# Patient Record
Sex: MALE | Race: WHITE | NOT HISPANIC OR LATINO | Employment: OTHER | URBAN - METROPOLITAN AREA
[De-identification: names, ages, dates, MRNs, and addresses within clinical notes are randomized per-mention and may not be internally consistent; named-entity substitution may affect disease eponyms.]

---

## 2017-05-19 ENCOUNTER — TRANSCRIBE ORDERS (OUTPATIENT)
Dept: ADMINISTRATIVE | Facility: HOSPITAL | Age: 72
End: 2017-05-19

## 2017-05-19 DIAGNOSIS — M19.012 PRIMARY OSTEOARTHRITIS OF LEFT SHOULDER: Primary | ICD-10-CM

## 2017-05-29 ENCOUNTER — HOSPITAL ENCOUNTER (OUTPATIENT)
Dept: CT IMAGING | Facility: HOSPITAL | Age: 72
Discharge: HOME | End: 2017-05-29
Attending: ORTHOPAEDIC SURGERY | Admitting: ORTHOPAEDIC SURGERY

## 2017-05-29 ENCOUNTER — HOSPITAL ENCOUNTER (OUTPATIENT)
Dept: GENERAL RADIOLOGY | Facility: HOSPITAL | Age: 72
Discharge: HOME | End: 2017-05-29
Admitting: ORTHOPAEDIC SURGERY

## 2017-05-29 ENCOUNTER — APPOINTMENT (OUTPATIENT)
Dept: PREADMISSION TESTING | Facility: HOSPITAL | Age: 72
End: 2017-05-29

## 2017-05-29 VITALS — BODY MASS INDEX: 44.44 KG/M2 | WEIGHT: 293.21 LBS | HEIGHT: 68 IN

## 2017-05-29 DIAGNOSIS — M19.012 PRIMARY OSTEOARTHRITIS OF LEFT SHOULDER: ICD-10-CM

## 2017-05-29 LAB
ANION GAP SERPL CALCULATED.3IONS-SCNC: 4 MMOL/L (ref 3–11)
BUN BLD-MCNC: 21 MG/DL (ref 9–23)
BUN/CREAT SERPL: 21 (ref 7–25)
CALCIUM SPEC-SCNC: 10.1 MG/DL (ref 8.7–10.4)
CHLORIDE SERPL-SCNC: 104 MMOL/L (ref 99–109)
CO2 SERPL-SCNC: 33 MMOL/L (ref 20–31)
CREAT BLD-MCNC: 1 MG/DL (ref 0.6–1.3)
DEPRECATED RDW RBC AUTO: 47.8 FL (ref 37–54)
ERYTHROCYTE [DISTWIDTH] IN BLOOD BY AUTOMATED COUNT: 13.8 % (ref 11.3–14.5)
GFR SERPL CREATININE-BSD FRML MDRD: 73 ML/MIN/1.73
GLUCOSE BLD-MCNC: 100 MG/DL (ref 70–100)
HBA1C MFR BLD: 5.6 % (ref 4.8–5.6)
HCT VFR BLD AUTO: 40.8 % (ref 38.9–50.9)
HGB BLD-MCNC: 12.5 G/DL (ref 13.1–17.5)
MCH RBC QN AUTO: 28.9 PG (ref 27–31)
MCHC RBC AUTO-ENTMCNC: 30.6 G/DL (ref 32–36)
MCV RBC AUTO: 94.4 FL (ref 80–99)
PLATELET # BLD AUTO: 258 10*3/MM3 (ref 150–450)
PMV BLD AUTO: 9.3 FL (ref 6–12)
POTASSIUM BLD-SCNC: 4.7 MMOL/L (ref 3.5–5.5)
RBC # BLD AUTO: 4.32 10*6/MM3 (ref 4.2–5.76)
SODIUM BLD-SCNC: 141 MMOL/L (ref 132–146)
WBC NRBC COR # BLD: 6.25 10*3/MM3 (ref 3.5–10.8)

## 2017-05-29 PROCEDURE — 80048 BASIC METABOLIC PNL TOTAL CA: CPT | Performed by: ORTHOPAEDIC SURGERY

## 2017-05-29 PROCEDURE — 36415 COLL VENOUS BLD VENIPUNCTURE: CPT

## 2017-05-29 PROCEDURE — 73200 CT UPPER EXTREMITY W/O DYE: CPT

## 2017-05-29 PROCEDURE — 85027 COMPLETE CBC AUTOMATED: CPT | Performed by: ORTHOPAEDIC SURGERY

## 2017-05-29 PROCEDURE — 71020 HC CHEST PA AND LATERAL: CPT

## 2017-05-29 PROCEDURE — 93010 ELECTROCARDIOGRAM REPORT: CPT | Performed by: INTERNAL MEDICINE

## 2017-05-29 PROCEDURE — 83036 HEMOGLOBIN GLYCOSYLATED A1C: CPT | Performed by: ORTHOPAEDIC SURGERY

## 2017-05-29 PROCEDURE — 93005 ELECTROCARDIOGRAM TRACING: CPT

## 2017-05-29 RX ORDER — ALLOPURINOL 100 MG/1
100 TABLET ORAL DAILY
COMMUNITY

## 2017-05-29 RX ORDER — TELMISARTAN 80 MG/1
80 TABLET ORAL DAILY
COMMUNITY

## 2017-05-29 RX ORDER — ASPIRIN 325 MG
325 TABLET ORAL DAILY
COMMUNITY

## 2017-05-29 RX ORDER — PRAVASTATIN SODIUM 80 MG/1
80 TABLET ORAL DAILY
COMMUNITY

## 2017-05-29 RX ORDER — POTASSIUM CHLORIDE 600 MG/1
8 TABLET, FILM COATED, EXTENDED RELEASE ORAL EVERY OTHER DAY
COMMUNITY

## 2017-05-29 RX ORDER — LORAZEPAM 0.5 MG/1
0.5 TABLET ORAL EVERY 8 HOURS PRN
COMMUNITY

## 2017-05-29 RX ORDER — THIAMINE MONONITRATE (VIT B1) 100 MG
100 TABLET ORAL DAILY
COMMUNITY

## 2017-05-29 RX ORDER — HYDROCODONE BITARTRATE AND ACETAMINOPHEN 10; 325 MG/1; MG/1
1 TABLET ORAL EVERY 6 HOURS PRN
COMMUNITY
End: 2017-06-13 | Stop reason: HOSPADM

## 2017-05-29 RX ORDER — FUROSEMIDE 40 MG/1
40 TABLET ORAL DAILY
COMMUNITY

## 2017-06-11 ENCOUNTER — ANESTHESIA EVENT (OUTPATIENT)
Dept: PERIOP | Facility: HOSPITAL | Age: 72
End: 2017-06-11

## 2017-06-11 RX ORDER — FAMOTIDINE 10 MG/ML
20 INJECTION, SOLUTION INTRAVENOUS ONCE
Status: CANCELLED | OUTPATIENT
Start: 2017-06-12 | End: 2017-06-12

## 2017-06-12 ENCOUNTER — APPOINTMENT (OUTPATIENT)
Dept: GENERAL RADIOLOGY | Facility: HOSPITAL | Age: 72
End: 2017-06-12

## 2017-06-12 ENCOUNTER — ANESTHESIA (OUTPATIENT)
Dept: PERIOP | Facility: HOSPITAL | Age: 72
End: 2017-06-12

## 2017-06-12 ENCOUNTER — HOSPITAL ENCOUNTER (INPATIENT)
Facility: HOSPITAL | Age: 72
LOS: 1 days | Discharge: HOME | End: 2017-06-13
Attending: ORTHOPAEDIC SURGERY | Admitting: ORTHOPAEDIC SURGERY

## 2017-06-12 DIAGNOSIS — Z78.9 IMPAIRED MOBILITY AND ADLS: Primary | ICD-10-CM

## 2017-06-12 DIAGNOSIS — Z74.09 IMPAIRED MOBILITY AND ADLS: Primary | ICD-10-CM

## 2017-06-12 PROBLEM — M19.019 PRIMARY LOCALIZED OSTEOARTHROSIS OF SHOULDER REGION: Status: ACTIVE | Noted: 2017-06-12

## 2017-06-12 PROBLEM — M25.512 ACUTE PAIN OF LEFT SHOULDER: Status: ACTIVE | Noted: 2017-06-12

## 2017-06-12 PROBLEM — I10 HYPERTENSION: Status: ACTIVE | Noted: 2017-06-12

## 2017-06-12 LAB
GLUCOSE BLDC GLUCOMTR-MCNC: 119 MG/DL (ref 70–130)
POTASSIUM BLDA-SCNC: 4.14 MMOL/L (ref 3.5–5.3)

## 2017-06-12 PROCEDURE — 25010000002 FENTANYL CITRATE (PF) 100 MCG/2ML SOLUTION: Performed by: ANESTHESIOLOGY

## 2017-06-12 PROCEDURE — 0LS40ZZ REPOSITION LEFT UPPER ARM TENDON, OPEN APPROACH: ICD-10-PCS | Performed by: ORTHOPAEDIC SURGERY

## 2017-06-12 PROCEDURE — 97166 OT EVAL MOD COMPLEX 45 MIN: CPT | Performed by: OCCUPATIONAL THERAPIST

## 2017-06-12 PROCEDURE — C1713 ANCHOR/SCREW BN/BN,TIS/BN: HCPCS | Performed by: ORTHOPAEDIC SURGERY

## 2017-06-12 PROCEDURE — 99222 1ST HOSP IP/OBS MODERATE 55: CPT | Performed by: NURSE PRACTITIONER

## 2017-06-12 PROCEDURE — C1776 JOINT DEVICE (IMPLANTABLE): HCPCS | Performed by: ORTHOPAEDIC SURGERY

## 2017-06-12 PROCEDURE — 25010000002 ROPIVACAINE PER 1 MG: Performed by: NURSE ANESTHETIST, CERTIFIED REGISTERED

## 2017-06-12 PROCEDURE — 94799 UNLISTED PULMONARY SVC/PX: CPT

## 2017-06-12 PROCEDURE — 25010000002 ONDANSETRON PER 1 MG: Performed by: NURSE ANESTHETIST, CERTIFIED REGISTERED

## 2017-06-12 PROCEDURE — 25010000002 FENTANYL CITRATE (PF) 100 MCG/2ML SOLUTION: Performed by: NURSE ANESTHETIST, CERTIFIED REGISTERED

## 2017-06-12 PROCEDURE — 25010000002 NEOSTIGMINE 10 MG/10ML SOLUTION: Performed by: NURSE ANESTHETIST, CERTIFIED REGISTERED

## 2017-06-12 PROCEDURE — 73030 X-RAY EXAM OF SHOULDER: CPT

## 2017-06-12 PROCEDURE — 25010000002 PROPOFOL 10 MG/ML EMULSION: Performed by: NURSE ANESTHETIST, CERTIFIED REGISTERED

## 2017-06-12 PROCEDURE — 82962 GLUCOSE BLOOD TEST: CPT

## 2017-06-12 PROCEDURE — 25010000002 MIDAZOLAM PER 1 MG: Performed by: NURSE ANESTHETIST, CERTIFIED REGISTERED

## 2017-06-12 PROCEDURE — 0RRK0JZ REPLACEMENT OF LEFT SHOULDER JOINT WITH SYNTHETIC SUBSTITUTE, OPEN APPROACH: ICD-10-PCS | Performed by: ORTHOPAEDIC SURGERY

## 2017-06-12 PROCEDURE — 25010000002 VANCOMYCIN HCL IN NACL 2-0.9 GM/500ML-% SOLUTION: Performed by: ORTHOPAEDIC SURGERY

## 2017-06-12 PROCEDURE — 25010000002 VANCOMYCIN: Performed by: ORTHOPAEDIC SURGERY

## 2017-06-12 PROCEDURE — 97530 THERAPEUTIC ACTIVITIES: CPT | Performed by: OCCUPATIONAL THERAPIST

## 2017-06-12 PROCEDURE — 84132 ASSAY OF SERUM POTASSIUM: CPT | Performed by: ANESTHESIOLOGY

## 2017-06-12 PROCEDURE — 25010000002 DEXAMETHASONE PER 1 MG: Performed by: NURSE ANESTHETIST, CERTIFIED REGISTERED

## 2017-06-12 DEVICE — STEM HUM PRI EQUINX PF 11MM: Type: IMPLANTABLE DEVICE | Site: HUMERUS | Status: FUNCTIONAL

## 2017-06-12 DEVICE — HD HUM EQUINOXE SHT 50MM: Type: IMPLANTABLE DEVICE | Status: FUNCTIONAL

## 2017-06-12 DEVICE — PLT HUM EQUINOXE REPLICAT OFFST 4.5: Type: IMPLANTABLE DEVICE | Site: SHOULDER | Status: FUNCTIONAL

## 2017-06-12 DEVICE — SMARTSET HIGH PERFORMANCE MV MEDIUM VISCOSITY BONE CEMENT 40G
Type: IMPLANTABLE DEVICE | Site: SHOULDER | Status: FUNCTIONAL
Brand: SMARTSET

## 2017-06-12 DEVICE — IMPLANTABLE DEVICE: Type: IMPLANTABLE DEVICE | Site: SHOULDER | Status: FUNCTIONAL

## 2017-06-12 DEVICE — SCRW EQUINOXE TORQ DEFINE KT SQ: Type: IMPLANTABLE DEVICE | Site: SHOULDER | Status: FUNCTIONAL

## 2017-06-12 DEVICE — TOTL SHLDR ARTHROPLASTY SYS: Type: IMPLANTABLE DEVICE | Status: FUNCTIONAL

## 2017-06-12 RX ORDER — SODIUM CHLORIDE 450 MG/100ML
50 INJECTION, SOLUTION INTRAVENOUS CONTINUOUS
Status: DISCONTINUED | OUTPATIENT
Start: 2017-06-12 | End: 2017-06-13

## 2017-06-12 RX ORDER — SENNA AND DOCUSATE SODIUM 50; 8.6 MG/1; MG/1
2 TABLET, FILM COATED ORAL 2 TIMES DAILY
Status: DISCONTINUED | OUTPATIENT
Start: 2017-06-12 | End: 2017-06-13 | Stop reason: HOSPADM

## 2017-06-12 RX ORDER — FENTANYL CITRATE 50 UG/ML
INJECTION, SOLUTION INTRAMUSCULAR; INTRAVENOUS AS NEEDED
Status: DISCONTINUED | OUTPATIENT
Start: 2017-06-12 | End: 2017-06-12 | Stop reason: SURG

## 2017-06-12 RX ORDER — DEXAMETHASONE SODIUM PHOSPHATE 10 MG/ML
INJECTION INTRAMUSCULAR; INTRAVENOUS AS NEEDED
Status: DISCONTINUED | OUTPATIENT
Start: 2017-06-12 | End: 2017-06-12 | Stop reason: SURG

## 2017-06-12 RX ORDER — OXYCODONE HYDROCHLORIDE AND ACETAMINOPHEN 5; 325 MG/1; MG/1
1 TABLET ORAL ONCE
Status: COMPLETED | OUTPATIENT
Start: 2017-06-12 | End: 2017-06-12

## 2017-06-12 RX ORDER — ATRACURIUM BESYLATE 10 MG/ML
INJECTION, SOLUTION INTRAVENOUS AS NEEDED
Status: DISCONTINUED | OUTPATIENT
Start: 2017-06-12 | End: 2017-06-12 | Stop reason: SURG

## 2017-06-12 RX ORDER — ROCURONIUM BROMIDE 10 MG/ML
INJECTION, SOLUTION INTRAVENOUS AS NEEDED
Status: DISCONTINUED | OUTPATIENT
Start: 2017-06-12 | End: 2017-06-12 | Stop reason: SURG

## 2017-06-12 RX ORDER — PROPOFOL 10 MG/ML
VIAL (ML) INTRAVENOUS AS NEEDED
Status: DISCONTINUED | OUTPATIENT
Start: 2017-06-12 | End: 2017-06-12 | Stop reason: SURG

## 2017-06-12 RX ORDER — SODIUM CHLORIDE 0.9 % (FLUSH) 0.9 %
1-10 SYRINGE (ML) INJECTION AS NEEDED
Status: DISCONTINUED | OUTPATIENT
Start: 2017-06-12 | End: 2017-06-12 | Stop reason: HOSPADM

## 2017-06-12 RX ORDER — FENTANYL CITRATE 50 UG/ML
50 INJECTION, SOLUTION INTRAMUSCULAR; INTRAVENOUS
Status: DISCONTINUED | OUTPATIENT
Start: 2017-06-12 | End: 2017-06-12

## 2017-06-12 RX ORDER — BISACODYL 5 MG/1
10 TABLET, DELAYED RELEASE ORAL DAILY PRN
Status: DISCONTINUED | OUTPATIENT
Start: 2017-06-12 | End: 2017-06-13 | Stop reason: HOSPADM

## 2017-06-12 RX ORDER — MIDAZOLAM HYDROCHLORIDE 1 MG/ML
INJECTION INTRAMUSCULAR; INTRAVENOUS AS NEEDED
Status: DISCONTINUED | OUTPATIENT
Start: 2017-06-12 | End: 2017-06-12 | Stop reason: SURG

## 2017-06-12 RX ORDER — NALOXONE HCL 0.4 MG/ML
0.1 VIAL (ML) INJECTION
Status: DISCONTINUED | OUTPATIENT
Start: 2017-06-12 | End: 2017-06-13 | Stop reason: HOSPADM

## 2017-06-12 RX ORDER — GLYCOPYRROLATE 0.2 MG/ML
INJECTION INTRAMUSCULAR; INTRAVENOUS AS NEEDED
Status: DISCONTINUED | OUTPATIENT
Start: 2017-06-12 | End: 2017-06-12 | Stop reason: SURG

## 2017-06-12 RX ORDER — DOCUSATE SODIUM 100 MG/1
100 CAPSULE, LIQUID FILLED ORAL 2 TIMES DAILY PRN
Status: DISCONTINUED | OUTPATIENT
Start: 2017-06-12 | End: 2017-06-13 | Stop reason: HOSPADM

## 2017-06-12 RX ORDER — HYDROMORPHONE HYDROCHLORIDE 1 MG/ML
0.5 INJECTION, SOLUTION INTRAMUSCULAR; INTRAVENOUS; SUBCUTANEOUS
Status: DISCONTINUED | OUTPATIENT
Start: 2017-06-12 | End: 2017-06-13 | Stop reason: HOSPADM

## 2017-06-12 RX ORDER — MELOXICAM 15 MG/1
15 TABLET ORAL DAILY
COMMUNITY
End: 2017-06-13 | Stop reason: HOSPADM

## 2017-06-12 RX ORDER — VANCOMYCIN 2 GRAM/500 ML IN 0.9 % SODIUM CHLORIDE INTRAVENOUS
15 ONCE
Status: COMPLETED | OUTPATIENT
Start: 2017-06-12 | End: 2017-06-12

## 2017-06-12 RX ORDER — ATORVASTATIN CALCIUM 20 MG/1
20 TABLET, FILM COATED ORAL NIGHTLY
Status: DISCONTINUED | OUTPATIENT
Start: 2017-06-12 | End: 2017-06-13 | Stop reason: HOSPADM

## 2017-06-12 RX ORDER — ROPIVACAINE HYDROCHLORIDE 2 MG/ML
INJECTION, SOLUTION EPIDURAL; INFILTRATION; PERINEURAL AS NEEDED
Status: DISCONTINUED | OUTPATIENT
Start: 2017-06-12 | End: 2017-06-12 | Stop reason: SURG

## 2017-06-12 RX ORDER — ROPIVACAINE HYDROCHLORIDE 2 MG/ML
6 INJECTION, SOLUTION EPIDURAL; INFILTRATION CONTINUOUS
Status: DISCONTINUED | OUTPATIENT
Start: 2017-06-12 | End: 2017-06-13 | Stop reason: HOSPADM

## 2017-06-12 RX ORDER — SODIUM CHLORIDE, SODIUM LACTATE, POTASSIUM CHLORIDE, CALCIUM CHLORIDE 600; 310; 30; 20 MG/100ML; MG/100ML; MG/100ML; MG/100ML
9 INJECTION, SOLUTION INTRAVENOUS CONTINUOUS
Status: DISCONTINUED | OUTPATIENT
Start: 2017-06-12 | End: 2017-06-13 | Stop reason: HOSPADM

## 2017-06-12 RX ORDER — ONDANSETRON 2 MG/ML
INJECTION INTRAMUSCULAR; INTRAVENOUS AS NEEDED
Status: DISCONTINUED | OUTPATIENT
Start: 2017-06-12 | End: 2017-06-12 | Stop reason: SURG

## 2017-06-12 RX ORDER — FAMOTIDINE 20 MG/1
20 TABLET, FILM COATED ORAL ONCE
Status: COMPLETED | OUTPATIENT
Start: 2017-06-12 | End: 2017-06-12

## 2017-06-12 RX ORDER — CEFAZOLIN SODIUM IN 0.9 % NACL 3 G/100 ML
3 INTRAVENOUS SOLUTION, PIGGYBACK (ML) INTRAVENOUS ONCE
Status: COMPLETED | OUTPATIENT
Start: 2017-06-12 | End: 2017-06-12

## 2017-06-12 RX ORDER — ALLOPURINOL 100 MG/1
100 TABLET ORAL DAILY
Status: DISCONTINUED | OUTPATIENT
Start: 2017-06-12 | End: 2017-06-13 | Stop reason: HOSPADM

## 2017-06-12 RX ORDER — NEOSTIGMINE METHYLSULFATE 1 MG/ML
INJECTION, SOLUTION INTRAVENOUS AS NEEDED
Status: DISCONTINUED | OUTPATIENT
Start: 2017-06-12 | End: 2017-06-12 | Stop reason: SURG

## 2017-06-12 RX ORDER — ACETAMINOPHEN 325 MG/1
650 TABLET ORAL ONCE
Status: COMPLETED | OUTPATIENT
Start: 2017-06-12 | End: 2017-06-12

## 2017-06-12 RX ORDER — LIDOCAINE HYDROCHLORIDE 10 MG/ML
0.5 INJECTION, SOLUTION EPIDURAL; INFILTRATION; INTRACAUDAL; PERINEURAL ONCE AS NEEDED
Status: COMPLETED | OUTPATIENT
Start: 2017-06-12 | End: 2017-06-12

## 2017-06-12 RX ORDER — MAGNESIUM HYDROXIDE 1200 MG/15ML
LIQUID ORAL AS NEEDED
Status: DISCONTINUED | OUTPATIENT
Start: 2017-06-12 | End: 2017-06-12 | Stop reason: HOSPADM

## 2017-06-12 RX ORDER — TELMISARTAN 40 MG/1
80 TABLET ORAL DAILY
Status: DISCONTINUED | OUTPATIENT
Start: 2017-06-12 | End: 2017-06-13 | Stop reason: HOSPADM

## 2017-06-12 RX ORDER — ESMOLOL HYDROCHLORIDE 10 MG/ML
INJECTION INTRAVENOUS AS NEEDED
Status: DISCONTINUED | OUTPATIENT
Start: 2017-06-12 | End: 2017-06-12 | Stop reason: SURG

## 2017-06-12 RX ORDER — ACETAMINOPHEN 650 MG
TABLET, EXTENDED RELEASE ORAL AS NEEDED
Status: DISCONTINUED | OUTPATIENT
Start: 2017-06-12 | End: 2017-06-12 | Stop reason: HOSPADM

## 2017-06-12 RX ORDER — THIAMINE MONONITRATE (VIT B1) 100 MG
100 TABLET ORAL DAILY
Status: DISCONTINUED | OUTPATIENT
Start: 2017-06-12 | End: 2017-06-13 | Stop reason: HOSPADM

## 2017-06-12 RX ORDER — OXYCODONE AND ACETAMINOPHEN 7.5; 325 MG/1; MG/1
1 TABLET ORAL EVERY 4 HOURS PRN
Status: DISCONTINUED | OUTPATIENT
Start: 2017-06-12 | End: 2017-06-13 | Stop reason: HOSPADM

## 2017-06-12 RX ORDER — LIDOCAINE HYDROCHLORIDE 20 MG/ML
INJECTION, SOLUTION INFILTRATION; PERINEURAL AS NEEDED
Status: DISCONTINUED | OUTPATIENT
Start: 2017-06-12 | End: 2017-06-12 | Stop reason: SURG

## 2017-06-12 RX ADMIN — Medication 3 G: at 08:57

## 2017-06-12 RX ADMIN — ACETAMINOPHEN 650 MG: 325 TABLET, FILM COATED ORAL at 12:51

## 2017-06-12 RX ADMIN — Medication 100 MG: at 17:13

## 2017-06-12 RX ADMIN — ROCURONIUM BROMIDE 50 MG: 10 INJECTION INTRAVENOUS at 09:16

## 2017-06-12 RX ADMIN — VANCOMYCIN HYDROCHLORIDE 2000 MG: 1 INJECTION, POWDER, LYOPHILIZED, FOR SOLUTION INTRAVENOUS at 20:24

## 2017-06-12 RX ADMIN — Medication 6 ML/HR: at 12:15

## 2017-06-12 RX ADMIN — NEOSTIGMINE METHYLSULFATE 2.5 MG: 1 INJECTION, SOLUTION INTRAVENOUS at 11:28

## 2017-06-12 RX ADMIN — FENTANYL CITRATE 100 MCG: 50 INJECTION, SOLUTION INTRAMUSCULAR; INTRAVENOUS at 08:25

## 2017-06-12 RX ADMIN — ATRACURIUM BESYLATE 10 MG: 10 INJECTION, SOLUTION INTRAVENOUS at 10:13

## 2017-06-12 RX ADMIN — FENTANYL CITRATE 50 MCG: 50 INJECTION, SOLUTION INTRAMUSCULAR; INTRAVENOUS at 12:28

## 2017-06-12 RX ADMIN — DEXAMETHASONE SODIUM PHOSPHATE 4 MG: 10 INJECTION INTRAMUSCULAR; INTRAVENOUS at 09:51

## 2017-06-12 RX ADMIN — SODIUM CHLORIDE 50 ML/HR: 4.5 INJECTION, SOLUTION INTRAVENOUS at 12:53

## 2017-06-12 RX ADMIN — Medication 2000 MG: at 09:11

## 2017-06-12 RX ADMIN — LIDOCAINE HYDROCHLORIDE 50 MG: 20 INJECTION, SOLUTION INFILTRATION; PERINEURAL at 09:15

## 2017-06-12 RX ADMIN — ROPIVACAINE HYDROCHLORIDE 15 ML: 2 INJECTION, SOLUTION EPIDURAL; INFILTRATION at 12:32

## 2017-06-12 RX ADMIN — LIDOCAINE HYDROCHLORIDE 0.5 ML: 10 INJECTION, SOLUTION EPIDURAL; INFILTRATION; INTRACAUDAL; PERINEURAL at 07:48

## 2017-06-12 RX ADMIN — ATRACURIUM BESYLATE 10 MG: 10 INJECTION, SOLUTION INTRAVENOUS at 09:49

## 2017-06-12 RX ADMIN — FAMOTIDINE 20 MG: 20 TABLET ORAL at 07:48

## 2017-06-12 RX ADMIN — SODIUM CHLORIDE, POTASSIUM CHLORIDE, SODIUM LACTATE AND CALCIUM CHLORIDE 9 ML/HR: 600; 310; 30; 20 INJECTION, SOLUTION INTRAVENOUS at 07:48

## 2017-06-12 RX ADMIN — ROBINUL 0.4 MG: 0.2 INJECTION INTRAMUSCULAR; INTRAVENOUS at 11:28

## 2017-06-12 RX ADMIN — ATORVASTATIN CALCIUM 20 MG: 20 TABLET, FILM COATED ORAL at 20:24

## 2017-06-12 RX ADMIN — MIDAZOLAM HYDROCHLORIDE 2 MG: 1 INJECTION, SOLUTION INTRAMUSCULAR; INTRAVENOUS at 08:25

## 2017-06-12 RX ADMIN — PROPOFOL 200 MG: 10 INJECTION, EMULSION INTRAVENOUS at 09:16

## 2017-06-12 RX ADMIN — ESMOLOL HYDROCHLORIDE 5 MG: 10 INJECTION, SOLUTION INTRAVENOUS at 10:22

## 2017-06-12 RX ADMIN — ATRACURIUM BESYLATE 5 MG: 10 INJECTION, SOLUTION INTRAVENOUS at 10:46

## 2017-06-12 RX ADMIN — FENTANYL CITRATE 50 MCG: 50 INJECTION, SOLUTION INTRAMUSCULAR; INTRAVENOUS at 12:15

## 2017-06-12 RX ADMIN — SERTRALINE 50 MG: 50 TABLET, FILM COATED ORAL at 17:13

## 2017-06-12 RX ADMIN — ESMOLOL HYDROCHLORIDE 10 MG: 10 INJECTION, SOLUTION INTRAVENOUS at 11:01

## 2017-06-12 RX ADMIN — ONDANSETRON 4 MG: 2 INJECTION INTRAMUSCULAR; INTRAVENOUS at 10:43

## 2017-06-12 RX ADMIN — OXYCODONE HYDROCHLORIDE AND ACETAMINOPHEN 1 TABLET: 7.5; 325 TABLET ORAL at 17:13

## 2017-06-12 RX ADMIN — TELMISARTAN 80 MG: 40 TABLET ORAL at 17:13

## 2017-06-12 RX ADMIN — OXYCODONE AND ACETAMINOPHEN 1 TABLET: 5; 325 TABLET ORAL at 12:50

## 2017-06-12 RX ADMIN — OXYCODONE HYDROCHLORIDE AND ACETAMINOPHEN 1 TABLET: 7.5; 325 TABLET ORAL at 21:40

## 2017-06-12 RX ADMIN — ALLOPURINOL 100 MG: 100 TABLET ORAL at 17:13

## 2017-06-12 RX ADMIN — Medication 2 TABLET: at 17:13

## 2017-06-12 NOTE — PLAN OF CARE
Problem: Inpatient Occupational Therapy  Goal: Range of Motion Goal LTG- OT  Outcome: Ongoing (interventions implemented as appropriate)    06/12/17 1501   Range of Motion OT LTG   Range of Motion Goal OT LTG, Date Established 06/12/17   Range of Motion Goal OT LTG, Time to Achieve 1 wk   Range of Motion Goal OT LTG, AROM Measure Pt and spouse will complete LUE HEP within physician parameters with min assist and min cues in preparation for safe DC home        Goal: Patient Education Goal LTG- OT  Outcome: Ongoing (interventions implemented as appropriate)    06/12/17 1501   Patient Education OT LTG   Patient Education OT LTG, Date Established 06/12/17   Patient Education OT LTG, Time to Achieve by discharge   Patient Education OT LTG, Education Type written program;HEP;precautions per surgeon;1 hand/mich technique;brace use/care;home safety;john/doff brace   Patient Education OT LTG, Education Understanding demonstrates adequately;verbalizes understanding       Goal: UB Dressing Goal LTG- OT  Outcome: Ongoing (interventions implemented as appropriate)    06/12/17 1501   UB Dressing OT LTG   UB Dressing Goal OT LTG, Date Established 06/12/17   UB Dressing Goal OT LTG, Time to Achieve 1 wk   UB Dressing Goal OT LTG, Activity Type Pt and spouse will don/doff LUE sling with min assist and min cues in preparation for safe DC home.

## 2017-06-12 NOTE — THERAPY EVALUATION
Acute Care - Occupational Therapy Initial Evaluation  [facility]     Patient Name: Ibrahima Nava  : 1945  MRN: [id number]  Today's Date: 2017  Onset of Illness/Injury or Date of Surgery Date: 17  Date of Referral to OT: 17  Referring Physician: Dr. Xavier    Admit Date: 2017       ICD-10-CM ICD-9-CM   1. Impaired mobility and ADLs Z74.09 799.89     Patient Active Problem List   Diagnosis   • Primary localized osteoarthrosis of shoulder region   • Hypertension   • Impaired physical mobility   • Acute pain of left shoulder     Past Medical History:   Diagnosis Date   • Anxiety    • Arthritis    • Hypertension     CONTROLLED WITH MEDS PER PT    • Shoulder pain, left    • Stroke     -- NO RESIDUAL PROBLEMS   • Tremor of right hand    • Wears dentures     UPPER AND LOWER PLATE      Past Surgical History:   Procedure Laterality Date   • CARDIAC CATHETERIZATION      NO CORONARY STENTS    • COLONOSCOPY     • TOTAL KNEE ARTHROPLASTY Right    • TOTAL SHOULDER REPLACEMENT Right           OT ASSESSMENT FLOWSHEET (last 72 hours)      OT Evaluation       17 1420 17 1413 17 1355 17 1321 17 1300    Rehab Evaluation    Document Type   evaluation;therapy note (daily note)  -AR      Subjective Information   no complaints;agree to therapy  -AR      Patient Effort, Rehab Treatment   excellent  -AR      Symptoms Noted During/After Treatment   none  -AR      General Information    Patient Profile Review   yes  -AR      Onset of Illness/Injury or Date of Surgery Date   17  -AR      Referring Physician   Dr. Xavier  -AR      General Observations   Pt supine, spouse at bedside  -AR      Pertinent History Of Current Problem   Pt is a 72 yom admitted for surgical management of progressive left shoulder pain and dysfunction that has failed to improve with conservative measures. Pt is POD#0 left TSA and OT orders indicate left shoudler PROM FE no limit, IR chest, ER  30; AROM elbow/wrist/hand and scapular retractions. Pt is R-hand dominant and underwent right TSA x 1 yr ago. PTA, pt reports difficulty sleeping d/t pain and has been sleeping in a recliner with minimal improvement. He was unable to loop a belt and had pain when securing safety belt in the car. 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   -AR      Precautions/Limitations   fall precautions;insensate limb;shoulder precautions;non-weight bearing status   NWB LUE, interscalene nerve catheter  -AR      Prior Level of Function   independent:;all household mobility;community mobility;gait;transfer;min assist:;ADL's;driving;work  -AR      Equipment Currently Used at Home none  -MH  shower chair  -AR  none  -NT    Plans/Goals Discussed With   patient and family;agreed upon  -AR      Risks Reviewed   patient and family:;LOB;nausea/vomiting;dizziness;increased discomfort;change in vital signs;increased drainage;lines disloged  -AR      Benefits Reviewed   patient and family:;improve function;increase independence;increase strength;increase balance;decrease pain;decrease risk of DVT;increase knowledge  -AR      Barriers to Rehab   none identified  -AR      Living Environment    Lives With spouse  -MH  spouse  -AR      Living Arrangements house  -MH  house  -AR      Home Accessibility no concerns  -MH  stairs to enter home;tub/shower is not walk in;bed and bath on same level  -AR      Number of Stairs to Enter Home   1  -AR      Stair Railings at Home   none  -AR      Transportation Available car;family or friend will provide  -  car;family or friend will provide  -AR      Living Environment Comment   Pt lives with his spouse who can assist him as needed  -AR      Clinical Impression    Date of Referral to OT   06/12/17  -AR      OT Diagnosis   decreased independence with ADLS  -AR      Patient/Family Goals Statement   use a chainsaw again  -AR      Criteria for Skilled Therapeutic  Interventions Met   yes;treatment indicated  -AR      Rehab Potential   good, to achieve stated therapy goals  -AR      Therapy Frequency   daily   per priority policy  -AR      Anticipated Discharge Disposition   home with assist  -AR      Functional Level Prior    Ambulation  0-->independent  -MH   0-->independent  -NT    Transferring  0-->independent  -MH   0-->independent  -NT    Toileting  0-->independent  -MH   0-->independent  -NT    Bathing  0-->independent  -MH   0-->independent  -NT    Dressing  0-->independent  -MH   0-->independent  -NT    Eating  0-->independent  -MH   0-->independent  -NT    Communication  0-->understands/communicates without difficulty  -MH   0-->understands/communicates without difficulty  -NT    Swallowing  0-->swallows foods/liquids without difficulty  -MH   0-->swallows foods/liquids without difficulty  -NT    Prior Functional Level Comment     n/a  -NT    Pain Assessment    Pain Assessment   0-10  -AR      Pain Score   6  -AR      Post Pain Score   6  -AR      Pain Type   Acute pain  -AR      Pain Location   Shoulder  -AR      Pain Orientation   Left   axilla region  -AR      Pain Intervention(s)   Medication (See MAR);Repositioned;Ambulation/increased activity  -AR      Response to Interventions   tolerated  -AR      Vision Assessment/Intervention    Visual Impairment   WNL  -AR      Cognitive Assessment/Intervention    Current Cognitive/Communication Assessment   functional  -AR      Orientation Status   oriented x 4  -AR      Follows Commands/Answers Questions   100% of the time;able to follow multi-step instructions  -AR      Personal Safety   WNL/WFL  -AR      Personal Safety Interventions   fall prevention program maintained  -AR      ROM (Range of Motion)    General ROM   --   RUE WNLMARIA DEL CARMEN impaired  -AR      MMT (Manual Muscle Testing)    General MMT Assessment   --   RUCRISTIN WFL, MARIA DEL CARMEN deferred  -AR      Muscle Tone Assessment    Muscle Tone Assessment    LUE  -NT     LUE  Muscle Tone Assessment    mildly decreased tone  -NT     Mobility Assessment/Training    Extremity Weight-Bearing Status   left upper extremity  -AR      Left Upper Extremity Weight-Bearing   non weight-bearing  -AR      Bed Mobility, Assessment/Treatment    Bed Mobility, Scoot/Bridge, Bow   supervision required  -AR      Bed Mobility, Comment   educated pt on maintaining NWB LUE with bed mobility  -AR      Transfer Assessment/Treatment    Transfer, Comment   pt declined c/o fatigue  -AR      Upper Body Bathing Assessment/Training    UB Bathing Assess/Train, Comment   educated pt on left shoulder precation and left axilla care to maintain  -AR      Upper Body Dressing Assessment/Training    UB Dressing Assess/Train, Clothing Type   doffing:;donning:   sling  -AR      UB Dressing Assess/Train, Assist Device   mich technique  -AR      UB Dressing Assess/Train, Position   supine  -AR      UB Dressing Assess/Train, Bow   maximum assist (25% patient effort);verbal cues required  -AR      UB Dressing Assess/Train, Impairments   ROM decreased;sensation decreased;strength decreased;pain;other (see comments)   shoulder precautions  -AR      UB Dressing Assess/Train, Comment   Reviewed left shoulder precautions and ADL retraining to maintain, care of ON-Q ball with ADLs, sling mangement including donning/doffing/application and management.   -AR      Therapy Exercises    Left Upper Extremity   AROM:;10 reps;supine;hand pumps;shoulder protraction/retraction;AAROM:;elbow flexion/extension;pronation/supination;PROM:;shoulder extension/flexion;shoulder ER/IR   tolerated , IR chest, ER 30  -AR      Exercise Protocols   --   issued/reviewed LUE HEP, spouse declined teachback  -AR      Sensory Assessment/Intervention    Light Touch   LUE  -AR      LUE Light Touch   moderate impairment  -AR      General Therapy Interventions    ADL Retraining   Educated pt/spouse on left shoudler precautions and ADL retraining  to maintain, care of On-Q ball with ADLS and sling management  -AR      Positioning and Restraints    Pre-Treatment Position   in bed  -AR      Post Treatment Position   bed  -AR      In Bed   supine;call light within reach;encouraged to call for assist;exit alarm on;with family/caregiver;with brace  -AR        06/12/17 4885                Living Environment    Lives With spouse  -MY        Living Arrangements house  -MY        Transportation Available car;family or friend will provide  -MY        Functional Level Prior    Ambulation 0-->independent  -MY        Transferring 0-->independent  -MY        Toileting 0-->independent  -MY        Bathing 0-->independent  -MY        Dressing 0-->independent  -MY        Eating 0-->independent  -MY        Communication 0-->understands/communicates without difficulty  -MY        Swallowing 0-->swallows foods/liquids without difficulty  -MY          User Key  (r) = Recorded By, (t) = Taken By, (c) = Cosigned By    Initials Name Effective Dates    AR Anita Hu OT 06/22/15 -     MY Ewa Oreilly RN 06/16/16 -     ASHLEY Evans RN 03/14/16 -     NT Gabriela Shell RN 03/31/17 -            Occupational Therapy Education     Title: PT OT SLP Therapies (Done)     Topic: Occupational Therapy (Done)     Point: ADL training (Done)    Description: Instruct learner(s) on proper safety adaptation and remediation techniques during self care or transfers.   Instruct in proper use of assistive devices.    Learning Progress Summary    Learner Readiness Method Response Comment Documented by Status   Patient SOFIYA Cook,AIDA,H RYLEE,NR Reviewed left shouler precautions, ADL retraining to maintain, care of On-Q ball with ADLS, NWB LUE, LUE HEP AR 06/12/17 1459 Done   Significant Other SOFIYA Cook D,H RYLEE,DIANE Reviewed left shouler precautions, ADL retraining to maintain, care of On-Q ball with ADLS, NWB LUE, LUE HEP AR 06/12/17 1459 Done               Point: Home exercise program  (Done)    Description: Instruct learner(s) on appropriate technique for monitoring, assisting and/or progressing therapeutic exercises/activities.    Learning Progress Summary    Learner Readiness Method Response Comment Documented by Status   Patient Eager SOFIYA AMARAL D,H VU,NR Reviewed left shouler precautions, ADL retraining to maintain, care of On-Q ball with ADLS, NWB LUE LUE HEP AR 06/12/17 1459 Done   Significant Other [place] SOFIYA AMARAL,AIDA,H VU,NR Reviewed left shouler precautions, ADL retraining to maintain, care of On-Q ball with ADLS, NWB LUE, LUE HEP AR 06/12/17 1459 Done               Point: Precautions (Done)    Description: Instruct learner(s) on prescribed precautions during self-care and functional transfers.    Learning Progress Summary    Learner Readiness Method Response Comment Documented by Status   Patient Eager ESOFIYA D,H VU,NR Reviewed left shouler precautions, ADL retraining to maintain, care of On-Q ball with ADLS, NWB LUE, LUE HEP AR 06/12/17 1459 Done   Significant Other [place] SOFIYA AMARAL D,H VU,NR Reviewed left shouler precautions, ADL retraining to maintain, care of On-Q ball with ADLS, NWB LUE, LUE HEP AR 06/12/17 1459 Done               Point: Body mechanics (Done)    Description: Instruct learner(s) on proper positioning and spine alignment during self-care, functional mobility activities and/or exercises.    Learning Progress Summary    Learner Readiness Method Response Comment Documented by Status   Patient Eager E,TB,AIDA,H VU,NR Reviewed left shouler precautions, ADL retraining to maintain, care of On-Q ball with ADLS, NWB LUE, LUE HEP AR 06/12/17 1459 Done   Significant Other [place] SOFIYA AMARAL D,H VU,NR Reviewed left shouler precautions, ADL retraining to maintain, care of On-Q ball with ADLS, NWB LUE, LUE HEP AR 06/12/17 1459 Done                      User Key     Initials Effective Dates Name Provider Type Discipline    AR 06/22/15 -  Anita Hu OT Occupational Therapist OT                  OT  Recommendation and Plan  Anticipated Discharge Disposition: home with assist  Therapy Frequency: daily (per priority policy)  Plan of Care Review  Plan Of Care Reviewed With: patient, spouse  Outcome Summary/Follow up Plan: Pt rated pain 6/10 at axilla. He tolerated PROM , IR chest and ER 30. Recommend DC home with assist from spouse.           OT Goals       06/12/17 1503 06/12/17 1501       Bed Mobility OT LTG    Bed Mobility OT LTG, Date Established 06/12/17  -AR      Bed Mobility OT LTG, Time to Achieve by discharge  -AR      Bed Mobility OT LTG, Activity Type supine to sit/sit to supine  -AR      Bed Mobility OT LTG, [place] Level verbal cues required;minimum assist (75% patient effort)   while maintaining NWB LUE  -AR      Transfer Training OT LTG    Transfer Training OT LTG, Date Established 06/12/17  -AR      Transfer Training OT LTG, Activity Type sit to stand/stand to sit  -AR      Transfer Training OT LTG, [place] Level verbal cues required;contact guard assist  -AR      Range of Motion OT LTG    Range of Motion Goal OT LTG, Date Established  06/12/17  -AR     Range of Motion Goal OT LTG, Time to Achieve  1 wk  -AR     Range of Motion Goal OT LTG, AROM Measure  Pt and spouse will complete LUE HEP within physician parameters with min assist and min cues in preparation for safe DC home    -AR     Patient Education OT LTG    Patient Education OT LTG, Date Established  06/12/17  -AR     Patient Education OT LTG, Time to Achieve  by discharge  -AR     Patient Education OT LTG, Education Type  written program;HEP;precautions per surgeon;1 hand/mich technique;brace use/care;home safety;john/doff brace  -AR     Patient Education OT LTG, Education Understanding  demonstrates adequately;verbalizes understanding  -AR     UB Dressing OT LTG    UB Dressing Goal OT LTG, Date Established  06/12/17  -AR     UB Dressing Goal OT LTG, Time to Achieve  1 wk  -AR     UB Dressing Goal OT LTG, Activity Type  Pt  and spouse will don/doff LUE sling with min assist and min cues in preparation for safe DC home.   -AR       User Key  (r) = Recorded By, (t) = Taken By, (c) = Cosigned By    Initials Name Provider Type    JASON Hu OT Occupational Therapist                Outcome Measures       06/12/17 1355          How much help from another is currently needed...    Putting on and taking off regular lower body clothing? 2  -AR      Bathing (including washing, rinsing, and drying) 2  -AR      Toileting (which includes using toilet bed pan or urinal) 2  -AR      Putting on and taking off regular upper body clothing 2  -AR      Taking care of personal grooming (such as brushing teeth) 3  -AR      Eating meals 3  -AR      Score 14  -AR      Functional Assessment    Outcome Measure Options AM-PAC 6 Clicks Daily Activity (OT)  -AR        User Key  (r) = Recorded By, (t) = Taken By, (c) = Cosigned By    Initials Name Provider Type    JASON Hu OT Occupational Therapist          Time Calculation:   OT Start Time: 1355    Therapy Charges for Today     Code Description Service Date Service Provider Modifiers Qty    [id number]  OT EVAL MOD COMPLEXITY 3 6/12/2017 Anita Hu OT GO 1    [id number]  OT THERAPEUTIC ACT EA 15 MIN 6/12/2017 Anita Hu OT GO 2               Anita Hu OT  6/12/2017

## 2017-06-12 NOTE — PLAN OF CARE
Problem: Pain, Acute (Adult)  Goal: Identify Related Risk Factors and Signs and Symptoms  Outcome: Ongoing (interventions implemented as appropriate)    06/12/17 1428   Pain, Acute   Related Risk Factors (Acute Pain) surgery   Signs and Symptoms (Acute Pain) alteration in muscle tone       Goal: Acceptable Pain Control/Comfort Level  Outcome: Ongoing (interventions implemented as appropriate)    06/12/17 1428   Pain, Acute (Adult)   Acceptable Pain Control/Comfort Level making progress toward outcome

## 2017-06-12 NOTE — ANESTHESIA PROCEDURE NOTES
Airway  Urgency: elective    Airway not difficult    General Information and Staff    Patient location during procedure: OR    Indications and Patient Condition  Indications for airway management: airway protection    Preoxygenated: yes  Mask difficulty assessment: 1 - vent by mask    Final Airway Details  Final airway type: endotracheal airway      Successful airway: ETT  Cuffed: yes   Successful intubation technique: direct laryngoscopy  Facilitating devices/methods: intubating stylet  Endotracheal tube insertion site: oral  Blade: [place]  Blade size: #2  ETT size: 7.5 mm  Cormack-Lehane Classification: grade I - full view of glottis  Placement verified by: chest auscultation and capnometry   Measured from: lips  ETT to lips (cm): 22  Number of attempts at approach: 1

## 2017-06-12 NOTE — PLAN OF CARE
Problem: Patient Care Overview (Adult)  Goal: Plan of Care Review  Outcome: Ongoing (interventions implemented as appropriate)    06/12/17 1503   Coping/Psychosocial Response Interventions   Plan Of Care Reviewed With patient;spouse   Outcome Evaluation   Outcome Summary/Follow up Plan Pt rated pain 6/10 at axilla. He tolerated PROM , IR chest and ER 30. Recommend DC home with assist from spouse.          Problem: Inpatient Occupational Therapy  Goal: Bed Mobility Goal LTG- OT  Outcome: Ongoing (interventions implemented as appropriate)    06/12/17 1503   Bed Mobility OT LTG   Bed Mobility OT LTG, Date Established 06/12/17   Bed Mobility OT LTG, Time to Achieve by discharge   Bed Mobility OT LTG, Activity Type supine to sit/sit to supine   Bed Mobility OT LTG, [place] Level verbal cues required;minimum assist (75% patient effort)  (while maintaining NWB LUE)       Goal: Transfer Training Goal 1 LTG- OT  Outcome: Ongoing (interventions implemented as appropriate)

## 2017-06-12 NOTE — BRIEF OP NOTE
TOTAL SHOULDER ARTHROPLASTY  Procedure Note    Ibrahima Nava  6/12/2017    Pre-op Diagnosis:   * No pre-op diagnosis entered *    Post-op Diagnosis:     Post-Op Diagnosis Codes:     * Primary localized osteoarthrosis of shoulder region, left [M19.012]     * Left bicipital tenosynovitis [M75.22]    Procedure/CPT® Codes:  DE RECONSTR TOTAL SHOULDER IMPLANT [75648]  DE REPAIR BICEPS LONG TENDON [76185]    Procedure(s):  LEFT TOTAL SHOULDER ARTHROPLASTY     Surgeon(s):  MD Pradeep Doe MD, PGY-6 Sports Fellow  Lauri Lizarraga MD, PGY-5    Anesthesia: General with Block    Staff:   Circulator: Davon Reynoso RN; Brii Watkins RN; Nafisa Gonzalez RN  Scrub Person: Bairon Sandoval; Chloe Uriostegui; Fern Hernandez  Vendor Representative: Sajan Peoples  Nursing Assistant: Ronnie Montes    Estimated Blood Loss: 250 mL  Urine Voided: * No values recorded between 6/12/2017  9:11 AM and 6/12/2017 11:51 AM *    Specimens:                * No specimens in log *      Drains:           Findings: per dictation    Complications: none      Michael Xavier MD     Date: 6/12/2017  Time: 12:37 PM

## 2017-06-12 NOTE — ANESTHESIA POSTPROCEDURE EVALUATION
Patient: Ibrahima Nava    Procedure Summary     Date Anesthesia Start Anesthesia Stop Room / Location    06/12/17 0911 1155 [facility] OR 13 / [facility] OR       Procedure Diagnosis Surgeon Provider    LEFT TOTAL SHOULDER ARTHROPLASTY  (Left Shoulder) No diagnosis on file. MD Steve Doe MD          Anesthesia Type: general  Last vitals  BP      Temp      Pulse     Resp      SpO2        Post Anesthesia Care and Evaluation    Patient location during evaluation: PACU  Patient participation: complete - patient participated  Level of consciousness: awake and alert  Pain score: 0  Pain management: adequate  Airway patency: patent  Anesthetic complications: No anesthetic complications  PONV Status: none  Cardiovascular status: hemodynamically stable and acceptable  Respiratory status: nonlabored ventilation, acceptable and nasal cannula  Hydration status: acceptable

## 2017-06-12 NOTE — ANESTHESIA PREPROCEDURE EVALUATION
Anesthesia Evaluation     Patient summary reviewed and Nursing notes reviewed   NPO Solid Status: > 8 hours  NPO Liquid Status: > 8 hours     Airway   Mallampati: II  TM distance: >3 FB  Neck ROM: full  no difficulty expected  Dental    (+) upper dentures and lower dentures    Pulmonary    (+) a smoker (20 years ago) Former,   Cardiovascular     ECG reviewed    (+) hypertension,       Neuro/Psych  (+) CVA (no resid), psychiatric history Anxiety,    GI/Hepatic/Renal/Endo      Musculoskeletal     Abdominal    Substance History      OB/GYN          Other                                        Anesthesia Plan    ASA 2     general   (Interscalene catheter)  intravenous induction   Anesthetic plan and risks discussed with patient.    Plan discussed with CRNA.

## 2017-06-12 NOTE — OP NOTE
DATE OF OPERATION: 06/12/17  PREOPERATIVE DIAGNOSIS: Left shoulder degenerative joint disease with pain and limitation of function and motion.    POSTOPERATIVE DIAGNOSES:  1. 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    2. Biceps tenosynovitis.    PROCEDURES PERFORMED:  1. Left total shoulder arthroplasty.    2. Left biceps tenodesis.    SURGEON: Michael Xavier MD  ASSISTANTS:  1. Pradeep Brown MD, PGY-6 Sports Fellow  2. Lauri Lizarraga MD, PGY-5    ANESTHESIA: General plus block.    ESTIMATED BLOOD LOSS:250mL.    COMPLICATIONS: None.    DISPOSITION: Recovery room in stable condition.    IMPLANTS: [employer] Equinoxe total shoulder system, 11 mm stem press-fit, 4.5 replicator plate, 50 short humeral head, and large beta cage glenoid peripherally cemented.    INDICATIONS: This is a 72-year-old male with left shoulder pain and limited function and motion secondary to DJD. They have failed conservative treatment and after a discussion of risks, benefits, and alternatives, wished to proceed with shoulder arthroplasty.  DESCRIPTION OF PROCEDURE: On the day of surgery, he identified the left shoulder as the correct operative extremity. This was initialed by the surgeon with the patients's acknowledgment. The patient underwent placement of an interscalene block and was taken to the operating room and placed in the supine position. Upon induction of adequate anesthesia, the patient was brought up to the beach chair position and the shoulder and upper extremity were prepped and draped in the usual sterile fashion. Timeout confirmed the correct patient and operative extremity as well as that antibiotics were on board. A standard deltopectoral approach to the shoulder was carried out. It was carried sharply through the skin and subcutaneous tissue. Medial and lateral flaps were developed over the deltopectoral fascia. The cephalic vein was identified and mobilized medially with the  deltoid. The subdeltoid and subpectoral spaces were mobilized and a blunt retractor was placed deep to this. The clavipectoral fascia was opened on the lateral edge of the conjoined tendon and the retractor was moved deep to this. The leading edge of the pectoralis was released exposing the long head of the biceps. This was tenosynovitic. It was tenodesed to the pectoralis and released proximal to this. The clavipectoral fascia was opened on the lateral edge of conjoined tendon and the retractor moved deep to this. The 3 sisters were identified and coagulated. A subscapularis tenotomy was performed 1 cm medial to the lesser tuberosity and rotator interval was released to the glenoid exposing the humeral head. The inferior capsule was released directly off the humerus to allow greater than 90° of external rotation. A large inferior osteophyte was present which was removed with rongeur. The anatomic neck was exposed and the humeral head osteotomy was performed in approximately 30° of retroversion. The remainder of the osteophytes were removed. The canal was then entered, reamed, and broached. The final stem impacted in in approximately 30° of retroversion. A head protector was placed. The humerus was subluxed posteriorly. The glenoid exposed. Circumferential labral excision and capsular release were performed. A 270° mobilization of the subscapularis was carried out as well.  A centering hole was drilled. The glenoid was gently reamed and peripheral holes were drilled and trialing was carried out. The appropriate size was chosen. Thrombin-soaked Gelfoam was inserted into the holes to obtain hemostasis. Once the cement was prepared, Gelfoam was removed. Cement was inserted into the 3 peripheral holes and pressurized twice prior to impaction of the caged glenoid. Excellent fit was achieved with no rocking or instability. No excess cement was identified. The humerus was carefully subluxed back anteriorly. A 4.5  replicator plate was chosen and trialing was carried out. The appropriate head size and position were chosen and allowed approximately 50% posterior subluxation with spontaneous reduction, 25% inferior subluxation with spontaneous reduction, and full passive range of motion. The joint was copiously irrigated with orthopedic irrigation mixed with Betadine after the final implants were assembled and locked into place. The shoulder was reduced. The subscapularis was meticulously repaired with #2 FiberWire as was the rotator interval. This was stable to approximately 60° of external rotation, but will be limited to 30° in the perioperative period. The deltopectoral interval was approximated with 0 Vicryl, the subcutaneous tissue with 2-0 Vicryl, and the skin with Monocryl and Dermabond. A sterile dressing was placed. Anesthesia was reversed and the patient was taken to the recovery room in stable condition. All instrument, needle, and sponge counts were correct.      Michael Xavier MD*

## 2017-06-12 NOTE — PLAN OF CARE
Problem: Patient Care Overview (Adult)  Goal: Plan of Care Review  Outcome: Ongoing (interventions implemented as appropriate)    06/12/17 1321   Coping/Psychosocial Response Interventions   Plan Of Care Reviewed With patient;spouse   Patient Care Overview   Progress improving

## 2017-06-12 NOTE — H&P
Pre-Op H&P    Chief complaint: Left shoulder pain    HPI:    Patient is a 72 y.o.male presents with shoulder osteoarthrosis and here today for left total shoulder arthroplasty, possible reverse     Review of Systems:  General ROS: negative for chills, fever or skin lesions;  No changes since last office visit  Cardiovascular ROS: no chest pain or dyspnea on exertion  Respiratory ROS: no cough, shortness of breath, or wheezing    Allergies: No Known Allergies    Home Meds    Prescriptions Prior to Admission   Medication Sig Dispense Refill Last Dose   • furosemide (LASIX) 40 MG tablet Take 40 mg by mouth Daily.   6/11/2017   • HYDROcodone-acetaminophen (NORCO)  MG per tablet Take 1 tablet by mouth Every 6 (Six) Hours As Needed for Moderate Pain (4-6).   6/11/2017   • LORazepam (ATIVAN) 0.5 MG tablet Take 0.5 mg by mouth Every 8 (Eight) Hours As Needed for Anxiety.   6/11/2017   • meloxicam (MOBIC) 15 MG tablet Take 15 mg by mouth Daily.   6/7/2017   • Multiple Vitamins-Minerals (CENTRUM SILVER PO) Take 1 tablet by mouth Daily.   6/11/2017   • potassium chloride (KLOR-CON) 8 MEQ CR tablet Take 8 mEq by mouth Every Other Day.   6/11/2017   • pravastatin (PRAVACHOL) 80 MG tablet Take 80 mg by mouth Daily.   6/11/2017   • sertraline (ZOLOFT) 50 MG tablet Take 50 mg by mouth Daily.   6/11/2017   • telmisartan (MICARDIS) 80 MG tablet Take 80 mg by mouth Daily.   6/11/2017   • thiamine (VITAMIN B-1) 100 MG tablet Take 100 mg by mouth Daily.   6/11/2017   • allopurinol (ZYLOPRIM) 100 MG tablet Take 100 mg by mouth Daily.   6/7/2017   • aspirin 325 MG tablet Take 325 mg by mouth Daily.   6/7/2017       PMH:   Past Medical History:   Diagnosis Date   • Anxiety    • Arthritis    • Hypertension     CONTROLLED WITH MEDS PER PT    • Shoulder pain, left    • Stroke     2012-- NO RESIDUAL PROBLEMS   • Tremor of right hand    • Wears dentures     UPPER AND LOWER PLATE      PSH:    Past Surgical History:   Procedure Laterality  "Date   • CARDIAC CATHETERIZATION  2016    NO CORONARY STENTS    • COLONOSCOPY  2014   • TOTAL KNEE ARTHROPLASTY Right    • TOTAL SHOULDER REPLACEMENT Right        Immunization History:  Influenza: yes 2016  Pneumococcal: yes < 5 years  Tetanus: unknown    Social History:   Tobacco:   History   Smoking Status   • Former Smoker   • Types: Cigarettes   Smokeless Tobacco   • Never Used     Comment: QUIT 20 YEARS AGO, SMOKED X 40 YEARS       Alcohol:   History   Alcohol Use No       Physical Exam:/73 (BP Location: Right arm, Patient Position: Lying)  Pulse 68  Temp 98.2 °F (36.8 °C) (Temporal Artery )   Resp 18  Ht 68\" (172.7 cm)  Wt 293 lb 3.4 oz (133 kg)  SpO2 96%  BMI 44.58 kg/m2    General Appearance:    Alert, cooperative, no distress, appears stated age   Head:    Normocephalic, without obvious abnormality, atraumatic   Lungs:     Clear to auscultation bilaterally, respirations unlabored    Heart:   Regular rate and rhythm, S1 and S2 normal, no murmur, rub    or gallop    Abdomen:    Soft, non-tender.  +bowel sounds   Breast Exam:    deferred   Genitalia:    deferred   Extremities:   Extremities normal, atraumatic, no cyanosis or edema   Skin:   Skin color, texture, turgor normal, no rashes or lesions   Neurologic:   Grossly intact   Results Review  I reviewed the patient's new clinical results.    Cancer Staging (if applicable)  Cancer Patient: __ yes _x_no __unknown; If yes, clinical stage T:__ N:__M:__, stage group or __N/A    Impression: Left shoulder osteoarthrosis    Plan: Left total shoulder arthroplasty, possible reverse    Ailyn Barahona, APRN 6/12/2017 8:03 AM  "

## 2017-06-12 NOTE — PROGRESS NOTES
Discharge Planning Assessment  [facility]     Patient Name: Ibrahima Nava  MRN: [id number]  Today's Date: 6/12/2017    Admit Date: 6/12/2017          Discharge Needs Assessment       06/12/17 1420    Living Environment    Lives With spouse    Living Arrangements house    Home Accessibility no concerns    Transportation Available car;family or friend will provide    Living Environment    Provides Primary Care For no one    Quality Of Family Relationships supportive    Able to Return to Prior Living Arrangements yes    Discharge Needs Assessment    Concerns To Be Addressed denies needs/concerns at this time    Equipment Currently Used at Home none    Equipment Needed After Discharge none            Discharge Plan       06/12/17 1421    Case Management/Social Work Plan    Plan Home    Patient/Family In Agreement With Plan yes    Additional Comments Spoke with patient and wife at bedside. They live in a one story house in [facility]. He is indpendent with ADL's and mobility. He has a sling for his right shoulder. Denies any needs at this time. Family will transport at [place]. Goal is to return home. CM will follow.         Discharge Placement     No information found                Demographic Summary       06/12/17 1411    Referral Information    Arrived From home or self-care    Reason For Consult discharge planning    Primary Care Physician Information    Name OHLLY Lowe            Functional Status       06/12/17 1413    Functional Status Prior    Ambulation 0-->independent    Transferring 0-->independent    Toileting 0-->independent    Bathing 0-->independent    Dressing 0-->independent    Eating 0-->independent    Communication 0-->understands/communicates without difficulty    Swallowing 0-->swallows foods/liquids without difficulty    Activity Tolerance    Usual Activity Tolerance good    Current Activity Tolerance moderate            Psychosocial     None            Abuse/Neglect     None            Legal     None             Substance Abuse     None            Patient Forms     None          Brii Evans RN

## 2017-06-12 NOTE — NURSING NOTE
Acute Pain Service:  On-Q teaching completed with patient's spouse, Oralia.  Video demonstration, handout and bracelet provided with [facility] on call central phone number.  Instructed to call with any questions or concerns.  Patient verbalized understanding.  Service will continue to follow until catheter DC'd.  Please contact patient at [phone] if needed.

## 2017-06-12 NOTE — CONSULTS
Hospital Medicine Consult    Date of Admission: 6/12/2017  Date of Consult: 06/12/17    Primary Care Physician: Sebastien Lowe MD  Referring Physician:     Chief complaint/Reason for consultation Medical management     Subjective     History of Present Illness This is a 72 year old male with chronic and intractable left shoulder pain. The patient is currently status post  left total shoulder arthroplasty on 6/12/17. We were asked to consult on patient for medical management. Patient history is positive for a stroke with no residual. Patient had a right total shoulder arthroplasty last year. He had some arrhythmias with surgery and was evaluated by Cardiology after and had a heart cath with no stents and mild diease.       Review of Systems   Constitutional: Negative for appetite change, chills and fever.   Respiratory: Negative for shortness of breath and wheezing.    Cardiovascular: Negative for chest pain and palpitations.   Gastrointestinal: Negative for abdominal pain, constipation, diarrhea, nausea and vomiting.   Genitourinary: Negative for dysuria.      Otherwise complete ROS is negative except as mentioned above.    Past Medical History:  has a past medical history of Anxiety; Arthritis; Hypertension; Shoulder pain, left; Stroke; Tremor of right hand; and Wears dentures.    Past Surgical History:  has a past surgical history that includes Total shoulder replacement (Right); Cardiac catheterization (2016); Total knee arthroplasty (Right); and Colonoscopy (2014).    Family History: family history is not on file.    Social History:  reports that he has quit smoking. His smoking use included Cigarettes. He has never used smokeless tobacco. He reports that he does not drink alcohol or use illicit drugs.    Medications: Scheduled Meds:    sennosides-docusate sodium 2 tablet Oral BID   vancomycin 15 mg/kg Intravenous Once     Continuous Infusions:    lactated ringers 9 mL/hr Last Rate: 9 mL/hr (06/12/17 0748)    ropivacaine 6 mL/hr Last Rate: 6 mL/hr (06/12/17 1215)   sodium chloride 50 mL/hr Last Rate: 50 mL/hr (06/12/17 1253)     PRN Meds:.•  bisacodyl  •  docusate sodium  •  HYDROmorphone **AND** naloxone  •  magnesium hydroxide  •  oxyCODONE-acetaminophen  No Known Allergies    Objective    Physical Exam:   Vital Signs have been reviewed  Physical Exam   Constitutional: He is oriented to person, place, and time. He appears well-developed and well-nourished. No distress.   HENT:   Head: Normocephalic.   Eyes: Pupils are equal, round, and reactive to light.   Neck: Normal range of motion.   Cardiovascular: Normal rate, regular rhythm, normal heart sounds and intact distal pulses.  Exam reveals no friction rub.    No murmur heard.  Pulmonary/Chest: Effort normal and breath sounds normal. No stridor. No respiratory distress. He has no wheezes. He has no rales.   Abdominal: Soft. Bowel sounds are normal. He exhibits no distension. There is no tenderness.   Musculoskeletal:   Pt has limited rom in left arm due to surgery and sling    Neurological: He is alert and oriented to person, place, and time.   Skin: Skin is warm and dry. He is not diaphoretic.   Psychiatric: He has a normal mood and affect. His behavior is normal. Judgment and thought content normal.   Vitals reviewed.        Results Reviewed:  I have personally reviewed current lab, radiology, and data and agree with results.                Active Problems:    Primary localized osteoarthrosis of shoulder region    Hypertension    Impaired physical mobility    Acute pain of left shoulder        Assessment/Plan   Assessment & Plan  Intractable shoulder pain  secondary to osteoarthritis and status post left should arthroplasty  -- PT to assess   -- Pain control per surgery   -- nerve block   -- hold Mobic     History of CVA  -- will hold ASA for now resume when okay with Orthopedics  -- continue statin     Hypertension   -- resume Micardis   -- hold lasix today due to  decreased oral intake. Per patient wife he has no hx of CHF is on it for BP control. Can resume at discharge     Anxiety  -- on ativan at home     Hypothyroidism  -- Patient states he used to be on medication but has not been on any for at least a year  --TSH 0.901    CAD  -- Patient had heart cath at [place]   Mild disease  and EF 55-60%       AIC 5.60      I discussed the patients findings and my recommendations with:patient and wife.     Jerica Lawrence, ALAN  06/12/17  2:52 PM    Consults

## 2017-06-13 VITALS
RESPIRATION RATE: 16 BRPM | BODY MASS INDEX: 44.44 KG/M2 | OXYGEN SATURATION: 96 % | HEIGHT: 68 IN | TEMPERATURE: 97.1 F | DIASTOLIC BLOOD PRESSURE: 58 MMHG | HEART RATE: 80 BPM | WEIGHT: 293.21 LBS | SYSTOLIC BLOOD PRESSURE: 113 MMHG

## 2017-06-13 LAB
ANION GAP SERPL CALCULATED.3IONS-SCNC: 5 MMOL/L (ref 3–11)
BASOPHILS # BLD AUTO: 0.03 10*3/MM3 (ref 0–0.2)
BASOPHILS NFR BLD AUTO: 0.4 % (ref 0–1)
BUN BLD-MCNC: 13 MG/DL (ref 9–23)
BUN/CREAT SERPL: 14.4 (ref 7–25)
CALCIUM SPEC-SCNC: 9.1 MG/DL (ref 8.7–10.4)
CHLORIDE SERPL-SCNC: 104 MMOL/L (ref 99–109)
CO2 SERPL-SCNC: 28 MMOL/L (ref 20–31)
CREAT BLD-MCNC: 0.9 MG/DL (ref 0.6–1.3)
DEPRECATED RDW RBC AUTO: 50.6 FL (ref 37–54)
EOSINOPHIL # BLD AUTO: 0.08 10*3/MM3 (ref 0.1–0.3)
EOSINOPHIL NFR BLD AUTO: 1 % (ref 0–3)
ERYTHROCYTE [DISTWIDTH] IN BLOOD BY AUTOMATED COUNT: 14.2 % (ref 11.3–14.5)
GFR SERPL CREATININE-BSD FRML MDRD: 83 ML/MIN/1.73
GLUCOSE BLD-MCNC: 125 MG/DL (ref 70–100)
HCT VFR BLD AUTO: 35 % (ref 38.9–50.9)
HGB BLD-MCNC: 10.7 G/DL (ref 13.1–17.5)
IMM GRANULOCYTES # BLD: 0.02 10*3/MM3 (ref 0–0.03)
IMM GRANULOCYTES NFR BLD: 0.3 % (ref 0–0.6)
LYMPHOCYTES # BLD AUTO: 2.65 10*3/MM3 (ref 0.6–4.8)
LYMPHOCYTES NFR BLD AUTO: 34.8 % (ref 24–44)
MCH RBC QN AUTO: 29.6 PG (ref 27–31)
MCHC RBC AUTO-ENTMCNC: 30.6 G/DL (ref 32–36)
MCV RBC AUTO: 96.7 FL (ref 80–99)
MONOCYTES # BLD AUTO: 0.83 10*3/MM3 (ref 0–1)
MONOCYTES NFR BLD AUTO: 10.9 % (ref 0–12)
NEUTROPHILS # BLD AUTO: 4.01 10*3/MM3 (ref 1.5–8.3)
NEUTROPHILS NFR BLD AUTO: 52.6 % (ref 41–71)
PLATELET # BLD AUTO: 217 10*3/MM3 (ref 150–450)
PMV BLD AUTO: 10.1 FL (ref 6–12)
POTASSIUM BLD-SCNC: 4 MMOL/L (ref 3.5–5.5)
RBC # BLD AUTO: 3.62 10*6/MM3 (ref 4.2–5.76)
SODIUM BLD-SCNC: 137 MMOL/L (ref 132–146)
WBC NRBC COR # BLD: 7.62 10*3/MM3 (ref 3.5–10.8)

## 2017-06-13 PROCEDURE — 80048 BASIC METABOLIC PNL TOTAL CA: CPT | Performed by: ORTHOPAEDIC SURGERY

## 2017-06-13 PROCEDURE — 99239 HOSP IP/OBS DSCHRG MGMT >30: CPT | Performed by: PHYSICIAN ASSISTANT

## 2017-06-13 PROCEDURE — 25010000002 HYDROMORPHONE PER 4 MG: Performed by: ORTHOPAEDIC SURGERY

## 2017-06-13 PROCEDURE — 97530 THERAPEUTIC ACTIVITIES: CPT | Performed by: OCCUPATIONAL THERAPIST

## 2017-06-13 PROCEDURE — 85025 COMPLETE CBC W/AUTO DIFF WBC: CPT | Performed by: ORTHOPAEDIC SURGERY

## 2017-06-13 RX ORDER — ROPIVACAINE HYDROCHLORIDE 2 MG/ML
6 INJECTION, SOLUTION EPIDURAL; INFILTRATION CONTINUOUS
Start: 2017-06-13

## 2017-06-13 RX ORDER — OXYCODONE AND ACETAMINOPHEN 7.5; 325 MG/1; MG/1
1 TABLET ORAL EVERY 4 HOURS PRN
Qty: 50 TABLET | Refills: 0 | Status: SHIPPED | OUTPATIENT
Start: 2017-06-13 | End: 2017-06-22

## 2017-06-13 RX ORDER — PSEUDOEPHEDRINE HCL 30 MG
100 TABLET ORAL 2 TIMES DAILY PRN
Qty: 14 CAPSULE | Refills: 0 | Status: SHIPPED | OUTPATIENT
Start: 2017-06-13

## 2017-06-13 RX ADMIN — Medication 100 MG: at 09:11

## 2017-06-13 RX ADMIN — OXYCODONE HYDROCHLORIDE AND ACETAMINOPHEN 1 TABLET: 7.5; 325 TABLET ORAL at 09:15

## 2017-06-13 RX ADMIN — HYDROMORPHONE HYDROCHLORIDE 0.5 MG: 1 INJECTION, SOLUTION INTRAMUSCULAR; INTRAVENOUS; SUBCUTANEOUS at 01:22

## 2017-06-13 RX ADMIN — HYDROMORPHONE HYDROCHLORIDE 0.5 MG: 1 INJECTION, SOLUTION INTRAMUSCULAR; INTRAVENOUS; SUBCUTANEOUS at 11:29

## 2017-06-13 RX ADMIN — ALLOPURINOL 100 MG: 100 TABLET ORAL at 09:11

## 2017-06-13 RX ADMIN — SERTRALINE 50 MG: 50 TABLET, FILM COATED ORAL at 09:10

## 2017-06-13 RX ADMIN — OXYCODONE HYDROCHLORIDE AND ACETAMINOPHEN 1 TABLET: 7.5; 325 TABLET ORAL at 04:07

## 2017-06-13 RX ADMIN — Medication 2 TABLET: at 09:10

## 2017-06-13 RX ADMIN — TELMISARTAN 80 MG: 40 TABLET ORAL at 11:29

## 2017-06-13 NOTE — THERAPY DISCHARGE NOTE
Acute Care - Occupational Therapy Treatment Note/Discharge  [facility]     Patient Name: Ibrahima Nava  : 1945  MRN: [id number]  Today's Date: 2017  Onset of Illness/Injury or Date of Surgery Date: 17  Date of Referral to OT: 17  Referring Physician: Dr. Xavier      Admit Date: 2017    Visit Dx:     ICD-10-CM ICD-9-CM   1. Impaired mobility and ADLs Z74.09 799.89     Patient Active Problem List   Diagnosis   • Primary localized osteoarthrosis of shoulder region   • Hypertension   • Impaired physical mobility   • Acute pain of left shoulder             Adult Rehabilitation Note       17 0945          Rehab Assessment/Intervention    Discipline occupational therapist  -AR      Document Type therapy note (daily note);discharge summary   POD#1 left TSA  -AR      Subjective Information agree to therapy;complains of;pain  -AR      Patient Effort, Rehab Treatment excellent  -AR      Symptoms Noted During/After Treatment increased pain  -AR      Symptoms Noted Comment nurse notified of increased pain at anterior surface of shoulder despite block being increased to 10 prior to OT arrival.   -AR      Precautions/Limitations fall precautions;shoulder precautions;non-weight bearing status;other (see comments)   interscalene nerve catheter  -AR      Recorded by [AR] Anita Hu OT      Pain Assessment    Pain Assessment 0-10  -AR      Pain Score 6  -AR      Post Pain Score 7  -AR      Pain Type Acute pain  -AR      Pain Location Shoulder  -AR      Pain Orientation Left;Anterior  -AR      Pain Intervention(s) Medication (See MAR);Repositioned;Ambulation/increased activity  -AR      Response to Interventions nurse notified  -AR      Recorded by [AR] Anita Hu OT      Cognitive Assessment/Intervention    Current Cognitive/Communication Assessment functional  -AR      Orientation Status oriented x 4  -AR      Follows Commands/Answers Questions 100% of the time;able to follow  multi-step instructions  -AR      Personal Safety WNL/WFL  -AR      Personal Safety Interventions gait belt;nonskid shoes/slippers when out of bed;supervised activity  -AR      Recorded by [AR] Anita Hu OT      Mobility Assessment/Training    Extremity Weight-Bearing Status left upper extremity  -AR      Left Upper Extremity Weight-Bearing non weight-bearing  -AR      Recorded by [AR] Anita Hu OT      Bed Mobility, Assessment/Treatment    Bed Mobility, Scoot/Bridge, Tucker supervision required  -AR      Bed Mob, Supine to Sit, [place] supervision required  -AR      Bed Mobility, Comment Good ability to maintain NWB LUE  -AR      Recorded by [AR] Anita Hu OT      Transfer Assessment/Treatment    Transfers, Sit-Stand [place] independent  -AR      Transfers, Stand-Sit [place] independent  -AR      Toilet Transfer, [place] independent  -AR      Toilet Transfer, Assistive Device elevated toilet seat  -AR      Transfer, Comment Pt passed mobility screen and PT notified. Pt in agreement with plans to DC PT order.   -AR      Recorded by [AR] Anita Hu OT      Functional Mobility    Functional Mobility- Ind. Level independent  -AR      Functional Mobility-Distance (Feet) 250  -AR      Recorded by [AR] Anita Hu OT      Upper Body Bathing Assessment/Training    UB Bathing Assess/Train, Comment Pt and spouse recalled left axill care with independence  -AR      Recorded by [AR] Anita Hu OT      Upper Body Dressing Assessment/Training    UB Dressing Assess/Train, Clothing Type doffing:;donning:;hospital gown;t-shirt   sling  -AR      UB Dressing Assess/Train, Assist Device mich technique  -AR      UB Dressing Assess/Train, Position edge of bed  -AR      UB Dressing Assess/Train, [place] maximum assist (25% patient effort);verbal cues required  -AR      UB Dressing Assess/Train, Impairments ROM decreased;strength decreased;pain;other  (see comments)   shoulder precautions  -AR      UB Dressing Assess/Train, Comment Reviewed left shoulder precautions and ADL retraining to maintain, sling mangement including wear schedule/application/proper fit and care. Issued handout with "[employer]e link on internet to access shoulder sling video if concrens arise. Spouse doffed/donned sling with supervision and min cues. Reviewed care of ON_Q ball with ADLs to avoid dislodgement and avoid getting it wet.   -AR      Recorded by [AR] Anita Hu OT      Lower Body Dressing Assessment/Training    LB Dressing Assess/Train, Clothing Type donning:;pants;shoes  -AR      LB Dressing Assess/Train, Position standing;sitting  -AR      LB Dressing Assess/Train, [place] moderate assist (50% patient effort)  -AR      Recorded by [AR] Anita Hu OT      Therapy Exercises    Left Upper Extremity AROM:;10 reps;supine;elbow flexion/extension;hand pumps;pronation/supination;shoulder protraction/retraction;PROM:;shoulder ER/IR;shoulder horizontal abd/add   Tolerated FE PROM 120, IR chest and ER 30.  -AR      Exercise Protocols --   pt/spouse completed LUE HEP w/sup & good teachback from wife  -AR      Recorded by [AR] Anita Hu OT      Sensory Assessment/Intervention    Light Touch LUE  -AR      LUE Light Touch mild impairment  -AR      Recorded by [AR] Anita Hu OT      Positioning and Restraints    Pre-Treatment Position in bed  -AR      Post Treatment Position bed  -AR      In Bed sitting EOB;call light within reach;encouraged to call for assist;with family/caregiver;with brace;notified nsg   nurse notified of pt/s c/o pain despite block adjustment  -AR      Recorded by [AR] Anita Hu OT        User Key  (r) = Recorded By, (t) = Taken By, (c) = Cosigned By    Initials Name Effective Dates    AR Anita uH OT 06/22/15 -                 OT Goals       06/13/17 1408 06/12/17 1503 06/12/17 1501    Bed Mobility OT LTG    Bed  Mobility OT LTG, Date Established  06/12/17  -AR     Bed Mobility OT LTG, Time to Achieve  by discharge  -AR     Bed Mobility OT LTG, Activity Type  supine to sit/sit to supine  -AR     Bed Mobility OT LTG, [place] Level  verbal cues required;minimum assist (75% patient effort)   while maintaining NWB LUE  -AR     Bed Mobility OT LTG, Outcome goal met  -AR      Transfer Training OT LTG    Transfer Training OT LTG, Date Established  06/12/17  -AR     Transfer Training OT LTG, Activity Type  sit to stand/stand to sit  -AR     Transfer Training OT LTG, [place] Level  verbal cues required;contact guard assist  -AR     Transfer Training OT LTG, Outcome goal met  -AR      Range of Motion OT LTG    Range of Motion Goal OT LTG, Date Established   06/12/17  -AR    Range of Motion Goal OT LTG, Time to Achieve   1 wk  -AR    Range of Motion Goal OT LTG, AROM Measure   Pt and spouse will complete LUE HEP within physician parameters with min assist and min cues in preparation for safe DC home    -AR    Range of Motion Goal OT LTG, Outcome goal met  -AR      Patient Education OT LTG    Patient Education OT LTG, Date Established   06/12/17  -AR    Patient Education OT LTG, Time to Achieve   by discharge  -AR    Patient Education OT LTG, Education Type   written program;HEP;precautions per surgeon;1 hand/mich technique;brace use/care;home safety;john/doff brace  -AR    Patient Education OT LTG, Education Understanding   demonstrates adequately;verbalizes understanding  -AR    Patient Education OT LTG Outcome goal met  -AR      UB Dressing OT LTG    UB Dressing Goal OT LTG, Date Established   06/12/17  -AR    UB Dressing Goal OT LTG, Time to Achieve   1 wk  -AR    UB Dressing Goal OT LTG, Activity Type   Pt and spouse will don/doff LUE sling with min assist and min cues in preparation for safe DC home.   -AR    UB Dressing Goal OT LTG, Outcome goal met  -AR        User Key  (r) = Recorded By, (t) = Taken By, (c) = Cosigned  By    Initials Name Provider Type    JASON Hu OT Occupational Therapist          Occupational Therapy Education     Title: PT OT SLP Therapies (Done)     Topic: Occupational Therapy (Done)     Point: ADL training (Done)    Description: Instruct learner(s) on proper safety adaptation and remediation techniques during self care or transfers.   Instruct in proper use of assistive devices.    Learning Progress Summary    Learner Readiness Method Response Comment Documented by Status   Patient SOFIYA Cook D,H VU,YAZAN Reviewed L shoudler precautions, ADL retraining to maintain, sling management, LUE HEP, NWB LUE, home safety, care of ON-Q ball with ADLS AR 06/13/17 1407 Done    SOFIYA Cook,AIDA,H VU,NR Reviewed left shouler precautions, ADL retraining to maintain, care of On-Q ball with ADLS, NWB LUE, LUE HEP AR 06/12/17 1459 Done   Significant Other SOFIYA Cook D,H VU,DU Reviewed L shoudler precautions, ADL retraining to maintain, sling management, LUE HEP, NWB LUE, home safety, care of ON-Q ball with ADLS AR 06/13/17 1407 Done    SOFIYA Cook,AIDA,H VU,NR Reviewed left shouler precautions, ADL retraining to maintain, care of On-Q ball with ADLS, NWB LUE, LUE HEP AR 06/12/17 1459 Done               Point: Home exercise program (Done)    Description: Instruct learner(s) on appropriate technique for monitoring, assisting and/or progressing therapeutic exercises/activities.    Learning Progress Summary    Learner Readiness Method Response Comment Documented by Status   Patient SOFIYA Cook D,H VU,YAZAN Reviewed L shoudler precautions, ADL retraining to maintain, sling management, LUE HEP, NWB LUE, home safety, care of ON-Q ball with ADLS AR 06/13/17 1407 Done    SOFIYA Cook,AIDA,H VU,NR Reviewed left shouler precautions, ADL retraining to maintain, care of On-Q ball with ADLS, NWB LUE, LUE HEP AR 06/12/17 1459 Done   Significant Other SOFIYA Cook,AIDA,H VU,DU Reviewed L shoudler precautions, ADL retraining to maintain, sling  management, LUE HEP, NWB LUE, home safety, care of ON-Q ball with ADLS AR 06/13/17 1407 Done    SOFIYA Cook D,H VU,NR Reviewed left shouler precautions, ADL retraining to maintain, care of On-Q ball with ADLS, NWB LUE, LUE HEP AR 06/12/17 1459 Done               Point: Precautions (Done)    Description: Instruct learner(s) on prescribed precautions during self-care and functional transfers.    Learning Progress Summary    Learner Readiness Method Response Comment Documented by Status   Patient SOFIYA Cook D,H VU,DU Reviewed L shoudler precautions, ADL retraining to maintain, sling management, LUE HEP, NWB LUE, home safety, care of ON-Q ball with ADLS AR 06/13/17 1407 Done    SOFIYA Cook D,H VU,NR Reviewed left shouler precautions, ADL retraining to maintain, care of On-Q ball with ADLS, NWB LUE, LUE HEP AR 06/12/17 1459 Done   Significant Other SOFIYA Cook D,H VU,DU Reviewed L shoudler precautions, ADL retraining to maintain, sling management, LUE HEP, NWB LUE, home safety, care of ON-Q ball with ADLS AR 06/13/17 1407 Done    SOFIYA Cook D,H VU,NR Reviewed left shouler precautions, ADL retraining to maintain, care of On-Q ball with ADLS, NWB LUE, LUE HEP AR 06/12/17 1459 Done               Point: Body mechanics (Done)    Description: Instruct learner(s) on proper positioning and spine alignment during self-care, functional mobility activities and/or exercises.    Learning Progress Summary    Learner Readiness Method Response Comment Documented by Status   Patient SOFIYA Cook D,H VU,DU Reviewed L shoudler precautions, ADL retraining to maintain, sling management, LUE HEP, NWB LUE, home safety, care of ON-Q ball with ADLS AR 06/13/17 1407 Done    SOFIYA Cook D,H VU,NR Reviewed left shouler precautions, ADL retraining to maintain, care of On-Q ball with ADLS, NWB LUE, LUE HEP AR 06/12/17 1459 Done   Significant Other Eager E,TB,D,H RYLEE,YAZAN Reviewed L shoudler precautions, ADL retraining to maintain, sling management, LUE HEP,  APRYL JOYCE, home safety, care of ON-Q ball with ADLS AR 06/13/17 1407 Done    Asad AMARAL,SOFIYA,D,H VU,NR Reviewed left shouler precautions, ADL retraining to maintain, care of On-Q ball with ADLS, NWB MARIA [place] LUE HEP AR 06/12/17 1459 Done                      User Key     Initials Effective Dates Name Provider Type Discipline    AR 06/22/15 -  Anita Hu OT Occupational Therapist OT                OT Recommendation and Plan  Anticipated Discharge Disposition: home with assist  Therapy Frequency: daily (per priority policy)  Plan of Care Review  Plan Of Care Reviewed With: patient, spouse  Progress: improving  Outcome Summary/Follow up Plan: Pt c/o pain along anterior surface of shoulder despite increase in block prior to OT arrival. Pt tolerated FE PROM 120, IR chest and ER w/good teachback of HEP from spouse. They voiced no concerns for DC home other than controlling pain prior to DC. Recommend DC home with assist from spouse.            Outcome Measures       06/13/17 0945 06/12/17 1355       How much help from another is currently needed...    Putting on and taking off regular lower body clothing? 2  -AR 2  -AR     Bathing (including washing, rinsing, and drying) 2  -AR 2  -AR     Toileting (which includes using toilet bed pan or urinal) 3  -AR 2  -AR     Putting on and taking off regular upper body clothing 2  -AR 2  -AR     Taking care of personal grooming (such as brushing teeth) 3  -AR 3  -AR     Eating meals 3  -AR 3  -AR     Score 15  -AR 14  -AR     Functional Assessment    Outcome Measure Options AM-PAC 6 Clicks Daily Activity (OT)  -AR AM-PAC 6 Clicks Daily Activity (OT)  -AR       User Key  (r) = Recorded By, (t) = Taken By, (c) = Cosigned By    Initials Name Provider Type    AR Anita Hu OT Occupational Therapist           Time Calculation:          Time Calculation- OT       06/13/17 1411          Time Calculation- OT    OT Start Time 0945  -AR      Total Timed Code Minutes- OT 54 minute(s)   -AR      OT Received On 06/13/17  -AR      OT Goal Re-Cert Due Date 06/22/17  -AR        User Key  (r) = Recorded By, (t) = Taken By, (c) = Cosigned By    Initials Name Provider Type    JASON Hu OT Occupational Therapist          Therapy Charges for Today     Code Description Service Date Service Provider Modifiers Qty    11932107993  OT EVAL MOD COMPLEXITY 3 6/12/2017 Anita Hu OT GO 1    86243477907 HC OT THERAPEUTIC ACT EA 15 MIN 6/12/2017 Anita Hu OT GO 2    78886579022 HC OT THERAPEUTIC ACT EA 15 MIN 6/13/2017 Anita Hu OT GO 4               OT Discharge Summary  Anticipated Discharge Disposition: home with assist  Reason for Discharge: Discharge from facility  Outcomes Achieved: Able to achieve all goals within established timeline  Discharge Destination: Home with assist    Anita Hu OT  6/13/2017

## 2017-06-13 NOTE — PROGRESS NOTES
Orthopedic Daily Progress Note      CC: no new complaints    Pain well controlled  General: no fevers, chills  Abdomen: denies nausea, vomiting, or diarrhea    No other complaints    Physical Exam:  I have reviewed the vital signs.  Temp:  [96.8 °F (36 °C)-98.2 °F (36.8 °C)] 97.9 °F (36.6 °C)  Heart Rate:  [63-90] 79  Resp:  [16-22] 18  BP: (116-187)/(62-93) 143/75    Objective  General Appearance:    Alert, cooperative, no distress  Extremities: No clubbing, cyanosis, or edema to lower extremities  Pulses:  2+ in distal surgical extremity  Skin: Dressing Clean/dry/intact      Results Review:    I have reviewed the labs, radiology results and diagnostic studies personally      Results from last 7 days  Lab Units 06/13/17  0530   WBC 10*3/mm3 7.62   HEMOGLOBIN g/dL 10.7*   PLATELETS 10*3/mm3 217       Results from last 7 days  Lab Units 06/13/17  0530   SODIUM mmol/L 137   POTASSIUM mmol/L 4.0   TOTAL CO2 mmol/L 28.0   CREATININE mg/dL 0.90   GLUCOSE mg/dL 125*       I have reviewed the medications.    Assessment/Problem List  POD# 1 S/p left total shoulder arthroplasty    Plan  PT/OT this am  Discharge home today      Discharge Planning: I expect patient to be discharged to home today.    Lauri Lizarraga MD  06/13/17  7:34 AM

## 2017-06-13 NOTE — PLAN OF CARE
Problem: Patient Care Overview (Adult)  Goal: Plan of Care Review  Outcome: Ongoing (interventions implemented as appropriate)    06/13/17 0409   Coping/Psychosocial Response Interventions   Plan Of Care Reviewed With patient   Patient Care Overview   Progress no change   Outcome Evaluation   Outcome Summary/Follow up Plan Pt ambulated with minimal assist x1 this AM. VSS. Pain well controlled with PO meds. Complains only of axilla pain. ON-Q at 6ml/hr.          Problem: Pain, Acute (Adult)  Goal: Identify Related Risk Factors and Signs and Symptoms  Outcome: Ongoing (interventions implemented as appropriate)  Goal: Acceptable Pain Control/Comfort Level  Outcome: Ongoing (interventions implemented as appropriate)

## 2017-06-13 NOTE — NURSING NOTE
Dressing loose and catheter site leaking.  Catheter at 7.5cm at skin.  Changed dressing sterile technique.  Applied skin affix, steristrips, CHG tegaderm.   Patient still has numbness in his hand. Pain well controlled.

## 2017-06-13 NOTE — DISCHARGE SUMMARY
[facility] Medicine Services  DISCHARGE SUMMARY       Date of Admission: 6/12/2017  Date of Discharge:  6/13/2017  Primary Care Physician: Sebastien Lowe MD  Consulting Physician(s)     Provider Relationship    Molly Maldonado II, DO Consulting Physician          Discharge Diagnoses:  Active Hospital Problems (** Indicates Principal Problem)    Diagnosis Date Noted   • Primary localized osteoarthrosis of shoulder region [M19.019] 06/12/2017   • Hypertension [I10] 06/12/2017   • Impaired physical mobility [Z74.09] 06/12/2017   • Acute pain of left shoulder [M25.512] 06/12/2017      Resolved Hospital Problems    Diagnosis Date Noted Date Resolved   No resolved problems to display.       Presenting Problem/History of Present Illness  Primary localized osteoarthrosis of shoulder region, left [M19.012]     Chief Complaint on Day of Discharge:     History of Present Illness on Day of Discharge:   Pt sitting up in bed, reports doing well, still having some left shoulder pain, but tolerable per pt. OnQ in place, pt reports wife will be assisting with removal of OnQ needs to be present for teaching. Pt is not a good historian. Advised against ASA and meloxicam use until follows up with Ortho, but should be cautions with chronic use of meloxicam given hx. Pt reports eating very well. No fever, chills, SOA, palpations, CP, abd, or difficulty urinating. Pt would like to go home today. Discussed followup with PCP/Ortho    Hospital Course  Patient is a 72 y.o. male with PMH of HTN, hx CVA with left shoulder OA admitted for left total shoulder arthroplasty and left biceps tendoesis by Dr Xavier 6/12/17 , On-Q by CRNA to dc within 72hr. Passive ROM shoulder, FE no limit, ER to 30, IR to chest, Active ROM elbow/wrist/hand Scapula retractions per Ortho.    PT/OT have evaluated and provided tx, rec home with assist.     Hx of CVA/CAD with EF 55-60%, ASA held until okay with Dr Xavier to resume, continue  statin.Stop taking meloxicam until followup with PCP. Given cardiac hx and prior cva, advised pt to use with caution in the future/discuss with Cardiologist/PCP. On Home medications for HTN, lasix held 2nd to decreased po intake, no known hx of CHF. Tolerating po well, mild LE edema chronic per pt, will resume lasix. Remains on home meds for anxiety. Reportedly no longer on oral replacement for hypothyroidism, TSH 0.9 to follow up with PCP with further monitoring and management.      Pt is afebrile, normotensive, satting 96% on RA, wbc normal, mild postop anemia, likely dilutional in part with IVF, Pt is now medically appropriate for dc home, followup with PCP within 1 week of dc. Pt to follow up with Dr Xavier in 1 week    Procedures Performed  Procedure(s):  LEFT TOTAL SHOULDER ARTHROPLASTY        Consults:   Consults     Date and Time Order Name Status Description    6/12/2017 1245 Inpatient Consult to Hospitalist            Pertinent Test Results:  Lab Results (most recent)     Procedure Component Value Units Date/Time    POC Glucose Fingerstick [[id number]]  (Normal) Collected:  06/12/17 0749    Specimen:  Blood Updated:  06/12/17 0753     Glucose 119 mg/dL     Narrative:       Meter: [id number] : [id number] Destiny Vallejo    OR Potassium [[id number]]  (Normal) Collected:  06/12/17 0747    Specimen:  Blood Updated:  06/12/17 0756     Potassium, OR 4.14 mmol/L     CBC Auto Differential [[id number]]  (Abnormal) Collected:  06/13/17 0530    Specimen:  Blood Updated:  06/13/17 0636     WBC 7.62 10*3/mm3      RBC 3.62 (L) 10*6/mm3      Hemoglobin 10.7 (L) g/dL      Hematocrit 35.0 (L) %      MCV 96.7 fL      MCH 29.6 pg      MCHC 30.6 (L) g/dL      RDW 14.2 %      RDW-SD 50.6 fl      MPV 10.1 fL      Platelets 217 10*3/mm3      Neutrophil % 52.6 %      Lymphocyte % 34.8 %      Monocyte % 10.9 %      Eosinophil % 1.0 %      Basophil % 0.4 %      Immature Grans % 0.3 %      Neutrophils, Absolute 4.01 10*3/mm3       "Lymphocytes, Absolute 2.65 10*3/mm3      Monocytes, Absolute 0.83 10*3/mm3      Eosinophils, Absolute 0.08 (L) 10*3/mm3      Basophils, Absolute 0.03 10*3/mm3      Immature Grans, Absolute 0.02 10*3/mm3     CBC & Differential [[id number]] Collected:  06/13/17 0530    Specimen:  Blood Updated:  06/13/17 0636    Narrative:       Basic Metabolic Panel [[id number]]  (Abnormal) Collected:  06/13/17 0530    Specimen:  Blood Updated:  06/13/17 0651     Glucose 125 (H) mg/dL      BUN 13 mg/dL      Creatinine 0.90 mg/dL      Sodium 137 mmol/L      Potassium 4.0 mmol/L      Chloride 104 mmol/L      CO2 28.0 mmol/L      Calcium 9.1 mg/dL      eGFR Non African Amer 83 mL/min/1.73      BUN/Creatinine Ratio 14.4     Anion Gap 5.0 mmol/L     Narrative:           Imaging Results (most recent)     Procedure Component Value Units Date/Time    XR Shoulder 2+ View Left [[id number]] Updated:  06/12/17 1307        Condition on Discharge: stable    Physical Exam on Discharge:/58 (BP Location: Right arm, Patient Position: Lying)  Pulse 80  Temp 97.1 °F (36.2 °C) (Tympanic)   Resp 16  Ht 68\" (172.7 cm)  Wt 293 lb 3.4 oz (133 kg)  SpO2 96%  BMI 44.58 kg/m2  Physical Exam  Temp:  [96.8 °F (36 °C)-98.1 °F (36.7 °C)] 97.1 °F (36.2 °C)  Heart Rate:  [63-90] 80  Resp:  [16-22] 16  BP: (113-187)/(58-93) 113/58  Constitutional:sitting up at edge of bed, appears in no acute distress, awake, alert, looks comfortable  Eyes: PERRLA, sclerae anicteric, no conjunctival injection  Neck: supple trachea midline  Respiratory: Clear to auscultation bilaterally, nonlabored respirations   Cardiovascular: RRR, no murmurs, rubs, or gallops, palpable pedal pulses bilaterally  Gastrointestinal: Positive bowel sounds, soft, nontender, nondistended  Musculoskeletal: left shoulder incision with clean bandage and arm in sling, OnQ pump in place. 1+ bilat ankle edema, no clubbing or cyanosis to bilateral lower extremities. Wearing TEDS  Psychiatric: oriented x " 3, appropriate affect, cooperative  Neurologic: LUE in sling, strong hand ; RUE/BLE with active ROM, speech is clear, follows commands    Discharge Disposition  Home or Self Care    Discharge Medications   Ibrahima Nava CRISTIN   Home Medication Instructions DEZ:[id number]    Printed on:06/13/17 4536   Medication Information                      allopurinol (ZYLOPRIM) 100 MG tablet  Take 100 mg by mouth Daily.             aspirin 325 MG tablet  Take 325 mg by mouth Daily.             docusate sodium 100 MG capsule  Take 100 mg by mouth 2 (Two) Times a Day As Needed for Constipation.             furosemide (LASIX) 40 MG tablet  Take 40 mg by mouth Daily.             LORazepam (ATIVAN) 0.5 MG tablet  Take 0.5 mg by mouth Every 8 (Eight) Hours As Needed for Anxiety.             Multiple Vitamins-Minerals (CENTRUM SILVER PO)  Take 1 tablet by mouth Daily.             oxyCODONE-acetaminophen (PERCOCET) 7.5-325 MG per tablet  Take 1 tablet by mouth Every 4 (Four) Hours As Needed for Severe Pain (7-10) for up to 9 days.             potassium chloride (KLOR-CON) 8 MEQ CR tablet  Take 8 mEq by mouth Every Other Day.             pravastatin (PRAVACHOL) 80 MG tablet  Take 80 mg by mouth Daily.             ropivacaine (NAROPIN) 0.2 %  12 mg/hr by Peripheral Nerve route Continuous.             sertraline (ZOLOFT) 50 MG tablet  Take 50 mg by mouth Daily.             telmisartan (MICARDIS) 80 MG tablet  Take 80 mg by mouth Daily.             thiamine (VITAMIN B-1) 100 MG tablet  Take 100 mg by mouth Daily.                 Discharge Diet:   Diet Instructions     Diet: Cardiac; Thin Liquids, No Restrictions       Discharge Diet:  Cardiac   Fluid Consistency:  Thin Liquids, No Restrictions                 Discharge Care Plan / Instructions:    Activity at Discharge:   Activity Instructions     Other Instructions (Specify)       Per Dr Xavier                 Follow-up Appointments  No future appointments.  Additional Instructions for  the Follow-ups that You Need to Schedule     Discharge Follow-up with PCP    As directed    Follow Up Details:  1 week, please schedule.       Discharge Follow-up with Specified Provider    As directed    Follow Up:  1 Week   Has the patient’s follow-up appointment been scheduled and documented in the discharge navigator?:  Yes, documented in the appointment section                 Test Results Pending at Discharge       Casie M Mayne, PA-C 06/13/17 10:27 AM    Time: Discharge 40 min    Please note that portions of this note may have been completed with a voice recognition program. Efforts were made to edit the dictations, but occasionally words are mistranscribed.

## 2017-06-13 NOTE — PLAN OF CARE
Problem: Patient Care Overview (Adult)  Goal: Plan of Care Review  Outcome: Outcome(s) achieved Date Met:  06/13/17 06/13/17 1408   Coping/Psychosocial Response Interventions   Plan Of Care Reviewed With patient;spouse   Patient Care Overview   Progress improving   Outcome Evaluation   Outcome Summary/Follow up Plan Pt c/o pain along anterior surface of shoulder despite increase in block prior to OT arrival. Pt tolerated FE PROM 120, IR chest and ER w/good teachback of HEP from spouse. They voiced no concerns for DC home other than controlling pain prior to DC. Recommend DC home with assist from spouse.          Problem: Inpatient Occupational Therapy  Goal: Bed Mobility Goal LTG- OT  Outcome: Outcome(s) achieved Date Met:  06/13/17 06/12/17 1503 06/13/17 1408   Bed Mobility OT LTG   Bed Mobility OT LTG, Date Established 06/12/17 --    Bed Mobility OT LTG, Time to Achieve by discharge --    Bed Mobility OT LTG, Activity Type supine to sit/sit to supine --    Bed Mobility OT LTG, [place] Level verbal cues required;minimum assist (75% patient effort)  (while maintaining NWB LUE) --    Bed Mobility OT LTG, Outcome --  goal met       Goal: Transfer Training Goal 1 LTG- OT  Outcome: Outcome(s) achieved Date Met:  06/13/17 06/12/17 1503 06/13/17 1408   Transfer Training OT LTG   Transfer Training OT LTG, Date Established 06/12/17 --    Transfer Training OT LTG, Activity Type sit to stand/stand to sit --    Transfer Training OT LTG, [place] Level verbal cues required;contact guard assist --    Transfer Training OT LTG, Outcome --  goal met       Goal: Range of Motion Goal LTG- OT  Outcome: Outcome(s) achieved Date Met:  06/13/17 06/12/17 1501 06/13/17 1408   Range of Motion OT LTG   Range of Motion Goal OT LTG, Date Established 06/12/17 --    Range of Motion Goal OT LTG, Time to Achieve 1 wk --    Range of Motion Goal OT LTG, AROM Measure Pt and spouse will complete LUE HEP within physician parameters  with min assist and min cues in preparation for safe DC home  --    Range of Motion Goal OT LTG, Outcome --  goal met       Goal: Patient Education Goal LTG- OT  Outcome: Outcome(s) achieved Date Met:  06/13/17 06/12/17 1501 06/13/17 1408   Patient Education OT LTG   Patient Education OT LTG, Date Established 06/12/17 --    Patient Education OT LTG, Time to Achieve by discharge --    Patient Education OT LTG, Education Type written program;HEP;precautions per surgeon;1 hand/mich technique;brace use/care;home safety;john/doff brace --    Patient Education OT LTG, Education Understanding demonstrates adequately;verbalizes understanding --    Patient Education OT LTG Outcome --  goal met       Goal: UB Dressing Goal LTG- OT  Outcome: Outcome(s) achieved Date Met:  06/13/17 06/12/17 1501 06/13/17 1408   UB Dressing OT LTG   UB Dressing Goal OT LTG, Date Established 06/12/17 --    UB Dressing Goal OT LTG, Time to Achieve 1 wk --    UB Dressing Goal OT LTG, Activity Type Pt and spouse will don/doff LUE sling with min assist and min cues in preparation for safe DC home.  --    UB Dressing Goal OT LTG, Outcome --  goal met

## 2017-06-14 NOTE — PROGRESS NOTES
RAMAN Mcnair    Nerve Cath Post Op Call    Patient Name: Ibrahima Nava  :  1945  MRN:  [id number]  Date of Discharge: 2017    Nerve Cath Post Op Call:    Analgesia:Excellent  Pain Score:0/10  Side Effects:None  Catheter Site:clean  Patient Controlled ON Q pump infusion rate: 10ml/hr  Catheter Plan:Will continue with plan at home without changes and The patient was instructed to call ON CALL Anesthesia provider for any questions or problems

## 2017-06-15 NOTE — PROGRESS NOTES
RAMAN Mcnair    Nerve Cath Post Op Call    Patient Name: Ibrahima Nava  :  1945  MRN:  [id number]  Date of Discharge: 2017    Nerve Cath Post Op Call:    Catheter Plan:Patient/Family member report nerve catheter previously discontinued, tip intact

## 2017-06-15 NOTE — PROGRESS NOTES
RAMAN Mcnair    Nerve Cath Post Op Call    Patient Name: Ibrahima Nava  :  1945  MRN:  [id number]  Date of Discharge: 2017    Nerve Cath Post Op Call:    Catheter Plan:Patient called/No answer/Message left to call [facility] pain service for any questions or complaints

## 2022-02-22 NOTE — ANESTHESIA PROCEDURE NOTES
[facility] Note            Patient: Sissy Martin Date of Service: 2022   : 1984 MRN: [id number]     SUBJECTIVE:     Patient ID: Sissy is a 37 year old female.    Chief Complaint   Patient presents with   • Nausea     x3 days, with fatigue, dizziness, hot flashes, nasal congestion         HPI:  Patient is here for evaluation of generalized fatigue, dizziness, hot flashes, and nasal congestion intermittent x 3 days.  The patient has multiple complaints and does have a history of anxiety.      Past Medical History:   Diagnosis Date   • Thyroid disease        MEDICATIONS:  Current Outpatient Medications   Medication Sig   • azithromycin (ZITHROMAX) 250 MG tablet    • mirabegron ER (Myrbetriq) 25 MG 24 hour tablet daily.   • norethindrone-ethinyl estradiol-FE (Blisovi FE ) 1-20 MG-MCG per tablet Blisovi Fe  (28) 1 mg-20 mcg (21)/75 mg (7) tablet   TAKE 1 TABLET BY MOUTH DAILY   • Cholecalciferol (vitamin D3) 25 mcg (1,000 units) capsule Take 1,000 Units by mouth daily.   • levothyroxine 125 MCG tablet Take 125 mcg by mouth daily. Doses vary per day of the week   • hydroCORTisone (Anusol-HC) 25 MG suppository Place 1 suppository rectally 2 times daily as needed for Hemorrhoids.   • sertraline (ZOLOFT) 25 MG tablet sertraline 25 mg tablet   TAKE 1 TABLET BY MOUTH EVERY DAY   • nitrofurantoin, macrocrystal-monohydrate, (Macrobid) 100 MG capsule Take 1 capsule by mouth 2 times daily.   • ibuprofen (MOTRIN) 600 MG tablet Take 1 tablet by mouth every 6 hours as needed for Pain (take with food).   • ondansetron (Zofran ODT) 4 MG disintegrating tablet Place 1 tablet onto the tongue every 8 hours as needed for Nausea.     No current facility-administered medications for this visit.       ALLERGIES:  ALLERGIES:  No Known Allergies    PAST SURGICAL HISTORY:  History reviewed. No pertinent surgical history.    FAMILY HISTORY:  History reviewed. No pertinent family history.    SOCIAL  Peripheral Block    Patient location during procedure: pre-op  Start time: 6/12/2017 8:26 AM  Stop time: 6/12/2017 8:40 AM  Reason for block: at surgeon's request and post-op pain management  Performed by  Anesthesiologist: GENI ZHAO  Assisted by: RAMESH WETZEL  Preanesthetic Checklist  Completed: patient identified, site marked, surgical consent, pre-op evaluation, timeout performed, IV checked, risks and benefits discussed and monitors and equipment checked  Peripheral Block Prep:  Sterile barriers:cap, gloves, mask and sterile barriers  Prep: ChloraPrep  Patient monitoring: blood pressure monitoring, continuous pulse oximetry and EKG  Peripheral Procedure  Sedation:yes  Guidance:ultrasound guided  Images:still images obtained    Laterality:left  Block Type:interscalene  Injection Technique:catheter  Needle Type:Tuohy  Needle Gauge:18 G  Catheter Size:20 G (20g)  Medications  Local Injected:bupivacaine 0.25% Local Amount Injected:15mL  Post Assessment  Injection Assessment: negative aspiration for heme, no paresthesia on injection and incremental injection  Patient Tolerance:comfortable throughout block  Complications:no  Additional Notes  Procedure:                Catheter at skin-9                                      Anesthesia was provide by IV Sedation( see meds)      The pt was placed in semifowlers position with a slight tilt of the thorax contralateral to the insertion site.  The Insertion Site was prepped and draped in sterile fashion.  The skin was anesthetized with Lidocaine 1% 1ml injection utilizing a 25g needle.  Utilizing ultrasound guidance, a BBraun 2 inch 18 g Contiplex echogenic touhy needle was advanced in-plane.  Hydro dissection of tissue was achieved with Normal saline. Major vessels(carotid and Internal Jugular) where visualized as the brachial plexus was approached at the approximate level of C-7/ T-1.  Cervical 5 and Branches of Cervical 6 nerve roots where visualized and the  HISTORY:  Social History     Tobacco Use   • Smoking status: Never Smoker   • Smokeless tobacco: Never Used   Substance Use Topics   • Alcohol use: Not on file   • Drug use: Not on file         Review of Systems:  Review of Systems   Constitutional: Positive for fatigue.   HENT: Positive for congestion. Negative for ear pain, postnasal drip, rhinorrhea, sinus pressure, sinus pain and sore throat.    Eyes: Negative.    Respiratory: Negative.    Cardiovascular: Positive for chest pain and palpitations.   Gastrointestinal: Positive for nausea.   Endocrine: Negative.    Genitourinary: Negative.    Skin: Negative.    Allergic/Immunologic: Negative.    Neurological: Positive for headaches. Negative for dizziness.   Hematological: Negative.    Psychiatric/Behavioral: Negative for behavioral problems, self-injury and suicidal ideas. The patient is not hyperactive.          OBJECTIVE:     Visit Vitals  /74 (BP Location: LUE - Left upper extremity, Patient Position: Sitting, Cuff Size: Regular)   Pulse (!) 106   Temp 97.3 °F (36.3 °C) (Temporal)   Resp 18   LMP 01/19/2022 (Approximate)   SpO2 100%       Physical Exam  Vitals and nursing note reviewed.   Constitutional:       Appearance: Normal appearance.   HENT:      Head: Normocephalic and atraumatic.      Right Ear: Ear canal and external ear normal.      Left Ear: Ear canal and external ear normal.      Ears:      Comments: Bilateral middle ear effusion     Nose: Nose normal.      Mouth/Throat:      Mouth: Mucous membranes are moist.      Pharynx: Oropharynx is clear.      Neck: Normal range of motion.   Eyes:      Conjunctiva/sclera: Conjunctivae normal.   Cardiovascular:      Rate and Rhythm: Normal rate and regular rhythm.      Pulses: Normal pulses.      Heart sounds: Normal heart sounds.   Pulmonary:      Effort: Pulmonary effort is normal.      Breath sounds: Normal breath sounds.   Musculoskeletal:         General: Normal range of motion.   Skin:     General:  needle tip was placed posterior at the level of C-6 roots.  LA spread was visualized and injection was made incrementally every 5 mls with aspiration. Injection pressure was normal or little, there was no intraneural injection, no vascular injection.      The BBraun 20 g wire stylet  catheter was then placed under US guidance on the posterior aspect of the Brachial Plexus.  Location of catheter was confirmed with NS injection visualized with US . The tuohy was then removed and the skin was sealed with Skin AFix at catheter insertion site.  Skin was prepped with mastisol and the labeled catheter  was secured with steristrips and a CHG tegaderm. Thank You.             Skin is warm and dry.      Capillary Refill: Capillary refill takes less than 2 seconds.   Neurological:      General: No focal deficit present.      Mental Status: She is alert and oriented to person, place, and time. Mental status is at baseline.   Psychiatric:         Mood and Affect: Mood normal.         Behavior: Behavior normal.         Thought Content: Thought content normal.         Judgment: Judgment normal.           DIAGNOSTIC STUDIES:     Lab results:  Results for orders placed or performed in visit on 02/22/22   POCT SARS-COV-2 ANTIGEN   Result Value    POCT SARS-COV-2 ANTIGEN Not Detected   POCT URINE PREGNANCY   Result Value    URINE PREGNANCY,QUAL Negative    Internal Procedural Controls Acceptable Yes   POCT URINE DIP AUTO   Result Value    POCT Color Yellow    POCT Appearance Clear    POCT Glucose Urine Negative    POCT Bilirubin Negative    POCT Ketones Negative    POCT Specific [place] 1.015    POCT Occult Blood Moderate (A)    POCT pH 8.5    POCT Protein Negative    POCT Urobilinogen 0.2    Urine Nitrite Negative    WBC (Leukocyte) Esterase POC Small (A)        Imaging  XR CHEST PA AND LATERAL 2 VIEWS  Narrative: EXAMINATION: XR CHEST PA AND LATERAL 2 VIEWS.    CLINICAL INDICATION: None..     COMPARISON: None.    PA lateral views of the chest were obtained.    Lungs are clear, without infiltrate, atelectasis or effusion. Heart and  mediastinum are unremarkable.  Impression: Lungs are clear.    Electronically Signed by: DEBRA ANGELA MD   Signed on: 2/22/2022 9:59 AM         Procedures   Encounter Date: 02/22/22   ELECTROCARDIOGRAM 12-LEAD   Result Value    Ventricular Rate EKG/Min (BPM) 92    NJ-Interval (MSEC) 142    QRS-Interval (MSEC) 85    QT-Interval (MSEC) 344    QTc 394    P Axis (Degrees) 48    R Axis (Degrees) 57    T Axis (Degrees) 57    REPORT TEXT SINUS RHYTHM  NORMAL ECG  UNCONFIRMED REPORT       ASSESSMENT AND PLAN:     Problem List Items Addressed This Visit     None      Visit  Diagnoses     Nausea    -  Primary    Relevant Orders    POCT SARS-COV-2 ANTIGEN (Completed)    POCT URINE PREGNANCY (Completed)    POCT URINE DIP AUTO (Completed)    ELECTROCARDIOGRAM 12-LEAD (Completed)    XR CHEST PA AND LATERAL 2 VIEWS (Completed)    URINE, BACTERIAL CULTURE          This is a 37 year old year-old female who presents with here for evaluation of generalized fatigue, dizziness, hot flashes, and nasal congestion intermittent x 3 days.  The patient has multiple complaints and does have a history of anxiety.  She admits to being under increased stressors lately with starting a new job, and will be moving soon.  She does have a counselor that she meets with once a week.  She did state that she first developed dental pain and was seen by the dentist and was prescribed azithromycin for a sinus infection after imaging was completed.  She just started this today.  She has been complaining of sinus like headache and pressure with nasal congestion x 1 week.  Denies any fever or chills.  Denies being exposed to any person with an illness.  The patient also expressed that she has been having difficulty sleeping at night and awoke with chest pain, heart felt like it was beating fast, and she was unable to fall back asleep.  She does have a history of thyroid disease and has an appointment with the endocrinologist tomorrow.  She states that they will do lab work at that time to monitor her levels.  She has a hx of chronic UTIs.  A urinalysis was obtained, please see results as above.  A urine cx is pending.  The patient denies any urinary complaints at this time.  She also has an appointment with urology this week.  Multiple diagnoses have been considered.  An EKG was obtained and showed SR rate 92 bpm.  A CXR was obtained and did not show any acute abnormalities.  It was discussed with the patient that her sinus congestion is related to a sinus headache and to continue with the azithromycin as prescribed.  It  was also discussed that her other symptoms were likely related to anxiety and to follow up with her pmd, and therapist as already scheduled.  Told is chest pain returns to go to the ER for further evaluation.  The patient verbalized knowledge of symptoms.  Home with instructions.        Instructions provided as documented in the AVS.    The patient indicated understanding of the diagnosis and agreed with the plan of care.    Follow-up Information     Follow up With Specialties Details Why Contact Info    Teja Cook, DO Family Practice In 3 days  [address] [facility] [id number]  [phone]              Justin Gonzalez NP  2/22/2022

## 2024-04-16 ENCOUNTER — TRANSCRIBE ORDERS (OUTPATIENT)
Dept: ADMINISTRATIVE | Facility: HOSPITAL | Age: 79
End: 2024-04-16
Payer: MEDICARE

## 2024-04-16 DIAGNOSIS — T84.038A MECHANICAL LOOSENING OF PROSTHETIC SHOULDER JOINT: Primary | ICD-10-CM

## 2024-04-16 DIAGNOSIS — Z96.619 MECHANICAL LOOSENING OF PROSTHETIC SHOULDER JOINT: Primary | ICD-10-CM

## 2024-04-22 ENCOUNTER — HOSPITAL ENCOUNTER (OUTPATIENT)
Dept: GENERAL RADIOLOGY | Facility: HOSPITAL | Age: 79
Discharge: HOME | End: 2024-04-22
Admitting: ORTHOPAEDIC SURGERY
Payer: MEDICARE

## 2024-04-22 DIAGNOSIS — T84.038A MECHANICAL LOOSENING OF PROSTHETIC SHOULDER JOINT: ICD-10-CM

## 2024-04-22 DIAGNOSIS — Z96.619 MECHANICAL LOOSENING OF PROSTHETIC SHOULDER JOINT: ICD-10-CM

## 2024-04-22 LAB
APPEARANCE FLD: ABNORMAL
COLOR FLD: ABNORMAL
CRYSTALS FLD MICRO: NORMAL
LYMPHOCYTES NFR FLD MANUAL: 57 %
MACROPHAGE FLUID: 14 %
MONOCYTES NFR FLD: 26 %
NEUTROPHILS NFR FLD MANUAL: 3 %
RBC # FLD AUTO: ABNORMAL /MM3
WBC # FLD AUTO: 3344 /MM3

## 2024-04-22 PROCEDURE — 87015 SPECIMEN INFECT AGNT CONCNTJ: CPT | Performed by: ORTHOPAEDIC SURGERY

## 2024-04-22 PROCEDURE — 87206 SMEAR FLUORESCENT/ACID STAI: CPT | Performed by: ORTHOPAEDIC SURGERY

## 2024-04-22 PROCEDURE — 87205 SMEAR GRAM STAIN: CPT | Performed by: ORTHOPAEDIC SURGERY

## 2024-04-22 PROCEDURE — 87075 CULTR BACTERIA EXCEPT BLOOD: CPT | Performed by: ORTHOPAEDIC SURGERY

## 2024-04-22 PROCEDURE — 25010000002 LIDOCAINE 1 % SOLUTION: Performed by: ORTHOPAEDIC SURGERY

## 2024-04-22 PROCEDURE — 87102 FUNGUS ISOLATION CULTURE: CPT | Performed by: ORTHOPAEDIC SURGERY

## 2024-04-22 PROCEDURE — 87116 MYCOBACTERIA CULTURE: CPT | Performed by: ORTHOPAEDIC SURGERY

## 2024-04-22 PROCEDURE — 77002 NEEDLE LOCALIZATION BY XRAY: CPT

## 2024-04-22 PROCEDURE — 89051 BODY FLUID CELL COUNT: CPT | Performed by: ORTHOPAEDIC SURGERY

## 2024-04-22 PROCEDURE — 89060 EXAM SYNOVIAL FLUID CRYSTALS: CPT | Performed by: ORTHOPAEDIC SURGERY

## 2024-04-22 PROCEDURE — 87070 CULTURE OTHR SPECIMN AEROBIC: CPT | Performed by: ORTHOPAEDIC SURGERY

## 2024-04-22 RX ORDER — LIDOCAINE HYDROCHLORIDE 10 MG/ML
5 INJECTION, SOLUTION INFILTRATION; PERINEURAL ONCE
Status: COMPLETED | OUTPATIENT
Start: 2024-04-22 | End: 2024-04-22

## 2024-04-22 RX ADMIN — LIDOCAINE HYDROCHLORIDE 5 ML: 10 INJECTION, SOLUTION INFILTRATION; PERINEURAL at 13:34

## 2024-04-27 LAB
BACTERIA FLD CULT: NORMAL
BACTERIA SPEC ANAEROBE CULT: NORMAL
GRAM STN SPEC: NORMAL

## 2024-05-02 LAB — BACTERIA SPEC ANAEROBE CULT: NORMAL

## 2024-05-07 ENCOUNTER — HOSPITAL ENCOUNTER (OUTPATIENT)
Dept: GENERAL RADIOLOGY | Facility: HOSPITAL | Age: 79
Discharge: HOME | End: 2024-05-07
Payer: MEDICARE

## 2024-05-07 ENCOUNTER — PRE-ADMISSION TESTING (OUTPATIENT)
Dept: PREADMISSION TESTING | Facility: HOSPITAL | Age: 79
End: 2024-05-07
Payer: MEDICARE

## 2024-05-07 VITALS — BODY MASS INDEX: 43.19 KG/M2 | HEIGHT: 68 IN | WEIGHT: 284.94 LBS

## 2024-05-07 LAB
ANION GAP SERPL CALCULATED.3IONS-SCNC: 8 MMOL/L (ref 5–15)
BUN SERPL-MCNC: 15 MG/DL (ref 8–23)
BUN/CREAT SERPL: 17 (ref 7–25)
CALCIUM SPEC-SCNC: 9.4 MG/DL (ref 8.6–10.5)
CHLORIDE SERPL-SCNC: 104 MMOL/L (ref 98–107)
CO2 SERPL-SCNC: 28 MMOL/L (ref 22–29)
CREAT SERPL-MCNC: 0.88 MG/DL (ref 0.76–1.27)
CRP SERPL-MCNC: <0.3 MG/DL (ref 0–0.5)
DEPRECATED RDW RBC AUTO: 47.1 FL (ref 37–54)
EGFRCR SERPLBLD CKD-EPI 2021: 87.5 ML/MIN/1.73
ERYTHROCYTE [DISTWIDTH] IN BLOOD BY AUTOMATED COUNT: 14.7 % (ref 12.3–15.4)
ERYTHROCYTE [SEDIMENTATION RATE] IN BLOOD: 38 MM/HR (ref 0–20)
GLUCOSE SERPL-MCNC: 120 MG/DL (ref 65–99)
HBA1C MFR BLD: 5.6 % (ref 4.8–5.6)
HCT VFR BLD AUTO: 40.2 % (ref 37.5–51)
HGB BLD-MCNC: 12.2 G/DL (ref 13–17.7)
MCH RBC QN AUTO: 26.6 PG (ref 26.6–33)
MCHC RBC AUTO-ENTMCNC: 30.3 G/DL (ref 31.5–35.7)
MCV RBC AUTO: 87.6 FL (ref 79–97)
PLATELET # BLD AUTO: 402 10*3/MM3 (ref 140–450)
PMV BLD AUTO: 9.5 FL (ref 6–12)
POTASSIUM SERPL-SCNC: 4.6 MMOL/L (ref 3.5–5.2)
RBC # BLD AUTO: 4.59 10*6/MM3 (ref 4.14–5.8)
SODIUM SERPL-SCNC: 140 MMOL/L (ref 136–145)
WBC NRBC COR # BLD AUTO: 6.91 10*3/MM3 (ref 3.4–10.8)

## 2024-05-07 PROCEDURE — 86140 C-REACTIVE PROTEIN: CPT

## 2024-05-07 PROCEDURE — 36415 COLL VENOUS BLD VENIPUNCTURE: CPT

## 2024-05-07 PROCEDURE — 80048 BASIC METABOLIC PNL TOTAL CA: CPT

## 2024-05-07 PROCEDURE — 71046 X-RAY EXAM CHEST 2 VIEWS: CPT

## 2024-05-07 PROCEDURE — 85027 COMPLETE CBC AUTOMATED: CPT

## 2024-05-07 PROCEDURE — 85652 RBC SED RATE AUTOMATED: CPT

## 2024-05-07 PROCEDURE — 93005 ELECTROCARDIOGRAM TRACING: CPT

## 2024-05-07 PROCEDURE — 83036 HEMOGLOBIN GLYCOSYLATED A1C: CPT

## 2024-05-07 RX ORDER — VALACYCLOVIR HYDROCHLORIDE 500 MG/1
500 TABLET, FILM COATED ORAL DAILY
COMMUNITY

## 2024-05-07 RX ORDER — LEVOTHYROXINE SODIUM 0.05 MG/1
50 TABLET ORAL DAILY
COMMUNITY

## 2024-05-07 RX ORDER — FERROUS SULFATE 324(65)MG
324 TABLET, DELAYED RELEASE (ENTERIC COATED) ORAL
COMMUNITY

## 2024-05-07 RX ORDER — PANTOPRAZOLE SODIUM 40 MG/1
40 TABLET, DELAYED RELEASE ORAL DAILY
COMMUNITY

## 2024-05-07 RX ORDER — TIZANIDINE 4 MG/1
4 TABLET ORAL NIGHTLY
COMMUNITY

## 2024-05-07 RX ORDER — PRIMIDONE 50 MG/1
100 TABLET ORAL 2 TIMES DAILY
COMMUNITY

## 2024-05-10 LAB
QT INTERVAL: 380 MS
QTC INTERVAL: 407 MS

## 2024-05-20 ENCOUNTER — APPOINTMENT (OUTPATIENT)
Dept: GENERAL RADIOLOGY | Facility: HOSPITAL | Age: 79
End: 2024-05-20
Payer: MEDICARE

## 2024-05-20 ENCOUNTER — ANESTHESIA EVENT (OUTPATIENT)
Dept: PERIOP | Facility: HOSPITAL | Age: 79
End: 2024-05-20
Payer: MEDICARE

## 2024-05-20 ENCOUNTER — HOSPITAL ENCOUNTER (INPATIENT)
Facility: HOSPITAL | Age: 79
LOS: 1 days | Discharge: HOME | End: 2024-05-21
Attending: ORTHOPAEDIC SURGERY | Admitting: ORTHOPAEDIC SURGERY
Payer: MEDICARE

## 2024-05-20 ENCOUNTER — ANESTHESIA EVENT CONVERTED (OUTPATIENT)
Dept: ANESTHESIOLOGY | Facility: HOSPITAL | Age: 79
End: 2024-05-20
Payer: MEDICARE

## 2024-05-20 ENCOUNTER — ANESTHESIA (OUTPATIENT)
Dept: PERIOP | Facility: HOSPITAL | Age: 79
End: 2024-05-20
Payer: MEDICARE

## 2024-05-20 DIAGNOSIS — Z96.612 HISTORY OF ARTHROPLASTY OF LEFT SHOULDER: ICD-10-CM

## 2024-05-20 LAB — POTASSIUM SERPL-SCNC: 4.4 MMOL/L (ref 3.5–5.2)

## 2024-05-20 PROCEDURE — 0RPK0JZ REMOVAL OF SYNTHETIC SUBSTITUTE FROM LEFT SHOULDER JOINT, OPEN APPROACH: ICD-10-PCS | Performed by: ORTHOPAEDIC SURGERY

## 2024-05-20 PROCEDURE — 87075 CULTR BACTERIA EXCEPT BLOOD: CPT | Performed by: ORTHOPAEDIC SURGERY

## 2024-05-20 PROCEDURE — 25810000003 LACTATED RINGERS PER 1000 ML: Performed by: ANESTHESIOLOGY

## 2024-05-20 PROCEDURE — 97110 THERAPEUTIC EXERCISES: CPT

## 2024-05-20 PROCEDURE — 84132 ASSAY OF SERUM POTASSIUM: CPT | Performed by: ANESTHESIOLOGY

## 2024-05-20 PROCEDURE — 25010000002 ONDANSETRON PER 1 MG: Performed by: ANESTHESIOLOGY

## 2024-05-20 PROCEDURE — C1713 ANCHOR/SCREW BN/BN,TIS/BN: HCPCS | Performed by: ORTHOPAEDIC SURGERY

## 2024-05-20 PROCEDURE — C1776 JOINT DEVICE (IMPLANTABLE): HCPCS | Performed by: ORTHOPAEDIC SURGERY

## 2024-05-20 PROCEDURE — 97166 OT EVAL MOD COMPLEX 45 MIN: CPT

## 2024-05-20 PROCEDURE — 0RRK00Z REPLACEMENT OF LEFT SHOULDER JOINT WITH REVERSE BALL AND SOCKET SYNTHETIC SUBSTITUTE, OPEN APPROACH: ICD-10-PCS | Performed by: ORTHOPAEDIC SURGERY

## 2024-05-20 PROCEDURE — 25010000002 FENTANYL CITRATE (PF) 50 MCG/ML SOLUTION: Performed by: NURSE ANESTHETIST, CERTIFIED REGISTERED

## 2024-05-20 PROCEDURE — 87116 MYCOBACTERIA CULTURE: CPT | Performed by: ORTHOPAEDIC SURGERY

## 2024-05-20 PROCEDURE — 25010000002 ROPIVACAINE HCL-NACL 0.2-0.9 % SOLUTION: Performed by: NURSE ANESTHETIST, CERTIFIED REGISTERED

## 2024-05-20 PROCEDURE — 87102 FUNGUS ISOLATION CULTURE: CPT | Performed by: ORTHOPAEDIC SURGERY

## 2024-05-20 PROCEDURE — 87015 SPECIMEN INFECT AGNT CONCNTJ: CPT | Performed by: ORTHOPAEDIC SURGERY

## 2024-05-20 PROCEDURE — 25010000002 BUPIVACAINE (PF) 0.25 % SOLUTION: Performed by: NURSE ANESTHETIST, CERTIFIED REGISTERED

## 2024-05-20 PROCEDURE — 25010000002 DEXAMETHASONE PER 1 MG: Performed by: NURSE ANESTHETIST, CERTIFIED REGISTERED

## 2024-05-20 PROCEDURE — L3670 SO ACRO/CLAV CAN WEB PRE OTS: HCPCS | Performed by: ORTHOPAEDIC SURGERY

## 2024-05-20 PROCEDURE — 87205 SMEAR GRAM STAIN: CPT | Performed by: ORTHOPAEDIC SURGERY

## 2024-05-20 PROCEDURE — 97535 SELF CARE MNGMENT TRAINING: CPT

## 2024-05-20 PROCEDURE — 87206 SMEAR FLUORESCENT/ACID STAI: CPT | Performed by: ORTHOPAEDIC SURGERY

## 2024-05-20 PROCEDURE — 25010000002 PHENYLEPHRINE 10 MG/ML SOLUTION: Performed by: ANESTHESIOLOGY

## 2024-05-20 PROCEDURE — C1889 IMPLANT/INSERT DEVICE, NOC: HCPCS | Performed by: ORTHOPAEDIC SURGERY

## 2024-05-20 PROCEDURE — 25010000002 VANCOMYCIN HCL IN NACL 2-0.9 GM/500ML-% SOLUTION: Performed by: ORTHOPAEDIC SURGERY

## 2024-05-20 PROCEDURE — 25010000002 DEXAMETHASONE SODIUM PHOSPHATE 10 MG/ML SOLUTION: Performed by: NURSE ANESTHETIST, CERTIFIED REGISTERED

## 2024-05-20 PROCEDURE — 25010000002 PROPOFOL 10 MG/ML EMULSION: Performed by: NURSE ANESTHETIST, CERTIFIED REGISTERED

## 2024-05-20 PROCEDURE — 97550 CAREGIVER TRAING 1ST 30 MIN: CPT

## 2024-05-20 PROCEDURE — 87176 TISSUE HOMOGENIZATION CULTR: CPT | Performed by: ORTHOPAEDIC SURGERY

## 2024-05-20 PROCEDURE — 73030 X-RAY EXAM OF SHOULDER: CPT

## 2024-05-20 PROCEDURE — 25010000002 SUGAMMADEX 200 MG/2ML SOLUTION: Performed by: ANESTHESIOLOGY

## 2024-05-20 PROCEDURE — 25010000002 CEFAZOLIN PER 500 MG: Performed by: ORTHOPAEDIC SURGERY

## 2024-05-20 PROCEDURE — 87070 CULTURE OTHR SPECIMN AEROBIC: CPT | Performed by: ORTHOPAEDIC SURGERY

## 2024-05-20 DEVICE — IMPLANTABLE DEVICE
Type: IMPLANTABLE DEVICE | Site: SHOULDER | Status: FUNCTIONAL
Brand: EQUINOXE

## 2024-05-20 DEVICE — SUT FW #2 W/TPR NDL 1/2 CIR 38IN 97CM 26.5MM BLU: Type: IMPLANTABLE DEVICE | Site: SHOULDER | Status: FUNCTIONAL

## 2024-05-20 DEVICE — IMPLANTABLE DEVICE: Type: IMPLANTABLE DEVICE | Site: SHOULDER | Status: FUNCTIONAL

## 2024-05-20 RX ORDER — DEXAMETHASONE SODIUM PHOSPHATE 10 MG/ML
INJECTION, SOLUTION INTRAMUSCULAR; INTRAVENOUS
Status: COMPLETED | OUTPATIENT
Start: 2024-05-20 | End: 2024-05-20

## 2024-05-20 RX ORDER — LOSARTAN POTASSIUM 50 MG/1
50 TABLET ORAL
Status: DISCONTINUED | OUTPATIENT
Start: 2024-05-21 | End: 2024-05-21 | Stop reason: HOSPADM

## 2024-05-20 RX ORDER — PROPOFOL 10 MG/ML
VIAL (ML) INTRAVENOUS CONTINUOUS PRN
Status: DISCONTINUED | OUTPATIENT
Start: 2024-05-20 | End: 2024-05-20 | Stop reason: SURG

## 2024-05-20 RX ORDER — SODIUM CHLORIDE, SODIUM LACTATE, POTASSIUM CHLORIDE, CALCIUM CHLORIDE 600; 310; 30; 20 MG/100ML; MG/100ML; MG/100ML; MG/100ML
9 INJECTION, SOLUTION INTRAVENOUS CONTINUOUS
Status: DISCONTINUED | OUTPATIENT
Start: 2024-05-20 | End: 2024-05-21 | Stop reason: HOSPADM

## 2024-05-20 RX ORDER — SODIUM CHLORIDE 0.9 % (FLUSH) 0.9 %
3 SYRINGE (ML) INJECTION EVERY 12 HOURS SCHEDULED
Status: DISCONTINUED | OUTPATIENT
Start: 2024-05-20 | End: 2024-05-20 | Stop reason: HOSPADM

## 2024-05-20 RX ORDER — PRIMIDONE 50 MG/1
100 TABLET ORAL 2 TIMES DAILY
Status: DISCONTINUED | OUTPATIENT
Start: 2024-05-20 | End: 2024-05-21 | Stop reason: HOSPADM

## 2024-05-20 RX ORDER — HYDRALAZINE HYDROCHLORIDE 20 MG/ML
5 INJECTION INTRAMUSCULAR; INTRAVENOUS
Status: DISCONTINUED | OUTPATIENT
Start: 2024-05-20 | End: 2024-05-20 | Stop reason: HOSPADM

## 2024-05-20 RX ORDER — BUPIVACAINE HYDROCHLORIDE 2.5 MG/ML
INJECTION, SOLUTION EPIDURAL; INFILTRATION; INTRACAUDAL
Status: COMPLETED | OUTPATIENT
Start: 2024-05-20 | End: 2024-05-20

## 2024-05-20 RX ORDER — PROMETHAZINE HYDROCHLORIDE 25 MG/1
25 SUPPOSITORY RECTAL ONCE AS NEEDED
Status: DISCONTINUED | OUTPATIENT
Start: 2024-05-20 | End: 2024-05-20 | Stop reason: HOSPADM

## 2024-05-20 RX ORDER — LABETALOL HYDROCHLORIDE 5 MG/ML
5 INJECTION, SOLUTION INTRAVENOUS
Status: DISCONTINUED | OUTPATIENT
Start: 2024-05-20 | End: 2024-05-20 | Stop reason: HOSPADM

## 2024-05-20 RX ORDER — ACETAMINOPHEN 325 MG/1
650 TABLET ORAL EVERY 4 HOURS PRN
Status: DISCONTINUED | OUTPATIENT
Start: 2024-05-20 | End: 2024-05-21 | Stop reason: HOSPADM

## 2024-05-20 RX ORDER — DEXAMETHASONE SODIUM PHOSPHATE 4 MG/ML
INJECTION, SOLUTION INTRA-ARTICULAR; INTRALESIONAL; INTRAMUSCULAR; INTRAVENOUS; SOFT TISSUE AS NEEDED
Status: DISCONTINUED | OUTPATIENT
Start: 2024-05-20 | End: 2024-05-20 | Stop reason: SURG

## 2024-05-20 RX ORDER — LIDOCAINE HYDROCHLORIDE 10 MG/ML
0.5 INJECTION, SOLUTION EPIDURAL; INFILTRATION; INTRACAUDAL; PERINEURAL ONCE AS NEEDED
Status: COMPLETED | OUTPATIENT
Start: 2024-05-20 | End: 2024-05-20

## 2024-05-20 RX ORDER — DOCUSATE SODIUM 100 MG/1
100 CAPSULE, LIQUID FILLED ORAL 2 TIMES DAILY PRN
Status: DISCONTINUED | OUTPATIENT
Start: 2024-05-20 | End: 2024-05-21 | Stop reason: HOSPADM

## 2024-05-20 RX ORDER — TRANEXAMIC ACID 10 MG/ML
1000 INJECTION, SOLUTION INTRAVENOUS ONCE
Status: COMPLETED | OUTPATIENT
Start: 2024-05-20 | End: 2024-05-20

## 2024-05-20 RX ORDER — VANCOMYCIN 2 GRAM/500 ML IN 0.9 % SODIUM CHLORIDE INTRAVENOUS
15 ONCE
Status: COMPLETED | OUTPATIENT
Start: 2024-05-20 | End: 2024-05-20

## 2024-05-20 RX ORDER — FENTANYL CITRATE 50 UG/ML
50 INJECTION, SOLUTION INTRAMUSCULAR; INTRAVENOUS
Status: DISCONTINUED | OUTPATIENT
Start: 2024-05-20 | End: 2024-05-20 | Stop reason: HOSPADM

## 2024-05-20 RX ORDER — DEXMEDETOMIDINE HYDROCHLORIDE 100 UG/ML
INJECTION, SOLUTION INTRAVENOUS AS NEEDED
Status: DISCONTINUED | OUTPATIENT
Start: 2024-05-20 | End: 2024-05-20 | Stop reason: SURG

## 2024-05-20 RX ORDER — PHENYLEPHRINE HYDROCHLORIDE 10 MG/ML
INJECTION INTRAVENOUS AS NEEDED
Status: DISCONTINUED | OUTPATIENT
Start: 2024-05-20 | End: 2024-05-20 | Stop reason: SURG

## 2024-05-20 RX ORDER — ASPIRIN 325 MG
325 TABLET ORAL DAILY
Status: DISCONTINUED | OUTPATIENT
Start: 2024-05-21 | End: 2024-05-21 | Stop reason: HOSPADM

## 2024-05-20 RX ORDER — SODIUM CHLORIDE 0.9 % (FLUSH) 0.9 %
3-10 SYRINGE (ML) INJECTION AS NEEDED
Status: DISCONTINUED | OUTPATIENT
Start: 2024-05-20 | End: 2024-05-20 | Stop reason: HOSPADM

## 2024-05-20 RX ORDER — HYDROMORPHONE HYDROCHLORIDE 1 MG/ML
0.5 INJECTION, SOLUTION INTRAMUSCULAR; INTRAVENOUS; SUBCUTANEOUS
Status: DISCONTINUED | OUTPATIENT
Start: 2024-05-20 | End: 2024-05-21 | Stop reason: HOSPADM

## 2024-05-20 RX ORDER — OXYCODONE HYDROCHLORIDE 5 MG/1
5 TABLET ORAL EVERY 4 HOURS PRN
Status: DISCONTINUED | OUTPATIENT
Start: 2024-05-20 | End: 2024-05-21 | Stop reason: HOSPADM

## 2024-05-20 RX ORDER — NALOXONE HCL 0.4 MG/ML
0.4 VIAL (ML) INJECTION AS NEEDED
Status: DISCONTINUED | OUTPATIENT
Start: 2024-05-20 | End: 2024-05-20 | Stop reason: HOSPADM

## 2024-05-20 RX ORDER — ACETAMINOPHEN 650 MG/1
650 SUPPOSITORY RECTAL EVERY 4 HOURS PRN
Status: DISCONTINUED | OUTPATIENT
Start: 2024-05-20 | End: 2024-05-21 | Stop reason: HOSPADM

## 2024-05-20 RX ORDER — FERROUS SULFATE 325(65) MG
325 TABLET ORAL
Status: DISCONTINUED | OUTPATIENT
Start: 2024-05-21 | End: 2024-05-21 | Stop reason: HOSPADM

## 2024-05-20 RX ORDER — SERTRALINE HYDROCHLORIDE 100 MG/1
100 TABLET, FILM COATED ORAL DAILY
Status: DISCONTINUED | OUTPATIENT
Start: 2024-05-21 | End: 2024-05-21 | Stop reason: HOSPADM

## 2024-05-20 RX ORDER — PRAVASTATIN SODIUM 40 MG
80 TABLET ORAL NIGHTLY
Status: DISCONTINUED | OUTPATIENT
Start: 2024-05-20 | End: 2024-05-21 | Stop reason: HOSPADM

## 2024-05-20 RX ORDER — LEVOTHYROXINE SODIUM 0.05 MG/1
50 TABLET ORAL
Status: DISCONTINUED | OUTPATIENT
Start: 2024-05-21 | End: 2024-05-21 | Stop reason: HOSPADM

## 2024-05-20 RX ORDER — ONDANSETRON 2 MG/ML
4 INJECTION INTRAMUSCULAR; INTRAVENOUS EVERY 6 HOURS PRN
Status: DISCONTINUED | OUTPATIENT
Start: 2024-05-20 | End: 2024-05-21 | Stop reason: HOSPADM

## 2024-05-20 RX ORDER — MIDAZOLAM HYDROCHLORIDE 1 MG/ML
0.5 INJECTION INTRAMUSCULAR; INTRAVENOUS
Status: DISCONTINUED | OUTPATIENT
Start: 2024-05-20 | End: 2024-05-20 | Stop reason: HOSPADM

## 2024-05-20 RX ORDER — ONDANSETRON 4 MG/1
4 TABLET, ORALLY DISINTEGRATING ORAL EVERY 6 HOURS PRN
Status: DISCONTINUED | OUTPATIENT
Start: 2024-05-20 | End: 2024-05-21 | Stop reason: HOSPADM

## 2024-05-20 RX ORDER — PROMETHAZINE HYDROCHLORIDE 25 MG/1
25 TABLET ORAL ONCE AS NEEDED
Status: DISCONTINUED | OUTPATIENT
Start: 2024-05-20 | End: 2024-05-20 | Stop reason: HOSPADM

## 2024-05-20 RX ORDER — LORAZEPAM 0.5 MG/1
0.5 TABLET ORAL 2 TIMES DAILY
Status: DISCONTINUED | OUTPATIENT
Start: 2024-05-20 | End: 2024-05-21 | Stop reason: HOSPADM

## 2024-05-20 RX ORDER — ACETAMINOPHEN 500 MG
1000 TABLET ORAL ONCE
Status: COMPLETED | OUTPATIENT
Start: 2024-05-20 | End: 2024-05-20

## 2024-05-20 RX ORDER — ONDANSETRON 2 MG/ML
INJECTION INTRAMUSCULAR; INTRAVENOUS AS NEEDED
Status: DISCONTINUED | OUTPATIENT
Start: 2024-05-20 | End: 2024-05-20 | Stop reason: SURG

## 2024-05-20 RX ORDER — ROCURONIUM BROMIDE 10 MG/ML
INJECTION, SOLUTION INTRAVENOUS AS NEEDED
Status: DISCONTINUED | OUTPATIENT
Start: 2024-05-20 | End: 2024-05-20 | Stop reason: SURG

## 2024-05-20 RX ORDER — IPRATROPIUM BROMIDE AND ALBUTEROL SULFATE 2.5; .5 MG/3ML; MG/3ML
3 SOLUTION RESPIRATORY (INHALATION) ONCE AS NEEDED
Status: DISCONTINUED | OUTPATIENT
Start: 2024-05-20 | End: 2024-05-20 | Stop reason: HOSPADM

## 2024-05-20 RX ORDER — PANTOPRAZOLE SODIUM 40 MG/1
40 TABLET, DELAYED RELEASE ORAL DAILY
Status: DISCONTINUED | OUTPATIENT
Start: 2024-05-21 | End: 2024-05-21 | Stop reason: HOSPADM

## 2024-05-20 RX ORDER — DROPERIDOL 2.5 MG/ML
0.62 INJECTION, SOLUTION INTRAMUSCULAR; INTRAVENOUS
Status: DISCONTINUED | OUTPATIENT
Start: 2024-05-20 | End: 2024-05-20 | Stop reason: HOSPADM

## 2024-05-20 RX ORDER — DROPERIDOL 2.5 MG/ML
0.62 INJECTION, SOLUTION INTRAMUSCULAR; INTRAVENOUS ONCE AS NEEDED
Status: DISCONTINUED | OUTPATIENT
Start: 2024-05-20 | End: 2024-05-20 | Stop reason: HOSPADM

## 2024-05-20 RX ORDER — HYDROCODONE BITARTRATE AND ACETAMINOPHEN 5; 325 MG/1; MG/1
1 TABLET ORAL ONCE AS NEEDED
Status: DISCONTINUED | OUTPATIENT
Start: 2024-05-20 | End: 2024-05-20 | Stop reason: HOSPADM

## 2024-05-20 RX ORDER — NALOXONE HCL 0.4 MG/ML
0.1 VIAL (ML) INJECTION
Status: DISCONTINUED | OUTPATIENT
Start: 2024-05-20 | End: 2024-05-21 | Stop reason: HOSPADM

## 2024-05-20 RX ORDER — SODIUM CHLORIDE 0.9 % (FLUSH) 0.9 %
10 SYRINGE (ML) INJECTION AS NEEDED
Status: DISCONTINUED | OUTPATIENT
Start: 2024-05-20 | End: 2024-05-20 | Stop reason: HOSPADM

## 2024-05-20 RX ORDER — SODIUM CHLORIDE 450 MG/100ML
50 INJECTION, SOLUTION INTRAVENOUS CONTINUOUS
Status: DISCONTINUED | OUTPATIENT
Start: 2024-05-20 | End: 2024-05-21 | Stop reason: HOSPADM

## 2024-05-20 RX ORDER — LIDOCAINE HYDROCHLORIDE 10 MG/ML
INJECTION, SOLUTION EPIDURAL; INFILTRATION; INTRACAUDAL; PERINEURAL AS NEEDED
Status: DISCONTINUED | OUTPATIENT
Start: 2024-05-20 | End: 2024-05-20 | Stop reason: SURG

## 2024-05-20 RX ORDER — MEPERIDINE HYDROCHLORIDE 25 MG/ML
12.5 INJECTION INTRAMUSCULAR; INTRAVENOUS; SUBCUTANEOUS
Status: DISCONTINUED | OUTPATIENT
Start: 2024-05-20 | End: 2024-05-20 | Stop reason: HOSPADM

## 2024-05-20 RX ORDER — FENTANYL CITRATE 50 UG/ML
INJECTION, SOLUTION INTRAMUSCULAR; INTRAVENOUS
Status: COMPLETED | OUTPATIENT
Start: 2024-05-20 | End: 2024-05-20

## 2024-05-20 RX ORDER — SODIUM CHLORIDE 9 MG/ML
40 INJECTION, SOLUTION INTRAVENOUS AS NEEDED
Status: DISCONTINUED | OUTPATIENT
Start: 2024-05-20 | End: 2024-05-20 | Stop reason: HOSPADM

## 2024-05-20 RX ORDER — FAMOTIDINE 10 MG/ML
20 INJECTION, SOLUTION INTRAVENOUS ONCE
Status: CANCELLED | OUTPATIENT
Start: 2024-05-20 | End: 2024-05-20

## 2024-05-20 RX ORDER — CEFAZOLIN SODIUM IN 0.9 % NACL 3 G/100 ML
3000 INTRAVENOUS SOLUTION, PIGGYBACK (ML) INTRAVENOUS ONCE
Status: COMPLETED | OUTPATIENT
Start: 2024-05-20 | End: 2024-05-20

## 2024-05-20 RX ORDER — SODIUM CHLORIDE 0.9 % (FLUSH) 0.9 %
10 SYRINGE (ML) INJECTION EVERY 12 HOURS SCHEDULED
Status: DISCONTINUED | OUTPATIENT
Start: 2024-05-20 | End: 2024-05-20 | Stop reason: HOSPADM

## 2024-05-20 RX ORDER — HYDROMORPHONE HYDROCHLORIDE 1 MG/ML
0.5 INJECTION, SOLUTION INTRAMUSCULAR; INTRAVENOUS; SUBCUTANEOUS
Status: DISCONTINUED | OUTPATIENT
Start: 2024-05-20 | End: 2024-05-20 | Stop reason: HOSPADM

## 2024-05-20 RX ORDER — TIZANIDINE 4 MG/1
4 TABLET ORAL NIGHTLY
Status: DISCONTINUED | OUTPATIENT
Start: 2024-05-20 | End: 2024-05-21 | Stop reason: HOSPADM

## 2024-05-20 RX ORDER — VALACYCLOVIR HYDROCHLORIDE 500 MG/1
500 TABLET, FILM COATED ORAL
Qty: 3 TABLET | Refills: 0 | Status: DISCONTINUED | OUTPATIENT
Start: 2024-05-21 | End: 2024-05-21 | Stop reason: HOSPADM

## 2024-05-20 RX ORDER — ROPIVACAINE HYDROCHLORIDE 2 MG/ML
INJECTION, SOLUTION EPIDURAL; INFILTRATION; PERINEURAL CONTINUOUS
Status: DISCONTINUED | OUTPATIENT
Start: 2024-05-20 | End: 2024-05-21 | Stop reason: HOSPADM

## 2024-05-20 RX ORDER — ALLOPURINOL 100 MG/1
100 TABLET ORAL DAILY
Status: DISCONTINUED | OUTPATIENT
Start: 2024-05-21 | End: 2024-05-21 | Stop reason: HOSPADM

## 2024-05-20 RX ORDER — VANCOMYCIN 2 GRAM/500 ML IN 0.9 % SODIUM CHLORIDE INTRAVENOUS
15 ONCE
Qty: 500 ML | Refills: 0 | Status: COMPLETED | OUTPATIENT
Start: 2024-05-21 | End: 2024-05-21

## 2024-05-20 RX ORDER — FAMOTIDINE 20 MG/1
20 TABLET, FILM COATED ORAL ONCE
Status: COMPLETED | OUTPATIENT
Start: 2024-05-20 | End: 2024-05-20

## 2024-05-20 RX ORDER — ONDANSETRON 2 MG/ML
4 INJECTION INTRAMUSCULAR; INTRAVENOUS ONCE AS NEEDED
Status: DISCONTINUED | OUTPATIENT
Start: 2024-05-20 | End: 2024-05-20 | Stop reason: HOSPADM

## 2024-05-20 RX ADMIN — DEXAMETHASONE SODIUM PHOSPHATE 8 MG: 4 INJECTION INTRA-ARTICULAR; INTRALESIONAL; INTRAMUSCULAR; INTRAVENOUS; SOFT TISSUE at 15:08

## 2024-05-20 RX ADMIN — LIDOCAINE HYDROCHLORIDE 0.2 ML: 10 INJECTION, SOLUTION EPIDURAL; INFILTRATION; INTRACAUDAL; PERINEURAL at 13:26

## 2024-05-20 RX ADMIN — PROPOFOL 250 MG: 10 INJECTION, EMULSION INTRAVENOUS at 15:02

## 2024-05-20 RX ADMIN — SODIUM CHLORIDE, POTASSIUM CHLORIDE, SODIUM LACTATE AND CALCIUM CHLORIDE 9 ML/HR: 600; 310; 30; 20 INJECTION, SOLUTION INTRAVENOUS at 13:27

## 2024-05-20 RX ADMIN — SODIUM CHLORIDE, POTASSIUM CHLORIDE, SODIUM LACTATE AND CALCIUM CHLORIDE: 600; 310; 30; 20 INJECTION, SOLUTION INTRAVENOUS at 14:57

## 2024-05-20 RX ADMIN — PROPOFOL 25 MCG/KG/MIN: 10 INJECTION, EMULSION INTRAVENOUS at 15:13

## 2024-05-20 RX ADMIN — FAMOTIDINE 20 MG: 20 TABLET, FILM COATED ORAL at 13:24

## 2024-05-20 RX ADMIN — LORAZEPAM 0.5 MG: 0.5 TABLET ORAL at 22:51

## 2024-05-20 RX ADMIN — FENTANYL CITRATE 100 MCG: 50 INJECTION, SOLUTION INTRAMUSCULAR; INTRAVENOUS at 13:44

## 2024-05-20 RX ADMIN — Medication 1000 MG: at 17:27

## 2024-05-20 RX ADMIN — DEXAMETHASONE SODIUM PHOSPHATE 2 MG: 10 INJECTION, SOLUTION INTRAMUSCULAR; INTRAVENOUS at 13:57

## 2024-05-20 RX ADMIN — ONDANSETRON 4 MG: 2 INJECTION INTRAMUSCULAR; INTRAVENOUS at 17:01

## 2024-05-20 RX ADMIN — BUPIVACAINE HYDROCHLORIDE 15 ML: 2.5 INJECTION, SOLUTION EPIDURAL; INFILTRATION; INTRACAUDAL; PERINEURAL at 13:52

## 2024-05-20 RX ADMIN — BUPIVACAINE HYDROCHLORIDE 30 ML: 2.5 INJECTION, SOLUTION EPIDURAL; INFILTRATION; INTRACAUDAL at 13:57

## 2024-05-20 RX ADMIN — TRANEXAMIC ACID 1000 MG: 10 INJECTION, SOLUTION INTRAVENOUS at 15:09

## 2024-05-20 RX ADMIN — PHENYLEPHRINE HYDROCHLORIDE 100 MCG: 10 INJECTION, SOLUTION INTRAVENOUS at 16:33

## 2024-05-20 RX ADMIN — Medication 2 G: at 15:07

## 2024-05-20 RX ADMIN — ROCURONIUM BROMIDE 70 MG: 10 INJECTION INTRAVENOUS at 15:02

## 2024-05-20 RX ADMIN — TRANEXAMIC ACID 1000 MG: 10 INJECTION, SOLUTION INTRAVENOUS at 17:03

## 2024-05-20 RX ADMIN — LIDOCAINE HYDROCHLORIDE 50 MG: 10 INJECTION, SOLUTION EPIDURAL; INFILTRATION; INTRACAUDAL; PERINEURAL at 15:02

## 2024-05-20 RX ADMIN — SODIUM CHLORIDE 50 ML/HR: 4.5 INJECTION, SOLUTION INTRAVENOUS at 20:15

## 2024-05-20 RX ADMIN — CEFAZOLIN 3000 MG: 10 INJECTION, POWDER, FOR SOLUTION INTRAVENOUS at 14:00

## 2024-05-20 RX ADMIN — SUGAMMADEX 200 MG: 100 INJECTION, SOLUTION INTRAVENOUS at 17:06

## 2024-05-20 RX ADMIN — ACETAMINOPHEN 1000 MG: 500 TABLET ORAL at 13:24

## 2024-05-20 RX ADMIN — DEXMEDETOMIDINE HYDROCHLORIDE 8 MCG: 100 INJECTION, SOLUTION INTRAVENOUS at 15:03

## 2024-05-20 NOTE — ANESTHESIA PROCEDURE NOTES
Peripheral Block      Patient reassessed immediately prior to procedure    Patient location during procedure: pre-op  Start time: 5/20/2024 1:44 PM  Stop time: 5/20/2024 1:52 PM  Reason for block: at surgeon's request and post-op pain management  Performed by  CRNA/CAA: Yusuf Dodson, CRNA  Assisted by: Era Humphrey RN  Preanesthetic Checklist  Completed: patient identified, IV checked, site marked, risks and benefits discussed, surgical consent, monitors and equipment checked, pre-op evaluation and timeout performed  Prep:  Pt Position: right lateral decubitus  Sterile barriers:cap, gloves, mask and washed/disinfected hands  Prep: ChloraPrep  Patient monitoring: blood pressure monitoring, continuous pulse oximetry and EKG  Procedure    Sedation: yes  Performed under: local infiltration  Guidance:ultrasound guided    ULTRASOUND INTERPRETATION.  Using ultrasound guidance a 20 G gauge needle was placed in close proximity to the nerve, at which point, under ultrasound guidance anesthetic was injected in the area of the nerve and spread of the anesthesia was seen on ultrasound in close proximity thereto.  There were no abnormalities seen on ultrasound; a digital image was taken; and the patient tolerated the procedure with no complications. Images:still images obtained, printed/placed on chart    Laterality:left  Block Type:interscalene  Injection Technique:catheter  Needle Type:Tuohy and echogenic  Needle Gauge:18 G  Resistance on Injection: none  Catheter Size:20 G (20g)  Cath Depth at skin: 9 cm    Medications Used: fentaNYL citrate (PF) (SUBLIMAZE) injection - Intravenous   100 mcg - 5/20/2024 1:44:00 PM  bupivacaine PF (MARCAINE) 0.25 % injection - Injection   15 mL - 5/20/2024 1:52:00 PM      Medications  Preservative Free Saline:5ml    Post Assessment  Injection Assessment: negative aspiration for heme, no paresthesia on injection and incremental injection  Patient Tolerance:comfortable throughout  "block  Complications:no  Additional Notes  CATHETER  A high-frequency linear transducer, with sterile cover, was placed in the supraclavicular fossa to identify the subclavian artery and trunks and divisions of the brachial plexus. The transducer was then moved in a cephalad orientation with a slight rotation to continue visualization of the brachial plexus from the trunks and divisions, on to the C5-C7 roots. The insertion site was prepped and draped in sterile fashion. Skin and cutaneous tissue was infiltrated with 2-5 ml of 1% Lidocaine. Using ultrasound-guidance, an 18-gauge Contiplex Ultra 360 Touhy needle was advanced in plane from lateral to medial. Preservative-free normal saline was utilized for hydro-dissection of tissue, advancement of Touhy, and to confirm final needle placement at the fascial plane between the middle scalene muscle and sheath of the brachial plexus (C5-C7). A 20-gauge Contiplex Echo catheter was placed through the needle and advance out the tip of the Touhy 3-5 cm with the \"Guillory Flip\". The Touhy needle was then removed, and final catheter position verified lateral to the brachial plexus with local anesthetic (LA) and ultrasound visualization. The catheter was secured in the usual fashion with skin glue, benzoin, steri-strips, CHG tegaderm and label noting \"Nerve Block Catheter\". Jerk tape applied at yellow connector and catheter connection. All LA was injected in increments of 3-5 ml after catheter secured. Aspiration every 5 ml to prevent intravascular injection. Injection was completed with negative aspiration of blood and negative intravascular injection. Injection pressures were normal with minimal resistance.             "

## 2024-05-20 NOTE — ANESTHESIA POSTPROCEDURE EVALUATION
Patient: Ibrahima Nava    Procedure Summary       Date: 05/20/24 Room / Location:  [facility]    Anesthesia Start: 1457 Anesthesia Stop: 1727    Procedure: REVISION TOTAL SHOULDER ARTHROPLASTY TO REVERSE TOTAL SHOULDER ATHROPLASTY - LEFT (Left: Shoulder) Diagnosis:       Loosening of total shoulder replacement      (Loosening of total shoulder replacement [[id number]])    Surgeons: Michael Xavier MD Provider: Bobo Mariano MD    Anesthesia Type: general with block ASA Status: 2            Anesthesia Type: general with block    Vitals  Vitals Value Taken Time   BP     Temp     Pulse 72 05/20/24 1727   Resp     SpO2 94 % 05/20/24 1727   Vitals shown include unfiled device data.  124/68  97.2  RR 16      Post Anesthesia Care and Evaluation    Patient location during evaluation: PACU  Patient participation: waiting for patient participation  Level of consciousness: responsive to light touch  Pain score: 0  Pain management: adequate    Airway patency: patent  Anesthetic complications: No anesthetic complications  PONV Status: none  Cardiovascular status: hemodynamically stable and acceptable  Respiratory status: nonlabored ventilation, acceptable and nasal cannula  Hydration status: acceptable

## 2024-05-20 NOTE — ANESTHESIA PROCEDURE NOTES
Airway  Urgency: elective    Date/Time: 5/20/2024 3:04 PM  Airway not difficult    General Information and Staff    Patient location during procedure: OR  CRNA/CAA: Mikhail Wood, CRNA    Indications and Patient Condition  Indications for airway management: airway protection    Preoxygenated: yes  MILS not maintained throughout  Mask difficulty assessment: 1 - vent by mask    Final Airway Details  Final airway type: endotracheal airway      Successful airway: ETT  Cuffed: yes   Successful intubation technique: video laryngoscopy  Facilitating devices/methods: intubating stylet  Endotracheal tube insertion site: oral  Blade: Smith  Blade size: 3  ETT size (mm): 7.5  Cormack-Lehane Classification: grade I - full view of glottis  Placement verified by: chest auscultation and capnometry   Cuff volume (mL): 8  Measured from: lips  ETT/EBT  to lips (cm): 20  Number of attempts at approach: 1  Assessment: lips, teeth, and gum same as pre-op and atraumatic intubation    Additional Comments  Negative epigastric sounds, Breath sound equal bilaterally with symmetric chest rise and fall

## 2024-05-20 NOTE — H&P
Patient Name: Ibrahima Nava  MRN: [id number]  : 1945  DOS: 2024    Attending: Michael Xavier MD    Primary Care Provider: Sebastien Lowe MD      Chief complaint: Left shoulder pain    Subjective   Patient is a 79 y.o.male presents for scheduled surgery by Dr. Xavier. He anticipates a REVISION TOTAL SHOULDER ARTHROPLASTY TO REVERSE TOTAL SHOULDER ATHROPLASTY, POSSIBLE HEMIARTHROPLASTY - LEFT  today. He has history of left shoulder replacement several years ago. He states about 6 months ago he started having increased pain and limited ROM. He denies numbness, tingling or difficulty with . He denies injury.   Patient seen in the preop area, anxious about his surgery, denies no fever or chills, no chest pain or shortness of breath, no nausea or vomiting       Allergies:  No Known Allergies    Meds:  Medications Prior to Admission   Medication Sig Dispense Refill Last Dose    levothyroxine (SYNTHROID, LEVOTHROID) 50 MCG tablet Take 1 tablet by mouth Daily.   2024 at 0900    LORazepam (ATIVAN) 0.5 MG tablet Take 1 tablet by mouth 2 (Two) Times a Day.   2024 at 0900    NON FORMULARY Bariatric Advantage vitamin   2024 at 0900    pantoprazole (PROTONIX) 40 MG EC tablet Take 1 tablet by mouth Daily.   2024 at 0900    pravastatin (PRAVACHOL) 80 MG tablet Take 1 tablet by mouth Daily.   2024 at 0900    primidone (MYSOLINE) 50 MG tablet Take 2 tablets by mouth 2 (Two) Times a Day.   2024 at 0900    sertraline (ZOLOFT) 100 MG tablet Take 1 tablet by mouth Daily.   2024 at 0900    telmisartan (MICARDIS) 80 MG tablet Take 1 tablet by mouth Daily.   2024 at 0900    valACYclovir (VALTREX) 500 MG tablet Take 1 tablet by mouth Daily.   2024 at 0900    allopurinol (ZYLOPRIM) 100 MG tablet Take 1 tablet by mouth Daily.   More than a month    aspirin 325 MG tablet Take 1 tablet by mouth Daily.   5/15/2024    docusate sodium 100 MG capsule Take 100 mg by mouth 2 (Two)  "Times a Day As Needed for Constipation. 14 capsule 0 More than a month    ferrous sulfate 324 (65 Fe) MG tablet delayed-release EC tablet Take 1 tablet by mouth Daily With Breakfast.   More than a month    tiZANidine (ZANAFLEX) 4 MG tablet Take 1 tablet by mouth Every Night.   More than a month         History:   Past Medical History:   Diagnosis Date    Anxiety     Arthritis     Hypertension     CONTROLLED WITH MEDS PER PT     Shoulder pain, left     Sleep apnea     did not tolerate CPAP    Stroke     2012-- NO RESIDUAL PROBLEMS    Tremor of right hand     Wears dentures     UPPER AND LOWER PLATE      Past Surgical History:   Procedure Laterality Date    CARDIAC CATHETERIZATION  2016    NO CORONARY STENTS     COLONOSCOPY  2014    RI ARTHROPLASTY GLENOHUMERAL JOINT TOTAL SHOULDER Left 06/12/2017    Procedure: LEFT TOTAL SHOULDER ARTHROPLASTY ;  Surgeon: Michael Xavier MD;  Location: [facility] OR;  Service: Orthopedics    TOTAL KNEE ARTHROPLASTY Bilateral     TOTAL SHOULDER REPLACEMENT Right      History reviewed. No pertinent family history.  Social History     Tobacco Use    Smoking status: Former     Types: Cigarettes    Smokeless tobacco: Never    Tobacco comments:     QUIT 20 YEARS AGO, SMOKED X 40 YEARS    Vaping Use    Vaping status: Never Used   Substance Use Topics    Alcohol use: No    Drug use: No       Review of Systems  Pertinent items are noted in HPI, all other systems reviewed and negative    Vital Signs  /64 (BP Location: Right arm, Patient Position: Lying)   Pulse 63   Temp 98.1 °F (36.7 °C) (Temporal)   Resp 18   Ht 172.7 cm (68\")   Wt 129 kg (284 lb 15.1 oz)   SpO2 99%   BMI 43.33 kg/m²     Physical Exam:    General Appearance:    Alert, cooperative, in no acute distress   Head:    Normocephalic, without obvious abnormality, atraumatic   Eyes:            Lids and lashes normal, conjunctivae and sclerae normal, no   icterus, no pallor, corneas clear,    Ears:    Ears appear intact " "with no abnormalities noted   Throat:   No oral lesions, no thrush, oral mucosa moist   Neck:   No adenopathy, supple, trachea midline, no thyromegaly         Lungs:     Clear to auscultation,respirations regular, even and                   unlabored    Heart:    Regular rhythm and normal rate, normal S1 and S2, no      murmur    Abdomen:     Normal bowel sounds, no masses, no organomegaly, soft        non-tender, non-distended, no guarding, no rebound                 tenderness   Genitalia:    Deferred   Extremities: Left UE in a sling, CDI  dressing shoulder. Interscalene nerve block cath present.  Distal pulses, cap refill, movements of fingers, wrist, intact.     Pulses:   Pulses palpable and equal bilaterally   Skin:   No bleeding, bruising or rash   Neurologic:   Cranial nerves 2 - 12 grossly intact      I reviewed the patient's new clinical results.             Invalid input(s): \"NEUTOPHILPCT\"  Results from last 7 days   Lab Units 05/20/24  1313   POTASSIUM mmol/L 4.4     Lab Results   Component Value Date    HGBA1C 5.60 05/07/2024           Assessment and Plan:       * No active hospital problems. *      Plan    1. PT/OT. NWB, left UE, ROM hand, wrist, elbow.  2. Pain control-prns, interscalene nerve block cath with ropivacaine infusion.   3. IS-encourage  4. DVT proph- Mech/ mobilize.  5. Bowel regimen  6. Resume home medications as appropriate  7. Monitor post-op labs  8. DC planning home tomorrow if he is cleared by OT    Hypertension  Continue Micardis  Monitor blood pressure closely  Hold Lasix today  Monitor fluid balance closely  May restart Lasix tomorrow    Anxiety and depression   continue Zoloft    Hypercholesterolemia  Pravachol daily    Hypothyroidism  Continue Synthroid    Anemia  Continue iron supplement  H&H seems to be stable    Dragon disclaimer:  Part of this encounter note is an electronic transcription/translation of spoken language to printed text. The electronic translation of spoken " language may permit erroneous, or at times, nonsensical words or phrases to be inadvertently transcribed; Although I have reviewed the note for such errors, some may still exist.    Jaspreet Manning MD  05/20/24  15:14 EDT

## 2024-05-20 NOTE — OP NOTE
DATE OF OPERATION: 05/20/24  PREOPERATIVE DIAGNOSIS: 1.  Left shoulder prosthetic loosening  2. left status post total shoulder arthroplasty  POSTOPERATIVE DIAGNOSES:  1.  Left shoulder prosthetic loosening  2. left status post total shoulder arthroplasty  PROCEDURES PERFORMED:  1.  Left revision total shoulder to reverse total shoulder arthroplasty.    SURGEON: Michael Xavier MD  ASSISTANTS:  1. Edna Rose MD, PGY 5  2. Daphne Haider MD, PGY 6 Sports Fellow    ANESTHESIA: General plus block.      SURGICAL APPROACH: Deltopectoral      SURGICAL TECHNIQUE: Peel off         ESTIMATED BLOOD LOSS:550mL.    COMPLICATIONS: None.    DISPOSITION: Recovery room in stable condition.    IMPLANTS: [employer] Equinoxe reverse total shoulder system, 13 mm standard stem press-fit, +5 metal liner tray, 46 x 2.5 polyethylene tray, extended cage standard baseplate with 4 screws with locking caps, and a 46+4 mm glenosphere.    INDICATIONS: This is a 79-year-old male with left shoulder pain and limited function and motion.  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  After a discussion of risks, benefits, and alternatives, wished to proceed with revision shoulder arthroplasty.  DESCRIPTION OF PROCEDURE: On the day of surgery, the patient identified the left shoulder as the correct operative extremity. This was initialed by the surgeon with the patients's acknowledgment. The patient underwent placement of an interscalene block and was taken to the operating room and placed in the supine position. Upon induction of adequate anesthesia, the patient was brought up to the beach chair position and the shoulder and upper extremity were prepped and draped in the usual sterile fashion. Timeout confirmed the correct patient and operative extremity as well as that antibiotics were on board.  The previous deltopectoral approach to the shoulder was utilized. It was carried sharply through the  skin and subcutaneous tissue. Medial and lateral flaps were developed over the deltopectoral fascia. The cephalic vein was not identified and the deltopectoral interval was developed and the subdeltoid and subpectoral spaces were mobilized and a blunt retractor was placed deep to this. The clavipectoral fascia was opened on the lateral edge of the conjoined tendon and the retractor was moved deep to this. The leading edge of the pectoralis was released exposing the long head of the biceps. This was tenosynovitic. It was tenodesed to the pectoralis and released proximal to this. The 3 sisters were identified and coagulated.  Extensive bursal scarring was present which mimicked the rotator cuff but did not move with the humeral head with internal and external rotation.  The rotator interval was opened by following the bicipital groove.  The inferior capsule was then released directly off the humerus to allow greater than 90° of external rotation.  The humeral head was identified and extensive metallosis was present in the soft tissues and synovium.  Multiple soft tissue samples were obtained for tissue cultures.  2 were obtained from the anterior synovium, 1 from the humeral neck, 1 from inside the humeral canal and one from the glenoid.  Extensive resorption of the medial calcar was present.  The soft tissue here was debrided.  The humeral head and replicator plate were attempted to be removed and came out en bloc with the stem.  The canal was then thoroughly debrided, reamed and broached and we were able to reamed and broached up to a size.  A trial was placed and the humerus was subluxed posteriorly.  Circumferential labral excision and capsular release were performed.  The glenoid component was identified and was grossly loose and the central cage had fractured.  The glenoid came out en bloc with all 3 peripheral pegs with the majority of the metallic pegs present on the implant.  The glenoid vault was thoroughly  debrided using curettes and rongeurs.  A large central defect was present with significant bone loss.  The central cage from the anatomic prosthesis was very well-fixed and given the absence of infection removal was felt to risk significant bone loss.  Therefore initially a hole was drilled anterior to this but this did not provide adequate depth for placement of a new cage.  Next just superior to the original cage was drilled using a guide and this had approximately 13 mm depth of native bone.  This was felt to be adequate for placement of the extended cage glenoid and the glenoid vault after thorough debridement was filled with 30 cc of cancellous bone croutons which were packed densely.  The baseplate was impacted with good purchase to press-fit.  Next, the inferior screw hole was drilled and a screw placed with excellent purchase.  Next, an may-superior screw was placed, then a postero-inferior screw, then a superior screw placed with very good purchase in all. The locking caps were placed.  Due to going a little higher on the glenoid lateralized glenoid component along with the largest size possible were chosen to minimize the risk of notching or impingement.  The glenosphere was then inserted and locked into place with a set screw.  The humerus was carefully subluxed back anteriorly. A liner tray and polyethylene were placed and trialing was carried out. The appropriate final sizes were chosen and locked into place.  The shoulder was then reduced.  This allowed very good passive range of motion with no instability. The joint was copiously irrigated with orthopedic irrigation mixed with Betadine after the final implants were assembled and locked into place.  Passive range range of motion will be full elevation but external rotation will be limited to 30° in the perioperative period. The deltopectoral interval was approximated with 0 Vicryl, the subcutaneous tissue with 2-0 Vicryl, and the skin with Monocryl  and Dermabond. A sterile dressing was placed. Anesthesia was reversed and the patient was taken to the recovery room in stable condition. All instrument, needle, and sponge counts were correct.

## 2024-05-20 NOTE — ANESTHESIA PREPROCEDURE EVALUATION
Anesthesia Evaluation     Patient summary reviewed and Nursing notes reviewed   no history of anesthetic complications:   NPO Solid Status: > 8 hours  NPO Liquid Status: > 8 hours           Airway   Mallampati: II  TM distance: >3 FB  Neck ROM: full  No difficulty expected  Dental      Pulmonary - normal exam   (+) ,sleep apnea  Cardiovascular - normal exam    (+) hypertension      Neuro/Psych  (+) CVA, tremors, psychiatric history  GI/Hepatic/Renal/Endo      Musculoskeletal     Abdominal    Substance History      OB/GYN          Other   arthritis,                 Anesthesia Plan    ASA 2     general with block     intravenous induction     Anesthetic plan, risks, benefits, and alternatives have been provided, discussed and informed consent has been obtained with: patient.    Plan discussed with CRNA.    CODE STATUS:

## 2024-05-20 NOTE — THERAPY EVALUATION
Patient Name: Ibrahima Nava  : 1945    MRN: [id number]                              Today's Date: 2024       Admit Date: 2024    Visit Dx:     ICD-10-CM ICD-9-CM   1. History of arthroplasty of left shoulder  Z96.612 V43.61     Patient Active Problem List   Diagnosis    Primary localized osteoarthrosis of shoulder region    Hypertension    Impaired physical mobility    Acute pain of left shoulder    Status post reverse arthroplasty of shoulder, left     Past Medical History:   Diagnosis Date    Anxiety     Arthritis     Hypertension     CONTROLLED WITH MEDS PER PT     Shoulder pain, left     Sleep apnea     did not tolerate CPAP    Stroke     -- NO RESIDUAL PROBLEMS    Tremor of right hand     Wears dentures     UPPER AND LOWER PLATE      Past Surgical History:   Procedure Laterality Date    CARDIAC CATHETERIZATION      NO CORONARY STENTS     COLONOSCOPY      VA ARTHROPLASTY GLENOHUMERAL JOINT TOTAL SHOULDER Left 2017    Procedure: LEFT TOTAL SHOULDER ARTHROPLASTY ;  Surgeon: Michael Xavier MD;  Location: [facility];  Service: Orthopedics    TOTAL KNEE ARTHROPLASTY Bilateral     TOTAL SHOULDER REPLACEMENT Right       General Information       Row Name 24 1828          OT Time and Intention    Document Type evaluation  -AN     Mode of Treatment occupational therapy  -AN       Row Name 24 1828          General Information    Patient Profile Reviewed yes  -AN     Prior Level of Function independent:;all household mobility;community mobility;gait;ADL's  -AN     Existing Precautions/Restrictions fall;left;shoulder;non-weight bearing;other (see comments)  [place] PINK in Don[employer] II with abduction pillow, interscalene nerve cath  -AN     Barriers to Rehab none identified  -AN       Row Name 24 1828          Occupational Profile    Environmental Supports and Barriers (Occupational Profile) shower chair  -AN       Row Name 24 1828          Living Environment     People in Home alone  -AN       Row Name 05/20/24 1828          Home Main Entrance    Number of Stairs, Main Entrance none  -AN       Row Name 05/20/24 1828          Stairs Within Home, Primary    Number of Stairs, Within Home, Primary none  -AN       Row Name 05/20/24 1828          Cognition    Orientation Status (Cognition) oriented x 3  -AN       Row Name 05/20/24 1828          Safety Issues, Functional Mobility    Safety Issues Affecting Function (Mobility) awareness of need for assistance;safety precaution awareness;safety precautions follow-through/compliance;insight into deficits/self-awareness;sequencing abilities;judgment  -AN     Impairments Affecting Function (Mobility) balance;endurance/activity tolerance;sensation/sensory awareness;strength  -AN               User Key  (r) = Recorded By, (t) = Taken By, (c) = Cosigned By      Initials Name Provider Type    Hetal Parks OT Occupational Therapist                     Mobility/ADL's       Row Name 05/20/24 1829          Bed Mobility    Bed Mobility supine-sit;sit-supine  -AN     Supine-Sit [place] (Bed Mobility) verbal cues;contact guard  -AN     Sit-Supine [place] (Bed Mobility) verbal cues;contact guard  -AN     Bed Mobility, Safety Issues decreased use of arms for pushing/pulling  -AN     Assistive Device (Bed Mobility) head of bed elevated  -AN     Comment, (Bed Mobility) Pt educated on safe supine sleeping position and awareness of nerve cath  -AN       Row Name 05/20/24 1829          Transfers    Transfers sit-stand transfer;stand-sit transfer  -AN       Row Name 05/20/24 1829          Sit-Stand Transfer    Sit-Stand [place] (Transfers) contact guard;verbal cues  -AN       Row Name 05/20/24 1829          Stand-Sit Transfer    Stand-Sit [place] (Transfers) verbal cues;contact guard  -AN       Row Name 05/20/24 1829          Functional Mobility    Functional Mobility- Ind. Level minimum assist (75% patient effort);1  person;verbal cues required  -AN     Functional Mobility- Device other (see comments)  UE support  -AN     Functional Mobility-Distance (Feet) --  <household distance  -AN     Functional Mobility- Safety Issues sequencing ability decreased;step length decreased  -AN     Functional Mobility- Comment Pt ambulated short distance to the bathroom for toileting tasks with Min HHA due impaired balance. Intermittent dizziness.  -AN     Patient was able to Ambulate yes  -AN       Row Name 05/20/24 1829          Activities of Daily Living    BADL Assessment/Intervention bathing;upper body dressing;grooming;toileting  -AN       Row Name 05/20/24 1829          Mobility    Extremity Weight-bearing Status left upper extremity   -AN     Left Upper Extremity (Weight-bearing Status) non weight-bearing (NWB)   -AN       Row Name 05/20/24 1829          Upper Body Dressing Assessment/Training    [place] Level (Upper Body Dressing) don;pajama/robe;dependent (less than 25% patient effort)  don/doff sling  -AN     Position (Upper Body Dressing) sitting up in bed  -AN     Comment, (Upper Body Dressing) Patient educated on shoulder precautions with ADL retraining and sling management. Patient educated on nerve catheter care to avoid dislodgement.  -AN       Row Name 05/20/24 1829          Bathing Assessment/Intervention    Comment, (Bathing) Patient educated on shoulder precautions with axilla care. Patient educated that nerve catheter cannot get wet.  -AN       Row Name 05/20/24 1829          Grooming Assessment/Training    [place] Level (Grooming) wash face, hands;set up  -AN       Row Name 05/20/24 1829          Toileting Assessment/Training    [place] Level (Toileting) adjust/manage clothing;contact guard assist  -AN     Assistive Devices (Toileting) commode  -AN     Position (Toileting) supported standing  -AN               User Key  (r) = Recorded By, (t) = Taken By, (c) = Cosigned By      Initials Name Provider Type     AN Hetal Araya OT Occupational Therapist                   Obj/Interventions       Row Name 05/20/24 1832          Sensory Assessment (Somatosensory)    Sensory Assessment (Somatosensory) left UE  -AN     Left UE Sensory Assessment general sensation  -AN     Sensory Subjective Reports numbness  -AN       Row Name 05/20/24 1832          Vision Assessment/Intervention    Visual Impairment/Limitations WFL  -AN       Row Name 05/20/24 1832          Shoulder (Therapeutic Exercise)    Shoulder (Therapeutic Exercise) AROM (active range of motion);PROM (passive range of motion)  -AN     Shoulder AROM (Therapeutic Exercise) bilateral;scapular retraction;supine;10 repetitions  -AN     Shoulder PROM (Therapeutic Exercise) left;flexion;extension;external rotation;internal rotation;other (see comments)  tolerated to 140*  -AN       Row Name 05/20/24 1832          Elbow/Forearm (Therapeutic Exercise)    Elbow/Forearm (Therapeutic Exercise) AAROM (active assistive range of motion)  -AN     Elbow/Forearm AAROM (Therapeutic Exercise) left;flexion;extension;supination;pronation;sitting;10 repetitions  -AN       Row Name 05/20/24 1832          Wrist (Therapeutic Exercise)    Wrist (Therapeutic Exercise) AROM (active range of motion)  -AN     Wrist AROM (Therapeutic Exercise) left;flexion;extension;10 repetitions  -AN       Row Name 05/20/24 1832          Hand (Therapeutic Exercise)    Hand (Therapeutic Exercise) AROM (active range of motion)  -AN     Hand AROM/AAROM (Therapeutic Exercise) left;AROM (active range of motion);finger flexion;finger extension;10 repetitions  -AN       Row Name 05/20/24 1832          Motor Skills    Motor Skills motor control/coordination interventions;coordination  -AN     Coordination fine motor deficit;gross motor deficit;left;upper extremity;moderate impairment  -AN     Motor Control/Coordination Interventions fine motor manipulation/dexterity activities;gross motor coordination  activities;occupation/activity based treatment  -AN     Therapeutic Exercise elbow/forearm;wrist;hand;shoulder  -AN       Row Name 05/20/24 1832          Balance    Balance Assessment sitting static balance;sit to stand dynamic balance;sitting dynamic balance;standing static balance;standing dynamic balance  -AN     Static Sitting Balance standby assist  -AN     Dynamic Sitting Balance standby assist  -AN     Position, Sitting Balance sitting edge of bed  -AN     Sit to Stand Dynamic Balance verbal cues;contact guard  -AN     Static Standing Balance contact guard  -AN     Dynamic Standing Balance minimal assist  -AN     Position/Device Used, Standing Balance supported  -AN     Balance Interventions standing;sit to stand;supported;static;dynamic;minimal challenge;occupation based/functional task  -AN               User Key  (r) = Recorded By, (t) = Taken By, (c) = Cosigned By      Initials Name Provider Type    AN Hetal Araya, LORIE Occupational Therapist                   Goals/Plan       Row Name 05/20/24 1841          Transfer Goal 1 (OT)    Activity/Assistive Device (Transfer Goal 1, OT) sit-to-stand/stand-to-sit;toilet  -AN     [place] Level/Cues Needed (Transfer Goal 1, OT) standby assist  -AN     Time Frame (Transfer Goal 1, OT) long term goal (LTG);10 days  -AN       Row Name 05/20/24 1841          Bathing Goal 1 (OT)    Activity/Device (Bathing Goal 1, OT) upper body bathing  -AN     Time Frame (Bathing Goal 1, OT) long term goal (LTG);10 days  -AN     Strategies/Barriers (Bathing Goal 1, OT) axilla care  -AN       Row Name 05/20/24 1841          Dressing Goal 1 (OT)    Activity/Device (Dressing Goal 1, OT) upper body dressing  -AN     Time Frame (Dressing Goal 1, OT) long term goal (LTG);10 days  -AN     Strategies/Barriers (Dressing Goal 1, OT) Pt and family will demo donning/doffing of sling with supervision.  -AN       Row Name 05/20/24 1841          Strength Goal 1 (OT)    Strength Goal 1 (OT)  Pt and family will demo LUE HEP per MD parameters with supervision.  -AN     Time Frame (Strength Goal 1, OT) long term goal (LTG);10 days  -AN       Row Name 05/20/24 1841          Therapy Assessment/Plan (OT)    Planned Therapy Interventions (OT) activity tolerance training;adaptive equipment training;BADL retraining;functional balance retraining;edema control/reduction;transfer/mobility retraining;strengthening exercise;occupation/activity based interventions;ROM/therapeutic exercise;patient/caregiver education/training  -AN               User Key  (r) = Recorded By, (t) = Taken By, (c) = Cosigned By      Initials Name Provider Type    AN Hetal Araya OT Occupational Therapist                   Clinical Impression       Row Name 05/20/24 1834          Pain Assessment    Pretreatment Pain Rating 2/10  -AN     Posttreatment Pain Rating 2/10  -AN     Pain Location - Side/Orientation Left  -AN     Pain Location upper  -AN     Pain Location - extremity  -AN     Pre/Posttreatment Pain Comment tolerated  -AN     Pain Intervention(s) Repositioned;Ambulation/increased activity;Cold applied  -AN       Row Name 05/20/24 1834          Plan of Care Review    Plan of Care Reviewed With patient  -AN     Progress no change  -AN     Outcome Evaluation Pt presents below his functional baseline with deficits including impaired balance, mobility, and ADLs warranting skilled OT services. Pt and spouse educated on L shoulder precautions, ADL retraining, sling management, and LUE HEP. Pt tolerated L PROM FE to 140*, IR to chest and ER to 30*. Pt ambulated ~50 ft with Min HHA limited by dizziness and lethargy. SpO2 maintained >90% on RA. Pt failed mobility screen and would benefit from PT evaluation. Rec home with 24/7 assist at [place].  -AN       Row Name 05/20/24 1834          Therapy Assessment/Plan (OT)    Patient/Family Therapy Goal Statement (OT) Return to PLOF  -AN     Rehab Potential (OT) good, to achieve stated therapy goals   -AN     Criteria for Skilled Therapeutic Interventions Met (OT) yes;skilled treatment is necessary  -AN     Therapy Frequency (OT) daily  -AN     Predicted Duration of Therapy Intervention (OT) Pt caregiver training provided face to face on strategies and techniques related to activities of daily living, transfers, mobility, home safety, durable medical equipment, and brace management for 18 mins without the patient present.  -AN       Row Name 05/20/24 1834          Therapy Plan Review/Discharge Plan (OT)    Anticipated Discharge Disposition (OT) home with 24/7 care  -AN       Row Name 05/20/24 1834          Vital Signs    Pre SpO2 (%) 96  -AN     O2 Delivery Pre Treatment nasal cannula  -AN     Intra SpO2 (%) 96  -AN     O2 Delivery Intra Treatment room air  -AN     Post SpO2 (%) 95  -AN     O2 Delivery Post Treatment room air  -AN     Pre Patient Position Supine  -AN     Intra Patient Position Standing  -AN     Post Patient Position Supine  -AN       Row Name 05/20/24 1834          Positioning and Restraints    Pre-Treatment Position in bed  -AN     Post Treatment Position bed  -AN     In Bed notified nsg;supine;call light within reach;encouraged to call for assist;with nsg;side rails up x2;SCD pump applied  -AN               User Key  (r) = Recorded By, (t) = Taken By, (c) = Cosigned By      Initials Name Provider Type    AN Hetal Araya, LORIE Occupational Therapist                   Outcome Measures       Row Name 05/20/24 1842          How much help from another is currently needed...    Putting on and taking off regular lower body clothing? 2  -AN     Bathing (including washing, rinsing, and drying) 2  -AN     Toileting (which includes using toilet bed pan or urinal) 3  -AN     Putting on and taking off regular upper body clothing 1  -AN     Taking care of personal grooming (such as brushing teeth) 3  -AN     Eating meals 3  -AN     AM-PAC 6 Clicks Score (OT) 14  -AN       Row Name 05/20/24 1842           Functional Assessment    Outcome Measure Options AM-PAC 6 Clicks Daily Activity (OT)  -AN               User Key  (r) = Recorded By, (t) = Taken By, (c) = Cosigned By      Initials Name Provider Type    Hetal Parks OT Occupational Therapist                    Occupational Therapy Education       Title: PT OT SLP Therapies (Done)       Topic: Occupational Therapy (Done)       Point: ADL training (Done)       Description:   Instruct learner(s) on proper safety adaptation and remediation techniques during self care or transfers.   Instruct in proper use of assistive devices.                  Learning Progress Summary             Patient Acceptance, E,TB,D,H, VU,DU,NR by AN at 5/20/2024 1842   Family Acceptance, E,TB,D,H, VU,DU,NR by AN at 5/20/2024 1842                         Point: Home exercise program (Done)       Description:   Instruct learner(s) on appropriate technique for monitoring, assisting and/or progressing therapeutic exercises/activities.                  Learning Progress Summary             Patient Acceptance, E,TB,D,H, VU,DU,NR by AN at 5/20/2024 1842   Family Acceptance, E,TB,D,H, VU,DU,NR by AN at 5/20/2024 1842                         Point: Precautions (Done)       Description:   Instruct learner(s) on prescribed precautions during self-care and functional transfers.                  Learning Progress Summary             Patient Acceptance, E,TB,D,H, VU,DU,NR by AN at 5/20/2024 1842   Family Acceptance, E,TB,D,H, VU,DU,NR by AN at 5/20/2024 1842                         Point: Body mechanics (Done)       Description:   Instruct learner(s) on proper positioning and spine alignment during self-care, functional mobility activities and/or exercises.                  Learning Progress Summary             Patient Acceptance, E,TB,D,H, VU,DU,NR by AN at 5/20/2024 1842   Family Acceptance, E,TB,D,H, VU,DU,NR by AN at 5/20/2024 1842                                         User Key       Initials  Effective Dates Name Provider Type Discipline    AN 09/21/21 -  Hetal Araya OT Occupational Therapist OT                  OT Recommendation and Plan  Planned Therapy Interventions (OT): activity tolerance training, adaptive equipment training, BADL retraining, functional balance retraining, edema control/reduction, transfer/mobility retraining, strengthening exercise, occupation/activity based interventions, ROM/therapeutic exercise, patient/caregiver education/training  Therapy Frequency (OT): daily  Plan of Care Review  Plan of Care Reviewed With: patient  Progress: no change  Outcome Evaluation: Pt presents below his functional baseline with deficits including impaired balance, mobility, and ADLs warranting skilled OT services. Pt and spouse educated on L shoulder precautions, ADL retraining, sling management, and LUE HEP. Pt tolerated L PROM FE to 140*, IR to chest and ER to 30*. Pt ambulated ~50 ft with Min HHA limited by dizziness and lethargy. SpO2 maintained >90% on RA. Pt failed mobility screen and would benefit from PT evaluation. Rec home with 24/7 assist at [place].     Time Calculation:   Evaluation Complexity (OT)  Review Occupational Profile/Medical/Therapy History Complexity: expanded/moderate complexity  Assessment, Occupational Performance/Identification of Deficit Complexity: 3-5 performance deficits  Clinical Decision Making Complexity (OT): detailed assessment/moderate complexity  Overall Complexity of Evaluation (OT): moderate complexity     Time Calculation- OT       Row Name 05/20/24 1843             Time Calculation- OT    OT Start Time 1755  -AN      OT Received On 05/20/24  -AN      OT Goal Re-Cert Due Date 05/30/24  -AN         Timed Charges    41720 - OT Therapeutic Exercise Minutes 15  -AN      43786 - OT Self Care/Mgmt Minutes 10  -AN         Untimed Charges    OT Eval/Re-eval Minutes 46  -AN         Total Minutes    Timed Charges Total Minutes 25  -AN      Untimed Charges Total  Minutes 46  -AN       Total Minutes 71  -AN                User Key  (r) = Recorded By, (t) = Taken By, (c) = Cosigned By      Initials Name Provider Type    Hetal Parks OT Occupational Therapist                  Therapy Charges for Today       Code Description Service Date Service Provider Modifiers Qty    05237387043  OT THER PROC EA 15 MIN 5/20/2024 Hetal Araya OT GO 1    [id number]  OT SELF CARE/MGMT/TRAIN EA 15 MIN 5/20/2024 Hetal Araya OT GO 1    [id number]  OT EVAL MOD COMPLEXITY 4 5/20/2024 Hetal Araya OT GO 1    [id number]  CAREGIVER TRAINING STRATEGIES & TQ 1ST 30 MINUTES 5/20/2024 Hetal Araya OT  1                 Hetal Araya OT  5/20/2024

## 2024-05-20 NOTE — ANESTHESIA PROCEDURE NOTES
Peripheral Block      Patient reassessed immediately prior to procedure    Patient location during procedure: OR  Start time: 5/20/2024 1:53 PM  Stop time: 5/20/2024 1:57 PM  Reason for block: at surgeon's request and post-op pain management  Performed by  CRNA/CAA: Yusuf Dodson, CRNA  Assisted by: Era Humphrey RN  Preanesthetic Checklist  Completed: patient identified, IV checked, site marked, risks and benefits discussed, surgical consent, monitors and equipment checked, pre-op evaluation and timeout performed  Prep:  Pt Position: right lateral decubitus  Sterile barriers:cap, gloves, mask and washed/disinfected hands  Prep: ChloraPrep  Patient monitoring: blood pressure monitoring, continuous pulse oximetry and EKG  Procedure  Performed under: general  Guidance:ultrasound guided and landmark technique  Images:still images obtained, printed/placed on chart    Laterality:left  Block Type:PECS I and PECS II  Injection Technique:single-shot  Needle Type:short-bevel  Needle Gauge:20 G  Resistance on Injection: none    Medications Used: dexamethasone sodium phosphate injection - Injection   2 mg - 5/20/2024 1:57:00 PM  bupivacaine PF (MARCAINE) 0.25 % injection - Injection   30 mL - 5/20/2024 1:57:00 PM      Medications  Preservative Free Saline:5ml    Post Assessment  Injection Assessment: negative aspiration for heme, incremental injection and no paresthesia on injection  Patient Tolerance:comfortable throughout block  Complications:no  Additional Notes  Interpectoral-Pectoserratus Plane   A high-frequency linear transducer, with sterile cover, was placed medial to the coracoid process in the paramedian sagittal plane. The transducer was moved caudally to the 4th rib and rotated slightly to allow an in-plane needle trajectory from medial to lateral. Pectoralis Major Muscle (PMM), Pectoralis Minor Muscle (PmM), Thoracoacromial Artery, Ribs, and Pleura were identified under ultrasound. The insertion site was  "prepped in sterile fashion and then localized with 2-5 ml of 1% Lidocaine. Using ultrasound-guidance, a 20-gauge B-Moyer 4\" Ultraplex 360 non-stimulating echogenic needle was advanced in plane until the tip of the needle was in the fascial plane between the PMM and PmM, lateral to the Thoracoacromial Artery. 1-3ml of preservative free normal saline was used to hydro-dissect the fascial planes. After the fascial plane was verified, 10ml local anesthetic (LA) was injected for Interpectoral fascial plane block. The needle was continued along the same path to the level of the 4th rib below PmM.  Initially preservative free normal saline was used to confirm needle position and then 20 ml of LA was injected for Pectoserratus fascial plane block. Aspiration every 5 ml to prevent intravascular injection. Injection was completed with negative aspiration of blood and negative intravascular injection. Injection pressures were normal with minimal resistance.               "

## 2024-05-20 NOTE — PLAN OF CARE
Goal Outcome Evaluation:  Plan of Care Reviewed With: patient        Progress: no change  Outcome Evaluation: Pt presents below his functional baseline with deficits including impaired balance, mobility, and ADLs warranting skilled OT services. Pt and spouse educated on L shoulder precautions, ADL retraining, sling management, and LUE HEP. Pt tolerated L PROM FE to 140*, IR to chest and ER to 30*. Pt ambulated ~50 ft with Min HHA limited by dizziness and lethargy. SpO2 maintained >90% on RA. Pt failed mobility screen and would benefit from PT evaluation. Rec home with 24/7 assist at [place].      Anticipated Discharge Disposition (OT): home with 24/7 care

## 2024-05-20 NOTE — H&P
Pre-Op H&P  Ibrahima Nava  [id number]  1945      Chief complaint: Left shoulder pain      Subjective:  Patient is a 79 y.o.male presents for scheduled surgery by Dr. Xavier. He anticipates a REVISION TOTAL SHOULDER ARTHROPLASTY TO REVERSE TOTAL SHOULDER ATHROPLASTY, POSSIBLE HEMIARTHROPLASTY - LEFT  today. He has history of left shoulder replacement several years ago. He states about 6 months ago he started having increased pain and limited ROM. He denies numbness, tingling or difficulty with . He denies injury.      Review of Systems:  Constitutional-- No fever, chills or sweats. No fatigue.  CV-- No chest pain, palpitation or syncope. +HTN, HLD  Resp-- No SOB, cough, hemoptysis  Skin--No rashes or lesions      Allergies: No Known Allergies      Home Meds:  Medications Prior to Admission   Medication Sig Dispense Refill Last Dose    allopurinol (ZYLOPRIM) 100 MG tablet Take 1 tablet by mouth Daily.       aspirin 325 MG tablet Take 1 tablet by mouth Daily.       docusate sodium 100 MG capsule Take 100 mg by mouth 2 (Two) Times a Day As Needed for Constipation. 14 capsule 0     ferrous sulfate 324 (65 Fe) MG tablet delayed-release EC tablet Take 1 tablet by mouth Daily With Breakfast.       furosemide (LASIX) 40 MG tablet Take 1 tablet by mouth Daily.       levothyroxine (SYNTHROID, LEVOTHROID) 50 MCG tablet Take 1 tablet by mouth Daily.       LORazepam (ATIVAN) 0.5 MG tablet Take 1 tablet by mouth 2 (Two) Times a Day.       Multiple Vitamins-Iron (MULTIVITAMIN/IRON PO) Take 1 tablet by mouth Daily.       Multiple Vitamins-Minerals (CENTRUM SILVER PO) Take 1 tablet by mouth Daily.       pantoprazole (PROTONIX) 40 MG EC tablet Take 1 tablet by mouth Daily.       potassium chloride (KLOR-CON) 8 MEQ CR tablet Take 1 tablet by mouth Every Other Day.       pravastatin (PRAVACHOL) 80 MG tablet Take 1 tablet by mouth Daily.       primidone (MYSOLINE) 50 MG tablet Take 2 tablets by mouth 2 (Two) Times a Day.        ropivacaine (NAROPIN) 0.2 % 12 mg/hr by Peripheral Nerve route Continuous.       sertraline (ZOLOFT) 100 MG tablet Take 1 tablet by mouth Daily.       telmisartan (MICARDIS) 80 MG tablet Take 1 tablet by mouth Daily.       thiamine (VITAMIN B-1) 100 MG tablet Take 1 tablet by mouth Daily.       tiZANidine (ZANAFLEX) 4 MG tablet Take 1 tablet by mouth Every Night.       valACYclovir (VALTREX) 500 MG tablet Take 1 tablet by mouth Daily.       vitamin D3 125 MCG (5000 UT) capsule capsule Take 1 capsule by mouth Daily.            PMH:   Past Medical History:   Diagnosis Date    Anxiety     Arthritis     Hypertension     CONTROLLED WITH MEDS PER PT     Shoulder pain, left     Sleep apnea     did not tolerate CPAP    Stroke     2012-- NO RESIDUAL PROBLEMS    Tremor of right hand     Wears dentures     UPPER AND LOWER PLATE      PSH:    Past Surgical History:   Procedure Laterality Date    CARDIAC CATHETERIZATION  2016    NO CORONARY STENTS     COLONOSCOPY  2014    ND ARTHROPLASTY GLENOHUMERAL JOINT TOTAL SHOULDER Left 06/12/2017    Procedure: LEFT TOTAL SHOULDER ARTHROPLASTY ;  Surgeon: Michael Xavier MD;  Location: [facility];  Service: Orthopedics    TOTAL KNEE ARTHROPLASTY Bilateral     TOTAL SHOULDER REPLACEMENT Right        Immunization History:  Influenza: UTD  Pneumococcal: UTD  Tetanus: UTD  Covid : x2    Social History:   Tobacco:   Social History     Tobacco Use   Smoking Status Former    Types: Cigarettes   Smokeless Tobacco Never   Tobacco Comments    QUIT 20 YEARS AGO, SMOKED X 40 YEARS       Alcohol:     Social History     Substance and Sexual Activity   Alcohol Use No         Physical Exam:VS: /64  HR 76  RR 16 T 97.8  Sat 94%RA      General Appearance:    Alert, cooperative, no distress, appears stated age   Head:    Normocephalic, without obvious abnormality, atraumatic   Lungs:     Clear to auscultation bilaterally, respirations unlabored    Heart:   Regular rate and rhythm, S1 and S2  normal    Abdomen:    Soft without tenderness   Extremities:   Extremities normal, atraumatic, no cyanosis or edema   Skin:   Skin color, texture, turgor normal, no rashes or lesions   Neurologic:   Grossly intact     Results Review:     LABS:  Lab Results   Component Value Date    WBC 6.91 05/07/2024    HGB 12.2 (L) 05/07/2024    HCT 40.2 05/07/2024    MCV 87.6 05/07/2024     05/07/2024    NEUTROABS 4.01 06/13/2017    GLUCOSE 120 (H) 05/07/2024    BUN 15 05/07/2024    CREATININE 0.88 05/07/2024    EGFRIFNONA 83 06/13/2017     05/07/2024    K 4.6 05/07/2024     05/07/2024    CO2 28.0 05/07/2024    MG 1.9 06/07/2016    CALCIUM 9.4 05/07/2024       RADIOLOGY:  Imaging Results (Last 72 Hours)       ** No results found for the last 72 hours. **            I reviewed the patient's new clinical results.    Cancer Staging (if applicable)  Cancer Patient: __ yes __no __unknown; If yes, clinical stage T:__ N:__M:__, stage group or __N/A      Impression: Left shoulder pain      Plan: REVISION TOTAL SHOULDER ARTHROPLASTY TO REVERSE TOTAL SHOULDER ATHROPLASTY, POSSIBLE HEMIARTHROPLASTY - LEFT       [place] Walsh, ALAN   5/20/2024   12:48 EDT

## 2024-05-21 VITALS
WEIGHT: 284.94 LBS | BODY MASS INDEX: 43.19 KG/M2 | HEART RATE: 68 BPM | TEMPERATURE: 97.9 F | SYSTOLIC BLOOD PRESSURE: 154 MMHG | RESPIRATION RATE: 18 BRPM | HEIGHT: 68 IN | DIASTOLIC BLOOD PRESSURE: 67 MMHG | OXYGEN SATURATION: 95 %

## 2024-05-21 LAB
ANION GAP SERPL CALCULATED.3IONS-SCNC: 10 MMOL/L (ref 5–15)
BASOPHILS # BLD AUTO: 0.06 10*3/MM3 (ref 0–0.2)
BASOPHILS NFR BLD AUTO: 0.5 % (ref 0–1.5)
BUN SERPL-MCNC: 12 MG/DL (ref 8–23)
BUN/CREAT SERPL: 16 (ref 7–25)
CALCIUM SPEC-SCNC: 8.6 MG/DL (ref 8.6–10.5)
CHLORIDE SERPL-SCNC: 104 MMOL/L (ref 98–107)
CO2 SERPL-SCNC: 26 MMOL/L (ref 22–29)
CREAT SERPL-MCNC: 0.75 MG/DL (ref 0.76–1.27)
DEPRECATED RDW RBC AUTO: 46.8 FL (ref 37–54)
EGFRCR SERPLBLD CKD-EPI 2021: 91.8 ML/MIN/1.73
EOSINOPHIL # BLD AUTO: 0.06 10*3/MM3 (ref 0–0.4)
EOSINOPHIL NFR BLD AUTO: 0.5 % (ref 0.3–6.2)
ERYTHROCYTE [DISTWIDTH] IN BLOOD BY AUTOMATED COUNT: 14.6 % (ref 12.3–15.4)
GLUCOSE SERPL-MCNC: 105 MG/DL (ref 65–99)
HCT VFR BLD AUTO: 34 % (ref 37.5–51)
HGB BLD-MCNC: 10.4 G/DL (ref 13–17.7)
IMM GRANULOCYTES # BLD AUTO: 0.03 10*3/MM3 (ref 0–0.05)
IMM GRANULOCYTES NFR BLD AUTO: 0.2 % (ref 0–0.5)
LYMPHOCYTES # BLD AUTO: 3.24 10*3/MM3 (ref 0.7–3.1)
LYMPHOCYTES NFR BLD AUTO: 25.8 % (ref 19.6–45.3)
MCH RBC QN AUTO: 26.7 PG (ref 26.6–33)
MCHC RBC AUTO-ENTMCNC: 30.6 G/DL (ref 31.5–35.7)
MCV RBC AUTO: 87.2 FL (ref 79–97)
MONOCYTES # BLD AUTO: 1.04 10*3/MM3 (ref 0.1–0.9)
MONOCYTES NFR BLD AUTO: 8.3 % (ref 5–12)
NEUTROPHILS NFR BLD AUTO: 64.7 % (ref 42.7–76)
NEUTROPHILS NFR BLD AUTO: 8.11 10*3/MM3 (ref 1.7–7)
NRBC BLD AUTO-RTO: 0 /100 WBC (ref 0–0.2)
PLATELET # BLD AUTO: 310 10*3/MM3 (ref 140–450)
PMV BLD AUTO: 9.6 FL (ref 6–12)
POTASSIUM SERPL-SCNC: 4.7 MMOL/L (ref 3.5–5.2)
RBC # BLD AUTO: 3.9 10*6/MM3 (ref 4.14–5.8)
SODIUM SERPL-SCNC: 140 MMOL/L (ref 136–145)
WBC NRBC COR # BLD AUTO: 12.54 10*3/MM3 (ref 3.4–10.8)

## 2024-05-21 PROCEDURE — 97110 THERAPEUTIC EXERCISES: CPT | Performed by: OCCUPATIONAL THERAPIST

## 2024-05-21 PROCEDURE — 80048 BASIC METABOLIC PNL TOTAL CA: CPT | Performed by: ORTHOPAEDIC SURGERY

## 2024-05-21 PROCEDURE — 85025 COMPLETE CBC W/AUTO DIFF WBC: CPT | Performed by: ORTHOPAEDIC SURGERY

## 2024-05-21 PROCEDURE — 97161 PT EVAL LOW COMPLEX 20 MIN: CPT

## 2024-05-21 PROCEDURE — 97535 SELF CARE MNGMENT TRAINING: CPT | Performed by: OCCUPATIONAL THERAPIST

## 2024-05-21 PROCEDURE — 97116 GAIT TRAINING THERAPY: CPT

## 2024-05-21 PROCEDURE — 25010000002 VANCOMYCIN HCL IN NACL 2-0.9 GM/500ML-% SOLUTION: Performed by: ORTHOPAEDIC SURGERY

## 2024-05-21 RX ORDER — DOCUSATE SODIUM 100 MG/1
100 CAPSULE, LIQUID FILLED ORAL 2 TIMES DAILY
Qty: 30 CAPSULE | Refills: 0 | Status: SHIPPED | OUTPATIENT
Start: 2024-05-21 | End: 2024-06-05

## 2024-05-21 RX ORDER — OXYCODONE HYDROCHLORIDE 5 MG/1
5 TABLET ORAL EVERY 4 HOURS PRN
Qty: 25 TABLET | Refills: 0 | Status: SHIPPED | OUTPATIENT
Start: 2024-05-21 | End: 2024-05-30

## 2024-05-21 RX ADMIN — PANTOPRAZOLE SODIUM 40 MG: 40 TABLET, DELAYED RELEASE ORAL at 08:56

## 2024-05-21 RX ADMIN — LEVOTHYROXINE SODIUM 50 MCG: 0.05 TABLET ORAL at 05:13

## 2024-05-21 RX ADMIN — LORAZEPAM 0.5 MG: 0.5 TABLET ORAL at 08:56

## 2024-05-21 RX ADMIN — VALACYCLOVIR HYDROCHLORIDE 500 MG: 500 TABLET, FILM COATED ORAL at 08:55

## 2024-05-21 RX ADMIN — LOSARTAN POTASSIUM 50 MG: 50 TABLET, FILM COATED ORAL at 08:56

## 2024-05-21 RX ADMIN — PRIMIDONE 100 MG: 50 TABLET ORAL at 08:55

## 2024-05-21 RX ADMIN — SERTRALINE 100 MG: 100 TABLET, FILM COATED ORAL at 08:56

## 2024-05-21 RX ADMIN — ASPIRIN 325 MG: 325 TABLET ORAL at 08:56

## 2024-05-21 RX ADMIN — ALLOPURINOL 100 MG: 100 TABLET ORAL at 08:56

## 2024-05-21 RX ADMIN — FERROUS SULFATE TAB 325 MG (65 MG ELEMENTAL FE) 325 MG: 325 (65 FE) TAB at 08:56

## 2024-05-21 RX ADMIN — Medication 2000 MG: at 05:15

## 2024-05-21 NOTE — PROGRESS NOTES
"Orthopedic Daily Progress Note      CC: POD#1 s/p revision L shoulder arthroplasty to rTSA    Pain is controlled  General: no fevers, chills  Abdomen: no nausea, vomiting, or diarrhea    No other complaints    Physical Exam:  I have reviewed the vital signs.  Temp:  [97 °F (36.1 °C)-98.1 °F (36.7 °C)] 97.9 °F (36.6 °C)  Heart Rate:  [61-80] 68  Resp:  [16-18] 18  BP: (113-156)/(56-86) 154/67    Objective:  Vital signs: (most recent): Blood pressure 154/67, pulse 68, temperature 97.9 °F (36.6 °C), temperature source Oral, resp. rate 18, height 172.7 cm (68\"), weight 129 kg (284 lb 15.1 oz), SpO2 95%.              General Appearance:    Alert, cooperative, no distress  Extremities: No clubbing, cyanosis, or edema to lower extremities  Pulses:  2+ in distal surgical extremity  Skin: Dressing Clean/dry/intact      Results Review:    I have reviewed the labs, radiology results and diagnostic studies:    Results from last 7 days   Lab Units 05/21/24  0623   WBC 10*3/mm3 12.54*   HEMOGLOBIN g/dL 10.4*   PLATELETS 10*3/mm3 310     Results from last 7 days   Lab Units 05/21/24  0623   SODIUM mmol/L 140   POTASSIUM mmol/L 4.7   CO2 mmol/L 26.0   CREATININE mg/dL 0.75*   GLUCOSE mg/dL 105*       I have reviewed the medications.    Assessment/Problem List  POD# 1 S/p evision L shoulder arthroplasty to rTSA    Plan  D/c home today, FU in clinic for wound check 1 week. APRYL JOYCE in    -keep Cx x10d for C. acnes    Discharge Planning: I expect patient to be discharged to home today    Edna Rose MD  05/21/24  07:46 EDT            "

## 2024-05-21 NOTE — THERAPY TREATMENT NOTE
Patient Name: Ibrahima Nava  : 1945    MRN: [id number]                              Today's Date: 2024       Admit Date: 2024    Visit Dx:     ICD-10-CM ICD-9-CM   1. History of arthroplasty of left shoulder  Z96.612 V43.61     Patient Active Problem List   Diagnosis    Primary localized osteoarthrosis of shoulder region    Hypertension    Impaired physical mobility    Acute pain of left shoulder    Status post reverse arthroplasty of shoulder, left     Past Medical History:   Diagnosis Date    Anxiety     Arthritis     Hypertension     CONTROLLED WITH MEDS PER PT     Shoulder pain, left     Sleep apnea     did not tolerate CPAP    Stroke     -- NO RESIDUAL PROBLEMS    Tremor of right hand     Wears dentures     UPPER AND LOWER PLATE      Past Surgical History:   Procedure Laterality Date    CARDIAC CATHETERIZATION      NO CORONARY STENTS     COLONOSCOPY      IL ARTHROPLASTY GLENOHUMERAL JOINT TOTAL SHOULDER Left 2017    Procedure: LEFT TOTAL SHOULDER ARTHROPLASTY ;  Surgeon: Michael Xavier MD;  Location:  [facility];  Service: Orthopedics    TOTAL KNEE ARTHROPLASTY Bilateral     TOTAL SHOULDER REPLACEMENT Right     TOTAL SHOULDER REVISION Left 2024    Procedure: REVISION TOTAL SHOULDER ARTHROPLASTY TO REVERSE TOTAL SHOULDER ATHROPLASTY - LEFT;  Surgeon: Michael Xavier MD;  Location:  [facility];  Service: Orthopedics;  Laterality: Left;      General Information       Row Name 24 1421          OT Time and Intention    Document Type therapy note (daily note)  -AR     Mode of Treatment individual therapy;occupational therapy  -AR       Row Name 24 1421          General Information    Existing Precautions/Restrictions fall;left;shoulder;non-weight bearing;other (see comments)  interscalene nerve catheter, Donjoy Ultra II sling with pillow  -AR     Barriers to Rehab none identified  -AR       Row Name 24 1421          Cognition    Orientation Status  (Cognition) oriented x 4  -AR       Row Name 05/21/24 1421          Safety Issues, Functional Mobility    Safety Issues Affecting Function (Mobility) safety precautions follow-through/compliance  -AR     Impairments Affecting Function (Mobility) sensation/sensory awareness;pain  -AR               User Key  (r) = Recorded By, (t) = Taken By, (c) = Cosigned By      Initials Name Provider Type    AR Anita Hu, OT Occupational Therapist                     Mobility/ADL's       Row Name 05/21/24 1429          Bed Mobility    Comment, (Bed Mobility) OT educated pt on importance of maintaining NWB and safe sleeping position.  -AR       Row Name 05/21/24 1429          Transfers    Transfers sit-stand transfer;stand-sit transfer;toilet transfer  -AR     Comment, (Transfers) Educated pt on importance of attending to interscalene nerve catheter to avoid dislodgement.  -AR       [facility] Name 05/21/24 1429          Sit-Stand Transfer    Sit-Stand [place] (Transfers) supervision  -AR       Row Name 05/21/24 1429          Stand-Sit Transfer    Stand-Sit [place] (Transfers) supervision  -AR       Row Name 05/21/24 1429          Toilet Transfer    Type (Toilet Transfer) sit-stand;stand-sit  -AR     [place] Level (Toilet Transfer) supervision  -AR       [facility] Name 05/21/24 1429          Functional Mobility    Functional Mobility- Ind. Level supervision required  -AR     Functional Mobility-Distance (Feet) 20  -[facility] Name 05/21/24 1429          Activities of Daily Living    BADL Assessment/Intervention upper body dressing;bathing;lower body dressing  -AR       Row Name 05/21/24 1429          Mobility    Extremity Weight-bearing Status left upper extremity  -AR     Left Upper Extremity (Weight-bearing Status) non weight-bearing (NWB)  -[facility] Name 05/21/24 1429          Upper Body Dressing Assessment/Training    [place] Level (Upper Body Dressing) doff;front opening garment;moderate assist (50%  patient effort);don  -AR     Position (Upper Body Dressing) unsupported sitting  -AR     Comment, (Upper Body Dressing) Educated pt on shoulder precautions, ADL retraining to maintain, sling management and care of interscalene nerve catheter during ADLs to avoid dislodgement. Post teaching, pt and spouse able to don/doff sling with supervision. OT adjusted sling for optimal fit.  -AR       Row Name 05/21/24 1429          Bathing Assessment/Intervention    Comment, (Bathing) Educated pt on shoulder precautions and axilla care to maintain, reviewed that nerve catheter cannot get wet. Pt declined need for LH sponge as they have one at home.  -AR       Row Name 05/21/24 1429          Lower Body Dressing Assessment/Training    Comment, (Lower Body Dressing) Pt declined reacher and shoe horn  -AR               User Key  (r) = Recorded By, (t) = Taken By, (c) = Cosigned By      Initials Name Provider Type    Anita Truong, OT Occupational Therapist                   Obj/Interventions       Row Name 05/21/24 1444          Sensory Assessment (Somatosensory)    Sensory Assessment (Somatosensory) left LE  -AR     Sensory Subjective Reports numbness  -AR       Row Name 05/21/24 1444          Shoulder (Therapeutic Exercise)    Shoulder AROM (Therapeutic Exercise) bilateral;scapular retraction;sitting;10 repetitions  -AR     Shoulder PROM (Therapeutic Exercise) left;flexion;extension;external rotation;internal rotation;sitting;10 repetitions  -AR       Row Name 05/21/24 1444          Elbow/Forearm (Therapeutic Exercise)    Elbow/Forearm (Therapeutic Exercise) AROM (active range of motion)  -AR     Elbow/Forearm AROM (Therapeutic Exercise) left;flexion;extension;supination;pronation;sitting;10 repetitions  -AR       Row Name 05/21/24 1444          Wrist (Therapeutic Exercise)    Wrist (Therapeutic Exercise) AROM (active range of motion)  -AR     Wrist AROM (Therapeutic Exercise) left;flexion;extension;10 repetitions  -AR        [facility] Name 05/21/24 1444          Hand (Therapeutic Exercise)    Hand AROM/AAROM (Therapeutic Exercise) left;AROM (active range of motion);finger extension;finger flexion  -AR       [facility] Name 05/21/24 1444          Motor Skills    Therapeutic Exercise shoulder;elbow/forearm;wrist;hand  Reviewed written HEP  -AR       [facility] Name 05/21/24 1444          Balance    Balance Assessment sitting static balance;sitting dynamic balance;standing static balance;standing dynamic balance  -AR     Static Sitting Balance independent  -AR     Dynamic Sitting Balance independent  -AR     Position, Sitting Balance unsupported  -AR     Static Standing Balance supervision  -AR     Dynamic Standing Balance supervision  -AR     Position/Device Used, Standing Balance unsupported  -AR               User Key  (r) = Recorded By, (t) = Taken By, (c) = Cosigned By      Initials Name Provider Type    Anita Truong OT Occupational Therapist                   Goals/Plan       Row Name 05/21/24 1507          Transfer Goal 1 (OT)    Time Frame (Transfer Goal 1, OT) by discharge  -AR       Row Name 05/21/24 1507          Dressing Goal 1 (OT)    Progress/Outcome (Dressing Goal 1, OT) goal met  -AR       Row Name 05/21/24 1507          Strength Goal 1 (OT)    Progress/Outcome (Strength Goal 1, OT) goal met  -AR               User Key  (r) = Recorded By, (t) = Taken By, (c) = Cosigned By      Initials Name Provider Type    Anita Truong, OT Occupational Therapist                   Clinical Impression       Row Name 05/21/24 1506          Pain Assessment    Pretreatment Pain Rating 3/10  -AR     Posttreatment Pain Rating 0/10 - no pain  -AR     Pain Location - Side/Orientation Left  -AR     Pain Location - --  axilla  -AR     Pain Intervention(s) Medication (See MAR);Cold applied;Repositioned;Ambulation/increased activity  -AR       Row Name 05/21/24 1504          Plan of Care Review    Plan of Care Reviewed With  patient;spouse;daughter  -AR     Progress improving  -AR     Outcome Evaluation OT educated pt and family on shoulder precautions, ADL retraining to maintain, sling management and HEP. Post teaching, pt and spouse able to manage sling and HEP with supervision. Pt tolerated PROM , IR chest/ER 30. Spouse limited with weight of his LUE, trialed pt assisting with RUE and spouse assisting with BUE and no issues. Recommend DC home with family assist.  -AR       Row Name 05/21/24 1504          Therapy Plan Review/Discharge Plan (OT)    Anticipated Discharge Disposition (OT) home with assist  -AR       Row Name 05/21/24 1504          Vital Signs    Pre Patient Position Sitting  -AR     Intra Patient Position Standing  -AR     Post Patient Position Sitting  -AR       Row Name 05/21/24 1504          Positioning and Restraints    Pre-Treatment Position sitting in chair/recliner  -AR     Post Treatment Position chair  -AR     In Chair notified nsg;sitting;call light within reach;encouraged to call for assist;with family/caregiver;with brace  cold pack applied over incision  -AR               User Key  (r) = Recorded By, (t) = Taken By, (c) = Cosigned By      Initials Name Provider Type    Anita Truong, OT Occupational Therapist                   Outcome Measures       Row Name 05/21/24 1507          How much help from another is currently needed...    Putting on and taking off regular lower body clothing? 2  -AR     Bathing (including washing, rinsing, and drying) 3  -AR     Toileting (which includes using toilet bed pan or urinal) 3  -AR     Putting on and taking off regular upper body clothing 2  -AR     Taking care of personal grooming (such as brushing teeth) 3  -AR     Eating meals 3  -AR     AM-PAC 6 Clicks Score (OT) 16  -AR       Row Name 05/21/24 0926 05/21/24 0826       How much help from another person do you currently need...    Turning from your back to your side while in flat bed without using  bedrails? 4  -LR 3  -MM    Moving from lying on back to sitting on the side of a flat bed without bedrails? 3  -LR 3  -MM    Moving to and from a bed to a chair (including a wheelchair)? 3  -LR 3  -MM    Standing up from a chair using your arms (e.g., wheelchair, bedside chair)? 3  -LR 3  -MM    Climbing 3-5 steps with a railing? 3  -LR 3  -MM    To walk in hospital room? 3  -LR 3  -MM    AM-PAC 6 Clicks Score (PT) 19  -LR 18  -MM    Highest Level of Mobility Goal 6 --> Walk 10 steps or more  -LR 6 --> Walk 10 steps or more  -MM      Row Name 05/21/24 1507 05/21/24 0926       Functional Assessment    Outcome Measure Options AM-PAC 6 Clicks Daily Activity (OT)  -AR AM-PAC 6 Clicks Basic Mobility (PT)  -LR              User Key  (r) = Recorded By, (t) = Taken By, (c) = Cosigned By      Initials Name Provider Type    Jyoti Carlson, PT Physical Therapist    Anita Truong, OT Occupational Therapist    Claudia Tan, RN Registered Nurse                    Occupational Therapy Education       Title: PT OT SLP Therapies (In Progress)       Topic: Occupational Therapy (Done)       Point: ADL training (Done)       Description:   Instruct learner(s) on proper safety adaptation and remediation techniques during self care or transfers.   Instruct in proper use of assistive devices.                  Learning Progress Summary             Patient Eager, E,TB,D,H, DU,VU by AR at 5/21/2024 1508    Acceptance, E, VU by MM at 5/21/2024 0957    Acceptance, E,TB,D,H, VU,DU,NR by AN at 5/20/2024 1842   Family Eager, E,TB,D,H, DU,VU by AR at 5/21/2024 1508    Acceptance, E, VU by MM at 5/21/2024 0957    Acceptance, E,TB,D,H, VU,DU,NR by AN at 5/20/2024 1842                         Point: Home exercise program (Done)       Description:   Instruct learner(s) on appropriate technique for monitoring, assisting and/or progressing therapeutic exercises/activities.                  Learning Progress Summary              Patient Eager, E,TB,D,H, DU,VU by AR at 5/21/2024 1508    Acceptance, E, VU by MM at 5/21/2024 0957    Acceptance, E,TB,D,H, VU,DU,NR by AN at 5/20/2024 1842   Family Eager, E,TB,D,H, DU,VU by AR at 5/21/2024 1508    Acceptance, E, VU by MM at 5/21/2024 0957    Acceptance, E,TB,D,H, VU,DU,NR by AN at 5/20/2024 1842                         Point: Precautions (Done)       Description:   Instruct learner(s) on prescribed precautions during self-care and functional transfers.                  Learning Progress Summary             Patient Eager, E,TB,D,H, DU,VU by AR at 5/21/2024 1508    Acceptance, E, VU by MM at 5/21/2024 0957    Acceptance, E,TB,D,H, VU,DU,NR by AN at 5/20/2024 1842   Family Eager, E,TB,D,H, DU,VU by AR at 5/21/2024 1508    Acceptance, E, VU by MM at 5/21/2024 0957    Acceptance, E,TB,D,H, VU,DU,NR by AN at 5/20/2024 1842                         Point: Body mechanics (Done)       Description:   Instruct learner(s) on proper positioning and spine alignment during self-care, functional mobility activities and/or exercises.                  Learning Progress Summary             Patient Eager, E,TB,D,H, DU,VU by AR at 5/21/2024 1508    Acceptance, E, VU by MM at 5/21/2024 0957    Acceptance, E,TB,D,H, VU,DU,NR by AN at 5/20/2024 1842   Family Eager, E,TB,D,H, DU,VU by AR at 5/21/2024 1508    Acceptance, E, VU by MM at 5/21/2024 0957    Acceptance, E,TB,D,H, VU,DU,NR by AN at 5/20/2024 1842                                         User Key       Initials Effective Dates Name Provider Type Discipline    AR 07/11/23 -  Anita Hu, OT Occupational Therapist OT    MM 04/19/24 -  Claudia Regalado RN Registered Nurse Nurse     09/21/21 -  Hetal Araya OT Occupational Therapist OT                  OT Recommendation and Plan     Plan of Care Review  Plan of Care Reviewed With: patient, spouse, daughter  Progress: improving  Outcome Evaluation: OT educated pt and family on shoulder precautions, ADL  retraining to maintain, sling management and HEP. Post teaching, pt and spouse able to manage sling and HEP with supervision. Pt tolerated PROM , IR chest/ER 30. Spouse limited with weight of his LUE, trialed pt assisting with RUE and spouse assisting with BUE and no issues. Recommend DC home with family assist.     Time Calculation:         Time Calculation- OT       Row Name 05/21/24 1508 05/21/24 0926          Time Calculation- OT    OT Start Time 1323  -AR --     OT Received On 05/21/24  -AR --     OT Goal Re-Cert Due Date 05/30/24  -AR --        Timed Charges    35155 - OT Therapeutic Exercise Minutes 29  -AR --     04905 - Gait Training Minutes  -- 10  -LR     00101 - OT Self Care/Mgmt Minutes 24  -AR --        Total Minutes    Timed Charges Total Minutes 53  -AR 10  -LR      Total Minutes 53  -AR 10  -LR               User Key  (r) = Recorded By, (t) = Taken By, (c) = Cosigned By      Initials Name Provider Type    LR Jyoti Billingsley, PT Physical Therapist    Anita Truong OT Occupational Therapist                  Therapy Charges for Today       Code Description Service Date Service Provider Modifiers Qty    09948123299 HC OT SELF CARE/MGMT/TRAIN EA 15 MIN 5/21/2024 Anita Hu OT GO 2    11326333221 HC OT THER PROC EA 15 MIN 5/21/2024 Anita Hu OT GO 2                 Anita Hu OT  5/21/2024

## 2024-05-21 NOTE — PLAN OF CARE
Goal Outcome Evaluation:  Plan of Care Reviewed With: patient        Progress: improving  Outcome Evaluation: Patient ambulated 300 feet with SBA, step through gait pattern, no LOB or unsteadiness, limited by fatigue. B LE strength 5/5. O2 sats remained >92% on RA throughout mobility. Patient currently below baseline functioning, demonstrating decreased functional mobility status, decreased endurance, and decreased L shoulder strength/ROM. Will address these deficits to promote return to PLOF. Recommend d/c home with assist.      Anticipated Discharge Disposition (PT): home with assist

## 2024-05-21 NOTE — PLAN OF CARE
Goal Outcome Evaluation:  Plan of Care Reviewed With: patient           Outcome Evaluation: works very well with therapy and pain is well controlled education reviewed on DME and pain block medication set up and return when completed .

## 2024-05-21 NOTE — CASE MANAGEMENT/SOCIAL WORK
Continued Stay Note  [facility]     Patient Name: Ibrahima Nava  MRN: [id number]  Today's Date: 5/21/2024    Admit Date: 5/20/2024    Plan: Home   Discharge Plan       Row Name 05/21/24 1120       Plan    Plan Home    Plan Comments I spoke with patient in regards to discharge planning. He lives with spouse in [facility]. He reports being independent with ADLs'. He has grab bars and shower chair. He has Medicare. No advanced directives.    Final Discharge Disposition Code 01 - home or self-care                   Discharge Codes    No documentation.                 Expected Discharge Date and Time       Expected Discharge Date Expected Discharge Time    May 21, 2024               Alma Miller RN

## 2024-05-21 NOTE — PROGRESS NOTES
[facility]    Acute pain service Inpatient Progress Note    Patient Name: Ibrahima Nava  :  1945  MRN:  [id number]        Acute Pain  Service Inpatient Progress Note:    Analgesia:Good  Pain Score:2/10  LOC: alert and awake  Side Effects:None  Catheter Site:clean, dry and dressing intact  Cath type: peripheral nerve cath(InfuSystem)  Infusion rate: Ext/Pop: Basal: 1ml/hr, PIB: 5ml q 2 h, PCA: 5 ml q 30 min  Catheter Plan:Catheter to remain Insitu and Continue catheter infusion rate unchanged

## 2024-05-21 NOTE — PLAN OF CARE
Goal Outcome Evaluation:  Plan of Care Reviewed With: patient, spouse, daughter        Progress: improving  Outcome Evaluation: OT educated pt and family on shoulder precautions, ADL retraining to maintain, sling management and HEP. Post teaching, pt and spouse able to manage sling and HEP with supervision. Pt tolerated PROM , IR chest/ER 30. Spouse limited with weight of his LUE, trialed pt assisting with RUE and spouse assisting with BUE and no issues. Recommend DC home with family assist.      Anticipated Discharge Disposition (OT): home with assist

## 2024-05-21 NOTE — DISCHARGE SUMMARY
Patient Name: Ibrahima Naav  MRN: [id number]  : 1945  DOS: 2024    Attending: Michael Xavier MD    Primary Care Provider: Sebastien Lowe MD    Date of Admission:.2024 11:08 AM    Date of Discharge:  2024    Discharge Diagnosis:   Status post reverse arthroplasty of shoulder, left      Hospital Course    At admit:  Patient is a 79 y.o.male presents for scheduled surgery by Dr. Xavier. He anticipates a REVISION TOTAL SHOULDER ARTHROPLASTY TO REVERSE TOTAL SHOULDER ATHROPLASTY, POSSIBLE HEMIARTHROPLASTY - LEFT  today. He has history of left shoulder replacement several years ago. He states about 6 months ago he started having increased pain and limited ROM. He denies numbness, tingling or difficulty with . He denies injury.   Patient seen in the preop area, anxious about his surgery, denies no fever or chills, no chest pain or shortness of breath, no nausea or vomiting       After admit:  Patient was provided pain medications as needed for pain control, along with interscalene nerve block infusion of Ropivacaine    Adjustments were made to pain medications to optimize postop pain management.   Risks and benefits of opiate medications discussed with patient. PATIENCE report on chart was reviewed.    The patient was seen by OT and was taught exercises for left arm.  The patient used an IS for atelectasis prophylaxis and mechanicals for DVT prophylaxis.    Home medications were resumed as appropriate, and labs were monitored and remained fairly stable.     With the progress she has made, she is ready for DC home today.      The patient will have an Infupump ( instructed on it during this admit)  Discussed with patient regarding plan and she shows understanding and agreement.        Procedures Performed  Procedure(s):  REVISION TOTAL SHOULDER ARTHROPLASTY TO REVERSE TOTAL SHOULDER ATHROPLASTY - LEFT       Pertinent Test Results:    I reviewed the patient's new clinical results.   Results  "from last 7 days   Lab Units 24  0623   WBC 10*3/mm3 12.54*   HEMOGLOBIN g/dL 10.4*   HEMATOCRIT % 34.0*   PLATELETS 10*3/mm3 310     Results from last 7 days   Lab Units 24  0623 24  1313   SODIUM mmol/L 140  --    POTASSIUM mmol/L 4.7 4.4   CHLORIDE mmol/L 104  --    CO2 mmol/L 26.0  --    BUN mg/dL 12  --    CREATININE mg/dL 0.75*  --    CALCIUM mg/dL 8.6  --    GLUCOSE mg/dL 105*  --      I reviewed the patient's new imaging including images and reports.      Occupational Therapy  Pt presents below his functional baseline with deficits including impaired balance, mobility, and ADLs warranting skilled OT services. Pt and spouse educated on L shoulder precautions, ADL retraining, sling management, and LUE HEP. Pt tolerated L PROM FE to 140*, IR to chest and ER to 30*. Pt ambulated ~50 ft with Min HHA limited by dizziness and lethargy. SpO2 maintained >90% on RA. Pt failed mobility screen and would benefit from PT evaluation. Rec home with 24/7 assist at MA.        Anticipated Discharge Disposition (OT): home with 24/7 care       Physical therapy  Patient ambulated 300 feet with SBA, step through gait pattern, no LOB or unsteadiness, limited by fatigue. B LE strength 5/5. O2 sats remained >92% on RA throughout mobility. Patient currently below baseline functioning, demonstrating decreased functional mobility status, decreased endurance, and decreased L shoulder strength/ROM. Will address these deficits to promote return to PLOF. Recommend d/c home with assist.        Anticipated Discharge Disposition (PT): home with assist       Discharge Assessment:       Visit Vitals  /67 (BP Location: Right arm, Patient Position: Lying)   Pulse 68   Temp 97.9 °F (36.6 °C) (Oral)   Resp 18   Ht 172.7 cm (68\")   Wt 129 kg (284 lb 15.1 oz)   SpO2 95%   BMI 43.33 kg/m²     Temp (24hrs), Av.7 °F (36.5 °C), Min:97 °F (36.1 °C), Max:98.1 °F (36.7 °C)      General Appearance:    Alert, cooperative, in no " acute distress   Lungs:     Clear to auscultation,respirations regular, even and   unlabored    Heart:    Regular rhythm and normal rate, normal S1 and S2    Abdomen:     Normal bowel sounds, no masses, no organomegaly, soft        non-tender, non-distended, no guarding, no rebound                 tenderness   Extremities: Left UE in a sling, CDI  dressing over left shoulder incision . Interscalene nerve block cath present.  Distal pulses, cap refill,  intact. Movement and sensation no focal deficit     Pulses:   Pulses palpable and equal bilaterally   Skin:   No bleeding, bruising or rash        Discharge Disposition: Home          Discharge Medications        New Medications        Instructions Start Date   oxyCODONE 5 MG immediate release tablet  Commonly known as: ROXICODONE   5 mg, Oral, Every 4 Hours PRN             Changes to Medications        Instructions Start Date   docusate sodium 100 MG capsule  What changed: Another medication with the same name was added. Make sure you understand how and when to take each.   100 mg, Oral, 2 Times Daily PRN      docusate sodium 100 MG capsule  Commonly known as: COLACE  What changed: You were already taking a medication with the same name, and this prescription was added. Make sure you understand how and when to take each.   100 mg, Oral, 2 Times Daily             Continue These Medications        Instructions Start Date   allopurinol 100 MG tablet  Commonly known as: ZYLOPRIM   100 mg, Oral, Daily      aspirin 325 MG tablet   325 mg, Oral, Daily      ferrous sulfate 324 (65 Fe) MG tablet delayed-release EC tablet   324 mg, Oral, Daily With Breakfast      levothyroxine 50 MCG tablet  Commonly known as: SYNTHROID, LEVOTHROID   50 mcg, Oral, Daily      LORazepam 0.5 MG tablet  Commonly known as: ATIVAN   0.5 mg, Oral, 2 Times Daily      NON FORMULARY   Bariatric Advantage vitamin      pantoprazole 40 MG EC tablet  Commonly known as: PROTONIX   40 mg, Oral, Daily       pravastatin 80 MG tablet  Commonly known as: PRAVACHOL   80 mg, Oral, Daily      primidone 50 MG tablet  Commonly known as: MYSOLINE   100 mg, Oral, 2 Times Daily      sertraline 100 MG tablet  Commonly known as: ZOLOFT   100 mg, Oral, Daily      telmisartan 80 MG tablet  Commonly known as: MICARDIS   80 mg, Oral, Daily      tiZANidine 4 MG tablet  Commonly known as: ZANAFLEX   4 mg, Oral, Nightly      valACYclovir 500 MG tablet  Commonly known as: VALTREX   500 mg, Oral, Daily                 Activity at Discharge:   NWB left shoulder, ROM elbow, wrist, and hand per  instructions.    Follow-up Appointments:  No future appointments.    Time spent 33 minutes     Jaspreet Manning MD  05/21/24  11:15 EDT

## 2024-05-25 LAB
BACTERIA SPEC AEROBE CULT: NORMAL
BACTERIA SPEC ANAEROBE CULT: NORMAL
GRAM STN SPEC: NORMAL

## 2024-05-30 LAB
BACTERIA SPEC ANAEROBE CULT: NORMAL

## 2024-06-03 LAB
FUNGUS WND CULT: NORMAL
MYCOBACTERIUM SPEC CULT: NORMAL
NIGHT BLUE STAIN TISS: NORMAL

## 2024-07-01 LAB
FUNGUS WND CULT: NORMAL
MYCOBACTERIUM SPEC CULT: NORMAL
NIGHT BLUE STAIN TISS: NORMAL

## 2025-07-15 ENCOUNTER — APPOINTMENT (OUTPATIENT)
Dept: GENERAL RADIOLOGY | Facility: HOSPITAL | Age: 80
DRG: 308 | End: 2025-07-15
Payer: MEDICARE

## 2025-07-15 ENCOUNTER — HOSPITAL ENCOUNTER (INPATIENT)
Facility: HOSPITAL | Age: 80
LOS: 14 days | Discharge: NURSING, REHAB OR LONG-TERM CARE | DRG: 308 | End: 2025-07-29
Attending: EMERGENCY MEDICINE | Admitting: STUDENT IN AN ORGANIZED HEALTH CARE EDUCATION/TRAINING PROGRAM
Payer: MEDICARE

## 2025-07-15 ENCOUNTER — APPOINTMENT (OUTPATIENT)
Dept: NEUROLOGY | Facility: HOSPITAL | Age: 80
DRG: 308 | End: 2025-07-15
Payer: MEDICARE

## 2025-07-15 ENCOUNTER — APPOINTMENT (OUTPATIENT)
Dept: CT IMAGING | Facility: HOSPITAL | Age: 80
DRG: 308 | End: 2025-07-15
Payer: MEDICARE

## 2025-07-15 DIAGNOSIS — G47.30 SLEEP APNEA, UNSPECIFIED TYPE: ICD-10-CM

## 2025-07-15 DIAGNOSIS — R41.841 COGNITIVE COMMUNICATION DEFICIT: Primary | ICD-10-CM

## 2025-07-15 DIAGNOSIS — K92.2 GI (GASTROINTESTINAL BLEED): ICD-10-CM

## 2025-07-15 DIAGNOSIS — R41.82 ALTERED MENTAL STATUS, UNSPECIFIED ALTERED MENTAL STATUS TYPE: ICD-10-CM

## 2025-07-15 DIAGNOSIS — R13.19 ESOPHAGEAL DYSPHAGIA: ICD-10-CM

## 2025-07-15 DIAGNOSIS — E87.6 HYPOKALEMIA: ICD-10-CM

## 2025-07-15 DIAGNOSIS — D50.0 IRON DEFICIENCY ANEMIA DUE TO CHRONIC BLOOD LOSS: ICD-10-CM

## 2025-07-15 DIAGNOSIS — E66.813 OBESITY, CLASS III, BMI 40-49.9 (MORBID OBESITY): ICD-10-CM

## 2025-07-15 DIAGNOSIS — R47.01 APHASIA: ICD-10-CM

## 2025-07-15 DIAGNOSIS — Z86.73 HISTORY OF STROKE: ICD-10-CM

## 2025-07-15 PROBLEM — I63.9 CVA (CEREBRAL VASCULAR ACCIDENT): Status: ACTIVE | Noted: 2025-07-15

## 2025-07-15 LAB
ALBUMIN SERPL-MCNC: 3.7 G/DL (ref 3.5–5.2)
ALBUMIN/GLOB SERPL: 1.9 G/DL
ALP SERPL-CCNC: 85 U/L (ref 39–117)
ALT SERPL W P-5'-P-CCNC: 58 U/L (ref 1–41)
AMMONIA BLD-SCNC: 53 UMOL/L (ref 16–60)
AMPHET+METHAMPHET UR QL: NEGATIVE
AMPHETAMINES UR QL: NEGATIVE
ANION GAP SERPL CALCULATED.3IONS-SCNC: 12.4 MMOL/L (ref 5–15)
ARTERIAL PATENCY WRIST A: ABNORMAL
AST SERPL-CCNC: 75 U/L (ref 1–40)
ATMOSPHERIC PRESS: ABNORMAL MM[HG]
BACTERIA UR QL AUTO: ABNORMAL /HPF
BARBITURATES UR QL SCN: POSITIVE
BASE EXCESS BLDA CALC-SCNC: 13.3 MMOL/L (ref 0–2)
BASOPHILS # BLD AUTO: 0.02 10*3/MM3 (ref 0–0.2)
BASOPHILS NFR BLD AUTO: 0.2 % (ref 0–1.5)
BDY SITE: ABNORMAL
BENZODIAZ UR QL SCN: POSITIVE
BILIRUB SERPL-MCNC: 0.4 MG/DL (ref 0–1.2)
BILIRUB UR QL STRIP: NEGATIVE
BODY TEMPERATURE: 37
BUN BLDA-MCNC: 23 MG/DL (ref 8–26)
BUN SERPL-MCNC: 22.2 MG/DL (ref 8–23)
BUN/CREAT SERPL: 24.1 (ref 7–25)
BUPRENORPHINE SERPL-MCNC: NEGATIVE NG/ML
CA-I BLDA-SCNC: 1.14 MMOL/L (ref 1.2–1.32)
CALCIUM SPEC-SCNC: 8.9 MG/DL (ref 8.6–10.5)
CANNABINOIDS SERPL QL: NEGATIVE
CHLORIDE BLDA-SCNC: 93 MMOL/L (ref 98–109)
CHLORIDE SERPL-SCNC: 97 MMOL/L (ref 98–107)
CLARITY UR: CLEAR
CO2 BLDA-SCNC: 33 MMOL/L (ref 24–29)
CO2 BLDA-SCNC: 39.7 MMOL/L (ref 22–33)
CO2 SERPL-SCNC: 32.6 MMOL/L (ref 22–29)
COCAINE UR QL: NEGATIVE
COHGB MFR BLD: 1.8 % (ref 0–2)
COLOR UR: YELLOW
CREAT BLDA-MCNC: 0.9 MG/DL (ref 0.6–1.3)
CREAT SERPL-MCNC: 0.92 MG/DL (ref 0.76–1.27)
DEPRECATED RDW RBC AUTO: 53.5 FL (ref 37–54)
EGFRCR SERPLBLD CKD-EPI 2021: 84.1 ML/MIN/1.73
EGFRCR SERPLBLD CKD-EPI 2021: 86.3 ML/MIN/1.73
EOSINOPHIL # BLD AUTO: 0 10*3/MM3 (ref 0–0.4)
EOSINOPHIL NFR BLD AUTO: 0 % (ref 0.3–6.2)
EPAP: 0
ERYTHROCYTE [DISTWIDTH] IN BLOOD BY AUTOMATED COUNT: 17.2 % (ref 12.3–15.4)
ETHANOL BLD-MCNC: <10 MG/DL (ref 0–10)
FENTANYL UR-MCNC: NEGATIVE NG/ML
GEN 5 1HR TROPONIN T REFLEX: 45 NG/L
GLOBULIN UR ELPH-MCNC: 1.9 GM/DL
GLUCOSE BLDC GLUCOMTR-MCNC: 197 MG/DL (ref 70–130)
GLUCOSE BLDC GLUCOMTR-MCNC: 255 MG/DL (ref 70–130)
GLUCOSE SERPL-MCNC: 252 MG/DL (ref 65–99)
GLUCOSE UR STRIP-MCNC: NEGATIVE MG/DL
HBA1C MFR BLD: 6.34 % (ref 4.8–5.6)
HCO3 BLDA-SCNC: 38.2 MMOL/L (ref 20–26)
HCT VFR BLD AUTO: 31.2 % (ref 37.5–51)
HCT VFR BLD CALC: 27 % (ref 38–51)
HCT VFR BLDA CALC: 30 % (ref 38–51)
HGB BLD-MCNC: 9 G/DL (ref 13–17.7)
HGB BLDA-MCNC: 10.2 G/DL (ref 12–17)
HGB BLDA-MCNC: 8.8 G/DL (ref 13.5–17.5)
HGB UR QL STRIP.AUTO: ABNORMAL
HOLD SPECIMEN: NORMAL
HYALINE CASTS UR QL AUTO: ABNORMAL /LPF
IMM GRANULOCYTES # BLD AUTO: 0.32 10*3/MM3 (ref 0–0.05)
IMM GRANULOCYTES NFR BLD AUTO: 2.9 % (ref 0–0.5)
INHALED O2 CONCENTRATION: 21 %
INR PPP: 1.09 (ref 0.89–1.12)
IPAP: 0
KETONES UR QL STRIP: ABNORMAL
LEUKOCYTE ESTERASE UR QL STRIP.AUTO: NEGATIVE
LYMPHOCYTES # BLD AUTO: 1.22 10*3/MM3 (ref 0.7–3.1)
LYMPHOCYTES NFR BLD AUTO: 11 % (ref 19.6–45.3)
MAGNESIUM SERPL-MCNC: 2.2 MG/DL (ref 1.6–2.4)
MCH RBC QN AUTO: 25.2 PG (ref 26.6–33)
MCHC RBC AUTO-ENTMCNC: 28.8 G/DL (ref 31.5–35.7)
MCV RBC AUTO: 87.4 FL (ref 79–97)
METHADONE UR QL SCN: NEGATIVE
METHGB BLD QL: 0.4 % (ref 0–1.5)
MODALITY: ABNORMAL
MONOCYTES # BLD AUTO: 0.5 10*3/MM3 (ref 0.1–0.9)
MONOCYTES NFR BLD AUTO: 4.5 % (ref 5–12)
NEUTROPHILS NFR BLD AUTO: 81.4 % (ref 42.7–76)
NEUTROPHILS NFR BLD AUTO: 9.07 10*3/MM3 (ref 1.7–7)
NITRITE UR QL STRIP: NEGATIVE
NRBC BLD AUTO-RTO: 1.8 /100 WBC (ref 0–0.2)
NT-PROBNP SERPL-MCNC: 3433 PG/ML (ref 0–1800)
OPIATES UR QL: NEGATIVE
OXYCODONE UR QL SCN: NEGATIVE
OXYHGB MFR BLDV: 86.8 % (ref 94–99)
PAW @ PEAK INSP FLOW SETTING VENT: 0 CMH2O
PCO2 BLDA: 50.2 MM HG (ref 35–45)
PCO2 TEMP ADJ BLD: 50.2 MM HG (ref 35–48)
PCP UR QL SCN: NEGATIVE
PH BLDA: 7.49 PH UNITS (ref 7.35–7.45)
PH UR STRIP.AUTO: 6.5 [PH] (ref 5–8)
PH, TEMP CORRECTED: 7.49 PH UNITS
PLATELET # BLD AUTO: 176 10*3/MM3 (ref 140–450)
PMV BLD AUTO: 10.3 FL (ref 6–12)
PO2 BLDA: 52.2 MM HG (ref 83–108)
PO2 TEMP ADJ BLD: 52.2 MM HG (ref 83–108)
POTASSIUM BLDA-SCNC: 2.4 MMOL/L (ref 3.5–4.9)
POTASSIUM SERPL-SCNC: 2.5 MMOL/L (ref 3.5–5.2)
PROT SERPL-MCNC: 5.6 G/DL (ref 6–8.5)
PROT UR QL STRIP: ABNORMAL
PROTHROMBIN TIME: 14.8 SECONDS (ref 12.2–15.3)
RBC # BLD AUTO: 3.57 10*6/MM3 (ref 4.14–5.8)
RBC # UR STRIP: ABNORMAL /HPF
REF LAB TEST METHOD: ABNORMAL
SODIUM BLD-SCNC: 140 MMOL/L (ref 138–146)
SODIUM SERPL-SCNC: 142 MMOL/L (ref 136–145)
SP GR UR STRIP: >1.03 (ref 1–1.03)
SQUAMOUS #/AREA URNS HPF: ABNORMAL /HPF
TOTAL RATE: 0 BREATHS/MINUTE
TRICYCLICS UR QL SCN: NEGATIVE
TROPONIN T % DELTA: -2
TROPONIN T NUMERIC DELTA: -1 NG/L
TROPONIN T SERPL HS-MCNC: 46 NG/L
TSH SERPL DL<=0.05 MIU/L-ACNC: 0.88 UIU/ML (ref 0.27–4.2)
UROBILINOGEN UR QL STRIP: ABNORMAL
WBC # UR STRIP: ABNORMAL /HPF
WBC NRBC COR # BLD AUTO: 11.13 10*3/MM3 (ref 3.4–10.8)
WHOLE BLOOD HOLD COAG: NORMAL
WHOLE BLOOD HOLD SPECIMEN: NORMAL

## 2025-07-15 PROCEDURE — 83735 ASSAY OF MAGNESIUM: CPT | Performed by: EMERGENCY MEDICINE

## 2025-07-15 PROCEDURE — 85025 COMPLETE CBC W/AUTO DIFF WBC: CPT | Performed by: EMERGENCY MEDICINE

## 2025-07-15 PROCEDURE — 95816 EEG AWAKE AND DROWSY: CPT | Performed by: PSYCHIATRY & NEUROLOGY

## 2025-07-15 PROCEDURE — 99285 EMERGENCY DEPT VISIT HI MDM: CPT

## 2025-07-15 PROCEDURE — 71045 X-RAY EXAM CHEST 1 VIEW: CPT

## 2025-07-15 PROCEDURE — 82948 REAGENT STRIP/BLOOD GLUCOSE: CPT

## 2025-07-15 PROCEDURE — 80053 COMPREHEN METABOLIC PANEL: CPT | Performed by: EMERGENCY MEDICINE

## 2025-07-15 PROCEDURE — 85014 HEMATOCRIT: CPT | Performed by: EMERGENCY MEDICINE

## 2025-07-15 PROCEDURE — 95816 EEG AWAKE AND DROWSY: CPT

## 2025-07-15 PROCEDURE — 93005 ELECTROCARDIOGRAM TRACING: CPT | Performed by: EMERGENCY MEDICINE

## 2025-07-15 PROCEDURE — 70496 CT ANGIOGRAPHY HEAD: CPT

## 2025-07-15 PROCEDURE — 99222 1ST HOSP IP/OBS MODERATE 55: CPT | Performed by: NURSE PRACTITIONER

## 2025-07-15 PROCEDURE — 83036 HEMOGLOBIN GLYCOSYLATED A1C: CPT | Performed by: EMERGENCY MEDICINE

## 2025-07-15 PROCEDURE — 82375 ASSAY CARBOXYHB QUANT: CPT

## 2025-07-15 PROCEDURE — 83880 ASSAY OF NATRIURETIC PEPTIDE: CPT | Performed by: EMERGENCY MEDICINE

## 2025-07-15 PROCEDURE — 84484 ASSAY OF TROPONIN QUANT: CPT | Performed by: EMERGENCY MEDICINE

## 2025-07-15 PROCEDURE — 82140 ASSAY OF AMMONIA: CPT | Performed by: EMERGENCY MEDICINE

## 2025-07-15 PROCEDURE — 82077 ASSAY SPEC XCP UR&BREATH IA: CPT | Performed by: EMERGENCY MEDICINE

## 2025-07-15 PROCEDURE — 25510000001 IOPAMIDOL PER 1 ML: Performed by: EMERGENCY MEDICINE

## 2025-07-15 PROCEDURE — 82805 BLOOD GASES W/O2 SATURATION: CPT

## 2025-07-15 PROCEDURE — 70450 CT HEAD/BRAIN W/O DYE: CPT

## 2025-07-15 PROCEDURE — 36415 COLL VENOUS BLD VENIPUNCTURE: CPT

## 2025-07-15 PROCEDURE — 25010000002 POTASSIUM CHLORIDE 10 MEQ/100ML SOLUTION: Performed by: EMERGENCY MEDICINE

## 2025-07-15 PROCEDURE — 74177 CT ABD & PELVIS W/CONTRAST: CPT

## 2025-07-15 PROCEDURE — 84443 ASSAY THYROID STIM HORMONE: CPT | Performed by: EMERGENCY MEDICINE

## 2025-07-15 PROCEDURE — 25010000002 LEVETRIRACETAM PER 10 MG: Performed by: NURSE PRACTITIONER

## 2025-07-15 PROCEDURE — 70498 CT ANGIOGRAPHY NECK: CPT

## 2025-07-15 PROCEDURE — 99223 1ST HOSP IP/OBS HIGH 75: CPT | Performed by: INTERNAL MEDICINE

## 2025-07-15 PROCEDURE — 85610 PROTHROMBIN TIME: CPT | Performed by: EMERGENCY MEDICINE

## 2025-07-15 PROCEDURE — 0042T HC CT CEREBRAL PERFUSION W/WO CONTRAST: CPT

## 2025-07-15 PROCEDURE — 83050 HGB METHEMOGLOBIN QUAN: CPT

## 2025-07-15 PROCEDURE — 81001 URINALYSIS AUTO W/SCOPE: CPT | Performed by: EMERGENCY MEDICINE

## 2025-07-15 PROCEDURE — 36600 WITHDRAWAL OF ARTERIAL BLOOD: CPT

## 2025-07-15 PROCEDURE — 80047 BASIC METABLC PNL IONIZED CA: CPT | Performed by: EMERGENCY MEDICINE

## 2025-07-15 PROCEDURE — 80307 DRUG TEST PRSMV CHEM ANLYZR: CPT | Performed by: EMERGENCY MEDICINE

## 2025-07-15 RX ORDER — POTASSIUM CHLORIDE 1.5 G/1.58G
40 POWDER, FOR SOLUTION ORAL
Status: DISPENSED | OUTPATIENT
Start: 2025-07-15 | End: 2025-07-15

## 2025-07-15 RX ORDER — SODIUM CHLORIDE 0.9 % (FLUSH) 0.9 %
10 SYRINGE (ML) INJECTION EVERY 12 HOURS SCHEDULED
Status: DISCONTINUED | OUTPATIENT
Start: 2025-07-15 | End: 2025-07-23

## 2025-07-15 RX ORDER — SODIUM CHLORIDE 9 MG/ML
40 INJECTION, SOLUTION INTRAVENOUS AS NEEDED
Status: DISCONTINUED | OUTPATIENT
Start: 2025-07-15 | End: 2025-07-23

## 2025-07-15 RX ORDER — CLOPIDOGREL BISULFATE 75 MG/1
75 TABLET ORAL DAILY
Status: DISCONTINUED | OUTPATIENT
Start: 2025-07-16 | End: 2025-07-16

## 2025-07-15 RX ORDER — CLOPIDOGREL BISULFATE 75 MG/1
300 TABLET ORAL ONCE
Status: COMPLETED | OUTPATIENT
Start: 2025-07-15 | End: 2025-07-15

## 2025-07-15 RX ORDER — SODIUM CHLORIDE 0.9 % (FLUSH) 0.9 %
10 SYRINGE (ML) INJECTION AS NEEDED
Status: DISCONTINUED | OUTPATIENT
Start: 2025-07-15 | End: 2025-07-23

## 2025-07-15 RX ORDER — IOPAMIDOL 755 MG/ML
100 INJECTION, SOLUTION INTRAVASCULAR
Status: COMPLETED | OUTPATIENT
Start: 2025-07-15 | End: 2025-07-15

## 2025-07-15 RX ORDER — NITROGLYCERIN 0.4 MG/1
0.4 TABLET SUBLINGUAL
Status: DISCONTINUED | OUTPATIENT
Start: 2025-07-15 | End: 2025-07-29 | Stop reason: HOSPADM

## 2025-07-15 RX ORDER — POLYETHYLENE GLYCOL 3350 17 G/17G
17 POWDER, FOR SOLUTION ORAL DAILY PRN
Status: DISCONTINUED | OUTPATIENT
Start: 2025-07-15 | End: 2025-07-29 | Stop reason: HOSPADM

## 2025-07-15 RX ORDER — LEVETIRACETAM 500 MG/5ML
1000 INJECTION, SOLUTION, CONCENTRATE INTRAVENOUS ONCE
Status: COMPLETED | OUTPATIENT
Start: 2025-07-15 | End: 2025-07-15

## 2025-07-15 RX ORDER — DOXAZOSIN 1 MG/1
1 TABLET ORAL NIGHTLY
COMMUNITY
End: 2025-07-29 | Stop reason: HOSPADM

## 2025-07-15 RX ORDER — POTASSIUM CHLORIDE 7.45 MG/ML
10 INJECTION INTRAVENOUS
Status: COMPLETED | OUTPATIENT
Start: 2025-07-15 | End: 2025-07-15

## 2025-07-15 RX ORDER — AMOXICILLIN 250 MG
2 CAPSULE ORAL 2 TIMES DAILY PRN
Status: DISCONTINUED | OUTPATIENT
Start: 2025-07-15 | End: 2025-07-29 | Stop reason: HOSPADM

## 2025-07-15 RX ORDER — LEVETIRACETAM 500 MG/5ML
500 INJECTION, SOLUTION, CONCENTRATE INTRAVENOUS EVERY 12 HOURS SCHEDULED
Status: DISCONTINUED | OUTPATIENT
Start: 2025-07-16 | End: 2025-07-17

## 2025-07-15 RX ORDER — ASPIRIN 81 MG/1
81 TABLET, CHEWABLE ORAL DAILY
Status: DISCONTINUED | OUTPATIENT
Start: 2025-07-15 | End: 2025-07-16

## 2025-07-15 RX ORDER — CELECOXIB 100 MG/1
100 CAPSULE ORAL DAILY
COMMUNITY
End: 2025-07-29 | Stop reason: HOSPADM

## 2025-07-15 RX ORDER — SODIUM CHLORIDE 0.9 % (FLUSH) 0.9 %
10 SYRINGE (ML) INJECTION EVERY 12 HOURS SCHEDULED
Status: DISCONTINUED | OUTPATIENT
Start: 2025-07-15 | End: 2025-07-29 | Stop reason: HOSPADM

## 2025-07-15 RX ORDER — ATORVASTATIN CALCIUM 40 MG/1
80 TABLET, FILM COATED ORAL NIGHTLY
Status: CANCELLED | OUTPATIENT
Start: 2025-07-15

## 2025-07-15 RX ORDER — BISACODYL 10 MG
10 SUPPOSITORY, RECTAL RECTAL DAILY PRN
Status: DISCONTINUED | OUTPATIENT
Start: 2025-07-15 | End: 2025-07-29 | Stop reason: HOSPADM

## 2025-07-15 RX ORDER — SODIUM CHLORIDE 9 MG/ML
40 INJECTION, SOLUTION INTRAVENOUS AS NEEDED
Status: DISCONTINUED | OUTPATIENT
Start: 2025-07-15 | End: 2025-07-29 | Stop reason: HOSPADM

## 2025-07-15 RX ORDER — SODIUM CHLORIDE 0.9 % (FLUSH) 0.9 %
10 SYRINGE (ML) INJECTION AS NEEDED
Status: DISCONTINUED | OUTPATIENT
Start: 2025-07-15 | End: 2025-07-29 | Stop reason: HOSPADM

## 2025-07-15 RX ORDER — BISACODYL 5 MG/1
5 TABLET, DELAYED RELEASE ORAL DAILY PRN
Status: DISCONTINUED | OUTPATIENT
Start: 2025-07-15 | End: 2025-07-29 | Stop reason: HOSPADM

## 2025-07-15 RX ORDER — ASPIRIN 300 MG/1
300 SUPPOSITORY RECTAL DAILY
Status: DISCONTINUED | OUTPATIENT
Start: 2025-07-15 | End: 2025-07-16

## 2025-07-15 RX ADMIN — Medication 10 ML: at 23:28

## 2025-07-15 RX ADMIN — ASPIRIN 81 MG: 81 TABLET, CHEWABLE ORAL at 14:13

## 2025-07-15 RX ADMIN — LEVETIRACETAM 1000 MG: 500 INJECTION, SOLUTION INTRAVENOUS at 17:06

## 2025-07-15 RX ADMIN — IOPAMIDOL 115 ML: 755 INJECTION, SOLUTION INTRAVENOUS at 12:41

## 2025-07-15 RX ADMIN — CLOPIDOGREL BISULFATE 300 MG: 75 TABLET, FILM COATED ORAL at 14:13

## 2025-07-15 RX ADMIN — POTASSIUM CHLORIDE 40 MEQ: 1.5 POWDER, FOR SOLUTION ORAL at 14:13

## 2025-07-15 RX ADMIN — POTASSIUM CHLORIDE 10 MEQ: 7.46 INJECTION, SOLUTION INTRAVENOUS at 15:14

## 2025-07-15 RX ADMIN — POTASSIUM CHLORIDE 10 MEQ: 7.46 INJECTION, SOLUTION INTRAVENOUS at 14:14

## 2025-07-15 NOTE — ED NOTES
Ibrahima Nava    Nursing Report ED to Floor:  Mental status: A&OX4, NIH 1  Ambulatory status: HAS NOT AMBULATED IN ED  Oxygen Therapy:  2LNC  Cardiac Rhythm: AFIB NEW  Admitted from: HOME  Safety Concerns:  HIGH FALL RISK   Precautions: NA  Social Issues: NA  ED Room #:  14    ED Nurse Phone Extension - [phone] or may call [phone].      HPI:   Chief Complaint   Patient presents with    Difficulty Swallowing    Speech Problem    Altered Mental Status       Past Medical History:  Past Medical History:   Diagnosis Date    Anxiety     Arthritis     Hypertension     CONTROLLED WITH MEDS PER PT     Shoulder pain, left     Sleep apnea     did not tolerate CPAP    Stroke     2012-- NO RESIDUAL PROBLEMS    Tremor of right hand     Wears dentures     UPPER AND LOWER PLATE         Past Surgical History:  Past Surgical History:   Procedure Laterality Date    CARDIAC CATHETERIZATION  2016    NO CORONARY STENTS     COLONOSCOPY  2014    MO ARTHROPLASTY GLENOHUMERAL JOINT TOTAL SHOULDER Left 06/12/2017    Procedure: LEFT TOTAL SHOULDER ARTHROPLASTY ;  Surgeon: Michael Xavier MD;  Location: [facility] OR;  Service: Orthopedics    TOTAL KNEE ARTHROPLASTY Bilateral     TOTAL SHOULDER REPLACEMENT Right     TOTAL SHOULDER REVISION Left 5/20/2024    Procedure: REVISION TOTAL SHOULDER ARTHROPLASTY TO REVERSE TOTAL SHOULDER ATHROPLASTY - LEFT;  Surgeon: Michael Xavier MD;  Location:  [facility] OR;  Service: Orthopedics;  Laterality: Left;        Admitting Doctor:   Ruth Michaud MD    Consulting Provider(s):  Consults       Date and Time Order Name Status Description    7/15/2025 12:29 PM Inpatient Neurology Consult Stroke               Admitting Diagnosis:   The primary encounter diagnosis was Aphasia. Diagnoses of Dysphagia, unspecified type, Altered mental status, unspecified altered mental status type, and Hypokalemia were also pertinent to this visit.    Most Recent Vitals:   Vitals:    07/15/25 1333 07/15/25 1335 07/15/25 1400  07/15/25 1430   BP:   (!) 182/95 (!) 166/105   BP Location:       Pulse: 87 90 91 89   Resp:       Temp:       TempSrc:       SpO2: 98% 97% 98% 98%   Weight:       Height:           Active LDAs/IV Access:   Lines, Drains & Airways       Active LDAs       Name Placement date Placement time Site Days    Peripheral IV 05/20/24 1307 Posterior;Right Hand 05/20/24  1307  Hand  421    Peripheral IV 07/15/25 1237 18 G Right Antecubital 07/15/25  1237  Antecubital  less than 1                    Labs (abnormal labs have a star):   Labs Reviewed   CBC WITH AUTO DIFFERENTIAL - Abnormal; Notable for the following components:       Result Value    WBC 11.13 (*)     RBC 3.57 (*)     Hemoglobin 9.0 (*)     Hematocrit 31.2 (*)     MCH 25.2 (*)     MCHC 28.8 (*)     RDW 17.2 (*)     Neutrophil % 81.4 (*)     Lymphocyte % 11.0 (*)     Monocyte % 4.5 (*)     Eosinophil % 0.0 (*)     Immature Grans % 2.9 (*)     Neutrophils, Absolute 9.07 (*)     Immature Grans, Absolute 0.32 (*)     nRBC 1.8 (*)     All other components within normal limits   COMPREHENSIVE METABOLIC PANEL - Abnormal; Notable for the following components:    Glucose 252 (*)     Potassium 2.5 (*)     Chloride 97 (*)     CO2 32.6 (*)     Total Protein 5.6 (*)     ALT (SGPT) 58 (*)     AST (SGOT) 75 (*)     All other components within normal limits    Narrative:     GFR Categories in Chronic Kidney Disease (CKD)              GFR Category          GFR (mL/min/1.73)    Interpretation  G1                    90 or greater        Normal or high (1)  G2                    60-89                Mild decrease (1)  G3a                   45-59                Mild to moderate decrease  G3b                   30-44                Moderate to severe decrease  G4                    15-29                Severe decrease  G5                    14 or less           Kidney failure    (1)In the absence of evidence of kidney disease, neither GFR category G1 or G2 fulfill the criteria for  CKD.    eGFR calculation 2021 CKD-EPI creatinine equation, which does not include race as a factor   BNP (IN-HOUSE) - Abnormal; Notable for the following components:    proBNP 3,433.0 (*)     All other components within normal limits    Narrative:     This assay is used as an aid in the diagnosis of individuals suspected of having heart failure. It can be used as an aid in the diagnosis of acute decompensated heart failure (ADHF) in patients presenting with signs and symptoms of ADHF to the emergency department (ED). In addition, NT-proBNP of <300 pg/mL indicates ADHF is not likely.    Age Range Result Interpretation  NT-proBNP Concentration (pg/mL:      <50             Positive            >450                   Gray                 300-450                    Negative             <300    50-75           Positive            >900                  Gray                300-900                  Negative            <300      >75             Positive            >1800                  Gray                300-1800                  Negative            <300   TROPONIN - Abnormal; Notable for the following components:    HS Troponin T 46 (*)     All other components within normal limits    Narrative:     High Sensitive Troponin T Reference Range:  <14.0 ng/L- Negative Female for AMI  <22.0 ng/L- Negative Male for AMI  >=14 - Abnormal Female indicating possible myocardial injury.  >=22 - Abnormal Male indicating possible myocardial injury.   Clinicians would have to utilize clinical acumen, EKG, Troponin, and serial changes to determine if it is an Acute Myocardial Infarction or myocardial injury due to an underlying chronic condition.        URINE DRUG SCREEN - Abnormal; Notable for the following components:    Benzodiazepine Screen, Urine Positive (*)     Barbiturates Screen, Urine Positive (*)     All other components within normal limits    Narrative:     Cutoff For Drugs Screened:    Amphetamines               500  ng/ml  Barbiturates               200 ng/ml  Benzodiazepines            150 ng/ml  Cocaine                    150 ng/ml  Methadone                  200 ng/ml  Opiates                    100 ng/ml  Phencyclidine               25 ng/ml  THC                         50 ng/ml  Methamphetamine            500 ng/ml  Tricyclic Antidepressants  300 ng/ml  Oxycodone                  100 ng/ml  Buprenorphine               10 ng/ml    The normal value for all drugs tested is negative. This report includes unconfirmed screening results, with the cutoff values listed, to be used for medical treatment purposes only.  Unconfirmed results must not be used for non-medical purposes such as employment or legal testing.  Clinical consideration should be applied to any drug of abuse test, particularly when unconfirmed results are used.     URINALYSIS W/ MICROSCOPIC IF INDICATED (NO CULTURE) - Abnormal; Notable for the following components:    Specific Gravity, UA >1.030 (*)     Ketones, UA Trace (*)     Blood, UA Moderate (2+) (*)     Protein, UA 30 mg/dL (1+) (*)     All other components within normal limits   BLOOD GAS, ARTERIAL W/CO-OXIMETRY - Abnormal; Notable for the following components:    pH, Arterial 7.489 (*)     pCO2, Arterial 50.2 (*)     pO2, Arterial 52.2 (*)     HCO3, Arterial 38.2 (*)     Base Excess, Arterial 13.3 (*)     Hemoglobin, Blood Gas 8.8 (*)     Hematocrit, Blood Gas 27.0 (*)     Oxyhemoglobin 86.8 (*)     CO2 Content 39.7 (*)     pCO2, Temperature Corrected 50.2 (*)     pO2, Temperature Corrected 52.2 (*)     All other components within normal limits   HEMOGLOBIN A1C - Abnormal; Notable for the following components:    Hemoglobin A1C 6.34 (*)     All other components within normal limits    Narrative:     Hemoglobin A1C Ranges:    Increased Risk for Diabetes  5.7% to 6.4%  Diabetes                     >= 6.5%  Diabetic Goal                < 7.0%   URINALYSIS, MICROSCOPIC ONLY - Abnormal; Notable for the  following components:    RBC, UA 11-20 (*)     All other components within normal limits   POCT CHEM 8 - Abnormal; Notable for the following components:    Glucose 255 (*)     POC Potassium 2.4 (*)     Chloride 93 (*)     Total CO2 33 (*)     Hemoglobin 10.2 (*)     Hematocrit 30 (*)     Ionized Calcium 1.14 (*)     All other components within normal limits   PROTIME-INR - Normal   TSH RFX ON ABNORMAL TO FREE T4 - Normal   MAGNESIUM - Normal   ETHANOL - Normal    Narrative:     Not for legal purposes.   FENTANYL, URINE - Normal    Narrative:     Negative Threshold:      Fentanyl 5 ng/mL     The normal value for the drug tested is negative. This report includes final unconfirmed screening results to be used for medical treatment purposes only. Unconfirmed results must not be used for non-medical purposes such as employment or legal testing. Clinical consideration should be applied to any drug of abuse test, particularly when unconfirmed results are used.          RAINBOW DRAW    Narrative:     The following orders were created for panel order [facility] Draw.  Procedure                               Abnormality         Status                     ---------                               -----------         ------                     Green Top (Gel)[[id number]]                                  Final result               Lavender Top[[id number]]                                     Final result               Gold Top - SST[[id number]]                                   Final result               Uriarte Top[[id number]]                                         Final result               Light Blue Top[[id number]]                                   Final result                 Please view results for these tests on the individual orders.   BLOOD GAS, ARTERIAL   AMMONIA   HIGH SENSITIVITIY TROPONIN T 1HR   CBC AND DIFFERENTIAL    Narrative:     The following orders were created for panel order CBC & Differential.  Procedure                                Abnormality         Status                     ---------                               -----------         ------                     CBC Auto Differential[[id number]]        Abnormal            Final result                 Please view results for these tests on the individual orders.   GREEN TOP   LAVENDER TOP   GOLD TOP - SST   GRAY TOP   LIGHT BLUE TOP       Meds Given in ED:   Medications   sodium chloride 0.9 % flush 10 mL (has no administration in time range)   aspirin chewable tablet 81 mg (81 mg Oral Given 7/15/25 1413)     Or   aspirin suppository 300 mg ( Rectal Not Given:  See Alt 7/15/25 1413)   clopidogrel (PLAVIX) tablet 300 mg (300 mg Oral Given 7/15/25 1413)     And   clopidogrel (PLAVIX) tablet 75 mg (has no administration in time range)   Potassium Replacement - Follow Nurse / BPA Driven Protocol (has no administration in time range)   Magnesium Cardiology Dose Replacement - Follow Nurse / BPA Driven Protocol (has no administration in time range)   potassium chloride (KLOR-CON) packet 40 mEq (40 mEq Oral Given 7/15/25 1413)   potassium chloride 10 mEq in 100 mL IVPB (10 mEq Intravenous New Bag 7/15/25 1414)   iopamidol (ISOVUE-370) 76 % injection 100 mL (115 mL Intravenous Given 7/15/25 1241)           Last NIH score:  Interval: baseline  1a. Level of Consciousness: 0-->Alert, keenly responsive  1b. LOC Questions: 0-->Answers both questions correctly  1c. LOC Commands: 0-->Performs both tasks correctly  2. Best Gaze: 0-->Normal  3. Visual: 0-->No visual loss  4. Facial Palsy: 0-->Normal symmetrical movements  5a. Motor Arm, Left: 0-->No drift, limb holds 90 (or 45) degrees for full 10 secs  5b. Motor Arm, Right: 0-->No drift, limb holds 90 (or 45) degrees for full 10 secs  6a. Motor Leg, Left: 0-->No drift, leg holds 30 degree position for full 5 secs  6b. Motor Leg, Right: 0-->No drift, leg holds 30 degree position for full 5 secs  7. Limb Ataxia: 0-->Absent  8. Sensory: 0-->Normal,  no sensory loss  9. Best Language: 1-->Mild-to-moderate aphasia, some obvious loss of fluency or facility of comprehension, without significant limitation on ideas expressed or form of expression. Reduction of speech and/or comprehension, however, makes conversation. . . (see row details)  10. Dysarthria: 0-->Normal  11. Extinction and Inattention (formerly Neglect): 0-->No abnormality    Total (NIH Stroke Scale): 1     Dysphagia screening results:  Patient Factors Component (Dysphagia:Stroke or Rule-out)  Best Eye Response: 4-->(E4) spontaneous (07/15/25 1428)  Best Motor Response: 6-->(M6) obeys commands (07/15/25 1428)  Best Verbal Response: 5-->(V5) oriented (07/15/25 1428)  [place] Coma Scale Score: 15 (07/15/25 1428)  Is there Facial Asymmetry/Weakness?: No (07/15/25 1427)  Is there Tongue Asymmetry/Weakness?: No (07/15/25 1427)  Is there Palatal Asymmetry/Weakness?: No (07/15/25 1427)  Patient Assessment Result: Pass - Proceed to Water Test (07/15/25 1427)     [place] Coma Scale:  No data recorded     CIWA:        Restraint Type:            Isolation Status:  No active isolations

## 2025-07-15 NOTE — Clinical Note
Level of Care: Telemetry [5]   Diagnosis: CVA (cerebral vascular accident) [969563]   Admitting Physician: JADIEL MCKAY [[id number]]   Attending Physician: JADIEL MCKAY [id number]]   Certification: I Certify That Inpatient Hospital Services Are Medically Necessary For Greater Than 2 Midnights

## 2025-07-15 NOTE — ED PROVIDER NOTES
[place]    EMERGENCY DEPARTMENT ENCOUNTER      Pt Name: Ibrahima Nava  MRN: [id number]  YOB: 1945  Date of evaluation: 7/15/2025  Provider: Juan Manuel Sandoval MD    CHIEF COMPLAINT       Chief Complaint   Patient presents with    Difficulty Swallowing    Speech Problem    Altered Mental Status         HISTORY OF PRESENT ILLNESS   Ibrahima Nava is a 80 y.o. male who presents to the emergency department with reported alteration in mental status, difficulty with swallowing, altered speech with an unknown time of onset.  Patient family member went to check on him yesterday and noted the above symptoms, these have persisted into today prompting emergency department visit.  Patient has prior history of CVA.  Patient himself has no specific complaints at this time.      Nursing notes were reviewed.    REVIEW OF SYSTEMS     ROS:  A chief complaint appropriate review of systems was completed and is negative except as noted in the HPI.      PAST MEDICAL HISTORY     Past Medical History:   Diagnosis Date    Anxiety     Arthritis     Hypertension     CONTROLLED WITH MEDS PER PT     Shoulder pain, left     Sleep apnea     did not tolerate CPAP    Stroke     2012-- NO RESIDUAL PROBLEMS    Tremor of right hand     Wears dentures     UPPER AND LOWER PLATE          SURGICAL HISTORY       Past Surgical History:   Procedure Laterality Date    CARDIAC CATHETERIZATION  2016    NO CORONARY STENTS     COLONOSCOPY  2014    WA ARTHROPLASTY GLENOHUMERAL JOINT TOTAL SHOULDER Left 06/12/2017    Procedure: LEFT TOTAL SHOULDER ARTHROPLASTY ;  Surgeon: Michael Xavier MD;  Location: [facility] OR;  Service: Orthopedics    TOTAL KNEE ARTHROPLASTY Bilateral     TOTAL SHOULDER REPLACEMENT Right     TOTAL SHOULDER REVISION Left 5/20/2024    Procedure: REVISION TOTAL SHOULDER ARTHROPLASTY TO REVERSE TOTAL SHOULDER ATHROPLASTY - LEFT;  Surgeon: Michael Xavier MD;  Location: [facility] OR;  Service: Orthopedics;  Laterality: Left;          CURRENT MEDICATIONS       Current Facility-Administered Medications:     apixaban (ELIQUIS) tablet 5 mg, 5 mg, Oral, Q12H, Igor Nath MD, 5 mg at 07/16/25 2056    atorvastatin (LIPITOR) tablet 40 mg, 40 mg, Oral, Nightly, Carlos Mendez MD, 40 mg at 07/16/25 2056    sennosides-docusate (PERICOLACE) 8.6-50 MG per tablet 2 tablet, 2 tablet, Oral, BID PRN **AND** polyethylene glycol (MIRALAX) packet 17 g, 17 g, Oral, Daily PRN **AND** bisacodyl (DULCOLAX) EC tablet 5 mg, 5 mg, Oral, Daily PRN **AND** bisacodyl (DULCOLAX) suppository 10 mg, 10 mg, Rectal, Daily PRN, Ruth Michaud MD    cloNIDine (CATAPRES) tablet 0.1 mg, 0.1 mg, Oral, Q1H PRN, Carlos Mendez MD    dextrose (D50W) (25 g/50 mL) IV injection 25 g, 25 g, Intravenous, Q15 Min PRN, Peggy Walsh DO    dextrose (GLUTOSE) oral gel 15 g, 15 g, Oral, Q15 Min PRN, Peggy Walsh DO    empagliflozin (JARDIANCE) tablet 10 mg, 10 mg, Oral, Daily, Carlos Mendez MD, 10 mg at 07/16/25 1521    glucagon (GLUCAGEN) injection 1 mg, 1 mg, Intramuscular, Q15 Min PRN, Peggy Walsh DO    hydrALAZINE (APRESOLINE) injection 10 mg, 10 mg, Intravenous, Q6H PRN, Peggy Walsh DO, 10 mg at 07/16/25 1803    Insulin Lispro (humaLOG) injection 2-9 Units, 2-9 Units, Subcutaneous, 4x Daily AC & at Bedtime, Peggy Walsh DO, 8 Units at 07/16/25 2152    levETIRAcetam (KEPPRA) injection 500 mg, 500 mg, Intravenous, Q12H, Lyric Dan APRN, 500 mg at 07/16/25 2056    levothyroxine (SYNTHROID, LEVOTHROID) tablet 50 mcg, 50 mcg, Oral, Q AM, Peggy Walsh, DO    LORazepam (ATIVAN) tablet 0.5 mg, 0.5 mg, Oral, Daily PRN, Peggy Walsh, DO    losartan (COZAAR) tablet 100 mg, 100 mg, Oral, Q24H, Carlos Mendez MD, 100 mg at 07/16/25 4159    Magnesium Cardiology Dose Replacement - Follow Nurse / BPA Driven Protocol, , Not Applicable, PRN, Juan Manuel Sandoval MD    metoprolol tartrate (LOPRESSOR)  injection 5 mg, 5 mg, Intravenous, Q6H PRN, Peggy Walsh DO    metoprolol tartrate (LOPRESSOR) tablet 12.5 mg, 12.5 mg, Oral, Q12H, Carlos Mendez MD, 12.5 mg at 07/16/25 2109    nitroglycerin (NITROSTAT) SL tablet 0.4 mg, 0.4 mg, Sublingual, Q5 Min PRN, Ruth Michaud MD    pantoprazole (PROTONIX) EC tablet 40 mg, 40 mg, Oral, Q AM, Peggy Walsh DO    potassium chloride (KLOR-CON M20) CR tablet 40 mEq, 40 mEq, Oral, Q4H, Peggy Walsh, , 40 mEq at 07/16/25 2304    Potassium Replacement - Follow Nurse / BPA Driven Protocol, , Not Applicable, PRJaime HILLIARD Nicholas D, MD    primidone (MYSOLINE) tablet 100 mg, 100 mg, Oral, Daily, Peggy Walsh, , 100 mg at 07/16/25 1521    sertraline (ZOLOFT) tablet 200 mg, 200 mg, Oral, Daily, Peggy Walsh DO, 200 mg at 07/16/25 1521    sodium chloride 0.9 % flush 10 mL, 10 mL, Intravenous, PRNJaime Nicholas D, MD    sodium chloride 0.9 % flush 10 mL, 10 mL, Intravenous, Q12H, Lyric Dan APRN, 10 mL at 07/16/25 2109    sodium chloride 0.9 % flush 10 mL, 10 mL, Intravenous, PRNSy Alison E, APRN    sodium chloride 0.9 % flush 10 mL, 10 mL, Intravenous, Q12H, Ruth Michaud MD, 10 mL at 07/16/25 2056    sodium chloride 0.9 % flush 10 mL, 10 mL, Intravenous, PRNKeily Jocelyn, MD    sodium chloride 0.9 % infusion 40 mL, 40 mL, Intravenous, PRNSy Alison E, APRN    sodium chloride 0.9 % infusion 40 mL, 40 mL, Intravenous, PRN, Ruth Michaud MD    spironolactone (ALDACTONE) tablet 25 mg, 25 mg, Oral, Daily, Carlos Mendez MD, 25 mg at 07/16/25 1521    terazosin (HYTRIN) capsule 1 mg, 1 mg, Oral, Nightly, Peggy Walsh DO, 1 mg at 07/16/25 2056    tiZANidine (ZANAFLEX) tablet 4 mg, 4 mg, Oral, Nightly, Peggy Walsh DO, 4 mg at 07/16/25 2056    valACYclovir (VALTREX) tablet 500 mg, 500 mg, Oral, Daily, Peggy aWlsh DO, 500 mg at 07/16/25 1521    ALLERGIES     Patient has no known allergies.    FAMILY  HISTORY     History reviewed. No pertinent family history.       SOCIAL HISTORY       Social History     Socioeconomic History    Marital status:    Tobacco Use    Smoking status: Former     Types: Cigarettes    Smokeless tobacco: Never    Tobacco comments:     QUIT 20 YEARS AGO, SMOKED X 40 YEARS    Vaping Use    Vaping status: Never Used   Substance and Sexual Activity    Alcohol use: No    Drug use: No    Sexual activity: Defer         PHYSICAL EXAM    (up to 7 for level 4, 8 or more for level 5)     Vitals:    07/16/25 1525 07/16/25 1758 07/16/25 2155 07/17/25 0029   BP: (!) 214/109 (!) 175/116 152/89 117/83   BP Location: Right arm Right arm  Right arm   Patient Position: Lying Lying  Lying   Pulse: 94 89 89 85   Resp: 14 18  18   Temp: 97.5 °F (36.4 °C) 98.3 °F (36.8 °C)  97.4 °F (36.3 °C)   TempSrc: Oral Oral  Axillary   SpO2: 96%  97% 94%   Weight:       Height:           General: Awake, alert, no acute distress.  HEENT: Conjunctivae normal.  Neck: Trachea midline.  Cardiac: Tachycardic, regular rhythm, no murmurs, rubs, or gallops  Lungs: Lungs are clear to auscultation, there is no wheezing, rhonchi, or rales. There is no use of accessory muscles.  Abdomen: Abdomen is soft, nontender, nondistended. There are no firm or pulsatile masses, no rebound rigidity or guarding.   Musculoskeletal: No deformity.  Pitting edema to bilateral lower extremities.  Neuro: Alert and oriented x 4.  Seems to be slightly aphasic.  He has 5 out of 5 strength in all 4 extremities.  Dermatology: Skin is warm and dry  Psych: Mentation is grossly normal, cognition is grossly normal. Affect is appropriate.        DIAGNOSTIC RESULTS     EKG: All EKGs are interpreted by the Emergency Department Physician who either signs or Co-signs this chart in the absence of a cardiologist.    ECG 12 Lead Chest Pain   Preliminary Result   Test Reason : Chest Pain   Blood Pressure :   */*   mmHG   Vent. Rate :  98 BPM     Atrial Rate : 468  BPM      P-R Int :   * ms          QRS Dur :  88 ms       QT Int : 352 ms       P-R-T Axes :   *  27 184 degrees     QTcB Int : 449 ms      Atrial fibrillation   Anterior infarct (cited on or before 15-Jul-2025)   Abnormal ECG   When compared with ECG of 15-Jul-2025 14:18, (Unconfirmed)   No significant change was found      Referred By:            Confirmed By:       ECG 12 Lead ED Triage Standing Order; Acute Stroke (Onset <24 hrs)   Preliminary Result   Test Reason : ED Triage Standing Order~   Blood Pressure :   */*   mmHG   Vent. Rate :  88 BPM     Atrial Rate :  81 BPM      P-R Int :   * ms          QRS Dur :  92 ms       QT Int : 388 ms       P-R-T Axes :   *  31 173 degrees     QTcB Int : 469 ms      Atrial fibrillation   Possible Anterior infarct , age undetermined   Abnormal ECG   When compared with ECG of 07-May-2024 13:43,   Significant changes have occurred      Referred By: ED MD           Confirmed By:       Telemetry Scan   Final Result            RADIOLOGY:   [x] Radiologist's Report Reviewed:  FL Limited Ugi For Mbs Reflux Single-Contrast   Final Result   Impression:      1. Fluoroscopy provided for modified barium swallow.      2. Limited upper GI series shows incomplete clearance with residual thin barium in the upper two thirds of the esophagus. No evidence of obstruction and only mild intraesophageal reflux.         Electronically Signed: Derek Garcia MD     7/16/2025 12:10 PM EDT     Workstation ID: [id number]      FL Video Swallow With Speech Single Contrast   Final Result   Impression:      1. Fluoroscopy provided for modified barium swallow.      2. Limited upper GI series shows incomplete clearance with residual thin barium in the upper two thirds of the esophagus. No evidence of obstruction and only mild intraesophageal reflux.         Electronically Signed: Derek Garcia MD     7/16/2025 12:10 PM EDT     Workstation ID: [id number]      MRI Brain Without Contrast   Final Result   Impression:    Moderate typical chronic and senescent findings are present as above, including a chronic appearing infarct of the left pre and postcentral gyri. There is no evidence of recent infarct, hemorrhage, mass or mass effect.            Electronically Signed: Chase Birmingham MD     7/16/2025 8:46 AM EDT     Workstation ID: [id number]      XR Chest 1 View   Final Result   Impression:   Cardiomegaly with interval development of pulmonary edema.            Electronically Signed: Jani Jung MD     7/15/2025 2:44 PM EDT     Workstation ID: [id number]      CT Angiogram Head w AI Analysis of LVO   Final Result   Impression:   1.Mild cervical and intracranial atherosclerotic changes are present as above, without evidence of flow-limiting stenosis, large vessel occlusion or aneurysm.   2.No acute findings in the abdomen and pelvis.   3.Incidentally noted 22 x 21 mm cystic finding within the pancreatic head. Correlation with any available prior imaging could be of use to document expected stability of this likely benign finding, otherwise as indicated, MRI could be useful to further    evaluate.   4.Incompletely characterized 3.8 cm nodular mass within the upper mediastinum, possibly an enlarged lymph node. Consider dedicated CT chest.            Electronically Signed: Chase Birmingham MD     7/15/2025 1:27 PM EDT     Workstation ID: [id number]      CT Angiogram Neck   Final Result   Impression:   1.Mild cervical and intracranial atherosclerotic changes are present as above, without evidence of flow-limiting stenosis, large vessel occlusion or aneurysm.   2.No acute findings in the abdomen and pelvis.   3.Incidentally noted 22 x 21 mm cystic finding within the pancreatic head. Correlation with any available prior imaging could be of use to document expected stability of this likely benign finding, otherwise as indicated, MRI could be useful to further    evaluate.   4.Incompletely characterized 3.8 cm nodular mass within the upper  mediastinum, possibly an enlarged lymph node. Consider dedicated CT chest.            Electronically Signed: Chase Birmingham MD     7/15/2025 1:27 PM EDT     Workstation ID: [id number]      CT CEREBRAL PERFUSION WITH & WITHOUT CONTRAST   Final Result   Impression:   Examination appears within normal limits.            Electronically Signed: Ibrahima Ribeiro MD     7/15/2025 1:15 PM EDT     Workstation ID: [id number]      CT Abdomen Pelvis With Contrast   Final Result   Impression:   1.Mild cervical and intracranial atherosclerotic changes are present as above, without evidence of flow-limiting stenosis, large vessel occlusion or aneurysm.   2.No acute findings in the abdomen and pelvis.   3.Incidentally noted 22 x 21 mm cystic finding within the pancreatic head. Correlation with any available prior imaging could be of use to document expected stability of this likely benign finding, otherwise as indicated, MRI could be useful to further    evaluate.   4.Incompletely characterized 3.8 cm nodular mass within the upper mediastinum, possibly an enlarged lymph node. Consider dedicated CT chest.            Electronically Signed: Chase Birmingham MD     7/15/2025 1:27 PM EDT     Workstation ID: [id number]      CT Head Without Contrast Stroke Protocol   Final Result   Impression:         1. Old, less likely late subacute infarct of the left parietal lobe.      2. Small chronic ischemic focus of the area of the left external capsule.      3. 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      4. No clearly acute intracranial abnormality is identified.            Electronically Signed: Derek Garcia MD     7/15/2025 12:55 PM EDT     Workstation ID: [id number]          I ordered and independently reviewed the above noted radiographic studies.        LABS:    I have reviewed and interpreted all of the currently available lab results from this visit (if applicable):  Results for orders placed or performed during the hospital encounter  of 07/15/25   Protime-INR    Collection Time: 07/15/25 12:39 PM    Specimen: Blood   Result Value Ref Range    Protime 14.8 12.2 - 15.3 Seconds    INR 1.09 0.89 - 1.12   CBC Auto Differential    Collection Time: 07/15/25 12:39 PM    Specimen: Blood   Result Value Ref Range    WBC 11.13 (H) 3.40 - 10.80 10*3/mm3    RBC 3.57 (L) 4.14 - 5.80 10*6/mm3    Hemoglobin 9.0 (L) 13.0 - 17.7 g/dL    Hematocrit 31.2 (L) 37.5 - 51.0 %    MCV 87.4 79.0 - 97.0 fL    MCH 25.2 (L) 26.6 - 33.0 pg    MCHC 28.8 (L) 31.5 - 35.7 g/dL    RDW 17.2 (H) 12.3 - 15.4 %    RDW-SD 53.5 37.0 - 54.0 fl    MPV 10.3 6.0 - 12.0 fL    Platelets 176 140 - 450 10*3/mm3    Neutrophil % 81.4 (H) 42.7 - 76.0 %    Lymphocyte % 11.0 (L) 19.6 - 45.3 %    Monocyte % 4.5 (L) 5.0 - 12.0 %    Eosinophil % 0.0 (L) 0.3 - 6.2 %    Basophil % 0.2 0.0 - 1.5 %    Immature Grans % 2.9 (H) 0.0 - 0.5 %    Neutrophils, Absolute 9.07 (H) 1.70 - 7.00 10*3/mm3    Lymphocytes, Absolute 1.22 0.70 - 3.10 10*3/mm3    Monocytes, Absolute 0.50 0.10 - 0.90 10*3/mm3    Eosinophils, Absolute 0.00 0.00 - 0.40 10*3/mm3    Basophils, Absolute 0.02 0.00 - 0.20 10*3/mm3    Immature Grans, Absolute 0.32 (H) 0.00 - 0.05 10*3/mm3    nRBC 1.8 (H) 0.0 - 0.2 /100 WBC   Comprehensive Metabolic Panel    Collection Time: 07/15/25 12:39 PM    Specimen: Blood   Result Value Ref Range    Glucose 252 (H) 65 - 99 mg/dL    BUN 22.2 8.0 - 23.0 mg/dL    Creatinine 0.92 0.76 - 1.27 mg/dL    Sodium 142 136 - 145 mmol/L    Potassium 2.5 (C) 3.5 - 5.2 mmol/L    Chloride 97 (L) 98 - 107 mmol/L    CO2 32.6 (H) 22.0 - 29.0 mmol/L    Calcium 8.9 8.6 - 10.5 mg/dL    Total Protein 5.6 (L) 6.0 - 8.5 g/dL    Albumin 3.7 3.5 - 5.2 g/dL    ALT (SGPT) 58 (H) 1 - 41 U/L    AST (SGOT) 75 (H) 1 - 40 U/L    Alkaline Phosphatase 85 39 - 117 U/L    Total Bilirubin 0.4 0.0 - 1.2 mg/dL    Globulin 1.9 gm/dL    A/G Ratio 1.9 g/dL    BUN/Creatinine Ratio 24.1 7.0 - 25.0    Anion Gap 12.4 5.0 - 15.0 mmol/L    eGFR 84.1 >60.0  mL/min/1.73   BNP    Collection Time: 07/15/25 12:39 PM    Specimen: Blood   Result Value Ref Range    proBNP 3,433.0 (H) 0.0 - 1,800.0 pg/mL   High Sensitivity Troponin T    Collection Time: 07/15/25 12:39 PM    Specimen: Blood   Result Value Ref Range    HS Troponin T 46 (H) <22 ng/L   TSH Rfx On Abnormal To Free T4    Collection Time: 07/15/25 12:39 PM    Specimen: Blood   Result Value Ref Range    TSH 0.876 0.270 - 4.200 uIU/mL   Magnesium    Collection Time: 07/15/25 12:39 PM    Specimen: Blood   Result Value Ref Range    Magnesium 2.2 1.6 - 2.4 mg/dL   Ethanol    Collection Time: 07/15/25 12:39 PM    Specimen: Blood   Result Value Ref Range    Ethanol <10 0 - 10 mg/dL   Hemoglobin A1c    Collection Time: 07/15/25 12:39 PM    Specimen: Blood   Result Value Ref Range    Hemoglobin A1C 6.34 (H) 4.80 - 5.60 %   Green Top (Gel)    Collection Time: 07/15/25 12:39 PM   Result Value Ref Range    Extra Tube Hold for add-ons.    Lavender Top    Collection Time: 07/15/25 12:39 PM   Result Value Ref Range    Extra Tube hold for add-on    Gold Top - SST    Collection Time: 07/15/25 12:39 PM   Result Value Ref Range    Extra Tube Hold for add-ons.    Gray Top    Collection Time: 07/15/25 12:39 PM   Result Value Ref Range    Extra Tube Hold for add-ons.    Light Blue Top    Collection Time: 07/15/25 12:39 PM   Result Value Ref Range    Extra Tube Hold for add-ons.    POC CHEM 8    Collection Time: 07/15/25 12:43 PM    Specimen: Blood   Result Value Ref Range    Glucose 255 (H) 70 - 130 mg/dL    BUN 23 8 - 26 mg/dL    Creatinine 0.90 0.60 - 1.30 mg/dL    Sodium 140 138 - 146 mmol/L    POC Potassium 2.4 (L) 3.5 - 4.9 mmol/L    Chloride 93 (L) 98 - 109 mmol/L    Total CO2 33 (H) 24 - 29 mmol/L    Hemoglobin 10.2 (L) 12.0 - 17.0 g/dL    Hematocrit 30 (L) 38 - 51 %    Ionized Calcium 1.14 (L) 1.20 - 1.32 mmol/L    eGFR 86.3 >60.0 mL/min/1.73   Blood Gas, Arterial With Co-Ox    Collection Time: 07/15/25  1:16 PM    Specimen:  Arterial Blood   Result Value Ref Range    Site Right Radial     Hesham's Test N/A     pH, Arterial 7.489 (H) 7.350 - 7.450 pH units    pCO2, Arterial 50.2 (H) 35.0 - 45.0 mm Hg    pO2, Arterial 52.2 (L) 83.0 - 108.0 mm Hg    HCO3, Arterial 38.2 (H) 20.0 - 26.0 mmol/L    Base Excess, Arterial 13.3 (H) 0.0 - 2.0 mmol/L    Hemoglobin, Blood Gas 8.8 (L) 13.5 - 17.5 g/dL    Hematocrit, Blood Gas 27.0 (L) 38.0 - 51.0 %    Oxyhemoglobin 86.8 (L) 94 - 99 %    Methemoglobin 0.40 0.00 - 1.50 %    Carboxyhemoglobin 1.8 0 - 2 %    CO2 Content 39.7 (H) 22 - 33 mmol/L    Temperature 37.0     Barometric Pressure for Blood Gas      Modality Room Air     FIO2 21 %    Rate 0 Breaths/minute    PIP 0 cmH2O    IPAP 0     EPAP 0     pH, Temp Corrected 7.489 pH Units    pCO2, Temperature Corrected 50.2 (H) 35 - 48 mm Hg    pO2, Temperature Corrected 52.2 (L) 83 - 108 mm Hg   Urine Drug Screen - Urine, Clean Catch    Collection Time: 07/15/25  1:19 PM    Specimen: Urine, Clean Catch   Result Value Ref Range    THC, Screen, Urine Negative Negative    Phencyclidine (PCP), Urine Negative Negative    Cocaine Screen, Urine Negative Negative    Methamphetamine, Ur Negative Negative    Opiate Screen Negative Negative    Amphetamine Screen, Urine Negative Negative    Benzodiazepine Screen, Urine Positive (A) Negative    Tricyclic Antidepressants Screen Negative Negative    Methadone Screen, Urine Negative Negative    Barbiturates Screen, Urine Positive (A) Negative    Oxycodone Screen, Urine Negative Negative    Buprenorphine, Screen, Urine Negative Negative   Urinalysis With Microscopic If Indicated (No Culture) - Urine, Clean Catch    Collection Time: 07/15/25  1:19 PM    Specimen: Urine, Clean Catch   Result Value Ref Range    Color, UA Yellow Yellow, Straw    Appearance, UA Clear Clear    pH, UA 6.5 5.0 - 8.0    Specific Gravity, UA >1.030 (H) 1.005 - 1.030    Glucose, UA Negative Negative    Ketones, UA Trace (A) Negative    Bilirubin, UA  Negative Negative    Blood, UA Moderate (2+) (A) Negative    Protein, UA 30 mg/dL (1+) (A) Negative    Leuk Esterase, UA Negative Negative    Nitrite, UA Negative Negative    Urobilinogen, UA 1.0 E.U./dL 0.2 - 1.0 E.U./dL   Fentanyl, Urine - Urine, Clean Catch    Collection Time: 07/15/25  1:19 PM    Specimen: Urine, Clean Catch   Result Value Ref Range    Fentanyl, Urine Negative Negative   Urinalysis, Microscopic Only - Urine, Clean Catch    Collection Time: 07/15/25  1:19 PM    Specimen: Urine, Clean Catch   Result Value Ref Range    RBC, UA 11-20 (A) None Seen, 0-2 /HPF    WBC, UA 0-2 None Seen, 0-2 /HPF    Bacteria, UA None Seen None Seen /HPF    Squamous Epithelial Cells, UA 0-2 None Seen, 0-2 /HPF    Hyaline Casts, UA None Seen None Seen /LPF    Methodology Automated Microscopy    ECG 12 Lead ED Triage Standing Order; Acute Stroke (Onset <24 hrs)    Collection Time: 07/15/25  2:18 PM   Result Value Ref Range    QT Interval 388 ms    QTC Interval 469 ms   High Sensitivity Troponin T 1Hr    Collection Time: 07/15/25  2:25 PM    Specimen: Blood   Result Value Ref Range    HS Troponin T 45 (H) <22 ng/L    Troponin T Numeric Delta -1 ng/L    Troponin T % Delta -2 Abnormal if >/= 20%   POC Glucose Once    Collection Time: 07/15/25  6:10 PM    Specimen: Blood   Result Value Ref Range    Glucose 197 (H) 70 - 130 mg/dL   Ammonia    Collection Time: 07/15/25  7:49 PM    Specimen: Blood   Result Value Ref Range    Ammonia 53 16 - 60 umol/L   POC Glucose Once    Collection Time: 07/16/25 12:00 AM    Specimen: Blood   Result Value Ref Range    Glucose 182 (H) 70 - 130 mg/dL   POC Glucose Once    Collection Time: 07/16/25  6:16 AM    Specimen: Blood   Result Value Ref Range    Glucose 180 (H) 70 - 130 mg/dL   POC Glucose Once    Collection Time: 07/16/25 11:45 AM    Specimen: Blood   Result Value Ref Range    Glucose 251 (H) 70 - 130 mg/dL   Lipid Panel    Collection Time: 07/16/25  3:07 PM    Specimen: Blood   Result  Value Ref Range    Total Cholesterol 157 0 - 200 mg/dL    Triglycerides 240 (H) 0 - 150 mg/dL    HDL Cholesterol 40 40 - 60 mg/dL    LDL Cholesterol  77 0 - 100 mg/dL    VLDL Cholesterol 40 5 - 40 mg/dL    LDL/HDL Ratio 1.73    Basic Metabolic Panel    Collection Time: 07/16/25  3:07 PM    Specimen: Blood   Result Value Ref Range    Glucose 227 (H) 65 - 99 mg/dL    BUN 20.9 8.0 - 23.0 mg/dL    Creatinine 0.84 0.76 - 1.27 mg/dL    Sodium 142 136 - 145 mmol/L    Potassium 2.7 (L) 3.5 - 5.2 mmol/L    Chloride 98 98 - 107 mmol/L    CO2 33.0 (H) 22.0 - 29.0 mmol/L    Calcium 8.7 8.6 - 10.5 mg/dL    BUN/Creatinine Ratio 24.9 7.0 - 25.0    Anion Gap 11.0 5.0 - 15.0 mmol/L    eGFR 88.2 >60.0 mL/min/1.73   CBC (No Diff)    Collection Time: 07/16/25  3:17 PM    Specimen: Blood   Result Value Ref Range    WBC 9.61 3.40 - 10.80 10*3/mm3    RBC 3.29 (L) 4.14 - 5.80 10*6/mm3    Hemoglobin 8.3 (L) 13.0 - 17.7 g/dL    Hematocrit 28.6 (L) 37.5 - 51.0 %    MCV 86.9 79.0 - 97.0 fL    MCH 25.2 (L) 26.6 - 33.0 pg    MCHC 29.0 (L) 31.5 - 35.7 g/dL    RDW 17.4 (H) 12.3 - 15.4 %    RDW-SD 53.6 37.0 - 54.0 fl    MPV 11.1 6.0 - 12.0 fL    Platelets 150 140 - 450 10*3/mm3   Iron Profile w/o Ferritin    Collection Time: 07/16/25  4:05 PM    Specimen: Blood   Result Value Ref Range    Iron 52 (L) 59 - 158 mcg/dL    Iron Saturation (TSAT) 14 (L) 20 - 50 %    Transferrin 257 200 - 360 mg/dL    TIBC 383 298 - 536 mcg/dL   ECG 12 Lead Chest Pain    Collection Time: 07/16/25  5:02 PM   Result Value Ref Range    QT Interval 352 ms    QTC Interval 449 ms   POC Glucose Once    Collection Time: 07/16/25  5:21 PM    Specimen: Blood   Result Value Ref Range    Glucose 189 (H) 70 - 130 mg/dL   POC Glucose Once    Collection Time: 07/16/25  9:40 PM    Specimen: Blood   Result Value Ref Range    Glucose 371 (H) 70 - 130 mg/dL        If labs were ordered, I independently reviewed the results and considered them in treating the patient.      EMERGENCY  DEPARTMENT COURSE and DIFFERENTIAL DIAGNOSIS/MDM:   Vitals:  AS OF 02:21 EDT    BP - 117/83  HR - 85  TEMP - 97.4 °F (36.3 °C) (Axillary)  O2 SATS - 94%        Discussion below represents my analysis of pertinent findings related to patient's condition, differential diagnosis, treatment plan and final disposition.      Differential diagnosis:  The differential diagnosis associated with the patient's presentation includes: CVA, intracranial hemorrhage, intracranial mass, migraine, seizure, pneumonia, urinary tract infection, electrolyte derangement, substance use      Independent interpretations (ECG/rhythm strip/X-ray/US/CT scan): I independently interpreted the patient's head CT and chest x-ray.  There is no evidence of [facility], no focal infiltrate on x-ray.      Additional sources:  Discussed/obtained information from independent historians:   [] Spouse:   [] Parent:   [] Friend:   [] EMS:   [x] Other: History obtained from patient family member who accompanies him at bedside and is as noted in HPI  External (non-ED) record review:   [] Inpatient record:   [] Office record:   [] Outpatient record:   [] Prior Outpatient labs:   [] Prior Outpatient radiology:   [] Primary Care record:   [] Outside ED record:   [] Other:       Patient's care impacted by:   [] Diabetes   [x] Hypertension   [] Coronary Artery Disease   [] Cancer   [] Other:     Care significantly affected by Social Determinants of Health (housing and economic circumstances, unemployment)    [] Yes     [x] No   If yes, Patient's care significantly limited by  Social Determinants of Health including:    [] Inadequate housing    [] Low income    [] Alcoholism and drug addiction in family    [] Problems related to primary support group    [] Unemployment    [] Problems related to employment    [] Other Social Determinants of Health:       ED Course:    ED Course as of 07/17/25 0221   Tue Jul 15, 2025   3804 Patient presents with unknown duration of dysphagia,  aphasia, confusion. He is the father of one of our nurses in the emergency department, she went to visit him yesterday and noted the above symptoms that did not improve. He is oriented x 4, does have some mild aphasia, possible subacute infarct noted on initial CT imaging. Stroke team is following, they ordered an EEG which has been completed. He is also significantly hypokalemic with a potassium of 2.5, replacement protocols have been ordered. [NS]   1414 I discussed case with hospitalist Dr. Monteiro.  Discussed history, presentation, workup.  Accepts patient for admission. [NS]      ED Course User Index  [NS] Juan Manuel Sandoval MD         CRITICAL CARE TIME    Approximately 35 minutes of discontinuous critical care time was provided to this patient by myself absent of any time spent performing procedures.  Patient presents critically ill with severe hypokalemia placing the cardiovascular system at risk requiring the following interventions: IV potassium replacement, interpretation of lab/ECG/imaging, frequent reassessment, coordination of admission.  Patient at high risk of deterioration and possibly death without these interventions.      FINAL IMPRESSION      1. Aphasia    2. Esophageal dysphagia    3. Altered mental status, unspecified altered mental status type    4. Hypokalemia    5. History of stroke          DISPOSITION/PLAN     ED Disposition       ED Disposition   Decision to Admit    Condition   --    Comment   Level of Care: Telemetry [5]   Diagnosis: CVA (cerebral vascular accident) [[id number]]   Admitting Physician: JADIEL MCKAY [[id number]]   Attending Physician: JADIEL MCKAY [[id number]]                   Comment: Please note this report has been produced using speech recognition software.      Juan Manuel Sandoval MD  Attending Emergency Physician             Juan Manuel Sandoval MD  07/17/25 0221

## 2025-07-15 NOTE — H&P
[facility] Medicine Services  HISTORY AND PHYSICAL    Patient Name: Ibrahima Nava  : 1945  MRN: [id number]  Primary Care Physician: Sebastien Lowe MD  Date of admission: 7/15/2025      Subjective   Subjective     Chief Complaint:  speech changes, reported dysarthria and dysphagia     HPI:  Ibrahima Nava is a 80 y.o. male w history of HTN, anxiety on chronic benzos, CVA in  (no residual deficit), essential tremor.   His daughter, an RN, saw him yesterday after 2-3 days away and noted slurred speech, trouble swallowing liquid, and apparent confusion/slow mentation.  Pt also had had urinary incontinence which is very unusual for him.   No focal numbnesss/weakness.  Patient himself thinks he is having trouble making words come out, also notes some trouble walking but cannot explain it well.  Veers into tangential conversation about a gastric sleeve and his diastasis recti.        Personal History     Past Medical History:   Diagnosis Date    Anxiety     Arthritis     Hypertension     CONTROLLED WITH MEDS PER PT     Shoulder pain, left     Sleep apnea     did not tolerate CPAP    Stroke     -- NO RESIDUAL PROBLEMS    Tremor of right hand     Wears dentures     UPPER AND LOWER PLATE            Past Surgical History:   Procedure Laterality Date    CARDIAC CATHETERIZATION      NO CORONARY STENTS     COLONOSCOPY      DE ARTHROPLASTY GLENOHUMERAL JOINT TOTAL SHOULDER Left 2017    Procedure: LEFT TOTAL SHOULDER ARTHROPLASTY ;  Surgeon: Michael Xavier MD;  Location: [facility];  Service: Orthopedics    TOTAL KNEE ARTHROPLASTY Bilateral     TOTAL SHOULDER REPLACEMENT Right     TOTAL SHOULDER REVISION Left 2024    Procedure: REVISION TOTAL SHOULDER ARTHROPLASTY TO REVERSE TOTAL SHOULDER ATHROPLASTY - LEFT;  Surgeon: Michael Xavier MD;  Location: [facility];  Service: Orthopedics;  Laterality: Left;       Family History: family history is not on file.      Social History:  reports that he has quit smoking. His smoking use included cigarettes. He has never used smokeless tobacco. He reports that he does not drink alcohol and does not use drugs.  Social History     Social History Narrative    Not on file       Medications:  Available home medication information reviewed.  LORazepam, NON FORMULARY, allopurinol, aspirin, docusate sodium, ferrous sulfate, levothyroxine, pantoprazole, pravastatin, primidone, sertraline, telmisartan, tiZANidine, and valACYclovir    No Known Allergies    Objective   Objective     Vital Signs:   Temp:  [98 °F (36.7 °C)] 98 °F (36.7 °C)  Heart Rate:  [] 89  Resp:  [18] 18  BP: (143-187)/() 166/105  Flow (L/min) (Oxygen Therapy):  [2] 2  Total (NIH Stroke Scale): 1    Physical Exam   Gen:  WD/WN man in bed, awake and conversant, speech slow and somewhat halting, seems to have trouble expressing his ideas.    Neuro: alert and oriented, clear speech but slow/halting, follows commands, grossly nonfocal, tremor   HEENT:  NC/AT   Neck:  Supple, no LAD  Heart RRR   Lungs CTA   Abd:  Soft, nontender  Extrem:  No c/c/e      Result Review:  I have personally reviewed the results from the time of this admission to 7/15/2025 15:02 EDT and agree with these findings:  [x]  Laboratory list / accordion  []  Microbiology  [x]  Radiology  [x]  EKG/Telemetry   []  Cardiology/Vascular   []  Pathology  []  Old records  []  Other:  Most notable findings include: head CT, CTAs, noted.  MRI pending.        LAB RESULTS:      Lab 07/15/25  1243 07/15/25  1239   WBC  --  11.13*   HEMOGLOBIN  --  9.0*   HEMOGLOBIN, POC 10.2*  --    HEMATOCRIT  --  31.2*   HEMATOCRIT POC 30*  --    PLATELETS  --  176   NEUTROS ABS  --  9.07*   IMMATURE GRANS (ABS)  --  0.32*   LYMPHS ABS  --  1.22   MONOS ABS  --  0.50   EOS ABS  --  0.00   MCV  --  87.4   PROTIME  --  14.8   INR  --  1.09         Lab 07/15/25  1243 07/15/25  1239   SODIUM  --  142   POTASSIUM  --  2.5*    CHLORIDE  --  97*   CO2  --  32.6*   ANION GAP  --  12.4   BUN  --  22.2   CREATININE 0.90 0.92   EGFR 86.3 84.1   GLUCOSE  --  252*   CALCIUM  --  8.9   MAGNESIUM  --  2.2   HEMOGLOBIN A1C  --  6.34*   TSH  --  0.876         Lab 07/15/25  1239   TOTAL PROTEIN 5.6*   ALBUMIN 3.7   GLOBULIN 1.9   ALT (SGPT) 58*   AST (SGOT) 75*   BILIRUBIN 0.4   ALK PHOS 85         Lab 07/15/25  1239   PROBNP 3,433.0*   HSTROP T 46*                 Lab 07/15/25  1316   PH, ARTERIAL 7.489*   PCO2, ARTERIAL 50.2*   PO2 ART 52.2*   FIO2 21   HCO3 ART 38.2*   BASE EXCESS ART 13.3*   CARBOXYHEMOGLOBIN 1.8     UA          7/15/2025    13:19   Urinalysis   Squamous Epithelial Cells, UA 0-2    Specific Gravity, UA >1.030    Ketones, UA Trace    Blood, UA Moderate (2+)    Leukocytes, UA Negative    Nitrite, UA Negative    RBC, UA 11-20    WBC, UA 0-2    Bacteria, UA None Seen        Microbiology Results (last 10 days)       ** No results found for the last 240 hours. **            XR Chest 1 View  Result Date: 7/15/2025  XR CHEST 1 VW Date of Exam: 7/15/2025 2:23 PM EDT Indication: Acute Stroke Protocol (onset < 12 hrs) Comparison: 5/7/2024 Findings: Cardiac size is enlarged. There is an increase in the pulmonary vascular markings with interval development of diffuse interstitial edema. No focal consolidation is seen. There is no obvious pleural fluid.     Impression: Impression: Cardiomegaly with interval development of pulmonary edema. Electronically Signed: Jani Jung MD  7/15/2025 2:44 PM EDT  Workstation ID: [id number]    CT Angiogram Head w AI Analysis of LVO  Result Date: 7/15/2025  CT ANGIOGRAM HEAD W AI ANALYSIS OF LVO, CT ANGIOGRAM NECK, CT ABDOMEN PELVIS W CONTRAST Date of Exam: 7/15/2025 12:38 PM EDT Indication: Neuro Deficit, acute, Stroke suspected Neuro deficit, acute stroke suspected. Comparison: None available. Technique: CTA of the head and neck was performed after the uneventful intravenous administration of 90 mL  Isovue-370. Axial IV contrast-enhanced CT of the abdomen and pelvis was performed. Reconstructed coronal and sagittal images were also obtained. In  addition, a 3-D volume rendered image was created for interpretation. Automated exposure control and iterative reconstruction methods were used. Findings: CTA head and neck: The lung apices demonstrate a partially imaged 3.8 cm nodular soft tissue finding within the upper mediastinum on the left, either an enlarged lymph node or mass. Evaluation of the neck soft tissues demonstrates no evidence of aerodigestive tract mass or pathologic cervical lymphadenopathy. Multilevel cervical spondylosis is present, without evidence of acute fracture or aggressive osseous lesion. Patent aortic arch with three-vessel branching. The subclavian arteries are patent bilaterally. On the right, there is calcific atherosclerotic change present at the ICA origin with mild, less than 50% narrowing by NASCET criteria. Similar mild narrowing is present on the left. The vertebral arteries are normal in course and caliber bilaterally. Intracranially, the carotid siphons demonstrate calcific atherosclerotic changes with some mild narrowing of both ophthalmic segments. The anterior cerebral arteries are normal in course and caliber bilaterally. The right middle cerebral artery is normal. The left middle cerebral artery demonstrates no evidence of high-grade stenosis, occlusion or aneurysm. The vertebrobasilar system is patent. The posterior cerebral arteries are normal in course and caliber bilaterally. CT abdomen pelvis: The lung bases are clear. The body wall soft tissues demonstrate no acute or suspicious findings, with 2 adjacent presumed sebaceous cyst seen in the right gluteal region. The liver demonstrates low-attenuation findings most consistent  with cysts. The spleen, pancreas and bilateral adrenal glands demonstrate no acute findings. A homogeneous cystic appearing finding within the  pancreatic head measures 22 x 21 mm. Normal gallbladder. Small and large bowel loops are nondilated. Colonic diverticular changes are present, without definite acute inflammatory change to suggest diverticulitis. The appendix is normal. There is no free fluid or pneumoperitoneum. Atherosclerotic, nonaneurysmal abdominal aorta. The pelvic viscera demonstrate no acute findings.     Impression: Impression: 1.Mild cervical and intracranial atherosclerotic changes are present as above, without evidence of flow-limiting stenosis, large vessel occlusion or aneurysm. 2.No acute findings in the abdomen and pelvis. 3.Incidentally noted 22 x 21 mm cystic finding within the pancreatic head. Correlation with any available prior imaging could be of use to document expected stability of this likely benign finding, otherwise as indicated, MRI could be useful to further evaluate. 4.Incompletely characterized 3.8 cm nodular mass within the upper mediastinum, possibly an enlarged lymph node. Consider dedicated CT chest. Electronically Signed: Chase Birmingham MD  7/15/2025 1:27 PM EDT  Workstation ID: [id number]    CT Angiogram Neck  Result Date: 7/15/2025  CT ANGIOGRAM HEAD W AI ANALYSIS OF LVO, CT ANGIOGRAM NECK, CT ABDOMEN PELVIS W CONTRAST Date of Exam: 7/15/2025 12:38 PM EDT Indication: Neuro Deficit, acute, Stroke suspected Neuro deficit, acute stroke suspected. Comparison: None available. Technique: CTA of the head and neck was performed after the uneventful intravenous administration of 90 mL Isovue-370. Axial IV contrast-enhanced CT of the abdomen and pelvis was performed. Reconstructed coronal and sagittal images were also obtained. In  addition, a 3-D volume rendered image was created for interpretation. Automated exposure control and iterative reconstruction methods were used. Findings: CTA head and neck: The lung apices demonstrate a partially imaged 3.8 cm nodular soft tissue finding within the upper mediastinum on the left,  either an enlarged lymph node or mass. Evaluation of the neck soft tissues demonstrates no evidence of aerodigestive tract mass or pathologic cervical lymphadenopathy. Multilevel cervical spondylosis is present, without evidence of acute fracture or aggressive osseous lesion. Patent aortic arch with three-vessel branching. The subclavian arteries are patent bilaterally. On the right, there is calcific atherosclerotic change present at the ICA origin with mild, less than 50% narrowing by NASCET criteria. Similar mild narrowing is present on the left. The vertebral arteries are normal in course and caliber bilaterally. Intracranially, the carotid siphons demonstrate calcific atherosclerotic changes with some mild narrowing of both ophthalmic segments. The anterior cerebral arteries are normal in course and caliber bilaterally. The right middle cerebral artery is normal. The left middle cerebral artery demonstrates no evidence of high-grade stenosis, occlusion or aneurysm. The vertebrobasilar system is patent. The posterior cerebral arteries are normal in course and caliber bilaterally. CT abdomen pelvis: The lung bases are clear. The body wall soft tissues demonstrate no acute or suspicious findings, with 2 adjacent presumed sebaceous cyst seen in the right gluteal region. The liver demonstrates low-attenuation findings most consistent  with cysts. The spleen, pancreas and bilateral adrenal glands demonstrate no acute findings. A homogeneous cystic appearing finding within the pancreatic head measures 22 x 21 mm. Normal gallbladder. Small and large bowel loops are nondilated. Colonic diverticular changes are present, without definite acute inflammatory change to suggest diverticulitis. The appendix is normal. There is no free fluid or pneumoperitoneum. Atherosclerotic, nonaneurysmal abdominal aorta. The pelvic viscera demonstrate no acute findings.     Impression: Impression: 1.Mild cervical and intracranial  atherosclerotic changes are present as above, without evidence of flow-limiting stenosis, large vessel occlusion or aneurysm. 2.No acute findings in the abdomen and pelvis. 3.Incidentally noted 22 x 21 mm cystic finding within the pancreatic head. Correlation with any available prior imaging could be of use to document expected stability of this likely benign finding, otherwise as indicated, MRI could be useful to further evaluate. 4.Incompletely characterized 3.8 cm nodular mass within the upper mediastinum, possibly an enlarged lymph node. Consider dedicated CT chest. Electronically Signed: Chase Birmingham MD  7/15/2025 1:27 PM EDT  Workstation ID: [id number]    CT Abdomen Pelvis With Contrast  Result Date: 7/15/2025  CT ANGIOGRAM HEAD W AI ANALYSIS OF LVO, CT ANGIOGRAM NECK, CT ABDOMEN PELVIS W CONTRAST Date of Exam: 7/15/2025 12:38 PM EDT Indication: Neuro Deficit, acute, Stroke suspected Neuro deficit, acute stroke suspected. Comparison: None available. Technique: CTA of the head and neck was performed after the uneventful intravenous administration of 90 mL Isovue-370. Axial IV contrast-enhanced CT of the abdomen and pelvis was performed. Reconstructed coronal and sagittal images were also obtained. In  addition, a 3-D volume rendered image was created for interpretation. Automated exposure control and iterative reconstruction methods were used. Findings: CTA head and neck: The lung apices demonstrate a partially imaged 3.8 cm nodular soft tissue finding within the upper mediastinum on the left, either an enlarged lymph node or mass. Evaluation of the neck soft tissues demonstrates no evidence of aerodigestive tract mass or pathologic cervical lymphadenopathy. Multilevel cervical spondylosis is present, without evidence of acute fracture or aggressive osseous lesion. Patent aortic arch with three-vessel branching. The subclavian arteries are patent bilaterally. On the right, there is calcific atherosclerotic  change present at the ICA origin with mild, less than 50% narrowing by NASCET criteria. Similar mild narrowing is present on the left. The vertebral arteries are normal in course and caliber bilaterally. Intracranially, the carotid siphons demonstrate calcific atherosclerotic changes with some mild narrowing of both ophthalmic segments. The anterior cerebral arteries are normal in course and caliber bilaterally. The right middle cerebral artery is normal. The left middle cerebral artery demonstrates no evidence of high-grade stenosis, occlusion or aneurysm. The vertebrobasilar system is patent. The posterior cerebral arteries are normal in course and caliber bilaterally. CT abdomen pelvis: The lung bases are clear. The body wall soft tissues demonstrate no acute or suspicious findings, with 2 adjacent presumed sebaceous cyst seen in the right gluteal region. The liver demonstrates low-attenuation findings most consistent  with cysts. The spleen, pancreas and bilateral adrenal glands demonstrate no acute findings. A homogeneous cystic appearing finding within the pancreatic head measures 22 x 21 mm. Normal gallbladder. Small and large bowel loops are nondilated. Colonic diverticular changes are present, without definite acute inflammatory change to suggest diverticulitis. The appendix is normal. There is no free fluid or pneumoperitoneum. Atherosclerotic, nonaneurysmal abdominal aorta. The pelvic viscera demonstrate no acute findings.     Impression: Impression: 1.Mild cervical and intracranial atherosclerotic changes are present as above, without evidence of flow-limiting stenosis, large vessel occlusion or aneurysm. 2.No acute findings in the abdomen and pelvis. 3.Incidentally noted 22 x 21 mm cystic finding within the pancreatic head. Correlation with any available prior imaging could be of use to document expected stability of this likely benign finding, otherwise as indicated, MRI could be useful to further  evaluate. 4.Incompletely characterized 3.8 cm nodular mass within the upper mediastinum, possibly an enlarged lymph node. Consider dedicated CT chest. Electronically Signed: Chase Birmingham MD  7/15/2025 1:27 PM EDT  Workstation ID: [id number]    CT CEREBRAL PERFUSION WITH & WITHOUT CONTRAST  Result Date: 7/15/2025  CT CEREBRAL PERFUSION W WO CONTRAST Date of Exam: 7/15/2025 12:38 PM EDT Indication: Neuro Deficit, acute, Stroke suspected Neuro deficit, acute stroke suspected.  Comparison: CT head from earlier today Technique: Axial CT images of the brain were obtained prior to and after the administration of 50 mL Isovue-370. Core blood volume, core blood flow, mean transit time, and Tmax images were obtained utilizing the Rapid software protocol. A limited CT angiogram of the head was also performed to measure the blood vessel density. The radiation dose reduction device was turned on for each scan per the ALARA (As Low as Reasonably Achievable) protocol. Findings: The intracranial cerebral perfusion appears normal.     Impression: Impression: Examination appears within normal limits. Electronically Signed: Ibrahima Ribeiro MD  7/15/2025 1:15 PM EDT  Workstation ID: [id number]    CT Head Without Contrast Stroke Protocol  Result Date: 7/15/2025  CT HEAD WO CONTRAST STROKE PROTOCOL Date of Exam: 7/15/2025 12:36 PM EDT Indication: Neuro deficit, acute, stroke suspected Neuro Deficit, acute, Stroke suspected. Comparison: None available Technique: Axial CT images were obtained of the head without contrast administration.  Reconstructed coronal images were also obtained. Automated exposure control and iterative construction methods were used. Scan Time: 1234 Results discussed with ALAN Myers at 1240, 7/15/2025. Findings: The calvarium appears intact. 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 Left maxillary sinus mucous cyst is noted. Paranasal sinuses and mastoids otherwise appear clear.  Orbits appear unremarkable except for ocular lens replacements. There is moderately advanced generalized cerebral atrophy. Focal left parietal encephalomalacia presumably represents old infarct. Intermediate density of the medial portion of the area of encephalomalacia could at least potentially represent late subacute infarct, although this is not favored. Small focal low-attenuation area is seen in the left external capsule, nonspecific. There is an old lacunar-type infarct of the head of the caudate nucleus. No clearly acute infarct/edema is identified. No hemorrhage, hydrocephalus, mass or mass effect, or abnormal extra-axial collection is seen.     Impression: Impression: 1. Old, less likely late subacute infarct of the left parietal lobe. 2. Small chronic ischemic focus of the area of the left external capsule. 3. 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 4. No clearly acute intracranial abnormality is identified. Electronically Signed: Derek Garcia MD  7/15/2025 12:55 PM EDT  Workstation ID: [id number]          Assessment & Plan   Assessment & Plan       CVA (cerebral vascular accident)    80 yr old man w hisotry of CVA 2021, HTN, anxiety, found at home with confusion, aphasia, dysarthria.        Suspected CVA   Vs encephalopathy or seizure disorder   - change in speech, mentation noted by dtr without focal deficit   - stroke team consulted  - head CT shows old CVAs, possible late subacute CVA in left parietal   - ASA, Plavix, statin.  Await MRI.  Checking EEG.  Loaded w Keppra     Chronic benzo use  - ativan 1mg BID prescribed longterm     Pancreatic head cystic mass  - seen on abd CT.  2x2cm.  Consider dedicated imaging at some point .  Need to ask pt/family if a recent CT abd has been done for comparison     Upper mediastinal nodule/mass vs LAD  - seen on abd CT       VTE Prophylaxis:  Mechanical VTE prophylaxis orders are signed & held.            CODE STATUS:    There are no questions and answers  to display.       Expected Discharge   Expected discharge date/ time has not been documented.     Ruth Michaud MD  07/15/25

## 2025-07-15 NOTE — CONSULTS
Stroke Consult Note    Patient Name: Ibrahima Nava   MRN: [id number]  Age: 80 y.o.  Sex: male  : 1945    Primary Care Physician: Sebastien Lowe MD  Referring Physician:  Dr. Sandoval    TIME STROKE TEAM CALLED: 1235 EST       TIME PATIENT SEEN: 1240 EST    Handedness:Right  Race:      Chief Complaint/Reason for Consultation: AMS, dysarthria, dysphagia    HPI: Ibrahima Nava is an 80-year-old,  male with known diagnosis of prior CVA (> 10 years ago, [facility] no residual deficits), HTN, DARCY unable to tolerate CPAP who presents with altered mental status, dysarthria, weakness, and dysphagia.  Patient's wife says that he has not been quite himself for about a month.  His daughter, who is an ED nurse says that he has seemed more confused over the past few days and significantly weaker from his baseline, has confused slurred speech.  Family reports several instances of decreased alertness not responding when they are speaking to him; he has had at least 1 episode of urinary incontinence of which he was unaware.  They deny any obvious seizure-like activity.  The patient is a contractor and normally very physically active.  He also reports trouble swallowing, dizziness and generalized weakness.  His speech is slurred, he does have some obvious loss of fluency but no focal weakness or sensory loss.    Last Known Normal Date/Time:  > 24 hours    Review of Systems   Constitutional:  Positive for activity change and fatigue. Negative for chills and fever.   HENT:  Positive for trouble swallowing.    Eyes:  Negative for visual disturbance.   Respiratory:  Negative for cough and shortness of breath.    Cardiovascular:  Negative for chest pain and palpitations.   Gastrointestinal:  Positive for nausea. Negative for vomiting.   Musculoskeletal:  Positive for gait problem.   Neurological:  Positive for dizziness, speech difficulty and weakness. Negative for facial asymmetry, numbness and headaches.    Psychiatric/Behavioral: Negative.        Past Medical History:   Diagnosis Date    Anxiety     Arthritis     Hypertension     CONTROLLED WITH MEDS PER PT     Shoulder pain, left     Sleep apnea     did not tolerate CPAP    Stroke     2012-- NO RESIDUAL PROBLEMS    Tremor of right hand     Wears dentures     UPPER AND LOWER PLATE      Past Surgical History:   Procedure Laterality Date    CARDIAC CATHETERIZATION  2016    NO CORONARY STENTS     COLONOSCOPY  2014    SC ARTHROPLASTY GLENOHUMERAL JOINT TOTAL SHOULDER Left 06/12/2017    Procedure: LEFT TOTAL SHOULDER ARTHROPLASTY ;  Surgeon: Michael Xavier MD;  Location:  [facility];  Service: Orthopedics    TOTAL KNEE ARTHROPLASTY Bilateral     TOTAL SHOULDER REPLACEMENT Right     TOTAL SHOULDER REVISION Left 5/20/2024    Procedure: REVISION TOTAL SHOULDER ARTHROPLASTY TO REVERSE TOTAL SHOULDER ATHROPLASTY - LEFT;  Surgeon: Michael Xavier MD;  Location:  [facility];  Service: Orthopedics;  Laterality: Left;     No family history on file.  Social History     Socioeconomic History    Marital status:    Tobacco Use    Smoking status: Former     Types: Cigarettes    Smokeless tobacco: Never    Tobacco comments:     QUIT 20 YEARS AGO, SMOKED X 40 YEARS    Vaping Use    Vaping status: Never Used   Substance and Sexual Activity    Alcohol use: No    Drug use: No    Sexual activity: Defer     No Known Allergies  Prior to Admission medications    Medication Sig Start Date End Date Taking? Authorizing Provider   allopurinol (ZYLOPRIM) 100 MG tablet Take 1 tablet by mouth Daily.    ProviderVonda MD   aspirin 325 MG tablet Take 1 tablet by mouth Daily.    ProviderVonda MD   docusate sodium 100 MG capsule Take 100 mg by mouth 2 (Two) Times a Day As Needed for Constipation. 6/13/17   Mayne, Casie M, PA-C   ferrous sulfate 324 (65 Fe) MG tablet delayed-release EC tablet Take 1 tablet by mouth Daily With Breakfast.    ProviderVonda MD    levothyroxine (SYNTHROID, LEVOTHROID) 50 MCG tablet Take 1 tablet by mouth Daily.    Vonda Rosario MD   LORazepam (ATIVAN) 0.5 MG tablet Take 1 tablet by mouth 2 (Two) Times a Day.    Vonda Rosario MD   NON FORMULARY Bariatric Advantage vitamin    Vonda Rosario MD   pantoprazole (PROTONIX) 40 MG EC tablet Take 1 tablet by mouth Daily.    Vonda Rosario MD   pravastatin (PRAVACHOL) 80 MG tablet Take 1 tablet by mouth Daily.    Vonda Rosario MD   primidone (MYSOLINE) 50 MG tablet Take 2 tablets by mouth 2 (Two) Times a Day.    Vonda Rosario MD   sertraline (ZOLOFT) 100 MG tablet Take 1 tablet by mouth Daily.    Vonda Rosario MD   telmisartan (MICARDIS) 80 MG tablet Take 1 tablet by mouth Daily.    Vonda Rosario MD   tiZANidine (ZANAFLEX) 4 MG tablet Take 1 tablet by mouth Every Night.    Vonda Rosario MD   valACYclovir (VALTREX) 500 MG tablet Take 1 tablet by mouth Daily.    Vonda Rosario MD         Temp:  [98 °F (36.7 °C)] 98 °F (36.7 °C)  Heart Rate:  [101] 101  Resp:  [18] 18  BP: (143-187)/() 187/103  Neurological Exam  Mental Status  Awake and alert. Oriented only to person and time. Believes he is at [place]. Mild dysarthria present. Expressive aphasia present.    Cranial Nerves  CN II: Visual fields full to confrontation.  CN III, IV, VI: Extraocular movements intact bilaterally. Normal lids and orbits bilaterally. Pupils equal round and reactive to light bilaterally.  CN V: Facial sensation is normal.  CN VII: Full and symmetric facial movement.  CN XII: Tongue midline without atrophy or fasciculations.    Motor  Normal muscle bulk throughout. No fasciculations present. Normal muscle tone. No abnormal involuntary movements. Strength is 5/5 in all four extremities except as noted.  BLE 4/5.    Sensory  Light touch is normal in upper and lower extremities.     Coordination  Right: Finger-to-nose normal.Left: Finger-to-nose  normal.    Gait    Not tested.      Physical Exam  Vitals reviewed.   Constitutional:       General: He is awake.      Appearance: Normal appearance.   HENT:      Head: Normocephalic and atraumatic.   Eyes:      General: Lids are normal.      Extraocular Movements: Extraocular movements intact.      Pupils: Pupils are equal, round, and reactive to light.   Cardiovascular:      Rate and Rhythm: Normal rate.   Pulmonary:      Effort: Pulmonary effort is normal. No respiratory distress.   Musculoskeletal:      Cervical back: Normal range of motion.      Right lower leg: Edema present.      Left lower leg: Edema present.   Skin:     General: Skin is warm and dry.   Neurological:      General: No focal deficit present.      Mental Status: He is alert. He is disoriented.      Cranial Nerves: Dysarthria present. No cranial nerve deficit.      Sensory: No sensory deficit.      Motor: Weakness present.      Coordination: Coordination normal.   Psychiatric:         Mood and Affect: Mood normal.         Behavior: Behavior normal.         Acute Stroke Data    Thrombolytic Inclusion / Exclusion Criteria    Time: 13:20 EDT  Person Administering Scale: ALAN Veronica      YES NO INCLUSION CRITERIA CLASS I   [x] []   Suspected diagnosis of acute ischemic stroke with measureable neurological deficit.  Low NIHSS with disabling stroke symptoms.   [] [x]   Onset of stroke symptoms < 3 hours before beginning treatment >/ 18 years old  Stroke symptom onset = time patient was last seen well or without symptoms (LKW)   [] [x]   Onset of symptoms between 3-4.5 hours: >/= 80 years old (safe Class IIa) with history of   both diabetes and prior CVA  (reasonable Class IIb) AND NIHSS </= 25  *If not eligible for IV Thrombolytic consider neuro intervention for LKW within 24 hours     YES NO EXCLUSION CRITERIA (CONTRAINDICATIONS) CLASS III EVIDENCE HARM   [] []   Blood pressure >185/110 medically refractory to IV medications   [] []    Active bleeding at a non-compressible site   [] []   Active intracranial hemorrhage (ICH)   [] []   Symptoms suggestive of subarachnoid hemorrhage (SAH)   [] []   GI bleed within 21 days   [] []   Ischemic stroke within 3 months   [] []   Severe head trauma within 3 months    [] []   Intracranial or intraspinal surgery within 3 months   [] []   Current GI malignancy   [] []   Intracranial neoplasm   [] []   Infective endocarditis   [] []   Aortic arch dissection   [] []   Active coagulopathy with  INR >1.7, platelets <100,000, PTT > 40 sec, PT > 15 sec   *For warfarin, administration can begin before blood tests resulted. Discontinue for above values.    [] []   Treatment dose* of LMWH (Lovenox) in last 24 hours  *prophylactic dosages are not a contraindication   [] []   Concurrent use of antiplatelet agents' glycoprotein inhibitors IIb/IIIa (Integrilin, etc.)   [] []   Thrombin or factor Xa inhibitors (Eliquis, Xarelto, Arixtra) taken in last 48 hours     YES NO CLASS II: AIS WITH THE FOLLOWING CONDITIONS -   TREATMENT RISKS SHOULD BE WEIGHED AGAINST POSSIBLE BENEFITS.    [] []   Major trauma in last 14 days, recent major surgery in last 14 days, intracranial arterial dissection, giant unruptured and unsecured intracranial aneurysm, pericarditis     [] []   The risks and benefits have been discussed with the patient or family related to the administration of IV thrombolytic therapy for stroke symptoms.   [] []   I have discussed and reviewed the patient's case and imaging with the attending prior to IV thrombolytic therapy.   TIME  Time IV thrombolytic administered       Hospital Meds:  Scheduled- aspirin, 81 mg, Oral, Daily   Or  aspirin, 300 mg, Rectal, Daily  clopidogrel, 300 mg, Oral, Once   And  [START ON 7/16/2025] clopidogrel, 75 mg, Oral, Daily      Infusions-     PRNs-   sodium chloride    Functional Status Prior to Current Stroke/Melodie Score:     MODIFIED MELODIE SCALE (to be assessed for each patient  having history of stroke) []Stroke history but not assessed  [x]0: No symptoms at all  []1: No significant disability despite symptoms  []2: Slight disability  []3: Moderate disability  []4: Moderately severe disability  []5: Severe disability  []6: Death         NIH Stroke Scale  Time: 12:45 EDT  Person Administering Scale: ALAN Veronica    1a  Level of consciousness: 0=alert; keenly responsive   1b. LOC questions:  0=Answers both questions correctly   1c. LOC commands: 0=Performs both tasks correctly   2.  Best Gaze: 0=normal   3.  Visual: 0=No visual loss   4. Facial Palsy: 0=Normal symmetric movement   5a.  Motor left arm: 0=No drift, limb holds 90 (or 45) degrees for full 10 seconds   5b.  Motor right arm: 0=No drift, limb holds 90 (or 45) degrees for full 10 seconds   6a. motor left le=No drift, limb holds 90 (or 45) degrees for full 10 seconds   6b  Motor right le=No drift, limb holds 90 (or 45) degrees for full 10 seconds   7. Limb Ataxia: 0=Absent   8.  Sensory: 0=Normal; no sensory loss   9. Best Language:  1=Mild to moderate aphasia; some obvious loss of fluency or facility of comprehension without significant limitation on ideas expressed or form of expression.   10. Dysarthria: 1=Mild to moderate, patient slurs at least some words and at worst, can be understood with some difficulty   11. Extinction and Inattention: 0=No abnormality    Total:   2       Results Reviewed:  I have personally reviewed current lab, radiology, and data and agree with results.  WBC   Date Value Ref Range Status   07/15/2025 11.13 (H) 3.40 - 10.80 10*3/mm3 Final     RBC   Date Value Ref Range Status   07/15/2025 3.57 (L) 4.14 - 5.80 10*6/mm3 Final     Hemoglobin   Date Value Ref Range Status   07/15/2025 10.2 (L) 12.0 - 17.0 g/dL Final     Comment:     Serial Number: [id number]:  [id number]   07/15/2025 9.0 (L) 13.0 - 17.7 g/dL Final     Hematocrit   Date Value Ref Range Status   07/15/2025 30 (L) 38 - 51 %  Final   07/15/2025 31.2 (L) 37.5 - 51.0 % Final     MCV   Date Value Ref Range Status   07/15/2025 87.4 79.0 - 97.0 fL Final     MCH   Date Value Ref Range Status   07/15/2025 25.2 (L) 26.6 - 33.0 pg Final     MCHC   Date Value Ref Range Status   07/15/2025 28.8 (L) 31.5 - 35.7 g/dL Final     RDW   Date Value Ref Range Status   07/15/2025 17.2 (H) 12.3 - 15.4 % Final     RDW-SD   Date Value Ref Range Status   07/15/2025 53.5 37.0 - 54.0 fl Final     MPV   Date Value Ref Range Status   07/15/2025 10.3 6.0 - 12.0 fL Final     Platelets   Date Value Ref Range Status   07/15/2025 176 140 - 450 10*3/mm3 Final     Neutrophil %   Date Value Ref Range Status   07/15/2025 81.4 (H) 42.7 - 76.0 % Final     Lymphocyte %   Date Value Ref Range Status   07/15/2025 11.0 (L) 19.6 - 45.3 % Final     Monocyte %   Date Value Ref Range Status   07/15/2025 4.5 (L) 5.0 - 12.0 % Final     Eosinophil %   Date Value Ref Range Status   07/15/2025 0.0 (L) 0.3 - 6.2 % Final     Basophil %   Date Value Ref Range Status   07/15/2025 0.2 0.0 - 1.5 % Final     Immature Grans %   Date Value Ref Range Status   07/15/2025 2.9 (H) 0.0 - 0.5 % Final     Neutrophils, Absolute   Date Value Ref Range Status   07/15/2025 9.07 (H) 1.70 - 7.00 10*3/mm3 Final     Lymphocytes, Absolute   Date Value Ref Range Status   07/15/2025 1.22 0.70 - 3.10 10*3/mm3 Final     Monocytes, Absolute   Date Value Ref Range Status   07/15/2025 0.50 0.10 - 0.90 10*3/mm3 Final     Eosinophils, Absolute   Date Value Ref Range Status   07/15/2025 0.00 0.00 - 0.40 10*3/mm3 Final     Basophils, Absolute   Date Value Ref Range Status   07/15/2025 0.02 0.00 - 0.20 10*3/mm3 Final     Immature Grans, Absolute   Date Value Ref Range Status   07/15/2025 0.32 (H) 0.00 - 0.05 10*3/mm3 Final     nRBC   Date Value Ref Range Status   07/15/2025 1.8 (H) 0.0 - 0.2 /100 WBC Final     Lab Results   Component Value Date    GLUCOSE 252 (H) 07/15/2025    BUN 22.2 07/15/2025    CREATININE 0.90  07/15/2025     07/15/2025    K 2.5 (C) 07/15/2025    CL 97 (L) 07/15/2025    CALCIUM 8.9 07/15/2025    PROTEINTOT 5.6 (L) 07/15/2025    ALBUMIN 3.7 07/15/2025    ALT 58 (H) 07/15/2025    AST 75 (H) 07/15/2025    ALKPHOS 85 07/15/2025    BILITOT 0.4 07/15/2025    GLOB 1.9 07/15/2025    AGRATIO 1.9 07/15/2025    BCR 24.1 07/15/2025    ANIONGAP 12.4 07/15/2025    EGFR 86.3 07/15/2025     XR Chest 1 View  Result Date: 7/15/2025  Impression: Cardiomegaly with interval development of pulmonary edema. Electronically Signed: Jani Jung MD  7/15/2025 2:44 PM EDT  Workstation ID: [id number]    CT Angiogram Head w AI Analysis of LVO  Result Date: 7/15/2025  Impression: 1.Mild cervical and intracranial atherosclerotic changes are present as above, without evidence of flow-limiting stenosis, large vessel occlusion or aneurysm. 2.No acute findings in the abdomen and pelvis. 3.Incidentally noted 22 x 21 mm cystic finding within the pancreatic head. Correlation with any available prior imaging could be of use to document expected stability of this likely benign finding, otherwise as indicated, MRI could be useful to further evaluate. 4.Incompletely characterized 3.8 cm nodular mass within the upper mediastinum, possibly an enlarged lymph node. Consider dedicated CT chest. Electronically Signed: Chase Birmingham MD  7/15/2025 1:27 PM EDT  Workstation ID: [id number]    CT Angiogram Neck  Result Date: 7/15/2025  Impression: 1.Mild cervical and intracranial atherosclerotic changes are present as above, without evidence of flow-limiting stenosis, large vessel occlusion or aneurysm. 2.No acute findings in the abdomen and pelvis. 3.Incidentally noted 22 x 21 mm cystic finding within the pancreatic head. Correlation with any available prior imaging could be of use to document expected stability of this likely benign finding, otherwise as indicated, MRI could be useful to further evaluate. 4.Incompletely characterized 3.8 cm nodular mass  within the upper mediastinum, possibly an enlarged lymph node. Consider dedicated CT chest. Electronically Signed: Chase Birmingham MD  7/15/2025 1:27 PM EDT  Workstation ID: [id number]    CT Abdomen Pelvis With Contrast  Result Date: 7/15/2025  Impression: 1.Mild cervical and intracranial atherosclerotic changes are present as above, without evidence of flow-limiting stenosis, large vessel occlusion or aneurysm. 2.No acute findings in the abdomen and pelvis. 3.Incidentally noted 22 x 21 mm cystic finding within the pancreatic head. Correlation with any available prior imaging could be of use to document expected stability of this likely benign finding, otherwise as indicated, MRI could be useful to further evaluate. 4.Incompletely characterized 3.8 cm nodular mass within the upper mediastinum, possibly an enlarged lymph node. Consider dedicated CT chest. Electronically Signed: Chase Birmingham MD  7/15/2025 1:27 PM EDT  Workstation ID: [id number]    CT CEREBRAL PERFUSION WITH & WITHOUT CONTRAST  Result Date: 7/15/2025  Impression: Examination appears within normal limits. Electronically Signed: Ibrahima Ribeiro MD  7/15/2025 1:15 PM EDT  Workstation ID: [id number]    CT Head Without Contrast Stroke Protocol  Result Date: 7/15/2025  Impression: 1. Old, less likely late subacute infarct of the left parietal lobe. 2. Small chronic ischemic focus of the area of the left external capsule. 3. 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 4. No clearly acute intracranial abnormality is identified. Electronically Signed: Derek Garcia MD  7/15/2025 12:55 PM EDT  Workstation ID: [id number]          Assessment/Plan:  80-year-old,  male with known diagnosis of prior CVA (> 10 years ago, [facility] no residual deficits), HTN, and DARCY unable to tolerate CPAP who presents with altered mental status, dysarthria, weakness, and dysphagia.  Per his wife he has not been at his baseline for about a month.  He is become very  weak with slurred fragmented speech.  He has periods of decreased alertness and has had at least 1 episode of urinary incontinence of which he was unaware.  No obvious seizure-like activity.  NIHSS is a 2.  CT head shows a subacute to chronic left parietal infarct, a small chronic lacunar infarct in the left external capsule and the left caudate nucleus.  There is no acute abnormality.  CT perfusion is negative.  CTA head and neck shows mild bilateral arthrosclerosis in the ICAs, but no significant flow-limiting stenosis no LVO or aneurysm.  There is an incidental cystic finding within the pancreatic head measuring 22 x 21 mm and a 3.8 cm nodule mass in the upper mediastinum that is possibly an enlarged lymph node.  Patient is not a candidate for IV thrombolytics secondary to time.  Not a candidate for endovascular therapy as no vessel amenable      Antiplatelet PTA: Aspirin 325  Anticoagulant PTA: None         Dysarthria        Dysphagia        AMS  - Subacute left MCA infarct on CT head which is likely the source of patient's symptoms  -TIA/CVA order set without thrombolytic therapy has been initiated  -NPO until bedside nursing dysphagia screen completed  -MRI brain without  -TTE   -EEG pending  -A1c and lipid panel in AM  -Meds: Aspirin 81 mg and plavix 75 mg (loaded with 300 mg in the ED), load with keppra 1g followed by 500 mg BID for suspected seizures  -Activity as tolerated, fall risk precautions  -PT/OT/SLP evaluation    2.  Essential hypertension,   -SBP < 180 for now  -Nicardipine as needed for SBP >180    3.  Hyperlipidemia  -Lipid panel in AM  -Hold statin due to elevated LFTs        Plan of care was discussed with patient, family and Dr. Sandoval.  Stroke neurology will continue to follow.  Please call with any questions or concerns.  Thank you for this consult.             Lyric Dan, ALAN  July 15, 2025  13:20 EDT

## 2025-07-16 ENCOUNTER — APPOINTMENT (OUTPATIENT)
Dept: MRI IMAGING | Facility: HOSPITAL | Age: 80
DRG: 308 | End: 2025-07-16
Payer: MEDICARE

## 2025-07-16 ENCOUNTER — APPOINTMENT (OUTPATIENT)
Dept: GENERAL RADIOLOGY | Facility: HOSPITAL | Age: 80
DRG: 308 | End: 2025-07-16
Payer: MEDICARE

## 2025-07-16 ENCOUNTER — APPOINTMENT (OUTPATIENT)
Dept: CARDIOLOGY | Facility: HOSPITAL | Age: 80
DRG: 308 | End: 2025-07-16
Payer: MEDICARE

## 2025-07-16 LAB
ANION GAP SERPL CALCULATED.3IONS-SCNC: 11 MMOL/L (ref 5–15)
BUN SERPL-MCNC: 20.9 MG/DL (ref 8–23)
BUN/CREAT SERPL: 24.9 (ref 7–25)
CALCIUM SPEC-SCNC: 8.7 MG/DL (ref 8.6–10.5)
CHLORIDE SERPL-SCNC: 98 MMOL/L (ref 98–107)
CHOLEST SERPL-MCNC: 157 MG/DL (ref 0–200)
CO2 SERPL-SCNC: 33 MMOL/L (ref 22–29)
CREAT SERPL-MCNC: 0.84 MG/DL (ref 0.76–1.27)
DEPRECATED RDW RBC AUTO: 53.6 FL (ref 37–54)
EGFRCR SERPLBLD CKD-EPI 2021: 88.2 ML/MIN/1.73
ERYTHROCYTE [DISTWIDTH] IN BLOOD BY AUTOMATED COUNT: 17.4 % (ref 12.3–15.4)
GLUCOSE BLDC GLUCOMTR-MCNC: 180 MG/DL (ref 70–130)
GLUCOSE BLDC GLUCOMTR-MCNC: 182 MG/DL (ref 70–130)
GLUCOSE BLDC GLUCOMTR-MCNC: 189 MG/DL (ref 70–130)
GLUCOSE BLDC GLUCOMTR-MCNC: 251 MG/DL (ref 70–130)
GLUCOSE BLDC GLUCOMTR-MCNC: 371 MG/DL (ref 70–130)
GLUCOSE SERPL-MCNC: 227 MG/DL (ref 65–99)
HCT VFR BLD AUTO: 28.6 % (ref 37.5–51)
HDLC SERPL-MCNC: 40 MG/DL (ref 40–60)
HGB BLD-MCNC: 8.3 G/DL (ref 13–17.7)
IRON 24H UR-MRATE: 52 MCG/DL (ref 59–158)
IRON SATN MFR SERPL: 14 % (ref 20–50)
LDLC SERPL CALC-MCNC: 77 MG/DL (ref 0–100)
LDLC/HDLC SERPL: 1.73 {RATIO}
MCH RBC QN AUTO: 25.2 PG (ref 26.6–33)
MCHC RBC AUTO-ENTMCNC: 29 G/DL (ref 31.5–35.7)
MCV RBC AUTO: 86.9 FL (ref 79–97)
PLATELET # BLD AUTO: 150 10*3/MM3 (ref 140–450)
PMV BLD AUTO: 11.1 FL (ref 6–12)
POTASSIUM SERPL-SCNC: 2.7 MMOL/L (ref 3.5–5.2)
RBC # BLD AUTO: 3.29 10*6/MM3 (ref 4.14–5.8)
SODIUM SERPL-SCNC: 142 MMOL/L (ref 136–145)
TIBC SERPL-MCNC: 383 MCG/DL (ref 298–536)
TRANSFERRIN SERPL-MCNC: 257 MG/DL (ref 200–360)
TRIGL SERPL-MCNC: 240 MG/DL (ref 0–150)
VLDLC SERPL-MCNC: 40 MG/DL (ref 5–40)
WBC NRBC COR # BLD AUTO: 9.61 10*3/MM3 (ref 3.4–10.8)

## 2025-07-16 PROCEDURE — 80061 LIPID PANEL: CPT | Performed by: NURSE PRACTITIONER

## 2025-07-16 PROCEDURE — 93005 ELECTROCARDIOGRAM TRACING: CPT | Performed by: FAMILY MEDICINE

## 2025-07-16 PROCEDURE — 85027 COMPLETE CBC AUTOMATED: CPT | Performed by: INTERNAL MEDICINE

## 2025-07-16 PROCEDURE — 97161 PT EVAL LOW COMPLEX 20 MIN: CPT

## 2025-07-16 PROCEDURE — 74230 X-RAY XM SWLNG FUNCJ C+: CPT

## 2025-07-16 PROCEDURE — 82948 REAGENT STRIP/BLOOD GLUCOSE: CPT

## 2025-07-16 PROCEDURE — 99222 1ST HOSP IP/OBS MODERATE 55: CPT | Performed by: INTERNAL MEDICINE

## 2025-07-16 PROCEDURE — 80048 BASIC METABOLIC PNL TOTAL CA: CPT | Performed by: INTERNAL MEDICINE

## 2025-07-16 PROCEDURE — 93010 ELECTROCARDIOGRAM REPORT: CPT | Performed by: INTERNAL MEDICINE

## 2025-07-16 PROCEDURE — 25010000002 HYDRALAZINE PER 20 MG: Performed by: FAMILY MEDICINE

## 2025-07-16 PROCEDURE — 92523 SPEECH SOUND LANG COMPREHEN: CPT

## 2025-07-16 PROCEDURE — 97165 OT EVAL LOW COMPLEX 30 MIN: CPT

## 2025-07-16 PROCEDURE — 25010000002 LEVETRIRACETAM PER 10 MG: Performed by: NURSE PRACTITIONER

## 2025-07-16 PROCEDURE — 99233 SBSQ HOSP IP/OBS HIGH 50: CPT | Performed by: STUDENT IN AN ORGANIZED HEALTH CARE EDUCATION/TRAINING PROGRAM

## 2025-07-16 PROCEDURE — 63710000001 INSULIN LISPRO (HUMAN) PER 5 UNITS: Performed by: FAMILY MEDICINE

## 2025-07-16 PROCEDURE — 70551 MRI BRAIN STEM W/O DYE: CPT

## 2025-07-16 PROCEDURE — 92610 EVALUATE SWALLOWING FUNCTION: CPT

## 2025-07-16 PROCEDURE — 74240 X-RAY XM UPR GI TRC 1CNTRST: CPT

## 2025-07-16 PROCEDURE — 25010000002 METOPROLOL TARTRATE 5 MG/5ML SOLUTION: Performed by: FAMILY MEDICINE

## 2025-07-16 PROCEDURE — 84466 ASSAY OF TRANSFERRIN: CPT | Performed by: INTERNAL MEDICINE

## 2025-07-16 PROCEDURE — 92611 MOTION FLUOROSCOPY/SWALLOW: CPT

## 2025-07-16 PROCEDURE — 83540 ASSAY OF IRON: CPT | Performed by: INTERNAL MEDICINE

## 2025-07-16 PROCEDURE — 97116 GAIT TRAINING THERAPY: CPT

## 2025-07-16 PROCEDURE — 99233 SBSQ HOSP IP/OBS HIGH 50: CPT | Performed by: FAMILY MEDICINE

## 2025-07-16 RX ORDER — IBUPROFEN 600 MG/1
1 TABLET ORAL
Status: DISCONTINUED | OUTPATIENT
Start: 2025-07-16 | End: 2025-07-29 | Stop reason: HOSPADM

## 2025-07-16 RX ORDER — ATORVASTATIN CALCIUM 40 MG/1
40 TABLET, FILM COATED ORAL NIGHTLY
Status: DISCONTINUED | OUTPATIENT
Start: 2025-07-16 | End: 2025-07-29 | Stop reason: HOSPADM

## 2025-07-16 RX ORDER — DEXTROSE MONOHYDRATE 25 G/50ML
25 INJECTION, SOLUTION INTRAVENOUS
Status: DISCONTINUED | OUTPATIENT
Start: 2025-07-16 | End: 2025-07-29 | Stop reason: HOSPADM

## 2025-07-16 RX ORDER — VALACYCLOVIR HYDROCHLORIDE 500 MG/1
500 TABLET, FILM COATED ORAL DAILY
Status: DISCONTINUED | OUTPATIENT
Start: 2025-07-16 | End: 2025-07-29 | Stop reason: HOSPADM

## 2025-07-16 RX ORDER — POTASSIUM CHLORIDE 750 MG/1
40 CAPSULE, EXTENDED RELEASE ORAL ONCE
Status: COMPLETED | OUTPATIENT
Start: 2025-07-16 | End: 2025-07-16

## 2025-07-16 RX ORDER — LOSARTAN POTASSIUM 50 MG/1
100 TABLET ORAL
Status: DISCONTINUED | OUTPATIENT
Start: 2025-07-16 | End: 2025-07-16

## 2025-07-16 RX ORDER — CLONIDINE HYDROCHLORIDE 0.1 MG/1
0.1 TABLET ORAL
Status: DISCONTINUED | OUTPATIENT
Start: 2025-07-16 | End: 2025-07-29 | Stop reason: HOSPADM

## 2025-07-16 RX ORDER — LORAZEPAM 0.5 MG/1
0.5 TABLET ORAL DAILY PRN
Status: DISCONTINUED | OUTPATIENT
Start: 2025-07-16 | End: 2025-07-29 | Stop reason: HOSPADM

## 2025-07-16 RX ORDER — NICOTINE POLACRILEX 4 MG
15 LOZENGE BUCCAL
Status: DISCONTINUED | OUTPATIENT
Start: 2025-07-16 | End: 2025-07-29 | Stop reason: HOSPADM

## 2025-07-16 RX ORDER — SPIRONOLACTONE 25 MG/1
25 TABLET ORAL DAILY
Status: DISCONTINUED | OUTPATIENT
Start: 2025-07-16 | End: 2025-07-23

## 2025-07-16 RX ORDER — TERAZOSIN 1 MG/1
1 CAPSULE ORAL NIGHTLY
Status: DISCONTINUED | OUTPATIENT
Start: 2025-07-16 | End: 2025-07-20

## 2025-07-16 RX ORDER — INSULIN LISPRO 100 [IU]/ML
2-9 INJECTION, SOLUTION INTRAVENOUS; SUBCUTANEOUS
Status: DISCONTINUED | OUTPATIENT
Start: 2025-07-16 | End: 2025-07-29 | Stop reason: HOSPADM

## 2025-07-16 RX ORDER — METOPROLOL TARTRATE 1 MG/ML
5 INJECTION, SOLUTION INTRAVENOUS EVERY 6 HOURS PRN
Status: DISCONTINUED | OUTPATIENT
Start: 2025-07-16 | End: 2025-07-29 | Stop reason: HOSPADM

## 2025-07-16 RX ORDER — POTASSIUM CHLORIDE 1500 MG/1
40 TABLET, EXTENDED RELEASE ORAL EVERY 4 HOURS
Status: COMPLETED | OUTPATIENT
Start: 2025-07-16 | End: 2025-07-17

## 2025-07-16 RX ORDER — SERTRALINE HYDROCHLORIDE 100 MG/1
200 TABLET, FILM COATED ORAL DAILY
Status: DISCONTINUED | OUTPATIENT
Start: 2025-07-16 | End: 2025-07-29 | Stop reason: HOSPADM

## 2025-07-16 RX ORDER — LOSARTAN POTASSIUM 50 MG/1
100 TABLET ORAL
Status: DISCONTINUED | OUTPATIENT
Start: 2025-07-16 | End: 2025-07-29 | Stop reason: HOSPADM

## 2025-07-16 RX ORDER — LEVOTHYROXINE SODIUM 50 UG/1
50 TABLET ORAL
Status: DISCONTINUED | OUTPATIENT
Start: 2025-07-17 | End: 2025-07-29 | Stop reason: HOSPADM

## 2025-07-16 RX ORDER — HYDRALAZINE HYDROCHLORIDE 20 MG/ML
10 INJECTION INTRAMUSCULAR; INTRAVENOUS EVERY 6 HOURS PRN
Status: DISCONTINUED | OUTPATIENT
Start: 2025-07-16 | End: 2025-07-29 | Stop reason: HOSPADM

## 2025-07-16 RX ORDER — PANTOPRAZOLE SODIUM 40 MG/1
40 TABLET, DELAYED RELEASE ORAL
Status: DISCONTINUED | OUTPATIENT
Start: 2025-07-17 | End: 2025-07-23

## 2025-07-16 RX ORDER — CELECOXIB 100 MG/1
100 CAPSULE ORAL DAILY
Status: DISCONTINUED | OUTPATIENT
Start: 2025-07-16 | End: 2025-07-16

## 2025-07-16 RX ORDER — PRIMIDONE 50 MG/1
100 TABLET ORAL DAILY
Status: DISCONTINUED | OUTPATIENT
Start: 2025-07-16 | End: 2025-07-29

## 2025-07-16 RX ADMIN — BARIUM SULFATE 20 ML: 400 PASTE ORAL at 10:26

## 2025-07-16 RX ADMIN — POTASSIUM CHLORIDE 40 MEQ: 750 CAPSULE, EXTENDED RELEASE ORAL at 15:21

## 2025-07-16 RX ADMIN — POTASSIUM CHLORIDE 40 MEQ: 1500 TABLET, EXTENDED RELEASE ORAL at 23:04

## 2025-07-16 RX ADMIN — LEVETIRACETAM 500 MG: 500 INJECTION, SOLUTION INTRAVENOUS at 08:17

## 2025-07-16 RX ADMIN — LEVETIRACETAM 500 MG: 500 INJECTION, SOLUTION INTRAVENOUS at 20:56

## 2025-07-16 RX ADMIN — TERAZOSIN HYDROCHLORIDE 1 MG: 1 CAPSULE ORAL at 20:56

## 2025-07-16 RX ADMIN — TIZANIDINE 4 MG: 4 TABLET ORAL at 20:56

## 2025-07-16 RX ADMIN — Medication 12.5 MG: at 21:09

## 2025-07-16 RX ADMIN — Medication 10 ML: at 08:48

## 2025-07-16 RX ADMIN — Medication 10 ML: at 20:56

## 2025-07-16 RX ADMIN — LOSARTAN POTASSIUM 100 MG: 50 TABLET, FILM COATED ORAL at 15:59

## 2025-07-16 RX ADMIN — CLOPIDOGREL BISULFATE 75 MG: 75 TABLET, FILM COATED ORAL at 08:48

## 2025-07-16 RX ADMIN — ASPIRIN 81 MG: 81 TABLET, CHEWABLE ORAL at 08:48

## 2025-07-16 RX ADMIN — INSULIN LISPRO 8 UNITS: 100 INJECTION, SOLUTION INTRAVENOUS; SUBCUTANEOUS at 21:52

## 2025-07-16 RX ADMIN — PRIMIDONE 100 MG: 50 TABLET ORAL at 15:21

## 2025-07-16 RX ADMIN — Medication 12.5 MG: at 15:21

## 2025-07-16 RX ADMIN — ATORVASTATIN CALCIUM 40 MG: 40 TABLET, FILM COATED ORAL at 20:56

## 2025-07-16 RX ADMIN — APIXABAN 5 MG: 5 TABLET, FILM COATED ORAL at 20:56

## 2025-07-16 RX ADMIN — HYDRALAZINE HYDROCHLORIDE 10 MG: 20 INJECTION INTRAMUSCULAR; INTRAVENOUS at 18:03

## 2025-07-16 RX ADMIN — CELECOXIB 100 MG: 100 CAPSULE ORAL at 15:21

## 2025-07-16 RX ADMIN — EMPAGLIFLOZIN 10 MG: 10 TABLET, FILM COATED ORAL at 15:21

## 2025-07-16 RX ADMIN — VALACYCLOVIR HYDROCHLORIDE 500 MG: 500 TABLET, FILM COATED ORAL at 15:21

## 2025-07-16 RX ADMIN — BARIUM SULFATE 100 ML: 0.81 POWDER, FOR SUSPENSION ORAL at 10:26

## 2025-07-16 RX ADMIN — Medication 10 ML: at 21:09

## 2025-07-16 RX ADMIN — SERTRALINE HYDROCHLORIDE 200 MG: 100 TABLET, FILM COATED ORAL at 15:21

## 2025-07-16 RX ADMIN — SPIRONOLACTONE 25 MG: 25 TABLET ORAL at 15:21

## 2025-07-16 NOTE — PROGRESS NOTES
[facility] Medicine Services  PROGRESS NOTE    Patient Name: Ibrahima Nava  : 1945  MRN: [id number]    Date of Admission: 7/15/2025  Primary Care Physician: Sebastien Lowe MD    Subjective   Subjective     CC:  F/U dysphagia, dysarthria, confusion    HPI:  Patient seen and examined. Patient up in chair. Daughter at bedside who is RN and very helpful with history. Patient states for past several weeks he has had difficulty swallowing both solids and liquids. He says he feels fullness in R side of his neck. He also has had episodes of confusion where he didn't know how to use his TV remote or work the radio.   He has new pitting LE edema. New A fib. Reviewed current imaging.     Objective   Objective     Vital Signs:   Temp:  [97.7 °F (36.5 °C)-98.6 °F (37 °C)] 97.8 °F (36.6 °C)  Heart Rate:  [] 87  Resp:  [16-18] 16  BP: (159-187)/() 159/93  Flow (L/min) (Oxygen Therapy):  [2] 2     Physical Exam:  Constitutional: No acute distress, awake, alert  HENT: NCAT, mucous membranes moist, [place], fullness noted R neck  Respiratory: Bibasilar rales, respiratory effort normal 2L NC  Cardiovascular: Irregular, no murmurs, rubs, or gallops  Gastrointestinal: Positive bowel sounds, soft, +epigastric TTP, no R/R/G, non-distended  Musculoskeletal:  4+ pitting B/L LE edema  Psychiatric: Appropriate affect, cooperative  Neurologic: Oriented x 3, GOVEA, speech difficult to understand at times  Skin: No rashes     Results Reviewed:  LAB RESULTS:      Lab 07/15/25  1425 07/15/25  1243 07/15/25  1239   WBC  --   --  11.13*   HEMOGLOBIN  --   --  9.0*   HEMOGLOBIN, POC  --  10.2*  --    HEMATOCRIT  --   --  31.2*   HEMATOCRIT POC  --  30*  --    PLATELETS  --   --  176   NEUTROS ABS  --   --  9.07*   IMMATURE GRANS (ABS)  --   --  0.32*   LYMPHS ABS  --   --  1.22   MONOS ABS  --   --  0.50   EOS ABS  --   --  0.00   MCV  --   --  87.4   PROTIME  --   --  14.8   HSTROP T 45*  --  46*          Lab 07/15/25  1243 07/15/25  1239   SODIUM  --  142   POTASSIUM  --  2.5*   CHLORIDE  --  97*   CO2  --  32.6*   ANION GAP  --  12.4   BUN  --  22.2   CREATININE 0.90 0.92   EGFR 86.3 84.1   GLUCOSE  --  252*   CALCIUM  --  8.9   MAGNESIUM  --  2.2   HEMOGLOBIN A1C  --  6.34*   TSH  --  0.876         Lab 07/15/25  1239   TOTAL PROTEIN 5.6*   ALBUMIN 3.7   GLOBULIN 1.9   ALT (SGPT) 58*   AST (SGOT) 75*   BILIRUBIN 0.4   ALK PHOS 85         Lab 07/15/25  1425 07/15/25  1239   PROBNP  --  3,433.0*   HSTROP T 45* 46*   PROTIME  --  14.8   INR  --  1.09                 Lab 07/15/25  1316   PH, ARTERIAL 7.489*   PCO2, ARTERIAL 50.2*   PO2 ART 52.2*   FIO2 21   HCO3 ART 38.2*   BASE EXCESS ART 13.3*   CARBOXYHEMOGLOBIN 1.8     Brief Urine Lab Results  (Last result in the past 365 days)        Color   Clarity   Blood   Leuk Est   Nitrite   Protein   CREAT   Urine HCG        07/15/25 1319 Yellow   Clear   Moderate (2+)   Negative   Negative   30 mg/dL (1+)                   Microbiology Results Abnormal       None            FL Video Swallow With Speech Single Contrast  Result Date: 7/16/2025  FL VIDEO SWALLOW W SPEECH SINGLE-CONTRAST, FL LIMITED UGI FOR MBS REFLUX SINGLE-CONTRAST Date of Exam: 7/16/2025 10:00 AM EDT Indication: dysphagia.   Comparison: None available. Technique:   The speech pathologist administered food and/or liquid mixed with barium to the patient with cine/video imaging.  Imaging assistance was provided to the speech pathologist and an image was saved. The camera was then lowered with the patient remaining in lateral seated position with additional swallows of thin barium, to evaluate the visible portion of the esophagus. Fluoroscopic Time: 1 minute 12 seconds Number of Images: 6 cine loops Findings: Modified barium swallow: The patient was evaluated in the seated lateral position while taking a variety of consistencies by mouth under the direction of speech pathology. No aspiration was observed.  Please see the speech pathologist's procedure report for full details and recommendations. Limited upper GI series: There is moderate residual barium in the upper two thirds of the esophagus following the modified barium swallow. Additional sips show no obvious stricture. There is mild intraesophageal reflux.     Impression: Impression: 1. Fluoroscopy provided for modified barium swallow. 2. Limited upper GI series shows incomplete clearance with residual thin barium in the upper two thirds of the esophagus. No evidence of obstruction and only mild intraesophageal reflux. Electronically Signed: Derek Garcia MD  7/16/2025 12:10 PM EDT  Workstation ID: [id number]    FL Limited Ugi For Mbs Reflux Single-Contrast  Result Date: 7/16/2025  FL VIDEO SWALLOW W SPEECH SINGLE-CONTRAST, FL LIMITED UGI FOR MBS REFLUX SINGLE-CONTRAST Date of Exam: 7/16/2025 10:00 AM EDT Indication: dysphagia.   Comparison: None available. Technique:   The speech pathologist administered food and/or liquid mixed with barium to the patient with cine/video imaging.  Imaging assistance was provided to the speech pathologist and an image was saved. The camera was then lowered with the patient remaining in lateral seated position with additional swallows of thin barium, to evaluate the visible portion of the esophagus. Fluoroscopic Time: 1 minute 12 seconds Number of Images: 6 cine loops Findings: Modified barium swallow: The patient was evaluated in the seated lateral position while taking a variety of consistencies by mouth under the direction of speech pathology. No aspiration was observed. Please see the speech pathologist's procedure report for full details and recommendations. Limited upper GI series: There is moderate residual barium in the upper two thirds of the esophagus following the modified barium swallow. Additional sips show no obvious stricture. There is mild intraesophageal reflux.     Impression: Impression: 1. Fluoroscopy provided for  modified barium swallow. 2. Limited upper GI series shows incomplete clearance with residual thin barium in the upper two thirds of the esophagus. No evidence of obstruction and only mild intraesophageal reflux. Electronically Signed: Derek Garcia MD  7/16/2025 12:10 PM EDT  Workstation ID: [id number]    MRI Brain Without Contrast  Result Date: 7/16/2025  MRI BRAIN WO CONTRAST Date of Exam: 7/16/2025 2:32 AM EDT Indication: Stroke, follow up AMS, dysarthria, dysphagia - subacute L MCA infarct on CT head.  Comparison: CT 1 day prior Technique:  Routine multiplanar/multisequence sequence images of the brain were obtained without contrast administration. Findings: No acute infarct is present on diffusion weighted sequences. Midline structures appear normal and the craniocervical junction is satisfactory. Age-related changes are present with mild generalized volume loss and mild typical T2 hyperintense subcortical and pontine white matter changes, nonspecific but favored to reflect sequela of chronic microvascular ischemia. There are also areas of old infarct present within the left perirolandic region and small bilateral chronic basal ganglia lacunar infarcts. There is no evidence of intracranial hemorrhage, mass or mass effect. The ventricles demonstrate mild ex vacuo prominence. The orbits are normal. The paranasal sinuses demonstrate small mucous retention cysts in the left maxillary sinus.     Impression: Impression: Moderate typical chronic and senescent findings are present as above, including a chronic appearing infarct of the left pre and postcentral gyri. There is no evidence of recent infarct, hemorrhage, mass or mass effect. Electronically Signed: Chase Birmingham MD  7/16/2025 8:46 AM EDT  Workstation ID: [id number]    EEG  Result Date: 7/15/2025  History: 80 y old male Procedure: A 21 Channel digital electroencephalogram was performed in the Clinical Neurophysiology Laboratory. The 10/20 International System of  electrode placement was used and both Bipolar and referential electrode montages were monitored.  EEG Description: This EEG is continuous and symmetrical. During the awake state with eye closed, there is a posterior dominant rhythm of  --9-  Hz that is symmetrical and reacts appropriately to eye opening. Anterior to posterior amplitude frequency gradient is preserved. With Drowsiness, there is waxing and waning of the posterior dominant rhythm with eventual replacement by a mixture of beta, alpha and theta activity. Photic stimulation using a stepwise increase in photic frequency, results in driving response but no activation of epileptiform activity.         Interpretation: This EEG obtained in the awake and drowsy state is normal for age.  Clinical correlation: The diagnosis of epilepsy is a clinical one. A normal EEG does not exclude this diagnosis. However there are no epileptiform features in this recording to sug    XR Chest 1 View  Result Date: 7/15/2025  XR CHEST 1 VW Date of Exam: 7/15/2025 2:23 PM EDT Indication: Acute Stroke Protocol (onset < 12 hrs) Comparison: 5/7/2024 Findings: Cardiac size is enlarged. There is an increase in the pulmonary vascular markings with interval development of diffuse interstitial edema. No focal consolidation is seen. There is no obvious pleural fluid.     Impression: Impression: Cardiomegaly with interval development of pulmonary edema. Electronically Signed: Jani Jung MD  7/15/2025 2:44 PM EDT  Workstation ID: [id number]    CT Angiogram Head w AI Analysis of LVO  Result Date: 7/15/2025  CT ANGIOGRAM HEAD W AI ANALYSIS OF LVO, CT ANGIOGRAM NECK, CT ABDOMEN PELVIS W CONTRAST Date of Exam: 7/15/2025 12:38 PM EDT Indication: Neuro Deficit, acute, Stroke suspected Neuro deficit, acute stroke suspected. Comparison: None available. Technique: CTA of the head and neck was performed after the uneventful intravenous administration of 90 mL Isovue-370. Axial IV contrast-enhanced CT  of the abdomen and pelvis was performed. Reconstructed coronal and sagittal images were also obtained. In  addition, a 3-D volume rendered image was created for interpretation. Automated exposure control and iterative reconstruction methods were used. Findings: CTA head and neck: The lung apices demonstrate a partially imaged 3.8 cm nodular soft tissue finding within the upper mediastinum on the left, either an enlarged lymph node or mass. Evaluation of the neck soft tissues demonstrates no evidence of aerodigestive tract mass or pathologic cervical lymphadenopathy. Multilevel cervical spondylosis is present, without evidence of acute fracture or aggressive osseous lesion. Patent aortic arch with three-vessel branching. The subclavian arteries are patent bilaterally. On the right, there is calcific atherosclerotic change present at the ICA origin with mild, less than 50% narrowing by NASCET criteria. Similar mild narrowing is present on the left. The vertebral arteries are normal in course and caliber bilaterally. Intracranially, the carotid siphons demonstrate calcific atherosclerotic changes with some mild narrowing of both ophthalmic segments. The anterior cerebral arteries are normal in course and caliber bilaterally. The right middle cerebral artery is normal. The left middle cerebral artery demonstrates no evidence of high-grade stenosis, occlusion or aneurysm. The vertebrobasilar system is patent. The posterior cerebral arteries are normal in course and caliber bilaterally. CT abdomen pelvis: The lung bases are clear. The body wall soft tissues demonstrate no acute or suspicious findings, with 2 adjacent presumed sebaceous cyst seen in the right gluteal region. The liver demonstrates low-attenuation findings most consistent  with cysts. The spleen, pancreas and bilateral adrenal glands demonstrate no acute findings. A homogeneous cystic appearing finding within the pancreatic head measures 22 x 21 mm. Normal  gallbladder. Small and large bowel loops are nondilated. Colonic diverticular changes are present, without definite acute inflammatory change to suggest diverticulitis. The appendix is normal. There is no free fluid or pneumoperitoneum. Atherosclerotic, nonaneurysmal abdominal aorta. The pelvic viscera demonstrate no acute findings.     Impression: Impression: 1.Mild cervical and intracranial atherosclerotic changes are present as above, without evidence of flow-limiting stenosis, large vessel occlusion or aneurysm. 2.No acute findings in the abdomen and pelvis. 3.Incidentally noted 22 x 21 mm cystic finding within the pancreatic head. Correlation with any available prior imaging could be of use to document expected stability of this likely benign finding, otherwise as indicated, MRI could be useful to further evaluate. 4.Incompletely characterized 3.8 cm nodular mass within the upper mediastinum, possibly an enlarged lymph node. Consider dedicated CT chest. Electronically Signed: Chase Birmingham MD  7/15/2025 1:27 PM EDT  Workstation ID: [id number]    CT Angiogram Neck  Result Date: 7/15/2025  CT ANGIOGRAM HEAD W AI ANALYSIS OF LVO, CT ANGIOGRAM NECK, CT ABDOMEN PELVIS W CONTRAST Date of Exam: 7/15/2025 12:38 PM EDT Indication: Neuro Deficit, acute, Stroke suspected Neuro deficit, acute stroke suspected. Comparison: None available. Technique: CTA of the head and neck was performed after the uneventful intravenous administration of 90 mL Isovue-370. Axial IV contrast-enhanced CT of the abdomen and pelvis was performed. Reconstructed coronal and sagittal images were also obtained. In  addition, a 3-D volume rendered image was created for interpretation. Automated exposure control and iterative reconstruction methods were used. Findings: CTA head and neck: The lung apices demonstrate a partially imaged 3.8 cm nodular soft tissue finding within the upper mediastinum on the left, either an enlarged lymph node or mass.  Evaluation of the neck soft tissues demonstrates no evidence of aerodigestive tract mass or pathologic cervical lymphadenopathy. Multilevel cervical spondylosis is present, without evidence of acute fracture or aggressive osseous lesion. Patent aortic arch with three-vessel branching. The subclavian arteries are patent bilaterally. On the right, there is calcific atherosclerotic change present at the ICA origin with mild, less than 50% narrowing by NASCET criteria. Similar mild narrowing is present on the left. The vertebral arteries are normal in course and caliber bilaterally. Intracranially, the carotid siphons demonstrate calcific atherosclerotic changes with some mild narrowing of both ophthalmic segments. The anterior cerebral arteries are normal in course and caliber bilaterally. The right middle cerebral artery is normal. The left middle cerebral artery demonstrates no evidence of high-grade stenosis, occlusion or aneurysm. The vertebrobasilar system is patent. The posterior cerebral arteries are normal in course and caliber bilaterally. CT abdomen pelvis: The lung bases are clear. The body wall soft tissues demonstrate no acute or suspicious findings, with 2 adjacent presumed sebaceous cyst seen in the right gluteal region. The liver demonstrates low-attenuation findings most consistent  with cysts. The spleen, pancreas and bilateral adrenal glands demonstrate no acute findings. A homogeneous cystic appearing finding within the pancreatic head measures 22 x 21 mm. Normal gallbladder. Small and large bowel loops are nondilated. Colonic diverticular changes are present, without definite acute inflammatory change to suggest diverticulitis. The appendix is normal. There is no free fluid or pneumoperitoneum. Atherosclerotic, nonaneurysmal abdominal aorta. The pelvic viscera demonstrate no acute findings.     Impression: Impression: 1.Mild cervical and intracranial atherosclerotic changes are present as above,  without evidence of flow-limiting stenosis, large vessel occlusion or aneurysm. 2.No acute findings in the abdomen and pelvis. 3.Incidentally noted 22 x 21 mm cystic finding within the pancreatic head. Correlation with any available prior imaging could be of use to document expected stability of this likely benign finding, otherwise as indicated, MRI could be useful to further evaluate. 4.Incompletely characterized 3.8 cm nodular mass within the upper mediastinum, possibly an enlarged lymph node. Consider dedicated CT chest. Electronically Signed: Chase Birmingham MD  7/15/2025 1:27 PM EDT  Workstation ID: [id number]    CT Abdomen Pelvis With Contrast  Result Date: 7/15/2025  CT ANGIOGRAM HEAD W AI ANALYSIS OF LVO, CT ANGIOGRAM NECK, CT ABDOMEN PELVIS W CONTRAST Date of Exam: 7/15/2025 12:38 PM EDT Indication: Neuro Deficit, acute, Stroke suspected Neuro deficit, acute stroke suspected. Comparison: None available. Technique: CTA of the head and neck was performed after the uneventful intravenous administration of 90 mL Isovue-370. Axial IV contrast-enhanced CT of the abdomen and pelvis was performed. Reconstructed coronal and sagittal images were also obtained. In  addition, a 3-D volume rendered image was created for interpretation. Automated exposure control and iterative reconstruction methods were used. Findings: CTA head and neck: The lung apices demonstrate a partially imaged 3.8 cm nodular soft tissue finding within the upper mediastinum on the left, either an enlarged lymph node or mass. Evaluation of the neck soft tissues demonstrates no evidence of aerodigestive tract mass or pathologic cervical lymphadenopathy. Multilevel cervical spondylosis is present, without evidence of acute fracture or aggressive osseous lesion. Patent aortic arch with three-vessel branching. The subclavian arteries are patent bilaterally. On the right, there is calcific atherosclerotic change present at the ICA origin with mild, less  than 50% narrowing by NASCET criteria. Similar mild narrowing is present on the left. The vertebral arteries are normal in course and caliber bilaterally. Intracranially, the carotid siphons demonstrate calcific atherosclerotic changes with some mild narrowing of both ophthalmic segments. The anterior cerebral arteries are normal in course and caliber bilaterally. The right middle cerebral artery is normal. The left middle cerebral artery demonstrates no evidence of high-grade stenosis, occlusion or aneurysm. The vertebrobasilar system is patent. The posterior cerebral arteries are normal in course and caliber bilaterally. CT abdomen pelvis: The lung bases are clear. The body wall soft tissues demonstrate no acute or suspicious findings, with 2 adjacent presumed sebaceous cyst seen in the right gluteal region. The liver demonstrates low-attenuation findings most consistent  with cysts. The spleen, pancreas and bilateral adrenal glands demonstrate no acute findings. A homogeneous cystic appearing finding within the pancreatic head measures 22 x 21 mm. Normal gallbladder. Small and large bowel loops are nondilated. Colonic diverticular changes are present, without definite acute inflammatory change to suggest diverticulitis. The appendix is normal. There is no free fluid or pneumoperitoneum. Atherosclerotic, nonaneurysmal abdominal aorta. The pelvic viscera demonstrate no acute findings.     Impression: Impression: 1.Mild cervical and intracranial atherosclerotic changes are present as above, without evidence of flow-limiting stenosis, large vessel occlusion or aneurysm. 2.No acute findings in the abdomen and pelvis. 3.Incidentally noted 22 x 21 mm cystic finding within the pancreatic head. Correlation with any available prior imaging could be of use to document expected stability of this likely benign finding, otherwise as indicated, MRI could be useful to further evaluate. 4.Incompletely characterized 3.8 cm nodular  mass within the upper mediastinum, possibly an enlarged lymph node. Consider dedicated CT chest. Electronically Signed: Chase Birmingham MD  7/15/2025 1:27 PM EDT  Workstation ID: [id number]    CT CEREBRAL PERFUSION WITH & WITHOUT CONTRAST  Result Date: 7/15/2025  CT CEREBRAL PERFUSION W WO CONTRAST Date of Exam: 7/15/2025 12:38 PM EDT Indication: Neuro Deficit, acute, Stroke suspected Neuro deficit, acute stroke suspected.  Comparison: CT head from earlier today Technique: Axial CT images of the brain were obtained prior to and after the administration of 50 mL Isovue-370. Core blood volume, core blood flow, mean transit time, and Tmax images were obtained utilizing the Rapid software protocol. A limited CT angiogram of the head was also performed to measure the blood vessel density. The radiation dose reduction device was turned on for each scan per the ALARA (As Low as Reasonably Achievable) protocol. Findings: The intracranial cerebral perfusion appears normal.     Impression: Impression: Examination appears within normal limits. Electronically Signed: Ibrahima Ribeiro MD  7/15/2025 1:15 PM EDT  Workstation ID: [id number]    CT Head Without Contrast Stroke Protocol  Result Date: 7/15/2025  CT HEAD WO CONTRAST STROKE PROTOCOL Date of Exam: 7/15/2025 12:36 PM EDT Indication: Neuro deficit, acute, stroke suspected Neuro Deficit, acute, Stroke suspected. Comparison: None available Technique: Axial CT images were obtained of the head without contrast administration.  Reconstructed coronal images were also obtained. Automated exposure control and iterative construction methods were used. Scan Time: 1234 Results discussed with ALAN Myers at 1240, 7/15/2025. Findings: The calvarium appears intact. 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 Left maxillary sinus mucous cyst is noted. Paranasal sinuses and mastoids otherwise appear clear. Orbits appear unremarkable except for ocular lens  replacements. There is moderately advanced generalized cerebral atrophy. Focal left parietal encephalomalacia presumably represents old infarct. Intermediate density of the medial portion of the area of encephalomalacia could at least potentially represent late subacute infarct, although this is not favored. Small focal low-attenuation area is seen in the left external capsule, nonspecific. There is an old lacunar-type infarct of the head of the caudate nucleus. No clearly acute infarct/edema is identified. No hemorrhage, hydrocephalus, mass or mass effect, or abnormal extra-axial collection is seen.     Impression: Impression: 1. Old, less likely late subacute infarct of the left parietal lobe. 2. Small chronic ischemic focus of the area of the left external capsule. 3. 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 4. No clearly acute intracranial abnormality is identified. Electronically Signed: Derek Garcia MD  7/15/2025 12:55 PM EDT  Workstation ID: [id number]          Current medications:  Scheduled Meds:aspirin, 81 mg, Oral, Daily   Or  aspirin, 300 mg, Rectal, Daily  celecoxib, 100 mg, Oral, Daily  clopidogrel, 75 mg, Oral, Daily  levETIRAcetam, 500 mg, Intravenous, Q12H  [START ON 7/17/2025] levothyroxine, 50 mcg, Oral, Q AM  [Held by provider] losartan, 100 mg, Oral, Q24H  [START ON 7/17/2025] pantoprazole, 40 mg, Oral, Q AM  primidone, 100 mg, Oral, Daily  sertraline, 200 mg, Oral, Daily  sodium chloride, 10 mL, Intravenous, Q12H  sodium chloride, 10 mL, Intravenous, Q12H  terazosin, 1 mg, Oral, Nightly  tiZANidine, 4 mg, Oral, Nightly  valACYclovir, 500 mg, Oral, Daily      Continuous Infusions:   PRN Meds:.  senna-docusate sodium **AND** polyethylene glycol **AND** bisacodyl **AND** bisacodyl    LORazepam    Magnesium Cardiology Dose Replacement - Follow Nurse / BPA Driven Protocol    metoprolol tartrate    nitroglycerin    Potassium Replacement - Follow Nurse / BPA Driven Protocol    sodium  chloride    sodium chloride    sodium chloride    sodium chloride    sodium chloride    Assessment & Plan   Assessment & Plan     Active Hospital Problems    Diagnosis  POA    **CVA (cerebral vascular accident) [I63.9]  Yes      Resolved Hospital Problems   No resolved problems to display.        Brief Hospital Course to date:  Ibrahima Nava is a 80 y.o. male w history of HTN, anxiety on chronic benzos, CVA in 2012 (no residual deficit), essential tremor. Endorses dysarthria, dysphagia to solids and liquids, confusion.     This patient's problems and plans were partially entered by my partner and updated as appropriate by me 07/16/25.   All problems are new to me today    Dysarthria  Expressive aphasia  Confusion   Prior stroke   -MRI brain negative for acute stroke. Noted chronic appearing infarct of L pre and post central gyri   -Currently on DAPT/Statin, Keppra per Stroke Team  -EEG negative  -Echo pending   -PT/OT  -UA ok     Dysphagia  -noted to solids and liquids   -endorses fullness R neck  -SLP follows, s/p MBS w/limited upper GI series, no evidence of obstruction and mild reflux   -CTA neck : Evaluation of the neck soft tissues demonstrates no evidence of aerodigestive tract mass or pathologic cervical lymphadenopathy.     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, upper mediastinum  -noted on CT A/P, incompletely visualized  -Obtain CT chest     Chronic Benzo use  -continue home meds     Pancreatic head cystic mass  -Monitor   -Interval outpatient follow-up    ?Tremor  -Resume home Primidone     Pre-DM  -Hga1c 6.34   -Not on home meds  -Glucose here elevated, 180-255  -Add SSI     Hypokalemia  -potassium 2.5 on admission, s/p replacement  -replace per protocol   -Mg ok     Expected  Discharge Location and Transportation: TBD  Expected Discharge   Expected Discharge Date: 7/18/2025; Expected Discharge Time:      VTE Prophylaxis:  Mechanical VTE prophylaxis orders are present.         AM-PAC 6 Clicks Score (PT): 17 (07/16/25 0800)    CODE STATUS:   Code Status and Medical Interventions: CPR (Attempt to Resuscitate); Full Support   Ordered at: 07/15/25 1515     Code Status (Patient has no pulse and is not breathing):    CPR (Attempt to Resuscitate)     Medical Interventions (Patient has pulse or is breathing):    Full Support     Level Of Support Discussed With:    Patient       Peggy PARRA Nico,   07/16/25       Primary Defect Length In Cm (Final Defect Size - Required For Flaps/Grafts): 2.3

## 2025-07-16 NOTE — THERAPY EVALUATION
Acute Care - Speech Language Pathology   Swallow Initial Evaluation [facility]  Clinical Swallow Evaluation  Cognitive-Communication Evaluation       Patient Name: Ibrahima Nava  : 1945  MRN: [id number]  Today's Date: 2025               Admit Date: 7/15/2025    Visit Dx:     ICD-10-CM ICD-9-CM   1. Aphasia  R47.01 784.3   2. Dysphagia, unspecified type  R13.10 787.20   3. Altered mental status, unspecified altered mental status type  R41.82 780.97   4. Hypokalemia  E87.6 276.8     Patient Active Problem List   Diagnosis    Primary localized osteoarthrosis of shoulder region    Hypertension    Impaired physical mobility    Acute pain of left shoulder    Status post reverse arthroplasty of shoulder, left    CVA (cerebral vascular accident)     Past Medical History:   Diagnosis Date    Anxiety     Arthritis     Hypertension     CONTROLLED WITH MEDS PER PT     Shoulder pain, left     Sleep apnea     did not tolerate CPAP    Stroke     -- NO RESIDUAL PROBLEMS    Tremor of right hand     Wears dentures     UPPER AND LOWER PLATE      Past Surgical History:   Procedure Laterality Date    CARDIAC CATHETERIZATION      NO CORONARY STENTS     COLONOSCOPY      MS ARTHROPLASTY GLENOHUMERAL JOINT TOTAL SHOULDER Left 2017    Procedure: LEFT TOTAL SHOULDER ARTHROPLASTY ;  Surgeon: Michael Xavier MD;  Location: [facility] OR;  Service: Orthopedics    TOTAL KNEE ARTHROPLASTY Bilateral     TOTAL SHOULDER REPLACEMENT Right     TOTAL SHOULDER REVISION Left 2024    Procedure: REVISION TOTAL SHOULDER ARTHROPLASTY TO REVERSE TOTAL SHOULDER ATHROPLASTY - LEFT;  Surgeon: Michael Xavier MD;  Location: [facility] OR;  Service: Orthopedics;  Laterality: Left;       SLP Recommendation and Plan  SLP Swallowing Diagnosis: R/O pharyngeal dysphagia, suspected esophageal dysphagia, mild, oral dysphagia (25 0816)  SLP Diet Recommendation: soft to chew textures, chopped, thin liquids, other (see  "comments) (pending MBS) (07/16/25 0816)  Recommended Precautions and Strategies: upright posture during/after eating, general aspiration precautions (07/16/25 0816)  SLP Rec. for Method of Medication Administration: meds whole, with thin liquids, with puree, as tolerated (07/16/25 0816)     Monitor for Signs of Aspiration: notify SLP if any concerns (07/16/25 0816)  Recommended Diagnostics: VFSS ([facility]), with esophageal screen (07/16/25 0816)     Anticipated Discharge Disposition (SLP): other (see comments) (pending further w/u) (07/16/25 0816)        Predicted Duration Therapy Intervention (Days): 1 week (07/16/25 0816)  Oral Care Recommendations: Oral Care BID/PRN, Toothbrush (07/16/25 0816)  Demonstrates Need for Referral to Another Service: otolaryngology (ENT) (07/16/25 0816)                                     Progress:  (eval)      SWALLOW EVALUATION (Last 72 Hours)       SLP Adult Swallow Evaluation       Row Name 07/16/25 0816                   Rehab Evaluation    Subjective Information no complaints  -MM        Patient Observations alert;cooperative  -MM        Patient/Family/Caregiver Comments/Observations no family present  -MM        Patient Effort good  -MM        Symptoms Noted During/After Treatment none  -MM        Oral Care dental appliance cleaned  -MM           General Information    Patient Profile Reviewed yes  -MM        Pertinent History Of Current Problem Pt is an 80 year old male who presented to the facility with c/o speech changes, dysarthria, and dysphagia. MRI Brain revealed the following: \"Moderate typical chronic and senescent findings are present as above, including a chronic appearing infarct of the left pre and postcentral gyri. There is no evidence of recent infarct, hemorrhage, mass or mass effect.\"  -MM        Current Method of Nutrition NPO  -MM        Precautions/Limitations, Vision WFL;for purposes of eval  -MM        Precautions/Limitations, Hearing hearing impairment, " "bilaterally  -MM        Prior Level of Function-Communication unknown  -MM        Prior Level of Function-Swallowing soft to chew;thin liquids;esophageal concerns;other (see comments)  pt reported over the last 6 months developing diffficulty with globus sensation in UES  -MM        Plans/Goals Discussed with patient;agreed upon  -MM        Patient's Goals for Discharge return to PO diet  -MM           Pain    Pretreatment Pain Rating 0/10 - no pain  -MM        Posttreatment Pain Rating 0/10 - no pain  -MM           Oral Motor Structure and Function    Secretion Management WNL/WFL  -MM        Mucosal Quality moist, healthy  -MM           General Eating/Swallowing Observations    Eating/Swallowing Skills self-fed;fed by SLP  -MM        Positioning During Eating upright 90 degree;upright in chair  -MM        Utensils Used spoon;straw;cup  -MM        Consistencies Trialed regular textures;pureed;thin liquids  -MM           Clinical Swallow Eval    Oral Prep Phase impaired  -MM        Oral Transit WFL  -MM        Oral Residue WFL  -MM        Pharyngeal Phase WFL  -MM        Esophageal Phase suspected esophageal impairment  -MM           Oral Prep Concerns    Oral Prep Concerns prolonged mastication  -MM        Prolonged Mastication regular consistencies  -MM        Oral Prep Concerns, Comment Pt reports he can't chew beef at baseline and that he prefers softer foods.  -MM           Esophageal Phase Concerns    Esophageal Phase Concerns globus;belching  -MM        Globus regular consistencies  -MM        Belching regular consistencies  -MM        Esophageal Phase Concerns, Comment Pt reports developing concerns over the last 6 months with globus sensation indicating UES and reporting he has to \"move it around.\" Eructation noted after PO trials. SLP recs MBS +limited UGI to further evaluate swallow function.  -MM           SLP Evaluation Clinical Impression    SLP Swallowing Diagnosis R/O pharyngeal dysphagia;suspected " esophageal dysphagia;mild;oral dysphagia  -MM        Functional Impact risk of aspiration/pneumonia  -MM           Recommendations    Predicted Duration Therapy Intervention (Days) 1 week  -MM        SLP Diet Recommendation soft to chew textures;chopped;thin liquids;other (see comments)  pending MBS  -MM        Recommended Diagnostics VFSS (MBS);with esophageal screen  -MM        Recommended Precautions and Strategies upright posture during/after eating;general aspiration precautions  -MM        Oral Care Recommendations Oral Care BID/PRN;Toothbrush  -MM        SLP Rec. for Method of Medication Administration meds whole;with thin liquids;with puree;as tolerated  -MM        Monitor for Signs of Aspiration notify SLP if any concerns  -MM        Anticipated Discharge Disposition (SLP) other (see comments)  pending further w/u  -MM        Demonstrates Need for Referral to Another Service otolaryngology (ENT)  -MM                  User Key  (r) = Recorded By, (t) = Taken By, (c) = Cosigned By      Initials Name Effective Dates    Polina Martínez MS CCC-SLP 08/30/24 -                     EDUCATION  The patient has been educated in the following areas:   Dysphagia (Swallowing Impairment).        SLP GOALS       Row Name 07/16/25 0816             SLP Diagnostic Treatment     Patient will participate in further assessment in the following areas cognitive-linguistic  -MM      Time Frame (Diagnostic) 1 week  -MM      Progress/Outcomes (Additional Goal 1, SLP) new goal  -MM                User Key  (r) = Recorded By, (t) = Taken By, (c) = Cosigned By      Initials Name Provider Type    Polina Martínez MS CCC-SLP Speech and Language Pathologist                         Time Calculation:    Time Calculation- SLP       Row Name 07/16/25 0922             Time Calculation- SLP    SLP Start Time 0816  -MM      SLP Received On 07/16/25  -MM         Untimed Charges    02008-CF Eval Speech and Production w/ Language Minutes 38  -MM       69323-VO Eval Oral Pharyng Swallow Minutes 39  -MM         Total Minutes    Untimed Charges Total Minutes 77  -MM       Total Minutes 77  -MM                User Key  (r) = Recorded By, (t) = Taken By, (c) = Cosigned By      Initials Name Provider Type    Polina Martínez, MS CCC-SLP Speech and Language Pathologist                    Therapy Charges for Today       Code Description Service Date Service Provider Modifiers Qty    40726552789 HC ST EVAL ORAL PHARYNG SWALLOW 3 2025 Polina Rodriguez, MS CCC-SLP GN 1    79013937697 HC ST EVAL SPEECH AND PROD W LANG  3 2025 Polina Rodriguez, MS CCC-SLP GN 1                 Polina Rodriguez MS CCC-SLP  2025   and Acute Care - Speech Language Pathology Initial Evaluation  [facility]     Patient Name: Ibrahima Nava  : 1945  MRN: [id number]  Today's Date: 2025               Admit Date: 7/15/2025     Visit Dx:    ICD-10-CM ICD-9-CM   1. Aphasia  R47.01 784.3   2. Dysphagia, unspecified type  R13.10 787.20   3. Altered mental status, unspecified altered mental status type  R41.82 780.97   4. Hypokalemia  E87.6 276.8     Patient Active Problem List   Diagnosis    Primary localized osteoarthrosis of shoulder region    Hypertension    Impaired physical mobility    Acute pain of left shoulder    Status post reverse arthroplasty of shoulder, left    CVA (cerebral vascular accident)     Past Medical History:   Diagnosis Date    Anxiety     Arthritis     Hypertension     CONTROLLED WITH MEDS PER PT     Shoulder pain, left     Sleep apnea     did not tolerate CPAP    Stroke     -- NO RESIDUAL PROBLEMS    Tremor of right hand     Wears dentures     UPPER AND LOWER PLATE      Past Surgical History:   Procedure Laterality Date    CARDIAC CATHETERIZATION      NO CORONARY STENTS     COLONOSCOPY      CO ARTHROPLASTY GLENOHUMERAL JOINT TOTAL SHOULDER Left 2017    Procedure: LEFT TOTAL SHOULDER ARTHROPLASTY ;  Surgeon: Michael Xavier MD;   Location:  [facility];  Service: Orthopedics    TOTAL KNEE ARTHROPLASTY Bilateral     TOTAL SHOULDER REPLACEMENT Right     TOTAL SHOULDER REVISION Left 5/20/2024    Procedure: REVISION TOTAL SHOULDER ARTHROPLASTY TO REVERSE TOTAL SHOULDER ATHROPLASTY - LEFT;  Surgeon: Michael Xavier MD;  Location:  [facility];  Service: Orthopedics;  Laterality: Left;       SLP Recommendation and Plan           Monitor for Signs of Aspiration: notify SLP if any concerns (07/16/25 0816)        Anticipated Discharge Disposition (SLP): other (see comments) (pending further w/u) (07/16/25 0816)  Demonstrates Need for Referral to Another Service: otolaryngology (ENT) (07/16/25 0816)     Therapy Frequency (SLP SLC): 5 days per week (07/16/25 0816)  Predicted Duration Therapy Intervention (Days): 1 week (07/16/25 0816)  Oral Care Recommendations: Oral Care BID/PRN, Toothbrush (07/16/25 0816)                          Progress:  (eval) (07/16/25 0920)      SLP EVALUATION (Last 72 Hours)       SLP SLC Evaluation       Row Name 07/16/25 0816                   Communication Assessment/Intervention    Document Type evaluation  -MM           General Information    Prior Level of Function-Communication unknown  -MM        Patient's Goals for Discharge patient did not state  -MM           Comprehension Assessment/Intervention    Comprehension Assessment/Intervention Auditory Comprehension;Reading Comprehension  -MM           Auditory Comprehension Assessment/Intervention    Auditory Comprehension (Communication) WFL  -MM        Narrative Discourse WFL  -MM        Auditory Comprehension Communication, Comment Pt initially had difficulty following multi-step commands, however, when SLP increased volume pt followed commands with 100% accuracy.  -MM           Reading Comprehension Assessment/Intervention    Reading Comprehension (Communication) WFL  -MM        Phrase Level WFL  -MM        Reading Comprehension, Comment baseline  -MM            Expression Assessment/Intervention    Expression Assessment/Intervention verbal expression;graphic expression  -MM           Verbal Expression Assessment/Intervention    Verbal Expression WFL  -MM        Confrontational Naming WFL  -MM        Conversational Discourse/Fluency WFL  -MM           Graphic Expression Assessment/Intervention    Graphic Expression WFL  -MM        Sentence Formulation simple;WFL  -MM           Oral Motor Structure and Function    Dentition Assessment edentulous;upper dentures/partial in place;lower dentures/partial in place  -MM           Oral Musculature and Cranial Nerve Assessment    Oral Motor General Assessment WFL  -MM           Motor Speech Assessment/Intervention    Motor Speech Function WFL  -MM        Speech intelligibility 100%;in connected speech;with unfamiliar listener  -MM           Cursory Voice Assessment/Intervention    Quality and Resonance (Voice) WFL  -MM           Recommendations    Therapy Frequency (SLP SLC) 5 days per week  -MM                  User Key  (r) = Recorded By, (t) = Taken By, (c) = Cosigned By      Initials Name Effective Dates    Polina Martínez MS CCC-SLP 08/30/24 -                        EDUCATION  The patient has been educated in the following areas:     Communication Impairment.           SLP GOALS       Row Name 07/16/25 0816             SLP Diagnostic Treatment     Patient will participate in further assessment in the following areas cognitive-linguistic  -MM      Time Frame (Diagnostic) 1 week  -MM      Progress/Outcomes (Additional Goal 1, SLP) new goal  -MM                User Key  (r) = Recorded By, (t) = Taken By, (c) = Cosigned By      Initials Name Provider Type    Polina Martínez MS CCC-SLP Speech and Language Pathologist                              Time Calculation:      Time Calculation- SLP       Row Name 07/16/25 0922             Time Calculation- SLP    SLP Start Time 0816  -MM      SLP Received On 07/16/25  -MM         Untimed  Charges    17486-XC Eval Speech and Production w/ Language Minutes 38  -MM      [id number] Eval Oral Pharyng Swallow Minutes 39  -MM         Total Minutes    Untimed Charges Total Minutes 77  -MM       Total Minutes 77  -MM                User Key  (r) = Recorded By, (t) = Taken By, (c) = Cosigned By      Initials Name Provider Type    Polina Martínez, MS CCC-SLP Speech and Language Pathologist                    Therapy Charges for Today       Code Description Service Date Service Provider Modifiers Qty    15230089175 HC ST EVAL ORAL PHARYNG SWALLOW 3 7/16/2025 Polina Rodriguez, MS CCC-SLP GN 1    [id number] HC ST EVAL SPEECH AND PROD W LANG  3 7/16/2025 Polina Rodriguez, MS CCC-SLP GN 1                       Polina Rodriguez MS CCC-SLP  7/16/2025

## 2025-07-16 NOTE — PLAN OF CARE
Goal Outcome Evaluation:  Plan of Care Reviewed With: patient                Anticipated Discharge Disposition (SLP): home with assist          SLP Swallowing Diagnosis: functional oral phase, functional pharyngeal phase, esophageal dysphagia (07/16/25 1015)

## 2025-07-16 NOTE — PLAN OF CARE
Problem: Adult Inpatient Plan of Care  Goal: Plan of Care Review  Outcome: Progressing  Goal: Patient-Specific Goal (Individualized)  Outcome: Progressing  Goal: Absence of Hospital-Acquired Illness or Injury  Outcome: Progressing  Intervention: Identify and Manage Fall Risk  Recent Flowsheet Documentation  Taken 7/16/2025 0400 by Bee Tellez RN  Safety Promotion/Fall Prevention:   activity supervised   assistive device/personal items within reach   clutter free environment maintained   nonskid shoes/slippers when out of bed   room organization consistent   safety round/check completed  Taken 7/16/2025 0200 by Bee Tellez RN  Safety Promotion/Fall Prevention:   activity supervised   assistive device/personal items within reach   clutter free environment maintained   nonskid shoes/slippers when out of bed   room organization consistent   safety round/check completed  Taken 7/16/2025 0000 by Bee Tellez RN  Safety Promotion/Fall Prevention:   activity supervised   assistive device/personal items within reach   clutter free environment maintained   room organization consistent   safety round/check completed   nonskid shoes/slippers when out of bed   fall prevention program maintained  Taken 7/15/2025 2200 by Bee Tellez RN  Safety Promotion/Fall Prevention:   activity supervised   assistive device/personal items within reach   clutter free environment maintained   room organization consistent   safety round/check completed   nonskid shoes/slippers when out of bed  Taken 7/15/2025 2000 by Bee Tellez RN  Safety Promotion/Fall Prevention:   activity supervised   assistive device/personal items within reach   clutter free environment maintained   nonskid shoes/slippers when out of bed   room organization consistent   safety round/check completed  Intervention: Prevent Skin Injury  Recent Flowsheet Documentation  Taken 7/16/2025 0400 by Bee Tellez RN  Body Position: (up in chair) other (see comments)  Skin Protection:    incontinence pads utilized   transparent dressing maintained   drying agents applied  Taken 7/16/2025 0200 by Bee Tellez RN  Body Position: (up in chair) other (see comments)  Skin Protection:   incontinence pads utilized   transparent dressing maintained   silicone foam dressing in place  Taken 7/16/2025 0000 by Bee Tellez RN  Body Position: supine  Skin Protection:   incontinence pads utilized   transparent dressing maintained   silicone foam dressing in place  Taken 7/15/2025 2200 by Bee Tellez RN  Body Position:   right   position maintained  Skin Protection:   incontinence pads utilized   transparent dressing maintained   silicone foam dressing in place  Taken 7/15/2025 2000 by Bee Tellez RN  Body Position:   right   position maintained  Skin Protection:   incontinence pads utilized   transparent dressing maintained   drying agents applied  Intervention: Prevent and Manage VTE (Venous Thromboembolism) Risk  Recent Flowsheet Documentation  Taken 7/16/2025 0400 by Bee Tellez RN  VTE Prevention/Management: SCDs (sequential compression devices) on  Taken 7/16/2025 0000 by Bee Tellez RN  VTE Prevention/Management: SCDs (sequential compression devices) on  Taken 7/15/2025 2000 by Bee Tellez RN  VTE Prevention/Management: SCDs (sequential compression devices) on  Intervention: Prevent Infection  Recent Flowsheet Documentation  Taken 7/16/2025 0400 by Bee Tellez RN  Infection Prevention:   hand hygiene promoted   rest/sleep promoted   environmental surveillance performed  Taken 7/16/2025 0200 by Bee Tellez RN  Infection Prevention:   hand hygiene promoted   environmental surveillance performed   rest/sleep promoted  Taken 7/16/2025 0000 by Bee Tellez RN  Infection Prevention:   hand hygiene promoted   rest/sleep promoted   environmental surveillance performed  Taken 7/15/2025 2200 by Bee Tellez RN  Infection Prevention:   rest/sleep promoted   hand hygiene promoted   environmental surveillance performed  Taken  7/15/2025 2000 by Bee Tellez RN  Infection Prevention:   hand hygiene promoted   rest/sleep promoted   environmental surveillance performed  Goal: Optimal Comfort and Wellbeing  Outcome: Progressing  Intervention: Provide Person-Centered Care  Recent Flowsheet Documentation  Taken 7/16/2025 0400 by Bee Tellez RN  Trust Relationship/Rapport:   care explained   choices provided   thoughts/feelings acknowledged   questions answered   questions encouraged  Taken 7/16/2025 0000 by Bee Tellez RN  Trust Relationship/Rapport:   care explained   choices provided   thoughts/feelings acknowledged   questions encouraged   questions answered  Taken 7/15/2025 2000 by Bee Tellez RN  Trust Relationship/Rapport:   care explained   choices provided   questions answered   questions encouraged   thoughts/feelings acknowledged  Goal: Readiness for Transition of Care  Outcome: Progressing     Problem: Stroke, Ischemic (Includes Transient Ischemic Attack)  Goal: Optimal Coping  Outcome: Progressing  Intervention: Support Psychosocial Response to Stroke  Recent Flowsheet Documentation  Taken 7/16/2025 0400 by Bee Tellez RN  Supportive Measures:   active listening utilized   relaxation techniques promoted  Taken 7/16/2025 0000 by Bee Tellez RN  Supportive Measures:   active listening utilized   relaxation techniques promoted  Taken 7/15/2025 2000 by Bee Tellez RN  Supportive Measures:   active listening utilized   relaxation techniques promoted  Family/Support System Care:   self-care encouraged   support provided  Goal: Optimal Cerebral Tissue Perfusion  Outcome: Progressing  Intervention: Protect and Optimize Cerebral Perfusion  Recent Flowsheet Documentation  Taken 7/16/2025 0000 by Bee Tellez RN  Cerebral Perfusion Promotion: blood pressure monitored  Taken 7/15/2025 2000 by Bee Tellez RN  Sensory Stimulation Regulation:   care clustered   lighting decreased  Cerebral Perfusion Promotion: blood pressure monitored  Goal: Optimal  Cognitive Function  Outcome: Progressing  Intervention: Optimize Cognitive Function  Recent Flowsheet Documentation  Taken 7/15/2025 2000 by Bee Tellez RN  Sensory Stimulation Regulation:   care clustered   lighting decreased  Goal: Optimal Functional Ability  Outcome: Progressing  Intervention: Optimize Functional Ability  Recent Flowsheet Documentation  Taken 7/16/2025 0400 by Bee Tellez RN  Activity Management: (up in chair per pt request)   activity minimized   other (see comments)  Taken 7/16/2025 0200 by Bee Tellez RN  Activity Management: (ambulated to chair) other (see comments)  Taken 7/16/2025 0000 by Bee Tellez RN  Activity Management: activity minimized  Taken 7/15/2025 2200 by Bee Tellez RN  Activity Management: activity encouraged  Taken 7/15/2025 2000 by Bee Tellez RN  Activity Management: activity encouraged  Goal: Effective Oxygenation and Ventilation  Outcome: Progressing  Intervention: Optimize Oxygenation and Ventilation  Recent Flowsheet Documentation  Taken 7/16/2025 0400 by Bee Tellez RN  Head of Bed (HOB) Positioning: HOB elevated  Taken 7/16/2025 0200 by Bee Tellez RN  Head of Bed (HOB) Positioning: HOB elevated  Taken 7/16/2025 0000 by Bee Tellez RN  Head of Bed (HOB) Positioning: HOB elevated  Taken 7/15/2025 2200 by Bee Tellez RN  Head of Bed (HOB) Positioning: HOB elevated  Taken 7/15/2025 2000 by Bee Tellez RN  Head of Bed (HOB) Positioning: HOB elevated  Goal: Safe and Effective Swallow  Outcome: Progressing     Problem: Comorbidity Management  Goal: Blood Pressure in Desired Range  Outcome: Progressing  Intervention: Maintain Blood Pressure Management  Recent Flowsheet Documentation  Taken 7/16/2025 0400 by Bee Tellez RN  Medication Review/Management: medications reviewed  Taken 7/16/2025 0200 by Bee Tellez RN  Medication Review/Management: medications reviewed  Taken 7/16/2025 0000 by Bee Tellez RN  Medication Review/Management: medications reviewed  Taken 7/15/2025 2200 by Rosalinda  ERICA Gamboa  Medication Review/Management: medications reviewed  Taken 7/15/2025 2000 by Bee Tellez RN  Medication Review/Management: medications reviewed   Goal Outcome Evaluation:

## 2025-07-16 NOTE — DISCHARGE INSTRUCTIONS
-Continue the following medications: Atorvastatin 40mg nightly and Eliquis 5mg twice daily  -Monitor blood pressure; goal 120-140/80-90  -Increase physical activity as tolerated; goal exercise 30 minutes/day 3-5 times weekly if able  -Call 911 for stroke symptoms (unilateral weakness, unilateral numbness, vision loss/double vision, speech difficulty, trouble walking, sudden severe headache or headache with nausea/vomiting/confusion/or decreased level of consciousness)  -Schedule follow-up with your primary care physician

## 2025-07-16 NOTE — CASE MANAGEMENT/SOCIAL WORK
Discharge Planning Assessment  [facility]     Patient Name: Ibrahima Nava  MRN: [id number]  Today's Date: 7/16/2025    Admit Date: 7/15/2025    Plan: Home   Discharge Needs Assessment       Row Name 07/16/25 0857       Living Environment    People in Home spouse    Current Living Arrangements home    Potentially Unsafe Housing Conditions none    Primary Care Provided by self    Provides Primary Care For no one    Family Caregiver if Needed spouse    Quality of Family Relationships unable to assess    Able to Return to Prior Arrangements yes       Resource/Environmental Concerns    Resource/Environmental Concerns none    Transportation Concerns none       Transition Planning    Patient/Family Anticipates Transition to home with family    Patient/Family Anticipated Services at Transition ;rehabilitation services    Transportation Anticipated family or friend will provide       Discharge Needs Assessment    Readmission Within the Last 30 Days no previous admission in last 30 days    Equipment Currently Used at Home cane, straight;rollator;shower chair;commode    Concerns to be Addressed discharge planning    Anticipated Changes Related to Illness none                   Discharge Plan       Row Name 07/16/25 0858       Plan    Plan Home    Patient/Family in Agreement with Plan yes    Plan Comments Spoke with patient in room to initiate discharge planning.  He lives with his wife in [facility].  Prior to admission, he was independent with ADLs, using a straight cane to assist with ambulation.  He also has a bedside commode, shower chair and rollator at home.  He is not current with home health.  His PCP is Sebastien Lowe.  He does not have an advanced directive.  Verified that he has Medicare A/B.  Mr. Nava states that he does not have RX coverage, but has his scripts filled at [facility].  His goal is to return home at discharge.  Will await therapy recommendations to determine proper  discharge palcement.  CM will continue to follow.    Final Discharge Disposition Code 01 - home or self-care                  Continued Care and Services - Admitted Since 7/15/2025    No active coordination exists.       Expected Discharge Date and Time       Expected Discharge Date Expected Discharge Time    Jul 17, 2025            Demographic Summary       Row Name 07/16/25 0857       General Information    Admission Type inpatient    Arrived From emergency department    Referral Source admission list    Reason for Consult discharge planning    Preferred Language English                   Functional Status       Row Name 07/16/25 0857       Functional Status    Usual Activity Tolerance moderate    Current Activity Tolerance moderate       Functional Status, IADL    Medications assistive equipment    Meal Preparation assistive equipment and person    Housekeeping assistive person    Laundry assistive person    Shopping assistive person                   Psychosocial    No documentation.                  Abuse/Neglect    No documentation.                  Legal    No documentation.                  Substance Abuse    No documentation.                  Patient Forms    No documentation.                     Codie Parekh RN

## 2025-07-16 NOTE — PROGRESS NOTES
"Nutrition Services    Patient Name:  Ibrahima Nava  YOB: 1945  MRN: [id number]  Admit Date:  7/15/2025    Patient identified to be at nutrition risk per nurse admission screen based on indicator of \"difficulty chewing/swallowing\". At this time patient has soft to chew textures with chopped meats/thin liquids and may advance to regular texture PO diet if preferred per SLP.  Pt does not currently meet screen criteria for nutrition risk. RD will continue to follow per protocol.    Electronically signed by:  Mary Ga RD  07/16/25 15:43 EDT   "

## 2025-07-16 NOTE — PLAN OF CARE
Goal Outcome Evaluation:  Plan of Care Reviewed With: patient        Progress:  (eval)       Anticipated Discharge Disposition (SLP): other (see comments) (pending further w/u)          SLP Swallowing Diagnosis: R/O pharyngeal dysphagia, suspected esophageal dysphagia, mild, oral dysphagia (07/16/25 0816)           MBS ordered

## 2025-07-16 NOTE — MBS/VFSS/FEES
Acute Care - Speech Language Pathology   Swallow Initial Evaluation  [facility]  Modified Barium Swallow Study (MBS)      Patient Name: Ibrahima Nava  : 1945  MRN: [id number]  Today's Date: 2025               Admit Date: 7/15/2025    Visit Dx:     ICD-10-CM ICD-9-CM   1. Aphasia  R47.01 784.3   2. Esophageal dysphagia  R13.19 787.29   3. Altered mental status, unspecified altered mental status type  R41.82 780.97   4. Hypokalemia  E87.6 276.8     Patient Active Problem List   Diagnosis    Primary localized osteoarthrosis of shoulder region    Hypertension    Impaired physical mobility    Acute pain of left shoulder    Status post reverse arthroplasty of shoulder, left    CVA (cerebral vascular accident)     Past Medical History:   Diagnosis Date    Anxiety     Arthritis     Hypertension     CONTROLLED WITH MEDS PER PT     Shoulder pain, left     Sleep apnea     did not tolerate CPAP    Stroke     -- NO RESIDUAL PROBLEMS    Tremor of right hand     Wears dentures     UPPER AND LOWER PLATE      Past Surgical History:   Procedure Laterality Date    CARDIAC CATHETERIZATION      NO CORONARY STENTS     COLONOSCOPY      WY ARTHROPLASTY GLENOHUMERAL JOINT TOTAL SHOULDER Left 2017    Procedure: LEFT TOTAL SHOULDER ARTHROPLASTY ;  Surgeon: Michael Xavier MD;  Location: [facility] OR;  Service: Orthopedics    TOTAL KNEE ARTHROPLASTY Bilateral     TOTAL SHOULDER REPLACEMENT Right     TOTAL SHOULDER REVISION Left 2024    Procedure: REVISION TOTAL SHOULDER ARTHROPLASTY TO REVERSE TOTAL SHOULDER ATHROPLASTY - LEFT;  Surgeon: Michael Xavier MD;  Location: [facility] OR;  Service: Orthopedics;  Laterality: Left;       SLP Recommendation and Plan  SLP Swallowing Diagnosis: functional oral phase, functional pharyngeal phase, esophageal dysphagia (25 1015)  SLP Diet Recommendation: soft to chew textures, chopped, thin liquids, other (see comments) (Pt may advance to regular per  "preference) (07/16/25 1015)  Recommended Precautions and Strategies: upright posture during/after eating, general aspiration precautions, reflux precautions (07/16/25 1015)  SLP Rec. for Method of Medication Administration: meds whole, with thin liquids, with puree, as tolerated (07/16/25 1015)     Monitor for Signs of Aspiration: notify SLP if any concerns (07/16/25 1015)     Swallow Criteria for Skilled Therapeutic Interventions Met: no problems identified which require skilled intervention (07/16/25 1015)  Anticipated Discharge Disposition (SLP): home with assist (07/16/25 1015)     Therapy Frequency (Swallow): evaluation only (07/16/25 1015)     Oral Care Recommendations: Oral Care BID/PRN, Toothbrush (07/16/25 1015)  Demonstrates Need for Referral to Another Service: gastroenterology, dedicated esophageal assessment (07/16/25 1015)                                            SWALLOW EVALUATION (Last 72 Hours)       SLP Adult Swallow Evaluation       Row Name 07/16/25 1015 07/16/25 0816                Rehab Evaluation    Document Type evaluation  -SM --       Subjective Information no complaints  -SM no complaints  -MM       Patient Observations alert;cooperative  -SM alert;cooperative  -MM       Patient/Family/Caregiver Comments/Observations -- no family present  -MM       Patient Effort good  -SM good  -MM       Comment In collaboration with AC  -SM --       Symptoms Noted During/After Treatment none  -SM none  -MM       Oral Care -- dental appliance cleaned  -MM          General Information    Patient Profile Reviewed yes  -SM yes  -MM       Pertinent History Of Current Problem See previous eval  -SM Pt is an 80 year old male who presented to the facility with c/o speech changes, dysarthria, and dysphagia. MRI Brain revealed the following: \"Moderate typical chronic and senescent findings are present as above, including a chronic appearing infarct of the left pre and postcentral gyri. There is no evidence of " "recent infarct, hemorrhage, mass or mass effect.\"  -MM       Current Method of Nutrition soft to chew textures;chopped;thin liquids  -SM NPO  -MM       Precautions/Limitations, Vision WFL;for purposes of eval  -SM WFL;for purposes of eval  -MM       Precautions/Limitations, Hearing hearing impairment, bilaterally  -SM hearing impairment, bilaterally  -MM       Prior Level of Function-Communication unknown  -SM unknown  -MM       Prior Level of Function-Swallowing soft to chew;thin liquids;esophageal concerns;other (see comments)  pt reported over the last 6 months developing difficulty with globus sensation in UES  -SM soft to chew;thin liquids;esophageal concerns;other (see comments)  pt reported over the last 6 months developing diffficulty with globus sensation in UES  -MM       Plans/Goals Discussed with patient;agreed upon  -SM patient;agreed upon  -MM       Barriers to Rehab none identified  - --       Patient's Goals for Discharge patient did not state  -SM return to PO diet  -MM          Pain    Pretreatment Pain Rating 0/10 - no pain  -SM 0/10 - no pain  -MM       Posttreatment Pain Rating 0/10 - no pain  -SM 0/10 - no pain  -MM          Oral Motor Structure and Function    Secretion Management -- WNL/WFL  -MM       Mucosal Quality -- moist, healthy  -MM          General Eating/Swallowing Observations    Eating/Swallowing Skills -- self-fed;fed by SLP  -MM       Positioning During Eating -- upright 90 degree;upright in chair  -MM       Utensils Used -- spoon;straw;cup  -MM       Consistencies Trialed -- regular textures;pureed;thin liquids  -MM          Clinical Swallow Eval    Oral Prep Phase -- impaired  -MM       Oral Transit -- WFL  -MM       Oral Residue -- WFL  -MM       Pharyngeal Phase -- WFL  -MM       Esophageal Phase -- suspected esophageal impairment  -MM          Oral Prep Concerns    Oral Prep Concerns -- prolonged mastication  -MM       Prolonged Mastication -- regular consistencies  -MM    " "   Oral Prep Concerns, Comment -- Pt reports he can't chew beef at baseline and that he prefers softer foods.  -MM          Esophageal Phase Concerns    Esophageal Phase Concerns -- globus;belching  -MM       Globus -- regular consistencies  -MM       Belching -- regular consistencies  -MM       Esophageal Phase Concerns, Comment -- Pt reports developing concerns over the last 6 months with globus sensation indicating UES and reporting he has to \"move it around.\" Eructation noted after PO trials. SLP recs MBS +limited UGI to further evaluate swallow function.  -MM          MBS/VFSS    Utensils Used spoon;cup;straw  -SM --       Consistencies Trialed thin liquids;pureed;barium pill;regular textures  -SM --          MBS/VFSS Interpretation    Oral Prep Phase WFL  -SM --       Oral Transit Phase WFL  -SM --       Oral Residue WFL  -SM --          Initiation of Pharyngeal Swallow    Initiation of Pharyngeal Swallow bolus in valleculae  -SM --       Pharyngeal Phase functional pharyngeal phase of swallowing  -SM --       Pharyngeal Phase, Comment initial difficulty orally transiting barium pill with liquid, but after a few drinks, barium tablet passed through oral cavity, pharynx, and visible esophagus without difficulty  -SM --          Esophageal Phase    Esophageal Phase see radiology report for further details;esophageal retention with retrograde flow below PES;other (see comments)  Esophageal reflux, retention at level of thoracic spine. per radiologist  -SM --          SLP Evaluation Clinical Impression    SLP Swallowing Diagnosis functional oral phase;functional pharyngeal phase;esophageal dysphagia  -SM R/O pharyngeal dysphagia;suspected esophageal dysphagia;mild;oral dysphagia  -MM       Functional Impact risk of aspiration/pneumonia  - risk of aspiration/pneumonia  -MM       Swallow Criteria for Skilled Therapeutic Interventions Met no problems identified which require skilled intervention  -SM --          " Recommendations    Therapy Frequency (Swallow) evaluation only  - --       Predicted Duration Therapy Intervention (Days) -- 1 week  -       SLP Diet Recommendation soft to chew textures;chopped;thin liquids;other (see comments)  Pt may advance to regular per preference  - soft to chew textures;chopped;thin liquids;other (see comments)  pending [facility]  -MM       Recommended Diagnostics -- VFSS (MBS);with esophageal screen  -MM       Recommended Precautions and Strategies upright posture during/after eating;general aspiration precautions;reflux precautions  - upright posture during/after eating;general aspiration precautions  -       Oral Care Recommendations Oral Care BID/PRN;Toothbrush  - Oral Care BID/PRN;Toothbrush  -MM       SLP Rec. for Method of Medication Administration meds whole;with thin liquids;with puree;as tolerated  - meds whole;with thin liquids;with puree;as tolerated  -       Monitor for Signs of Aspiration notify SLP if any concerns  - notify SLP if any concerns  -       Anticipated Discharge Disposition (SLP) home with assist  - other (see comments)  pending further w/u  -MM       Demonstrates Need for Referral to Another Service gastroenterology;dedicated esophageal assessment  - otolaryngology (ENT)  -MM                 User Key  (r) = Recorded By, (t) = Taken By, (c) = Cosigned By      Initials Name Effective Dates    MM Polina Rodriguez, MS CCC-SLP 08/30/24 -     Elle Mabry MS CF-SLP 06/05/25 -                     EDUCATION  The patient has been educated in the following areas:   Dysphagia (Swallowing Impairment).        SLP GOALS       Row Name 07/16/25 0816             SLP Diagnostic Treatment     Patient will participate in further assessment in the following areas cognitive-linguistic  -MM      Time Frame (Diagnostic) 1 week  -MM      Progress/Outcomes (Additional Goal 1, SLP) new goal  -MM                User Key  (r) = Recorded By, (t) = Taken By, (c) =  Cosigned By      Initials Name Provider Type    Polina Martínez, MS CCC-SLP Speech and Language Pathologist                         Time Calculation:    Time Calculation- SLP       Row Name 07/16/25 1309 07/16/25 0922          Time Calculation- SLP    SLP Start Time 1015  -SM 0816  -MM     SLP Received On 07/16/25  - 07/16/25  -MM        Untimed Charges    39260-HI Eval Speech and Production w/ Language Minutes -- 38  -MM     02283-JG Eval Oral Pharyng Swallow Minutes -- 39  -MM     [id number]-[id number] Motion Fluoro Eval Swallow Minutes 60  -SM --        Total Minutes    Untimed Charges Total Minutes 60  -SM 77  -MM      Total Minutes 60  -SM 77  -MM               User Key  (r) = Recorded By, (t) = Taken By, (c) = Cosigned By      Initials Name Provider Type    Polina Martínez, MS CCC-SLP Speech and Language Pathologist     Elle Carballo, MS CF-SLP Speech and Language Pathologist                    Therapy Charges for Today       Code Description Service Date Service Provider Modifiers Qty    [id number]  ST MOTION FLUORO EVAL SWALLOW 4 7/16/2025 Elle Carballo, MS CF-SLP GN 1                 Elle Carballo MS CF-SLP  7/16/2025

## 2025-07-16 NOTE — THERAPY EVALUATION
Patient Name: Ibrahima Nava  : 1945    MRN: [id number]                              Today's Date: 2025       Admit Date: 7/15/2025    Visit Dx:     ICD-10-CM ICD-9-CM   1. Aphasia  R47.01 784.3   2. Esophageal dysphagia  R13.19 787.29   3. Altered mental status, unspecified altered mental status type  R41.82 780.97   4. Hypokalemia  E87.6 276.8     Patient Active Problem List   Diagnosis    Primary localized osteoarthrosis of shoulder region    Hypertension    Impaired physical mobility    Acute pain of left shoulder    Status post reverse arthroplasty of shoulder, left    CVA (cerebral vascular accident)     Past Medical History:   Diagnosis Date    Anxiety     Arthritis     Hypertension     CONTROLLED WITH MEDS PER PT     Shoulder pain, left     Sleep apnea     did not tolerate CPAP    Stroke     -- NO RESIDUAL PROBLEMS    Tremor of right hand     Wears dentures     UPPER AND LOWER PLATE      Past Surgical History:   Procedure Laterality Date    CARDIAC CATHETERIZATION      NO CORONARY STENTS     COLONOSCOPY      MI ARTHROPLASTY GLENOHUMERAL JOINT TOTAL SHOULDER Left 2017    Procedure: LEFT TOTAL SHOULDER ARTHROPLASTY ;  Surgeon: Michael Xavier MD;  Location:  [facility];  Service: Orthopedics    TOTAL KNEE ARTHROPLASTY Bilateral     TOTAL SHOULDER REPLACEMENT Right     TOTAL SHOULDER REVISION Left 2024    Procedure: REVISION TOTAL SHOULDER ARTHROPLASTY TO REVERSE TOTAL SHOULDER ATHROPLASTY - LEFT;  Surgeon: Michael Xavier MD;  Location:  [facility];  Service: Orthopedics;  Laterality: Left;      General Information       Row Name 25 1401          Physical Therapy Time and Intention    Document Type evaluation  -KR     Mode of Treatment co-treatment;physical therapy  -KR       Row Name 25 1401          General Information    Patient Profile Reviewed yes  -KR     Prior Level of Function independent:;all household mobility;community mobility;ADL's  PTA pt was  "I w/ ADLs. SPC use PRN. Pt also owns a rollator. Has a standard tub w/ grabbars and tub bench. Reports 1 recent falls.  -KR     Existing Precautions/Restrictions fall;other (see comments)  BLE edema  -KR     Barriers to Rehab medically complex  -KR       Row Name 07/16/25 1401          Living Environment    Current Living Arrangements home  -KR     People in Home spouse  -KR       Row Name 07/16/25 1401          Home Main Entrance    Number of Stairs, Main Entrance one  -KR     Stair Railings, Main Entrance none  -KR       Row Name 07/16/25 1401          Stairs Within Home, Primary    Number of Stairs, Within Home, Primary none  -KR       Row Name 07/16/25 1401          Cognition    Orientation Status (Cognition) oriented x 4  -KR       Row Name 07/16/25 1401          Safety Issues/Impairments Affecting Functional Mobility    Safety Issues Affecting Function (Mobility) insight into deficits/self-awareness;safety precaution awareness;safety precautions follow-through/compliance  -KR     Impairments Affecting Function (Mobility) balance;endurance/activity tolerance  -KR               User Key  (r) = Recorded By, (t) = Taken By, (c) = Cosigned By      Initials Name Provider Type    KR Coco Casarez, PT Physical Therapist                   Mobility       Row Name 07/16/25 1404          Sit-Stand Transfer    Sit-Stand [place] (Transfers) contact guard;verbal cues  -KR     Comment, (Sit-Stand Transfer) 1x from chair with B HHA  -KR       Row Name 07/16/25 1404          Gait/Stairs (Locomotion)    [place] Level (Gait) contact guard;2 person assist  -KR     Assistive Device (Gait) other (see comments)  B HHA  -KR     Distance in Feet (Gait) 40  40 x 2  -KR     Deviations/Abnormal Patterns (Gait) dennise decreased;gait speed decreased;stride length decreased;weight shifting decreased  -KR     Comment, (Gait/Stairs) increased mediolateral trunk sway. Pt reported \"wooziness\" during ambulation, thus standing rest " break taken and wooziness resolved.  -KR               User Key  (r) = Recorded By, (t) = Taken By, (c) = Cosigned By      Initials Name Provider Type    Coco Bonner PT Physical Therapist                   Obj/Interventions       Row Name 07/16/25 1406          Range of Motion Comprehensive    General Range of Motion no range of motion deficits identified  -KR       Row Name 07/16/25 1406          Strength Comprehensive (MMT)    General Manual Muscle Testing (MMT) Assessment no strength deficits identified  -KR       Row Name 07/16/25 1406          Balance    Balance Assessment sitting static balance;sitting dynamic balance;standing static balance;standing dynamic balance  -KR     Static Sitting Balance independent  -KR     Dynamic Sitting Balance standby assist  -KR     Position, Sitting Balance unsupported;sitting in chair  -KR     Static Standing Balance contact guard;verbal cues;non-verbal cues (demo/gesture);1-person assist  -KR     Dynamic Standing Balance contact guard;2-person assist;non-verbal cues (demo/gesture);verbal cues  -KR     Position/Device Used, Standing Balance supported;other (see comments)  B HHA  -KR     Balance Interventions sitting;standing;sit to stand;static;dynamic;supported  -KR       Row Name 07/16/25 1406          Sensory Assessment (Somatosensory)    Sensory Assessment (Somatosensory) LE sensation intact  -KR               User Key  (r) = Recorded By, (t) = Taken By, (c) = Cosigned By      Initials Name Provider Type    Coco Bonner PT Physical Therapist                   Goals/Plan       Row Name 07/16/25 1409          Bed Mobility Goal 1 (PT)    Activity/Assistive Device (Bed Mobility Goal 1, PT) bed mobility activities, all  -KR     [place] Level/Cues Needed (Bed Mobility Goal 1, PT) independent  -KR     Time Frame (Bed Mobility Goal 1, PT) short term goal (STG);4 days  -KR       Row Name 07/16/25 1409          Transfer Goal 1 (PT)    Activity/Assistive Device  (Transfer Goal 1, PT) sit-to-stand/stand-to-sit;bed-to-chair/chair-to-bed  -KR     [place] Level/Cues Needed (Transfer Goal 1, PT) independent  -KR     Time Frame (Transfer Goal 1, PT) long term goal (LTG);1 week  -KR       Row Name 07/16/25 1409          Gait Training Goal 1 (PT)    Activity/Assistive Device (Gait Training Goal 1, PT) gait (walking locomotion);improve balance and speed;increase endurance/gait distance  -KR     [place] Level (Gait Training Goal 1, PT) independent  -KR     Distance (Gait Training Goal 1, PT) 150  -KR     Time Frame (Gait Training Goal 1, PT) long term goal (LTG);1 week  -KR       Row Name 07/16/25 1409          Stairs Goal 1 (PT)    Activity/Assistive Device (Stairs Goal 1, PT) stairs, all skills  -KR     [place] Level/Cues Needed (Stairs Goal 1, PT) contact guard required  -KR     Number of Stairs (Stairs Goal 1, PT) 1  -KR     Time Frame (Stairs Goal 1, PT) long term goal (LTG);1 week  -KR       Row Name 07/16/25 1403          Therapy Assessment/Plan (PT)    Planned Therapy Interventions (PT) balance training;bed mobility training;gait training;home exercise program;neuromuscular re-education;patient/family education;postural re-education;transfer training;strengthening;stretching;ROM (range of motion);stair training  -KR               User Key  (r) = Recorded By, (t) = Taken By, (c) = Cosigned By      Initials Name Provider Type    KR Coco Casarez, PT Physical Therapist                   Clinical Impression       Row Name 07/16/25 1408          Pain    Pretreatment Pain Rating 0/10 - no pain  -KR     Posttreatment Pain Rating 0/10 - no pain  -KR       Row Name 07/16/25 1408          Plan of Care Review    Plan of Care Reviewed With patient;spouse  -KR     Outcome Evaluation Pt presents with balance and endurance below baseline contributing to transfer and ambulation deficits. Pt will benefit from PT to address these deficits and return to PLOF. PT rec home with  assist and OP PT upon dc.  -KR       Row Name 07/16/25 1408          Therapy Assessment/Plan (PT)    Patient/Family Therapy Goals Statement (PT) to go home  -KR     Rehab Potential (PT) good  -KR     Criteria for Skilled Interventions Met (PT) yes;meets criteria;skilled treatment is necessary  -KR     Therapy Frequency (PT) daily  -KR     Predicted Duration of Therapy Intervention (PT) 1 week  -KR       Row Name 07/16/25 1408          Vital Signs    Pre Systolic BP Rehab 159  -KR     Pre Treatment Diastolic BP 93  -KR     Post Systolic BP Rehab 169  -KR     Post Treatment Diastolic BP 87  -KR     Pretreatment Heart Rate (beats/min) 90  -KR     Intratreatment Heart Rate (beats/min) 144   -KR     Posttreatment Heart Rate (beats/min) 101  -KR     Pre SpO2 (%) 91  -KR     O2 Delivery Pre Treatment room air  -KR     Post SpO2 (%) 92  -KR     O2 Delivery Post Treatment room air  -KR     Pre Patient Position Sitting  -KR     Intra Patient Position Standing  -KR     Post Patient Position Sitting  -KR       Row Name 07/16/25 1408          Positioning and Restraints    Pre-Treatment Position sitting in chair/recliner  -KR     Post Treatment Position chair  -KR     In Chair notified nsg;reclined;call light within reach;encouraged to call for assist;exit alarm on;with family/caregiver;waffle cushion;legs elevated;heels elevated  -KR               User Key  (r) = Recorded By, (t) = Taken By, (c) = Cosigned By      Initials Name Provider Type    Coco Bonner, PT Physical Therapist                   Outcome Measures       Row Name 07/16/25 1408 07/16/25 0800       How much help from another person do you currently need...    Turning from your back to your side while in flat bed without using bedrails? 4  -KR 4  -JM    Moving from lying on back to sitting on the side of a flat bed without bedrails? 3  -KR 3  -JM    Moving to and from a bed to a chair (including a wheelchair)? 3  -KR 3  -JM    Standing up from a chair using  your arms (e.g., wheelchair, bedside chair)? 3  -KR 3  -JM    Climbing 3-5 steps with a railing? 3  -KR 2  -JM    To walk in hospital room? 3  -KR 2  -JM    AM-PAC 6 Clicks Score (PT) 19  -KR 17  -JM    Highest Level of Mobility Goal Walk 10 Steps or More-6  -KR Stand (1 or More Minutes)-5  -JM      Row Name 07/16/25 1408 07/16/25 1407       Modified [place] Scale    Pre-Stroke Modified [place] Scale 6 - Unable to determine (UTD) from the medical record documentation  -KR 6 - Unable to determine (UTD) from the medical record documentation  -MR    Modified [place] Scale 2 - Slight disability.  Unable to carry out all previous activities but able to look after own affairs without assistance.  -KR 2 - Slight disability.  Unable to carry out all previous activities but able to look after own affairs without assistance.  -      Row Name 07/16/25 1408 07/16/25 1407       Functional Assessment    Outcome Measure Options Modified [place];AM-PAC 6 Clicks Basic Mobility (PT)  -KR AM-PAC 6 Clicks Daily Activity (OT);Modified [place]  -MR              User Key  (r) = Recorded By, (t) = Taken By, (c) = Cosigned By      Initials Name Provider Type     Ailyn Aldrich, OT Occupational Therapist    Coco Bonner, PT Physical Therapist    Francesca Baer, RN Registered Nurse                                 Physical Therapy Education       Title: PT OT SLP Therapies (In Progress)       Topic: Physical Therapy (In Progress)       Point: Mobility training (Done)       Learning Progress Summary            Patient Acceptance, E, VU by PARKER at 7/16/2025 1410   Family Acceptance, E, VU by PARKER at 7/16/2025 1410                      Point: Home exercise program (Not Started)       Learner Progress:  Not documented in this visit.              Point: Body mechanics (Done)       Learning Progress Summary            Patient Acceptance, E, VU by PARKER at 7/16/2025 1410   Family Acceptance, E, VU by PARKER at 7/16/2025 1410                       Point: Precautions (Done)       Learning Progress Summary            Patient Acceptance, E, VU by KR at 7/16/2025 1410   Family Acceptance, E, VU by KR at 7/16/2025 1410                                      User Key       Initials Effective Dates Name Provider Type Discipline    PARKER 12/30/22 -  Coco Casarez, PT Physical Therapist PT                  PT Recommendation and Plan  Planned Therapy Interventions (PT): balance training, bed mobility training, gait training, home exercise program, neuromuscular re-education, patient/family education, postural re-education, transfer training, strengthening, stretching, ROM (range of motion), stair training  Outcome Evaluation: Pt presents with balance and endurance below baseline contributing to transfer and ambulation deficits. Pt will benefit from PT to address these deficits and return to PLOF. PT rec home with assist and OP PT upon dc.     Time Calculation:   PT Evaluation Complexity  History, PT Evaluation Complexity: 3 or more personal factors and/or comorbidities  Examination of Body Systems (PT Eval Complexity): total of 4 or more elements  Clinical Presentation (PT Evaluation Complexity): stable  Clinical Decision Making (PT Evaluation Complexity): low complexity  Overall Complexity (PT Evaluation Complexity): low complexity     PT Charges       Row Name 07/16/25 1410             Time Calculation    Start Time 1323  -KR      PT Received On 07/16/25  -KR      PT Goal Re-Cert Due Date 07/26/25  -KR         Timed Charges    88867 - Gait Training Minutes  4  -KR      92654 - PT Therapeutic Activity Minutes 4  -KR         Untimed Charges    PT Eval/Re-eval Minutes 61  -KR         Total Minutes    Timed Charges Total Minutes 8  -KR      Untimed Charges Total Minutes 61  -KR       Total Minutes 69  -KR                User Key  (r) = Recorded By, (t) = Taken By, (c) = Cosigned By      Initials Name Provider Type    Coco Bonner, PT Physical Therapist                   Therapy Charges for Today       Code Description Service Date Service Provider Modifiers Qty    [id number]  GAIT TRAINING EA 15 MIN 7/16/2025 Coco Casarez, PT GP 1    [id number]  PT EVAL LOW COMPLEXITY 5 7/16/2025 Coco Casarez, PT  1            PT G-Codes  Outcome Measure Options: Modified [place], AM-PAC 6 Clicks Basic Mobility (PT)  AM-PAC 6 Clicks Score (PT): 19  AM-PAC 6 Clicks Score (OT): 17  Modified [place] Scale: 2 - Slight disability.  Unable to carry out all previous activities but able to look after own affairs without assistance.  PT Discharge Summary  Anticipated Discharge Disposition (PT): home with assist, home with outpatient therapy services    Coco Casarez, ALLYSSA  7/16/2025

## 2025-07-16 NOTE — THERAPY EVALUATION
Patient Name: Ibrahima Nava  : 1945    MRN: [id number]                              Today's Date: 2025       Admit Date: 7/15/2025    Visit Dx:     ICD-10-CM ICD-9-CM   1. Aphasia  R47.01 784.3   2. Esophageal dysphagia  R13.19 787.29   3. Altered mental status, unspecified altered mental status type  R41.82 780.97   4. Hypokalemia  E87.6 276.8     Patient Active Problem List   Diagnosis    Primary localized osteoarthrosis of shoulder region    Hypertension    Impaired physical mobility    Acute pain of left shoulder    Status post reverse arthroplasty of shoulder, left    CVA (cerebral vascular accident)     Past Medical History:   Diagnosis Date    Anxiety     Arthritis     Hypertension     CONTROLLED WITH MEDS PER PT     Shoulder pain, left     Sleep apnea     did not tolerate CPAP    Stroke     -- NO RESIDUAL PROBLEMS    Tremor of right hand     Wears dentures     UPPER AND LOWER PLATE      Past Surgical History:   Procedure Laterality Date    CARDIAC CATHETERIZATION      NO CORONARY STENTS     COLONOSCOPY      CT ARTHROPLASTY GLENOHUMERAL JOINT TOTAL SHOULDER Left 2017    Procedure: LEFT TOTAL SHOULDER ARTHROPLASTY ;  Surgeon: Michael Xavier MD;  Location:  [facility] OR;  Service: Orthopedics    TOTAL KNEE ARTHROPLASTY Bilateral     TOTAL SHOULDER REPLACEMENT Right     TOTAL SHOULDER REVISION Left 2024    Procedure: REVISION TOTAL SHOULDER ARTHROPLASTY TO REVERSE TOTAL SHOULDER ATHROPLASTY - LEFT;  Surgeon: Michael Xavier MD;  Location:  [facility] OR;  Service: Orthopedics;  Laterality: Left;      General Information       Row Name 25 1357          OT Time and Intention    Document Type evaluation  -MR     Mode of Treatment occupational therapy;co-treatment  -MR     Patient Effort good  -MR       Row Name 25 3991          General Information    Patient Profile Reviewed yes  -MR     Prior Level of Function independent:;all household mobility;community  mobility;gait;transfer;bed mobility;ADL's;work;driving;using stairs  PTA pt was I w/ ADLs. Denies use of AD but occas will use SPC if needed. Pt also owns a rollator. Has a standard tub w/ grabbars and tub bench. Reports 1 recent falls.  -MR     Existing Precautions/Restrictions fall;other (see comments)  BLE edema;  -MR     Barriers to Rehab medically complex  -MR       Row Name 07/16/25 7747          Living Environment    Current Living Arrangements home  -MR     People in Home spouse  -MR       Row Name 07/16/25 1357          Home Main Entrance    Number of Stairs, Main Entrance one  -MR     Stair Railings, Main Entrance none  -MR       Row Name 07/16/25 1357          Stairs Within Home, Primary    Number of Stairs, Within Home, Primary none  -MR       Row Name 07/16/25 [facility]          Cognition    Orientation Status (Cognition) oriented x 4  -MR       Row Name 07/16/25 1351          Safety Issues/Impairments Affecting Functional Mobility    Safety Issues Affecting Function (Mobility) awareness of need for assistance;insight into deficits/self-awareness;safety precautions follow-through/compliance;safety precaution awareness  -MR     Impairments Affecting Function (Mobility) balance;endurance/activity tolerance;strength  -MR               User Key  (r) = Recorded By, (t) = Taken By, (c) = Cosigned By      Initials Name Provider Type    Ailyn Rivera, OT Occupational Therapist                     Mobility/ADL's       Row Name 07/16/25 1354          Bed Mobility    Comment, (Bed Mobility) Pt received UIC and left UIC at end of session.  -MR       Row Name 07/16/25 1350          Transfers    Transfers sit-stand transfer  -MR       Row Name 07/16/25 1353          Sit-Stand Transfer    Sit-Stand Amigo (Transfers) contact guard;verbal cues  -MR     Assistive Device (Sit-Stand Transfers) other (see comments)  B HHA  -MR       Row Name 07/16/25 1355          Functional Mobility    Functional Mobility- Ind. Level  contact guard assist;2 person assist required  -MR     Functional Mobility- Device other (see comments)  B HHA  -MR     Functional Mobility-Distance (Feet) --  HH distances  -MR       Row Name 07/16/25 1358          Activities of Daily Living    BADL Assessment/Intervention lower body dressing  -MR       Row Name 07/16/25 1358          Lower Body Dressing Assessment/Training    [place] Level (Lower Body Dressing) don;shoes/slippers;set up  -MR     Position (Lower Body Dressing) supported sitting  -MR               User Key  (r) = Recorded By, (t) = Taken By, (c) = Cosigned By      Initials Name Provider Type    Ailyn Rivera, OT Occupational Therapist                   Obj/Interventions       Row Name 07/16/25 1359          Sensory Assessment (Somatosensory)    Sensory Assessment (Somatosensory) UE sensation intact  -MR       Row Name 07/16/25 1359          Vision Assessment/Intervention    Visual Impairment/Limitations WFL  -MR       Row Name 07/16/25 1359          Range of Motion Comprehensive    General Range of Motion bilateral upper extremity ROM WFL  -MR       Row Name 07/16/25 1354          Strength Comprehensive (MMT)    Comment, General Manual Muscle Testing (MMT) Assessment BUE grossly 4/5 in all functional planes.  -MR       Row Name 07/16/25 1350          Balance    Balance Assessment sitting static balance;sitting dynamic balance;standing static balance;standing dynamic balance  -MR     Static Sitting Balance standby assist  -MR     Dynamic Sitting Balance contact guard  -MR     Position, Sitting Balance unsupported;sitting in chair  -MR     Static Standing Balance contact guard  -MR     Dynamic Standing Balance contact guard;2-person assist  -MR     Position/Device Used, Standing Balance supported;other (see comments)  B HHA  -MR     Balance Interventions sitting;standing;sit to stand;supported;static;dynamic;minimal challenge;occupation based/functional task  -MR     Comment, Balance No overt  "LOB noted w/ transfers, balance or mobility. Pt endorsed some \"wooziness\" BP stable.  -MR               User Key  (r) = Recorded By, (t) = Taken By, (c) = Cosigned By      Initials Name Provider Type    MR Ailyn Aldrich, OT Occupational Therapist                   Goals/Plan       Row Name 07/16/25 9310          Bed Mobility Goal 1 (OT)    Activity/Assistive Device (Bed Mobility Goal 1, OT) sit to supine;supine to sit  -MR     [place] Level/Cues Needed (Bed Mobility Goal 1, OT) minimum assist (75% or more patient effort)  -MR     Time Frame (Bed Mobility Goal 1, OT) short term goal (STG);3 days  -MR     Progress/Outcomes (Bed Mobility Goal 1, OT) new goal  -MR       Row Name 07/16/25 8035          Transfer Goal 1 (OT)    Activity/Assistive Device (Transfer Goal 1, OT) sit-to-stand/stand-to-sit;toilet  -MR     [place] Level/Cues Needed (Transfer Goal 1, OT) contact guard required  -MR     Time Frame (Transfer Goal 1, OT) long term goal (LTG);5 days  -MR     Progress/Outcome (Transfer Goal 1, OT) new goal  -MR       Row Name 07/16/25           Dressing Goal 1 (OT)    Activity/Device (Dressing Goal 1, OT) lower body dressing  -MR     [place]/Cues Needed (Dressing Goal 1, OT) moderate assist (50-74% patient effort)  -MR     Time Frame (Dressing Goal 1, OT) long term goal (LTG);5 days  -MR     Progress/Outcome (Dressing Goal 1, OT) new goal  -MR       Row Name 07/16/25 0345          Therapy Assessment/Plan (OT)    Planned Therapy Interventions (OT) activity tolerance training;adaptive equipment training;BADL retraining;functional balance retraining;transfer/mobility retraining;strengthening exercise;ROM/therapeutic exercise;patient/caregiver education/training;passive ROM/stretching;IADL retraining;neuromuscular control/coordination retraining;occupation/activity based interventions  -MR               User Key  (r) = Recorded By, (t) = Taken By, (c) = Cosigned By      Initials Name Provider Type    MR " Ailyn Aldrich, OT Occupational Therapist                   Clinical Impression       [place] Name 07/16/25 1401          Pain Assessment    Pretreatment Pain Rating 0/10 - no pain  -MR     Posttreatment Pain Rating 0/10 - no pain  -MR [place] Name 07/16/25 1401          Plan of Care Review    Plan of Care Reviewed With patient  -MR     Progress no change  Initial Eval  -MR     Outcome Evaluation Patient presenting below his functional baseline w/ mobility, balance and endurance. Pt requiring assist w/ mobility, LB dressing and transfers. Deficits warrant skilled OT services. Recommend home w/ assist and OP PT when medically appropriate for d/c.  -MR [place] Name 07/16/25 1401          Therapy Assessment/Plan (OT)    Patient/Family Therapy Goal Statement (OT) Return to PLOF  -MR     Rehab Potential (OT) good  -MR     Criteria for Skilled Therapeutic Interventions Met (OT) yes;meets criteria;skilled treatment is necessary  -MR     Therapy Frequency (OT) daily  -MR     Predicted Duration of Therapy Intervention (OT) 5 days  -MR [place] Name 07/16/25 1401          Therapy Plan Review/Discharge Plan (OT)    Anticipated Discharge Disposition (OT) home with assist;home with outpatient therapy services  -MR [place] Name 07/16/25 1401          Vital Signs    Pre Systolic BP Rehab 159  -MR     Pre Treatment Diastolic BP 93  -MR     Post Systolic BP Rehab 169  -MR     Post Treatment Diastolic BP 87  -MR     Pretreatment Heart Rate (beats/min) 90  -MR     Intratreatment Heart Rate (beats/min) 144   -MR     Posttreatment Heart Rate (beats/min) 101  -MR     Pre SpO2 (%) 91  -MR     O2 Delivery Pre Treatment room air  -MR     O2 Delivery Intra Treatment room air  -MR     Post SpO2 (%) 92  -MR     O2 Delivery Post Treatment room air  -MR     Pre Patient Position Supine  -MR     Intra Patient Position Standing  -MR     Post Patient Position Sitting  -MR       [place] Name 07/16/25 1401          Positioning and Restraints     Pre-Treatment Position in bed  -MR     Post Treatment Position chair  -MR     In Chair notified nsg;reclined;sitting;call light within reach;encouraged to call for assist;waffle cushion;legs elevated;exit alarm on;with family/caregiver  -MR               User Key  (r) = Recorded By, (t) = Taken By, (c) = Cosigned By      Initials Name Provider Type    Ailyn Rivera, OT Occupational Therapist                   Outcome Measures       Row Name 07/16/25 1407          How much help from another is currently needed...    Putting on and taking off regular lower body clothing? 2  -MR     Bathing (including washing, rinsing, and drying) 2  -MR     Toileting (which includes using toilet bed pan or urinal) 3  -MR     Putting on and taking off regular upper body clothing 3  -MR     Taking care of personal grooming (such as brushing teeth) 3  -MR     Eating meals 4  -MR     AM-PAC 6 Clicks Score (OT) 17  -MR       Row Name 07/16/25 0800          How much help from another person do you currently need...    Turning from your back to your side while in flat bed without using bedrails? 4  -JM     Moving from lying on back to sitting on the side of a flat bed without bedrails? 3  -JM     Moving to and from a bed to a chair (including a wheelchair)? 3  -JM     Standing up from a chair using your arms (e.g., wheelchair, bedside chair)? 3  -JM     Climbing 3-5 steps with a railing? 2  -JM     To walk in hospital room? 2  -JM     AM-PAC 6 Clicks Score (PT) 17  -JM     Highest Level of Mobility Goal Stand (1 or More Minutes)-5  -JM       Row Name 07/16/25 1407          Modified Forrest Scale    Pre-Stroke Modified Forrest Scale 6 - Unable to determine (UTD) from the medical record documentation  -MR     Modified Melodie Scale 2 - Slight disability.  Unable to carry out all previous activities but able to look after own affairs without assistance.  -MR       Row Name 07/16/25 1408 07/16/25 1407       Functional Assessment    Outcome  Measure Options Modified Melodie;AM-PAC 6 Clicks Basic Mobility (PT)  -KR AM-PAC 6 Clicks Daily Activity (OT);Modified Melodie  -MR              User Key  (r) = Recorded By, (t) = Taken By, (c) = Cosigned By      Initials Name Provider Type    MR Ailyn Aldrich, OT Occupational Therapist    Coco Bonner, PT Physical Therapist    Francesca Baer, RN Registered Nurse                    Occupational Therapy Education       Title: PT OT SLP Therapies (In Progress)       Topic: Occupational Therapy (In Progress)       Point: ADL training (Done)       Learning Progress Summary            Patient Acceptance, E, VU by MR at 7/16/2025 1408                      Point: Precautions (Done)       Learning Progress Summary            Patient Acceptance, E, VU by MR at 7/16/2025 1408                      Point: Body mechanics (Done)       Learning Progress Summary            Patient Acceptance, E, VU by MR at 7/16/2025 1408                                      User Key       Initials Effective Dates Name Provider Type Discipline    MR 09/22/22 -  Ailyn Aldrich, OT Occupational Therapist OT                  OT Recommendation and Plan  Planned Therapy Interventions (OT): activity tolerance training, adaptive equipment training, BADL retraining, functional balance retraining, transfer/mobility retraining, strengthening exercise, ROM/therapeutic exercise, patient/caregiver education/training, passive ROM/stretching, IADL retraining, neuromuscular control/coordination retraining, occupation/activity based interventions  Therapy Frequency (OT): daily  Plan of Care Review  Plan of Care Reviewed With: patient  Progress: no change (Initial Eval)  Outcome Evaluation: Patient presenting below his functional baseline w/ mobility, balance and endurance. Pt requiring assist w/ mobility, LB dressing and transfers. Deficits warrant skilled OT services. Recommend home w/ assist and OP PT when medically appropriate for d/c.     Time  Calculation:   Evaluation Complexity (OT)  Review Occupational Profile/Medical/Therapy History Complexity: brief/low complexity  Assessment, Occupational Performance/Identification of Deficit Complexity: 1-3 performance deficits  Clinical Decision Making Complexity (OT): problem focused assessment/low complexity  Overall Complexity of Evaluation (OT): low complexity     Time Calculation- OT       Row Name 07/16/25 1409             Time Calculation- OT    OT Start Time 1318  -MR      OT Received On 07/16/25  -MR      OT Goal Re-Cert Due Date 07/26/25  -MR         Untimed Charges    OT Eval/Re-eval Minutes 61  -MR         Total Minutes    Untimed Charges Total Minutes 61  -MR       Total Minutes 61  -MR                User Key  (r) = Recorded By, (t) = Taken By, (c) = Cosigned By      Initials Name Provider Type    MR Ailyn Aldrich OT Occupational Therapist                  Therapy Charges for Today       Code Description Service Date Service Provider Modifiers Qty    [id number] HC-OT EVAL LOW COMPLEXITY 5 7/16/2025 Ailyn Aldrich OT  1                 Ailyn Aldrich OT  7/16/2025

## 2025-07-16 NOTE — PLAN OF CARE
Goal Outcome Evaluation:  Plan of Care Reviewed With: patient        Progress: no change (Initial Eval)  Outcome Evaluation: Patient presenting below his functional baseline w/ mobility, balance and endurance. Pt requiring assist w/ mobility, LB dressing and transfers. Deficits warrant skilled OT services. Recommend home w/ assist and OP PT when medically appropriate for d/c.    Anticipated Discharge Disposition (OT): home with assist, home with outpatient therapy services

## 2025-07-16 NOTE — CONSULTS
[facility]  Cardiovascular Consultation Note    Reason for consultation: #1 atrial fibrillation with episodes of RVR #2 elevated BNP #3 mildly elevated flat troponins    History of Present Illness:  80-year-old male with history of hypertension, anxiety, prior stroke in 2012 without residual deficits, tremor, presents with dysarthria weakness dysphagia symptoms suggestive of stroke.  CT imaging shows episodes of prior old strokes.  The patient was found to be in A-fib.  Last EKG in 2024 showed sinus rhythm.  The patient is wife report he has not seen a doctor in a while.  He is a very active man, does a lot of mowing and helps his neighbors.  Over the last several weeks to months the patient has noticed new onset dyspnea on exertion which has been progressive.  He occasionally feels his heart race.  He has also had increased fatigue and lethargy and sleeping a lot.  Has also had significant new onset lower extremity edema.  He denies orthopnea.  He has been getting some discomfort in his throat in his chest with activity that resolves when he stops and rest.  His neurologic symptoms have improved significantly.  He denies blood in the stool.  States he had a colonoscopy many years ago.  He denies anorexia no weight loss.    Cardiac risk factors: #1 male sex #2 age greater than 55 #3 hypertension #4 former smoker  [id number] score is 6 with annual right 9.8%    Past medical and surgical history, social and family history reviewed in EMR.    REVIEW OF SYSTEMS:     Past Medical History:   Diagnosis Date    Anxiety     Arthritis     Hypertension     CONTROLLED WITH MEDS PER PT     Shoulder pain, left     Sleep apnea     did not tolerate CPAP    Stroke     2012-- NO RESIDUAL PROBLEMS    Tremor of right hand     Wears dentures     UPPER AND LOWER PLATE      Past Surgical History:   Procedure Laterality Date    CARDIAC CATHETERIZATION  2016    NO CORONARY STENTS     COLONOSCOPY  2014     ID ARTHROPLASTY GLENOHUMERAL JOINT TOTAL SHOULDER Left 06/12/2017    Procedure: LEFT TOTAL SHOULDER ARTHROPLASTY ;  Surgeon: Michael Xavier MD;  Location: [facility] OR;  Service: Orthopedics    TOTAL KNEE ARTHROPLASTY Bilateral     TOTAL SHOULDER REPLACEMENT Right     TOTAL SHOULDER REVISION Left 5/20/2024    Procedure: REVISION TOTAL SHOULDER ARTHROPLASTY TO REVERSE TOTAL SHOULDER ATHROPLASTY - LEFT;  Surgeon: Michael Xavier MD;  Location:  [facility] OR;  Service: Orthopedics;  Laterality: Left;     Social History     Socioeconomic History    Marital status:    Tobacco Use    Smoking status: Former     Types: Cigarettes    Smokeless tobacco: Never    Tobacco comments:     QUIT 20 YEARS AGO, SMOKED X 40 YEARS    Vaping Use    Vaping status: Never Used   Substance and Sexual Activity    Alcohol use: No    Drug use: No    Sexual activity: Defer     History reviewed. No pertinent family history.    H&P ROS reviewed and pertinent CV ROS as noted in HPI.    Cardiac: Patient has no history of prior atrial fibrillation or any other cardiac issue.  Records he had a cardiac cath in 2016 for unknown reason no and no result.  Occasionally feels his heart race.  He has also been experiencing discomfort in his mid chest and neck with activity goes away with rest  Respiratory: Complains of progressive dyspnea for several weeks to months.  No wheezing.  He denies orthopnea.  Lower Extremities: Complains of new onset profound lower extremity edema  GI: No nausea vomiting diarrhea bright blood per rectum or melena  Neuro: Presented with dysarthria dysphagia and weakness  Hematology: No bleeding diathesis ecchymosis or petechiae but he does have anemia  Renal: No kidney disease  Musculoskeletal: Has had several joints operated on with replacement  Endocrine: No diabetes but does have hypothyroidism  Constitutional: No fever chills or weight loss  Psych: Has history of anxiety.  No history of suicidal  ideation      Physical Exam: General Very pleasant well-developed male in a recliner not dyspneic not tachypneic heart rate 100 at rest       HEENT: No obvious JVP.  There is noise over both carotids       Respiratory: Equal bilateral symmetrical expansion and mostly clear       Cardiovascular: Irregular rate and rhythm with a soft systolic ejection murmur and 3+ pitting edema       GI: Obese and soft with positive bowel sound       Lower Extremities: No open lesions.  Significant pitting edema       Neuro: Facial expressions symmetrical moves all 4 extremities       Skin: Warm and dry       Psych: Pleasant affect oriented x 3    Results Review: Troponin is elevated and flat at 45 and 46.  Potassium is low at 2.5.  Hemoglobin 9.0 GFR 84.  BNP is 3433.  EKG shows A-fib controlled ventricular response nonspecific T wave abnormality.  Review of telemetry shows the patient is having some runs of RVR.  No significant pauses are noted           Vital Sign Min/Max for last 24 hours  Temp  Min: 97.7 °F (36.5 °C)  Max: 98.6 °F (37 °C)   BP  Min: 159/93  Max: 187/103   Pulse  Min: 82  Max: 100   Resp  Min: 16  Max: 18   SpO2  Min: 92 %  Max: 98 %   No data recorded      Intake/Output Summary (Last 24 hours) at 7/16/2025 1253  Last data filed at 7/16/2025 0637  Gross per 24 hour   Intake --   Output 350 ml   Net -350 ml             Current Facility-Administered Medications:     aspirin chewable tablet 81 mg, 81 mg, Oral, Daily, 81 mg at 07/16/25 0848 **OR** aspirin suppository 300 mg, 300 mg, Rectal, Daily, Lyric Dan APRN    sennosides-docusate (PERICOLACE) 8.6-50 MG per tablet 2 tablet, 2 tablet, Oral, BID PRN **AND** polyethylene glycol (MIRALAX) packet 17 g, 17 g, Oral, Daily PRN **AND** bisacodyl (DULCOLAX) EC tablet 5 mg, 5 mg, Oral, Daily PRN **AND** bisacodyl (DULCOLAX) suppository 10 mg, 10 mg, Rectal, Daily PRN, Ruth Michaud MD    celecoxib (CeleBREX) capsule 100 mg, 100 mg, Oral, Daily, Peggy Walsh,  DO    [COMPLETED] clopidogrel (PLAVIX) tablet 300 mg, 300 mg, Oral, Once, 300 mg at 07/15/25 1413 **AND** clopidogrel (PLAVIX) tablet 75 mg, 75 mg, Oral, Daily, Lyric Dan APRN, 75 mg at 07/16/25 0848    dextrose (D50W) (25 g/50 mL) IV injection 25 g, 25 g, Intravenous, Q15 Min PRN, Peggy Walsh DO    dextrose (GLUTOSE) oral gel 15 g, 15 g, Oral, Q15 Min PRN, Peggy Walsh DO    glucagon (GLUCAGEN) injection 1 mg, 1 mg, Intramuscular, Q15 Min PRN, Peggy Walsh DO    Insulin Lispro (humaLOG) injection 2-9 Units, 2-9 Units, Subcutaneous, 4x Daily AC & at Bedtime, Peggy Walsh DO    levETIRAcetam (KEPPRA) injection 500 mg, 500 mg, Intravenous, Q12H, Lyric Dan APRN, 500 mg at 07/16/25 0817    [START ON 7/17/2025] levothyroxine (SYNTHROID, LEVOTHROID) tablet 50 mcg, 50 mcg, Oral, Q AM, Peggy Walsh DO    LORazepam (ATIVAN) tablet 0.5 mg, 0.5 mg, Oral, Daily PRN, Peggy Walsh DO    [Held by provider] losartan (COZAAR) tablet 100 mg, 100 mg, Oral, Q24H, Peggy Walsh DO    Magnesium Cardiology Dose Replacement - Follow Nurse / BPA Driven Protocol, , Not Applicable, PRN, Juan Manuel Sandoval MD    metoprolol tartrate (LOPRESSOR) injection 5 mg, 5 mg, Intravenous, Q6H PRN, Peggy Walsh DO    nitroglycerin (NITROSTAT) SL tablet 0.4 mg, 0.4 mg, Sublingual, Q5 Min PRN, Ruth Michaud MD    [START ON 7/17/2025] pantoprazole (PROTONIX) EC tablet 40 mg, 40 mg, Oral, Q AM, Peggy Walsh DO    Potassium Replacement - Follow Nurse / BPA Driven Protocol, , Not Applicable, PRN, Juan Manuel Sandoval MD    primidone (MYSOLINE) tablet 100 mg, 100 mg, Oral, Daily, Peggy Walsh,     sertraline (ZOLOFT) tablet 200 mg, 200 mg, Oral, Daily, Peggy Walsh,     sodium chloride 0.9 % flush 10 mL, 10 mL, Intravenous, PRN, Juan Manuel Sandoval MD    sodium chloride 0.9 % flush 10 mL, 10 mL, Intravenous, Q12H, Lyric Dan, APRN, 10 mL at 07/16/25 0848    sodium  chloride 0.9 % flush 10 mL, 10 mL, Intravenous, PRN, Lyric Dan, APRN    sodium chloride 0.9 % flush 10 mL, 10 mL, Intravenous, Q12H, Ruth Michaud MD, 10 mL at 07/16/25 0848    sodium chloride 0.9 % flush 10 mL, 10 mL, Intravenous, PRN, Ruth Michaud MD    sodium chloride 0.9 % infusion 40 mL, 40 mL, Intravenous, PRN, Lyric Dan, ALAN    sodium chloride 0.9 % infusion 40 mL, 40 mL, Intravenous, PRN, Ruth Michuad MD    terazosin (HYTRIN) capsule 1 mg, 1 mg, Oral, Nightly, Peggy Walsh,     tiZANidine (ZANAFLEX) tablet 4 mg, 4 mg, Oral, Nightly, Peggy Walsh DO    valACYclovir (VALTREX) tablet 500 mg, 500 mg, Oral, Daily, Peggy Walsh,     Lab Review:   Results from last 7 days   Lab Units 07/15/25  1243 07/15/25  1239   WBC 10*3/mm3  --  11.13*   HEMOGLOBIN g/dL  --  9.0*   HEMOGLOBIN, POC g/dL 10.2*  --    PLATELETS 10*3/mm3  --  176     Results from last 7 days   Lab Units 07/15/25  1243 07/15/25  1239   SODIUM mmol/L  --  142   POTASSIUM mmol/L  --  2.5*   CO2 mmol/L  --  32.6*   BUN mg/dL  --  22.2   CREATININE mg/dL 0.90 0.92   MAGNESIUM mg/dL  --  2.2   GLUCOSE mg/dL  --  252*     Estimated Creatinine Clearance: 87.2 mL/min (by C-G formula based on SCr of 0.9 mg/dL).    Results from last 7 days   Lab Units 07/15/25  1239   HEMOGLOBIN A1C % 6.34*     .lipd  Lab Results   Component Value Date    AST 75 (H) 07/15/2025    ALT 58 (H) 07/15/2025       Radiology Reports:  Imaging Results (Last 72 Hours)       Procedure Component Value Units Date/Time    FL Video Swallow With Speech Single Contrast [[id number]] Collected: 07/16/25 1202     Updated: 07/16/25 1213    Narrative:      FL VIDEO SWALLOW W SPEECH SINGLE-CONTRAST, FL LIMITED UGI FOR MBS REFLUX SINGLE-CONTRAST    Date of Exam: 7/16/2025 10:00 AM EDT    Indication: dysphagia.       Comparison: None available.    Technique:   The speech pathologist administered food and/or liquid mixed with barium to the patient with  cine/video imaging.  Imaging assistance was provided to the speech pathologist and an image was saved. The camera was then lowered with the patient   remaining in lateral seated position with additional swallows of thin barium, to evaluate the visible portion of the esophagus.    Fluoroscopic Time: 1 minute 12 seconds    Number of Images: 6 cine loops    Findings:  Modified barium swallow: The patient was evaluated in the seated lateral position while taking a variety of consistencies by mouth under the direction of speech pathology. No aspiration was observed. Please see the speech pathologist's procedure report   for full details and recommendations.    Limited upper GI series: There is moderate residual barium in the upper two thirds of the esophagus following the modified barium swallow. Additional sips show no obvious stricture. There is mild intraesophageal reflux.      Impression:      Impression:    1. Fluoroscopy provided for modified barium swallow.    2. Limited upper GI series shows incomplete clearance with residual thin barium in the upper two thirds of the esophagus. No evidence of obstruction and only mild intraesophageal reflux.      Electronically Signed: Derek Garcia MD    7/16/2025 12:10 PM EDT    Workstation ID: [id number]    FL Limited Ugi For Mbs Reflux Single-Contrast [[id number]] Collected: 07/16/25 1202     Updated: 07/16/25 1213    Narrative:      FL VIDEO SWALLOW W SPEECH SINGLE-CONTRAST, FL LIMITED UGI FOR MBS REFLUX SINGLE-CONTRAST    Date of Exam: 7/16/2025 10:00 AM EDT    Indication: dysphagia.       Comparison: None available.    Technique:   The speech pathologist administered food and/or liquid mixed with barium to the patient with cine/video imaging.  Imaging assistance was provided to the speech pathologist and an image was saved. The camera was then lowered with the patient   remaining in lateral seated position with additional swallows of thin barium, to evaluate the visible portion  of the esophagus.    Fluoroscopic Time: 1 minute 12 seconds    Number of Images: 6 cine loops    Findings:  Modified barium swallow: The patient was evaluated in the seated lateral position while taking a variety of consistencies by mouth under the direction of speech pathology. No aspiration was observed. Please see the speech pathologist's procedure report   for full details and recommendations.    Limited upper GI series: There is moderate residual barium in the upper two thirds of the esophagus following the modified barium swallow. Additional sips show no obvious stricture. There is mild intraesophageal reflux.      Impression:      Impression:    1. Fluoroscopy provided for modified barium swallow.    2. Limited upper GI series shows incomplete clearance with residual thin barium in the upper two thirds of the esophagus. No evidence of obstruction and only mild intraesophageal reflux.      Electronically Signed: Derek Garcia MD    7/16/2025 12:10 PM EDT    Workstation ID: [id number]    MRI Brain Without Contrast [[id number]] Collected: 07/16/25 0650     Updated: 07/16/25 0849    Narrative:      MRI BRAIN WO CONTRAST    Date of Exam: 7/16/2025 2:32 AM EDT    Indication: Stroke, follow up  AMS, dysarthria, dysphagia - subacute L MCA infarct on CT head.     Comparison: CT 1 day prior    Technique:  Routine multiplanar/multisequence sequence images of the brain were obtained without contrast administration.      Findings:  No acute infarct is present on diffusion weighted sequences. Midline structures appear normal and the craniocervical junction is satisfactory. Age-related changes are present with mild generalized volume loss and mild typical T2 hyperintense subcortical   and pontine white matter changes, nonspecific but favored to reflect sequela of chronic microvascular ischemia. There are also areas of old infarct present within the left perirolandic region and small bilateral chronic basal ganglia lacunar  infarcts.   There is no evidence of intracranial hemorrhage, mass or mass effect. The ventricles demonstrate mild ex vacuo prominence. The orbits are normal. The paranasal sinuses demonstrate small mucous retention cysts in the left maxillary sinus.      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 There is no evidence of recent infarct, hemorrhage, mass or mass effect.        Electronically Signed: Chase Birmingham MD    7/16/2025 8:46 AM EDT    Workstation ID: [id number]    XR Chest 1 View [[id number]] Collected: 07/15/25 1443     Updated: 07/15/25 1447    Narrative:      XR CHEST 1 VW    Date of Exam: 7/15/2025 2:23 PM EDT    Indication: Acute Stroke Protocol (onset < 12 hrs)    Comparison: 5/7/2024    Findings:  Cardiac size is enlarged. There is an increase in the pulmonary vascular markings with interval development of diffuse interstitial edema. No focal consolidation is seen. There is no obvious pleural fluid.      Impression:      Impression:  Cardiomegaly with interval development of pulmonary edema.        Electronically Signed: Jani Jung MD    7/15/2025 2:44 PM EDT    Workstation ID: [id number]    CT Angiogram Head w AI Analysis of LVO [[id number]] Collected: 07/15/25 1313     Updated: 07/15/25 1330    Narrative:      CT ANGIOGRAM HEAD W AI ANALYSIS OF LVO, CT ANGIOGRAM NECK, CT ABDOMEN PELVIS W CONTRAST    Date of Exam: 7/15/2025 12:38 PM EDT    Indication: Neuro Deficit, acute, Stroke suspected  Neuro deficit, acute stroke suspected.    Comparison: None available.    Technique: CTA of the head and neck was performed after the uneventful intravenous administration of 90 mL Isovue-370. Axial IV contrast-enhanced CT of the abdomen and pelvis was performed. Reconstructed coronal and sagittal images were also obtained. In   addition, a 3-D volume rendered image was created for  interpretation. Automated exposure control and iterative reconstruction methods were used.      Findings:  CTA head and neck: The lung apices demonstrate a partially imaged 3.8 cm nodular soft tissue finding within the upper mediastinum on the left, either an enlarged lymph node or mass. Evaluation of the neck soft tissues demonstrates no evidence of   aerodigestive tract mass or pathologic cervical lymphadenopathy. Multilevel cervical spondylosis is present, without evidence of acute fracture or aggressive osseous lesion. Patent aortic arch with three-vessel branching. The subclavian arteries are   patent bilaterally. On the right, there is calcific atherosclerotic change present at the ICA origin with mild, less than 50% narrowing by NASCET criteria. Similar mild narrowing is present on the left. The vertebral arteries are normal in course and   caliber bilaterally. Intracranially, the carotid siphons demonstrate calcific atherosclerotic changes with some mild narrowing of both ophthalmic segments. The anterior cerebral arteries are normal in course and caliber bilaterally. The right middle   cerebral artery is normal. The left middle cerebral artery demonstrates no evidence of high-grade stenosis, occlusion or aneurysm. The vertebrobasilar system is patent. The posterior cerebral arteries are normal in course and caliber bilaterally.    CT abdomen pelvis: The lung bases are clear. The body wall soft tissues demonstrate no acute or suspicious findings, with 2 adjacent presumed sebaceous cyst seen in the right gluteal region. The liver demonstrates low-attenuation findings most consistent   with cysts. The spleen, pancreas and bilateral adrenal glands demonstrate no acute findings. A homogeneous cystic appearing finding within the pancreatic head measures 22 x 21 mm. Normal gallbladder. Small and large bowel loops are nondilated. Colonic   diverticular changes are present, without definite acute inflammatory change  to suggest diverticulitis. The appendix is normal. There is no free fluid or pneumoperitoneum. Atherosclerotic, nonaneurysmal abdominal aorta. The pelvic viscera demonstrate no   acute findings.        Impression:      Impression:  1.Mild cervical and intracranial atherosclerotic changes are present as above, without evidence of flow-limiting stenosis, large vessel occlusion or aneurysm.  2.No acute findings in the abdomen and pelvis.  3.Incidentally noted 22 x 21 mm cystic finding within the pancreatic head. Correlation with any available prior imaging could be of use to document expected stability of this likely benign finding, otherwise as indicated, MRI could be useful to further   evaluate.  4.Incompletely characterized 3.8 cm nodular mass within the upper mediastinum, possibly an enlarged lymph node. Consider dedicated CT chest.        Electronically Signed: Chase Birmingham MD    7/15/2025 1:27 PM EDT    Workstation ID: [id number]    CT Angiogram Neck [[id number]] Collected: 07/15/25 1313     Updated: 07/15/25 1330    Narrative:      CT ANGIOGRAM HEAD W AI ANALYSIS OF LVO, CT ANGIOGRAM NECK, CT ABDOMEN PELVIS W CONTRAST    Date of Exam: 7/15/2025 12:38 PM EDT    Indication: Neuro Deficit, acute, Stroke suspected  Neuro deficit, acute stroke suspected.    Comparison: None available.    Technique: CTA of the head and neck was performed after the uneventful intravenous administration of 90 mL Isovue-370. Axial IV contrast-enhanced CT of the abdomen and pelvis was performed. Reconstructed coronal and sagittal images were also obtained. In   addition, a 3-D volume rendered image was created for interpretation. Automated exposure control and iterative reconstruction methods were used.      Findings:  CTA head and neck: The lung apices demonstrate a partially imaged 3.8 cm nodular soft tissue finding within the upper mediastinum on the left, either an enlarged lymph node or mass. Evaluation of the neck soft tissues  demonstrates no evidence of   aerodigestive tract mass or pathologic cervical lymphadenopathy. Multilevel cervical spondylosis is present, without evidence of acute fracture or aggressive osseous lesion. Patent aortic arch with three-vessel branching. The subclavian arteries are   patent bilaterally. On the right, there is calcific atherosclerotic change present at the ICA origin with mild, less than 50% narrowing by NASCET criteria. Similar mild narrowing is present on the left. The vertebral arteries are normal in course and   caliber bilaterally. Intracranially, the carotid siphons demonstrate calcific atherosclerotic changes with some mild narrowing of both ophthalmic segments. The anterior cerebral arteries are normal in course and caliber bilaterally. The right middle   cerebral artery is normal. The left middle cerebral artery demonstrates no evidence of high-grade stenosis, occlusion or aneurysm. The vertebrobasilar system is patent. The posterior cerebral arteries are normal in course and caliber bilaterally.    CT abdomen pelvis: The lung bases are clear. The body wall soft tissues demonstrate no acute or suspicious findings, with 2 adjacent presumed sebaceous cyst seen in the right gluteal region. The liver demonstrates low-attenuation findings most consistent   with cysts. The spleen, pancreas and bilateral adrenal glands demonstrate no acute findings. A homogeneous cystic appearing finding within the pancreatic head measures 22 x 21 mm. Normal gallbladder. Small and large bowel loops are nondilated. Colonic   diverticular changes are present, without definite acute inflammatory change to suggest diverticulitis. The appendix is normal. There is no free fluid or pneumoperitoneum. Atherosclerotic, nonaneurysmal abdominal aorta. The pelvic viscera demonstrate no   acute findings.        Impression:      Impression:  1.Mild cervical and intracranial atherosclerotic changes are present as above, without  evidence of flow-limiting stenosis, large vessel occlusion or aneurysm.  2.No acute findings in the abdomen and pelvis.  3.Incidentally noted 22 x 21 mm cystic finding within the pancreatic head. Correlation with any available prior imaging could be of use to document expected stability of this likely benign finding, otherwise as indicated, MRI could be useful to further   evaluate.  4.Incompletely characterized 3.8 cm nodular mass within the upper mediastinum, possibly an enlarged lymph node. Consider dedicated CT chest.        Electronically Signed: Chase Birmingham MD    7/15/2025 1:27 PM EDT    Workstation ID: [id number]    CT Abdomen Pelvis With Contrast [[id number]] Collected: 07/15/25 1313     Updated: 07/15/25 1330    Narrative:      CT ANGIOGRAM HEAD W AI ANALYSIS OF LVO, CT ANGIOGRAM NECK, CT ABDOMEN PELVIS W CONTRAST    Date of Exam: 7/15/2025 12:38 PM EDT    Indication: Neuro Deficit, acute, Stroke suspected  Neuro deficit, acute stroke suspected.    Comparison: None available.    Technique: CTA of the head and neck was performed after the uneventful intravenous administration of 90 mL Isovue-370. Axial IV contrast-enhanced CT of the abdomen and pelvis was performed. Reconstructed coronal and sagittal images were also obtained. In   addition, a 3-D volume rendered image was created for interpretation. Automated exposure control and iterative reconstruction methods were used.      Findings:  CTA head and neck: The lung apices demonstrate a partially imaged 3.8 cm nodular soft tissue finding within the upper mediastinum on the left, either an enlarged lymph node or mass. Evaluation of the neck soft tissues demonstrates no evidence of   aerodigestive tract mass or pathologic cervical lymphadenopathy. Multilevel cervical spondylosis is present, without evidence of acute fracture or aggressive osseous lesion. Patent aortic arch with three-vessel branching. The subclavian arteries are   patent bilaterally. On the  right, there is calcific atherosclerotic change present at the ICA origin with mild, less than 50% narrowing by NASCET criteria. Similar mild narrowing is present on the left. The vertebral arteries are normal in course and   caliber bilaterally. Intracranially, the carotid siphons demonstrate calcific atherosclerotic changes with some mild narrowing of both ophthalmic segments. The anterior cerebral arteries are normal in course and caliber bilaterally. The right middle   cerebral artery is normal. The left middle cerebral artery demonstrates no evidence of high-grade stenosis, occlusion or aneurysm. The vertebrobasilar system is patent. The posterior cerebral arteries are normal in course and caliber bilaterally.    CT abdomen pelvis: The lung bases are clear. The body wall soft tissues demonstrate no acute or suspicious findings, with 2 adjacent presumed sebaceous cyst seen in the right gluteal region. The liver demonstrates low-attenuation findings most consistent   with cysts. The spleen, pancreas and bilateral adrenal glands demonstrate no acute findings. A homogeneous cystic appearing finding within the pancreatic head measures 22 x 21 mm. Normal gallbladder. Small and large bowel loops are nondilated. Colonic   diverticular changes are present, without definite acute inflammatory change to suggest diverticulitis. The appendix is normal. There is no free fluid or pneumoperitoneum. Atherosclerotic, nonaneurysmal abdominal aorta. The pelvic viscera demonstrate no   acute findings.        Impression:      Impression:  1.Mild cervical and intracranial atherosclerotic changes are present as above, without evidence of flow-limiting stenosis, large vessel occlusion or aneurysm.  2.No acute findings in the abdomen and pelvis.  3.Incidentally noted 22 x 21 mm cystic finding within the pancreatic head. Correlation with any available prior imaging could be of use to document expected stability of this likely benign  finding, otherwise as indicated, MRI could be useful to further   evaluate.  4.Incompletely characterized 3.8 cm nodular mass within the upper mediastinum, possibly an enlarged lymph node. Consider dedicated CT chest.        Electronically Signed: Chase Birmingham MD    7/15/2025 1:27 PM EDT    Workstation ID: [id number]    CT CEREBRAL PERFUSION WITH & WITHOUT CONTRAST [[id number]] Collected: 07/15/25 1312     Updated: 07/15/25 1318    Narrative:      CT CEREBRAL PERFUSION W WO CONTRAST    Date of Exam: 7/15/2025 12:38 PM EDT    Indication: Neuro Deficit, acute, Stroke suspected  Neuro deficit, acute stroke suspected.     Comparison: CT head from earlier today    Technique: Axial CT images of the brain were obtained prior to and after the administration of 50 mL Isovue-370. Core blood volume, core blood flow, mean transit time, and Tmax images were obtained utilizing the Rapid software protocol. A limited CT   angiogram of the head was also performed to measure the blood vessel density.    The radiation dose reduction device was turned on for each scan per the ALARA (As Low as Reasonably Achievable) protocol.      Findings:  The intracranial cerebral perfusion appears normal.      Impression:      Impression:  Examination appears within normal limits.        Electronically Signed: Ibrahima Ribeiro MD    7/15/2025 1:15 PM EDT    Workstation ID: [id number]    CT Head Without Contrast Stroke Protocol [[id number]] Collected: 07/15/25 1247     Updated: 07/15/25 1258    Narrative:      CT HEAD WO CONTRAST STROKE PROTOCOL    Date of Exam: 7/15/2025 12:36 PM EDT    Indication: Neuro deficit, acute, stroke suspected  Neuro Deficit, acute, Stroke suspected.    Comparison: None available    Technique: Axial CT images were obtained of the head without contrast administration.  Reconstructed coronal images were also obtained. Automated exposure control and iterative construction methods were used.    Scan Time: 1234  Results discussed  with ALAN Myers at 1240, 7/15/2025.      Findings:  The calvarium appears intact. 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 Left maxillary sinus mucous cyst is noted. Paranasal sinuses and mastoids otherwise appear clear. Orbits appear unremarkable except for ocular lens   replacements.    There is moderately advanced generalized cerebral atrophy. Focal left parietal encephalomalacia presumably represents old infarct. Intermediate density of the medial portion of the area of encephalomalacia could at least potentially represent late   subacute infarct, although this is not favored.     Small focal low-attenuation area is seen in the left external capsule, nonspecific. There is an old lacunar-type infarct of the head of the caudate nucleus.    No clearly acute infarct/edema is identified. No hemorrhage, hydrocephalus, mass or mass effect, or abnormal extra-axial collection is seen.      Impression:      Impression:      1. Old, less likely late subacute infarct of the left parietal lobe.    2. Small chronic ischemic focus of the area of the left external capsule.    3. 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    4. No clearly acute intracranial abnormality is identified.        Electronically Signed: Derek Garcia MD    7/15/2025 12:55 PM EDT    Workstation ID: [id number]            Assessment/Plan: 1 patient presents with acute neurologic abnormalities and is found to be in A-fib.  Historically had a stroke in 2012 with no residual.  CT imaging shows evidence of prior strokes.  The patient's been feeling poorly now for several weeks to months i.e. complaint of increased fatigue decreased exercise endurance and increased shortness of breath.  He has not seen a doctor for a while so he could have been in A-fib for a while.  Is also associated heart failure  Will start metoprolol 12.5 twice daily  Will need Eliquis once okay with neurology  Plan SALVADOR cardioversion  2 heart  failure with unknown EF-echocardiogram is pending-will start spironolactone and metoprolol.  Consider Jardiance  3 has exertional chest pain which may be secondary to A-fib RVR.  Troponins are minimally elevated and flat.  Pending results of echocardiogram can consider outpatient ischemic evaluation      Carlos Mendez MD  07/16/25  12:53 EDT  Send addendum to the earlier note.  Will check iron levels.  Agree with neurology's note they have started Eliquis.  We do not have Micardis here on formulary so we will start losartan 100 mg.  Will use as needed clonidine for blood pressure.  If blood pressure still up in the morning we will start amlodipine

## 2025-07-16 NOTE — PROGRESS NOTES
Stroke Progress Note       Chief Complaint: Dysarthria    Subjective    Subjective     Subjective:  Dysarthria persistent  Review of Systems     Objective      Temp:  [97.7 °F (36.5 °C)-98.6 °F (37 °C)] 97.8 °F (36.6 °C)  Heart Rate:  [] 87  Resp:  [16-18] 16  BP: (159-184)/() 159/93        Alert oriented x 2  Mild dysarthria  Bilateral upper EXTR 4/5 bilateral lower extremity 3/5  Results Review:    I reviewed the patient's new clinical results.    Lab Results (last 24 hours)       Procedure Component Value Units Date/Time    Lipid Panel [[id number]] Collected: 07/16/25 1507    Specimen: Blood Updated: 07/16/25 1507    Basic Metabolic Panel [[id number]] Collected: 07/16/25 1507    Specimen: Blood Updated: 07/16/25 1507    POC Glucose Once [[id number]]  (Abnormal) Collected: 07/16/25 1145    Specimen: Blood Updated: 07/16/25 1150     Glucose 251 mg/dL     POC Glucose Once [[id number]]  (Abnormal) Collected: 07/16/25 0616    Specimen: Blood Updated: 07/16/25 0618     Glucose 180 mg/dL     POC Glucose Once [[id number]]  (Abnormal) Collected: 07/16/25 0000    Specimen: Blood Updated: 07/16/25 0001     Glucose 182 mg/dL     Ammonia [[id number]]  (Normal) Collected: 07/15/25 1949    Specimen: Blood Updated: 07/15/25 2037     Ammonia 53 umol/L     POC Glucose Once [[id number]]  (Abnormal) Collected: 07/15/25 1810    Specimen: Blood Updated: 07/15/25 1813     Glucose 197 mg/dL           FL Video Swallow With Speech Single Contrast  Result Date: 7/16/2025  Impression: 1. Fluoroscopy provided for modified barium swallow. 2. Limited upper GI series shows incomplete clearance with residual thin barium in the upper two thirds of the esophagus. No evidence of obstruction and only mild intraesophageal reflux. Electronically Signed: Derek Garcia MD  7/16/2025 12:10 PM EDT  Workstation ID: [id number]    FL Limited Ugi For Mbs Reflux Single-Contrast  Result Date: 7/16/2025  Impression: 1. Fluoroscopy provided for modified barium  swallow. 2. Limited upper GI series shows incomplete clearance with residual thin barium in the upper two thirds of the esophagus. No evidence of obstruction and only mild intraesophageal reflux. Electronically Signed: Derek Garcia MD  7/16/2025 12:10 PM EDT  Workstation ID: [id number]    MRI Brain Without Contrast  Result Date: 7/16/2025  Impression: Moderate typical chronic and senescent findings are present as above, including a chronic appearing infarct of the left pre and postcentral gyri. There is no evidence of recent infarct, hemorrhage, mass or mass effect. Electronically Signed: Chase Birmingham MD  7/16/2025 8:46 AM EDT  Workstation ID: [id number]    XR Chest 1 View  Result Date: 7/15/2025  Impression: Cardiomegaly with interval development of pulmonary edema. Electronically Signed: Jani Jung MD  7/15/2025 2:44 PM EDT  Workstation ID: [id number]    CT Angiogram Head w AI Analysis of LVO  Result Date: 7/15/2025  Impression: 1.Mild cervical and intracranial atherosclerotic changes are present as above, without evidence of flow-limiting stenosis, large vessel occlusion or aneurysm. 2.No acute findings in the abdomen and pelvis. 3.Incidentally noted 22 x 21 mm cystic finding within the pancreatic head. Correlation with any available prior imaging could be of use to document expected stability of this likely benign finding, otherwise as indicated, MRI could be useful to further evaluate. 4.Incompletely characterized 3.8 cm nodular mass within the upper mediastinum, possibly an enlarged lymph node. Consider dedicated CT chest. Electronically Signed: Chase Birmingham MD  7/15/2025 1:27 PM EDT  Workstation ID: [id number]    CT Angiogram Neck  Result Date: 7/15/2025  Impression: 1.Mild cervical and intracranial atherosclerotic changes are present as above, without evidence of flow-limiting stenosis, large vessel occlusion or aneurysm. 2.No acute findings in the abdomen and pelvis. 3.Incidentally noted 22 x 21 mm cystic  finding within the pancreatic head. Correlation with any available prior imaging could be of use to document expected stability of this likely benign finding, otherwise as indicated, MRI could be useful to further evaluate. 4.Incompletely characterized 3.8 cm nodular mass within the upper mediastinum, possibly an enlarged lymph node. Consider dedicated CT chest. Electronically Signed: Chase Birmingham MD  7/15/2025 1:27 PM EDT  Workstation ID: [id number]    CT Abdomen Pelvis With Contrast  Result Date: 7/15/2025  Impression: 1.Mild cervical and intracranial atherosclerotic changes are present as above, without evidence of flow-limiting stenosis, large vessel occlusion or aneurysm. 2.No acute findings in the abdomen and pelvis. 3.Incidentally noted 22 x 21 mm cystic finding within the pancreatic head. Correlation with any available prior imaging could be of use to document expected stability of this likely benign finding, otherwise as indicated, MRI could be useful to further evaluate. 4.Incompletely characterized 3.8 cm nodular mass within the upper mediastinum, possibly an enlarged lymph node. Consider dedicated CT chest. Electronically Signed: Chase Birmingham MD  7/15/2025 1:27 PM EDT  Workstation ID: [id number]    CT CEREBRAL PERFUSION WITH & WITHOUT CONTRAST  Result Date: 7/15/2025  Impression: Examination appears within normal limits. Electronically Signed: Ibrahima Ribeiro MD  7/15/2025 1:15 PM EDT  Workstation ID: [id number]    CT Head Without Contrast Stroke Protocol  Result Date: 7/15/2025  Impression: 1. Old, less likely late subacute infarct of the left parietal lobe. 2. Small chronic ischemic focus of the area of the left external capsule. 3. 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 4. No clearly acute intracranial abnormality is identified. Electronically Signed: Derek Garcia MD  7/15/2025 12:55 PM EDT  Workstation ID: [id number]              Assessment/Plan     Assessment/Plan:  80-year-old,   male with known diagnosis of prior CVA (> 10 years ago, Earlimart Hernandez no residual deficits), HTN, DARCY unable to tolerate CPAP who presents with altered mental status, dysarthria, weakness, and dysphagia.       Recrudescence of old stroke deficits-no new stroke  -Patient with new onset of A-fib,  -Recommend Eliquis 5 mg twice daily  - Discontinue other antiplatelet agents    Hypertension-normal blood pressure goals    HLD-Continue high intensity Lipitor of 80    Stroke clinic follow-up in 1 month      No further workup from stroke standpoint        Igor Nath MD  07/16/25  15:15 EDT

## 2025-07-16 NOTE — PLAN OF CARE
Goal Outcome Evaluation:  Plan of Care Reviewed With: patient, spouse           Outcome Evaluation: Pt presents with balance and endurance below baseline contributing to transfer and ambulation deficits. Pt will benefit from PT to address these deficits and return to PLOF. PT rec home with assist and OP PT upon dc.    Anticipated Discharge Disposition (PT): home with assist, home with outpatient therapy services

## 2025-07-17 ENCOUNTER — APPOINTMENT (OUTPATIENT)
Dept: CARDIOLOGY | Facility: HOSPITAL | Age: 80
DRG: 308 | End: 2025-07-17
Payer: MEDICARE

## 2025-07-17 ENCOUNTER — APPOINTMENT (OUTPATIENT)
Dept: CT IMAGING | Facility: HOSPITAL | Age: 80
DRG: 308 | End: 2025-07-17
Payer: MEDICARE

## 2025-07-17 PROBLEM — J98.59 MEDIASTINAL MASS: Status: ACTIVE | Noted: 2025-07-17

## 2025-07-17 LAB
AORTIC DIMENSIONLESS INDEX: 0.53 (DI)
ASCENDING AORTA: 3.3 CM
AV MEAN PRESS GRAD SYS DOP V1V2: 4.2 MMHG
AV VMAX SYS DOP: 136.6 CM/SEC
BH CV ECHO MEAS - AO MAX PG: 7.5 MMHG
BH CV ECHO MEAS - AO ROOT DIAM: 3.3 CM
BH CV ECHO MEAS - AO V2 VTI: 28.7 CM
BH CV ECHO MEAS - AVA(I,D): 1.85 CM2
BH CV ECHO MEAS - EDV(CUBED): 110.6 ML
BH CV ECHO MEAS - EDV(MOD-SP2): 144 ML
BH CV ECHO MEAS - EDV(MOD-SP4): 145 ML
BH CV ECHO MEAS - EF(MOD-SP2): 53 %
BH CV ECHO MEAS - EF(MOD-SP4): 53.9 %
BH CV ECHO MEAS - ESV(CUBED): 39.3 ML
BH CV ECHO MEAS - ESV(MOD-SP2): 67.7 ML
BH CV ECHO MEAS - ESV(MOD-SP4): 66.9 ML
BH CV ECHO MEAS - FS: 29.2 %
BH CV ECHO MEAS - IVS/LVPW: 1 CM
BH CV ECHO MEAS - IVSD: 1.3 CM
BH CV ECHO MEAS - LA DIMENSION: 4.2 CM
BH CV ECHO MEAS - LAT PEAK E' VEL: 8.8 CM/SEC
BH CV ECHO MEAS - LV DIASTOLIC VOL/BSA (35-75): 60.4 CM2
BH CV ECHO MEAS - LV MASS(C)D: 245.7 GRAMS
BH CV ECHO MEAS - LV MAX PG: 2.14 MMHG
BH CV ECHO MEAS - LV MEAN PG: 1 MMHG
BH CV ECHO MEAS - LV SYSTOLIC VOL/BSA (12-30): 27.9 CM2
BH CV ECHO MEAS - LV V1 MAX: 73.1 CM/SEC
BH CV ECHO MEAS - LV V1 VTI: 15.3 CM
BH CV ECHO MEAS - LVIDD: 4.8 CM
BH CV ECHO MEAS - LVIDS: 3.4 CM
BH CV ECHO MEAS - LVOT AREA: 3.5 CM2
BH CV ECHO MEAS - LVOT DIAM: 2.1 CM
BH CV ECHO MEAS - LVPWD: 1.3 CM
BH CV ECHO MEAS - MED PEAK E' VEL: 8.8 CM/SEC
BH CV ECHO MEAS - MR MAX PG: 138.3 MMHG
BH CV ECHO MEAS - MR MAX VEL: 588 CM/SEC
BH CV ECHO MEAS - MR MEAN PG: 104 MMHG
BH CV ECHO MEAS - MR MEAN VEL: 494 CM/SEC
BH CV ECHO MEAS - MR VTI: 198 CM
BH CV ECHO MEAS - MV DEC SLOPE: 625 CM/SEC2
BH CV ECHO MEAS - MV DEC TIME: 0.18 SEC
BH CV ECHO MEAS - MV E MAX VEL: 135 CM/SEC
BH CV ECHO MEAS - MV MAX PG: 8.2 MMHG
BH CV ECHO MEAS - MV MEAN PG: 4 MMHG
BH CV ECHO MEAS - MV P1/2T: 66.5 MSEC
BH CV ECHO MEAS - MV V2 VTI: 25.3 CM
BH CV ECHO MEAS - MVA(P1/2T): 3.3 CM2
BH CV ECHO MEAS - MVA(VTI): 2.1 CM2
BH CV ECHO MEAS - PA ACC TIME: 0.06 SEC
BH CV ECHO MEAS - RAP SYSTOLE: 15 MMHG
BH CV ECHO MEAS - RVSP: 44 MMHG
BH CV ECHO MEAS - SV(LVOT): 53 ML
BH CV ECHO MEAS - SV(MOD-SP2): 76.3 ML
BH CV ECHO MEAS - SV(MOD-SP4): 78.1 ML
BH CV ECHO MEAS - SVI(LVOT): 22.1 ML/M2
BH CV ECHO MEAS - SVI(MOD-SP2): 31.8 ML/M2
BH CV ECHO MEAS - SVI(MOD-SP4): 32.5 ML/M2
BH CV ECHO MEAS - TAPSE (>1.6): 1.8 CM
BH CV ECHO MEAS - TR MAX PG: 28.7 MMHG
BH CV ECHO MEAS - TR MAX VEL: 268 CM/SEC
BH CV ECHO MEASUREMENTS AVERAGE E/E' RATIO: 15.34
BH CV XLRA - RV BASE: 3.6 CM
BH CV XLRA - RV LENGTH: 8.2 CM
BH CV XLRA - RV MID: 2.8 CM
BH CV XLRA - TDI S': 11.8 CM/SEC
GLUCOSE BLDC GLUCOMTR-MCNC: 140 MG/DL (ref 70–130)
GLUCOSE BLDC GLUCOMTR-MCNC: 143 MG/DL (ref 70–130)
GLUCOSE BLDC GLUCOMTR-MCNC: 196 MG/DL (ref 70–130)
GLUCOSE BLDC GLUCOMTR-MCNC: 199 MG/DL (ref 70–130)
IVRT: 102 MS
LEFT ATRIUM VOLUME INDEX: 25.9 ML/M2
LV EF BIPLANE MOD: 53.4 %
NT-PROBNP SERPL-MCNC: 2423 PG/ML (ref 0–1800)
POTASSIUM SERPL-SCNC: 3.7 MMOL/L (ref 3.5–5.2)

## 2025-07-17 PROCEDURE — 97116 GAIT TRAINING THERAPY: CPT

## 2025-07-17 PROCEDURE — 93306 TTE W/DOPPLER COMPLETE: CPT | Performed by: INTERNAL MEDICINE

## 2025-07-17 PROCEDURE — 93306 TTE W/DOPPLER COMPLETE: CPT

## 2025-07-17 PROCEDURE — 99233 SBSQ HOSP IP/OBS HIGH 50: CPT | Performed by: FAMILY MEDICINE

## 2025-07-17 PROCEDURE — 83880 ASSAY OF NATRIURETIC PEPTIDE: CPT | Performed by: INTERNAL MEDICINE

## 2025-07-17 PROCEDURE — 97110 THERAPEUTIC EXERCISES: CPT

## 2025-07-17 PROCEDURE — 63710000001 INSULIN LISPRO (HUMAN) PER 5 UNITS: Performed by: FAMILY MEDICINE

## 2025-07-17 PROCEDURE — 25010000002 HYDRALAZINE PER 20 MG: Performed by: FAMILY MEDICINE

## 2025-07-17 PROCEDURE — 82948 REAGENT STRIP/BLOOD GLUCOSE: CPT

## 2025-07-17 PROCEDURE — 71250 CT THORAX DX C-: CPT

## 2025-07-17 PROCEDURE — 84132 ASSAY OF SERUM POTASSIUM: CPT | Performed by: FAMILY MEDICINE

## 2025-07-17 PROCEDURE — 25010000002 BUMETANIDE PER 0.5 MG: Performed by: INTERNAL MEDICINE

## 2025-07-17 PROCEDURE — 99222 1ST HOSP IP/OBS MODERATE 55: CPT | Performed by: INTERNAL MEDICINE

## 2025-07-17 PROCEDURE — 99232 SBSQ HOSP IP/OBS MODERATE 35: CPT | Performed by: INTERNAL MEDICINE

## 2025-07-17 RX ORDER — AMLODIPINE BESYLATE 5 MG/1
5 TABLET ORAL
Status: DISCONTINUED | OUTPATIENT
Start: 2025-07-17 | End: 2025-07-25

## 2025-07-17 RX ORDER — METOPROLOL TARTRATE 25 MG/1
25 TABLET, FILM COATED ORAL EVERY 12 HOURS SCHEDULED
Status: DISCONTINUED | OUTPATIENT
Start: 2025-07-17 | End: 2025-07-21

## 2025-07-17 RX ORDER — FERROUS SULFATE 325(65) MG
325 TABLET ORAL
Status: DISCONTINUED | OUTPATIENT
Start: 2025-07-18 | End: 2025-07-29 | Stop reason: HOSPADM

## 2025-07-17 RX ORDER — BUMETANIDE 0.25 MG/ML
1 INJECTION, SOLUTION INTRAMUSCULAR; INTRAVENOUS ONCE
Status: COMPLETED | OUTPATIENT
Start: 2025-07-17 | End: 2025-07-17

## 2025-07-17 RX ADMIN — CLONIDINE HYDROCHLORIDE 0.1 MG: 0.1 TABLET ORAL at 12:41

## 2025-07-17 RX ADMIN — POTASSIUM CHLORIDE 40 MEQ: 1500 TABLET, EXTENDED RELEASE ORAL at 06:08

## 2025-07-17 RX ADMIN — INSULIN LISPRO 2 UNITS: 100 INJECTION, SOLUTION INTRAVENOUS; SUBCUTANEOUS at 17:07

## 2025-07-17 RX ADMIN — PRIMIDONE 100 MG: 50 TABLET ORAL at 08:38

## 2025-07-17 RX ADMIN — POTASSIUM CHLORIDE 40 MEQ: 1500 TABLET, EXTENDED RELEASE ORAL at 04:02

## 2025-07-17 RX ADMIN — TIZANIDINE 4 MG: 4 TABLET ORAL at 21:08

## 2025-07-17 RX ADMIN — METOPROLOL TARTRATE 25 MG: 25 TABLET, FILM COATED ORAL at 21:08

## 2025-07-17 RX ADMIN — TERAZOSIN HYDROCHLORIDE 1 MG: 1 CAPSULE ORAL at 21:09

## 2025-07-17 RX ADMIN — APIXABAN 5 MG: 5 TABLET, FILM COATED ORAL at 21:08

## 2025-07-17 RX ADMIN — PANTOPRAZOLE SODIUM 40 MG: 40 TABLET, DELAYED RELEASE ORAL at 06:08

## 2025-07-17 RX ADMIN — APIXABAN 5 MG: 5 TABLET, FILM COATED ORAL at 08:38

## 2025-07-17 RX ADMIN — INSULIN LISPRO 2 UNITS: 100 INJECTION, SOLUTION INTRAVENOUS; SUBCUTANEOUS at 08:38

## 2025-07-17 RX ADMIN — SPIRONOLACTONE 25 MG: 25 TABLET ORAL at 08:39

## 2025-07-17 RX ADMIN — Medication 10 ML: at 08:39

## 2025-07-17 RX ADMIN — LOSARTAN POTASSIUM 100 MG: 50 TABLET, FILM COATED ORAL at 08:39

## 2025-07-17 RX ADMIN — LORAZEPAM 0.5 MG: 0.5 TABLET ORAL at 21:09

## 2025-07-17 RX ADMIN — VALACYCLOVIR HYDROCHLORIDE 500 MG: 500 TABLET, FILM COATED ORAL at 08:38

## 2025-07-17 RX ADMIN — LEVOTHYROXINE SODIUM 50 MCG: 50 TABLET ORAL at 06:08

## 2025-07-17 RX ADMIN — BUMETANIDE 1 MG: 0.25 INJECTION INTRAMUSCULAR; INTRAVENOUS at 12:35

## 2025-07-17 RX ADMIN — EMPAGLIFLOZIN 10 MG: 10 TABLET, FILM COATED ORAL at 08:38

## 2025-07-17 RX ADMIN — HYDRALAZINE HYDROCHLORIDE 10 MG: 20 INJECTION INTRAMUSCULAR; INTRAVENOUS at 10:35

## 2025-07-17 RX ADMIN — Medication 12.5 MG: at 08:39

## 2025-07-17 RX ADMIN — AMLODIPINE BESYLATE 5 MG: 5 TABLET ORAL at 15:35

## 2025-07-17 RX ADMIN — SERTRALINE HYDROCHLORIDE 200 MG: 100 TABLET, FILM COATED ORAL at 08:38

## 2025-07-17 RX ADMIN — ATORVASTATIN CALCIUM 40 MG: 40 TABLET, FILM COATED ORAL at 21:08

## 2025-07-17 NOTE — CONSULTS
Pulmonary Hospital Consultation       Reason for Consultation: CVA (cerebral vascular accident)  Referring Provider: Peggy Walsh DO      History of Present Illness     80-year-old male with hypertension, anxiety on chronic benzodiazepine, CVA in 2012 without any residual deficit, essential tremor, remote 60-pack-year smoking history quit about 30 years ago, obesity presented with strokelike symptoms.  Stroke neurology following and no new stroke noted.  Thought to have recrudescence of old stroke.  Patient also was noted to be in new onset atrial fibrillation and cardiology team is following.  Patient has been started on anticoagulation and plan is to undergo cardioversion tomorrow.  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  Small left pleural effusion noted.  There are patchy opacities bilaterally she is likely secondary to pulmonary edema pattern.  Patient has been on supplemental oxygen which he was not on before.  We were consulted to evaluate this lung mass.    Patient apparently is having dry cough for about a year now.  Denies any hemoptysis.  Denies any fevers, chills or night sweats.  Denies any change in appetite or weight loss.  Has had significant peripheral edema which according to family is a lot better now.  He does not think he ever had a CT scan of his chest before.  Patient had chest x-ray with last year in May as part of his shoulder surgery no significant abnormalities were noted at that time.    Patient used to smoke 2 packs a day for 30+ years but quit 30 years ago.  Illicit drug use.  No other exposures.    Problem List, Surgical History, Family, Social History, and ROS     Past Medical History:   Diagnosis Date    Anxiety     Arthritis     Hypertension     CONTROLLED WITH MEDS PER PT     Shoulder pain, left     Sleep apnea     did not tolerate CPAP    Stroke     2012-- NO RESIDUAL PROBLEMS    Tremor of right hand      Wears dentures     UPPER AND LOWER PLATE       Past Surgical History:   Procedure Laterality Date    CARDIAC CATHETERIZATION  2016    NO CORONARY STENTS     COLONOSCOPY  2014    [place] ARTHROPLASTY GLENOHUMERAL JOINT TOTAL SHOULDER Left 06/12/2017    Procedure: LEFT TOTAL SHOULDER ARTHROPLASTY ;  Surgeon: Michael Xavier MD;  Location: [facility] OR;  Service: Orthopedics    TOTAL KNEE ARTHROPLASTY Bilateral     TOTAL SHOULDER REPLACEMENT Right     TOTAL SHOULDER REVISION Left 5/20/2024    Procedure: REVISION TOTAL SHOULDER ARTHROPLASTY TO REVERSE TOTAL SHOULDER ATHROPLASTY - LEFT;  Surgeon: Michael Xavier MD;  Location:  [facility] OR;  Service: Orthopedics;  Laterality: Left;       No Known Allergies  No current facility-administered medications on file prior to encounter.     Current Outpatient Medications on File Prior to Encounter   Medication Sig    aspirin 325 MG tablet Take 1 tablet by mouth Daily.    celecoxib (CeleBREX) 100 MG capsule Take 1 capsule by mouth Daily.    doxazosin (CARDURA) 1 MG tablet Take 1 tablet by mouth Every Night.    esomeprazole (nexIUM) 20 MG capsule Take 2 capsules by mouth Every Morning Before Breakfast.    levothyroxine (SYNTHROID, LEVOTHROID) 50 MCG tablet Take 1 tablet by mouth Every Morning.    LORazepam (ATIVAN) 0.5 MG tablet Take 1 tablet by mouth Daily As Needed for Anxiety.    NON FORMULARY Take 1 tablet by mouth Daily. OTC- Bariatric Advantage Vitamin- with Iron    pravastatin (PRAVACHOL) 80 MG tablet Take 1 tablet by mouth Every Night.    primidone (MYSOLINE) 50 MG tablet Take 2 tablets by mouth Daily.    sertraline (ZOLOFT) 100 MG tablet Take 2 tablets by mouth Daily.    telmisartan (MICARDIS) 80 MG tablet Take 1 tablet by mouth Daily.    tiZANidine (ZANAFLEX) 4 MG tablet Take 1 tablet by mouth Every Night.    valACYclovir (VALTREX) 500 MG tablet Take 1 tablet by mouth Daily.     MEDICATION LIST AND ALLERGIES REVIEWED.    History reviewed. No pertinent family  "history.  Social History     Tobacco Use    Smoking status: Former     Types: Cigarettes    Smokeless tobacco: Never    Tobacco comments:     QUIT 20 YEARS AGO, SMOKED X 40 YEARS    Vaping Use    Vaping status: Never Used   Substance Use Topics    Alcohol use: No    Drug use: No     Social History     Social History Narrative    Not on file     FAMILY AND SOCIAL HISTORY REVIEWED.    Review of Systems  ALL OTHER SYSTEMS REVIEWED AND ARE NEGATIVE.    Physical Exam and Clinical Information   /88   Pulse 113   Temp 98.3 °F (36.8 °C) (Oral)   Resp 17   Ht 172.7 cm (67.99\")   Wt 133 kg (293 lb 3.4 oz)   SpO2 95%   BMI 44.59 kg/m²   Physical Exam    General Appearance: Awake, alert, in no acute distress  Lungs:   B/L Breath sounds present with decreased breath sounds on bases, no wheezing heard, occ crackles.   Heart: S1 and S2 present, no murmur, A fib  Abdomen: Obese, Soft, non-tender, no guarding or rigidity, bowel sounds positive.  Extremities: 2-3+ edema, warm to touch.  Neurologic:  Moving all four extremities.     Results from last 7 days   Lab Units 07/16/25  1517 07/15/25  1243 07/15/25  1239   WBC 10*3/mm3 9.61  --  11.13*   HEMOGLOBIN g/dL 8.3*  --  9.0*   HEMOGLOBIN, POC g/dL  --  10.2*  --    PLATELETS 10*3/mm3 150  --  176     Results from last 7 days   Lab Units 07/17/25  1152 07/16/25  1507 07/15/25  1243 07/15/25  1239   SODIUM mmol/L  --  142  --  142   POTASSIUM mmol/L 3.7 2.7*  --  2.5*   CO2 mmol/L  --  33.0*  --  32.6*   BUN mg/dL  --  20.9  --  22.2   CREATININE mg/dL  --  0.84 0.90 0.92   MAGNESIUM mg/dL  --   --   --  2.2   GLUCOSE mg/dL  --  227*  --  252*     Estimated Creatinine Clearance: 93.5 mL/min (by C-G formula based on SCr of 0.84 mg/dL).  Results from last 7 days   Lab Units 07/15/25  1239   HEMOGLOBIN A1C % 6.34*     Results from last 7 days   Lab Units 07/15/25  1316   PH, ARTERIAL pH units 7.489*   PCO2, ARTERIAL mm Hg 50.2*   PO2 ART mm Hg 52.2*     No results found for: " "\"LACTATE\"     Images: Ct chest reviewed,   Impression:     1. Approximately 4.5 cm mediastinal mass corresponding to previous CTA neck findings, and contiguous with a 5.5 x 3 cm pleural-based left upper lobe pulmonary mass, presumably primary neoplasm.     2. Few shotty mediastinal lymph nodes with rounded morphology, whether reactive, or early metastatic adenopathy.     3. Mixed pattern of pulmonary parenchymal disease primarily of the upper lobes, with some septal thickening suggesting pulmonary edema, and reticulonodular and groundglass changes more likely to represent pneumonia than unusual asymmetric pulmonary   edema.     4. Very small left pleural effusion again noted as well as previously described pancreatic cyst or IPMN.     Electronically Signed: Derek Garcia MD    7/17/2025 10:56 AM EDT    Workstation ID: [id number]    I reviewed the patient's results and images.       MRI brain negative for any metastatic disease.     CT abdomen also showed cystic lesion in pancreas.     Impression     CVA (cerebral vascular accident)    Mediastinal mass    Plan/Recommendations     Discussed extensively with patient and his family including patient's wife and daughter.  Imaging showed to them.  Discussed that this is a complex case with mediastinal mass and left upper lobe lesion.  There does not seem to be any obvious airway obstruction.  I think this case will be better handled with a navigational bronchoscopy with endobronchial ultrasound-guided biopsies.  Discussed that I do not perform that procedure and I will reach out to my partners out of this procedure and we can set this patient up as outpatient.  If we do not think this case is amenable to bronchoscopy then we will have CT surgery evaluate the patient for possible mediastinoscopy to see if we can reach the AP window mass.  I will be circling back with the family once we have a decision in place.    Discussed with family that he needs to recover from the stroke " symptoms and also plan for cardioversion for A-fib.  He is also with significant fluid overload and pulmonary edema which will increase the risk of complications from general anesthesia.  Discussed the risks of procedure with them.  Once patient recovers from all days then we can certainly get him back to perform the procedure to get a diagnosis.    Patient also has a cystic lesion in his pancreas though radiology feels it may be more of a benign lesion.  LFTs are mildly elevated.  The bilirubin and alk phos are normal.  Arrange for appropriate outpatient follow-up per primary team.    I would not recommend holding Eliquis for pulmonary issues while patient is hospitalized unless any other cardiac procedure is being planned.    Discussed with Dr. Walsh.     Juan Sams MD, [facility]  Pulmonary and Critical Care Medicine  07/17/25 14:49 EDT     CC: Sebastien Lowe MD

## 2025-07-17 NOTE — PROGRESS NOTES
[facility] Medicine Services  PROGRESS NOTE    Patient Name: Ibrahima Nava  : 1945  MRN: [id number]    Date of Admission: 7/15/2025  Primary Care Physician: Sebastien Lowe MD    Subjective   Subjective     CC:  F/U dysphagia, dysarthria, confusion. New lung mass.    HPI:  Patient seen and examined. Up in bed. No new complaints.  On 2nd visit, patient's wife and daughter are present. Reviewed CT chest findings. Patient does have prior Hx smoking. Quit 30 years ago. Denies weight loss. No hemoptysis. Does endorse chronic cough over the past year. His brother  from lung cancer. He states he wouldn't pursue Tx if cancer but would like to know what it is.     Objective   Objective     Vital Signs:   Temp:  [97.4 °F (36.3 °C)-98.3 °F (36.8 °C)] 98.3 °F (36.8 °C)  Heart Rate:  [] 106  Resp:  [14-18] 17  BP: (117-214)/() 171/92  Flow (L/min) (Oxygen Therapy):  [2] 2     Physical Exam:  Constitutional: No acute distress, awake, alert  HENT: NCAT, mucous membranes moist, [place]  Respiratory: Bibasilar rales, respiratory effort normal 2L NC  Cardiovascular: Irregularly irregular, no murmurs, rubs, or gallops  Gastrointestinal: Positive bowel sounds, soft, +epigastric TTP, no R/R/G, non-distended  Musculoskeletal:  4+ pitting B/L LE edema  Psychiatric: Appropriate affect, cooperative  Neurologic: Oriented x 3, GOVEA, speech difficult to understand at times  Skin: No rashes     Results Reviewed:  LAB RESULTS:      Lab 25  1517 07/15/25  1425 07/15/25  1243 07/15/25  1239   WBC 9.61  --   --  11.13*   HEMOGLOBIN 8.3*  --   --  9.0*   HEMOGLOBIN, POC  --   --  10.2*  --    HEMATOCRIT 28.6*  --   --  31.2*   HEMATOCRIT POC  --   --  30*  --    PLATELETS 150  --   --  176   NEUTROS ABS  --   --   --  9.07*   IMMATURE GRANS (ABS)  --   --   --  0.32*   LYMPHS ABS  --   --   --  1.22   MONOS ABS  --   --   --  0.50   EOS ABS  --   --   --  0.00   MCV 86.9  --   --  87.4   PROTIME   --   --   --  14.8   HSTROP T  --  45*  --  46*         Lab 07/17/25  1152 07/16/25  1507 07/15/25  1243 07/15/25  1239   SODIUM  --  142  --  142   POTASSIUM 3.7 2.7*  --  2.5*   CHLORIDE  --  98  --  97*   CO2  --  33.0*  --  32.6*   ANION GAP  --  11.0  --  12.4   BUN  --  20.9  --  22.2   CREATININE  --  0.84 0.90 0.92   EGFR  --  88.2 86.3 84.1   GLUCOSE  --  227*  --  252*   CALCIUM  --  8.7  --  8.9   MAGNESIUM  --   --   --  2.2   HEMOGLOBIN A1C  --   --   --  6.34*   TSH  --   --   --  0.876         Lab 07/15/25  1239   TOTAL PROTEIN 5.6*   ALBUMIN 3.7   GLOBULIN 1.9   ALT (SGPT) 58*   AST (SGOT) 75*   BILIRUBIN 0.4   ALK PHOS 85         Lab 07/15/25  1425 07/15/25  1239   PROBNP  --  3,433.0*   HSTROP T 45* 46*   PROTIME  --  14.8   INR  --  1.09         Lab 07/16/25  1507   CHOLESTEROL 157   LDL CHOL 77   HDL CHOL 40   TRIGLYCERIDES 240*         Lab 07/16/25  1605   IRON 52*   IRON SATURATION (TSAT) 14*   TIBC 383   TRANSFERRIN 257         Lab 07/15/25  1316   PH, ARTERIAL 7.489*   PCO2, ARTERIAL 50.2*   PO2 ART 52.2*   FIO2 21   HCO3 ART 38.2*   BASE EXCESS ART 13.3*   CARBOXYHEMOGLOBIN 1.8     Brief Urine Lab Results  (Last result in the past 365 days)        Color   Clarity   Blood   Leuk Est   Nitrite   Protein   CREAT   Urine HCG        07/15/25 1319 Yellow   Clear   Moderate (2+)   Negative   Negative   30 mg/dL (1+)                   Microbiology Results Abnormal       None            CT Chest Without Contrast Diagnostic  Result Date: 7/17/2025  CT CHEST WO CONTRAST DIAGNOSTIC Date of Exam: 7/17/2025 10:03 AM EDT Indication: nodular mass upper mediastinum noted on CT A/P. Comparison: No prior chest CT scan.; CTA neck 7/15/2025 which includes a portion of the upper chest Technique: Axial CT images were obtained of the chest without contrast administration.  Reconstructed coronal and sagittal images were also obtained. Automated exposure control and iterative construction methods were used. Findings:  There is a rounded, approximately 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 This appears contiguous with an elongated pleural-based mass extending along the left lateral mediastinal margin, which measures up to approximately 5.5 x 3 cm. Precise delineation from adjacent pulmonary vessels is difficult without IV contrast and only  the upper portion of the mediastinal mass was included on the previous neck CT scan. There are several shotty small mediastinal nodes with rounded morphology of questionable significance, possibly reactive, the largest a left superior mediastinal node 1 cm in diameter image 35/2. There is only trace pericardial fluid. There is a very small apparently free-flowing left pleural effusion. The larger central airways appear patent. Images of the lungs show multifocal reticular and groundglass changes primarily in the upper lungs, and a small amount of septal thickening, potentially a combination of bilateral pneumonia and early interstitial edema less likely all representing an unusual pattern of pulmonary interstitial edema. Bony structures appear to be intact. Previously noted 2.1 cm low-density lesion/cyst in the pancreatic head is faintly visible on today's exam. Water density right renal midpole cyst again noted. No acute upper abdominal disease is seen. 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     Impression: Impression: 1. Approximately 4.5 cm mediastinal mass corresponding to previous CTA neck findings, and contiguous with a 5.5 x 3 cm pleural-based left upper lobe pulmonary mass, presumably primary neoplasm. 2. Few shotty mediastinal lymph nodes with rounded morphology, whether reactive, or early metastatic adenopathy. 3. Mixed pattern of pulmonary parenchymal disease primarily of the upper lobes, with some septal thickening suggesting pulmonary edema, and reticulonodular and groundglass changes more likely to represent pneumonia  than unusual asymmetric pulmonary edema. 4. Very small left pleural effusion again noted as well as previously described pancreatic cyst or IPMN. Electronically Signed: Derek Garcia MD  7/17/2025 10:56 AM EDT  Workstation ID: [id number]    FL Video Swallow With Speech Single Contrast  Result Date: 7/16/2025  FL VIDEO SWALLOW W SPEECH SINGLE-CONTRAST, FL LIMITED UGI FOR MBS REFLUX SINGLE-CONTRAST Date of Exam: 7/16/2025 10:00 AM EDT Indication: dysphagia.   Comparison: None available. Technique:   The speech pathologist administered food and/or liquid mixed with barium to the patient with cine/video imaging.  Imaging assistance was provided to the speech pathologist and an image was saved. The camera was then lowered with the patient remaining in lateral seated position with additional swallows of thin barium, to evaluate the visible portion of the esophagus. Fluoroscopic Time: 1 minute 12 seconds Number of Images: 6 cine loops Findings: Modified barium swallow: The patient was evaluated in the seated lateral position while taking a variety of consistencies by mouth under the direction of speech pathology. No aspiration was observed. Please see the speech pathologist's procedure report for full details and recommendations. Limited upper GI series: There is moderate residual barium in the upper two thirds of the esophagus following the modified barium swallow. Additional sips show no obvious stricture. There is mild intraesophageal reflux.     Impression: Impression: 1. Fluoroscopy provided for modified barium swallow. 2. Limited upper GI series shows incomplete clearance with residual thin barium in the upper two thirds of the esophagus. No evidence of obstruction and only mild intraesophageal reflux. Electronically Signed: Derek Garcia MD  7/16/2025 12:10 PM EDT  Workstation ID: [id number]    FL Limited Ugi For Mbs Reflux Single-Contrast  Result Date: 7/16/2025  FL VIDEO SWALLOW W SPEECH SINGLE-CONTRAST, FL LIMITED UGI FOR  MBS REFLUX SINGLE-CONTRAST Date of Exam: 7/16/2025 10:00 AM EDT Indication: dysphagia.   Comparison: None available. Technique:   The speech pathologist administered food and/or liquid mixed with barium to the patient with cine/video imaging.  Imaging assistance was provided to the speech pathologist and an image was saved. The camera was then lowered with the patient remaining in lateral seated position with additional swallows of thin barium, to evaluate the visible portion of the esophagus. Fluoroscopic Time: 1 minute 12 seconds Number of Images: 6 cine loops Findings: Modified barium swallow: The patient was evaluated in the seated lateral position while taking a variety of consistencies by mouth under the direction of speech pathology. No aspiration was observed. Please see the speech pathologist's procedure report for full details and recommendations. Limited upper GI series: There is moderate residual barium in the upper two thirds of the esophagus following the modified barium swallow. Additional sips show no obvious stricture. There is mild intraesophageal reflux.     Impression: Impression: 1. Fluoroscopy provided for modified barium swallow. 2. Limited upper GI series shows incomplete clearance with residual thin barium in the upper two thirds of the esophagus. No evidence of obstruction and only mild intraesophageal reflux. Electronically Signed: Derek Garcia MD  7/16/2025 12:10 PM EDT  Workstation ID: [id number]    MRI Brain Without Contrast  Result Date: 7/16/2025  MRI BRAIN WO CONTRAST Date of Exam: 7/16/2025 2:32 AM EDT Indication: Stroke, follow up AMS, dysarthria, dysphagia - subacute L MCA infarct on CT head.  Comparison: CT 1 day prior Technique:  Routine multiplanar/multisequence sequence images of the brain were obtained without contrast administration. Findings: No acute infarct is present on diffusion weighted sequences. Midline structures appear normal and the craniocervical junction is  satisfactory. Age-related changes are present with mild generalized volume loss and mild typical T2 hyperintense subcortical and pontine white matter changes, nonspecific but favored to reflect sequela of chronic microvascular ischemia. There are also areas of old infarct present within the left perirolandic region and small bilateral chronic basal ganglia lacunar infarcts. There is no evidence of intracranial hemorrhage, mass or mass effect. The ventricles demonstrate mild ex vacuo prominence. The orbits are normal. The paranasal sinuses demonstrate small mucous retention cysts in the left maxillary sinus.     Impression: Impression: Moderate typical chronic and senescent findings are present as above, including a chronic appearing infarct of the left pre and postcentral gyri. There is no evidence of recent infarct, hemorrhage, mass or mass effect. Electronically Signed: Chase Birmingham MD  7/16/2025 8:46 AM EDT  Workstation ID: [id number]    EEG  Result Date: 7/15/2025  History: 80 y old male Procedure: A 21 Channel digital electroencephalogram was performed in the Clinical Neurophysiology Laboratory. The 10/20 International System of electrode placement was used and both Bipolar and referential electrode montages were monitored.  EEG Description: This EEG is continuous and symmetrical. During the awake state with eye closed, there is a posterior dominant rhythm of  --9-  Hz that is symmetrical and reacts appropriately to eye opening. Anterior to posterior amplitude frequency gradient is preserved. With Drowsiness, there is waxing and waning of the posterior dominant rhythm with eventual replacement by a mixture of beta, alpha and theta activity. Photic stimulation using a stepwise increase in photic frequency, results in driving response but no activation of epileptiform activity.         Interpretation: This EEG obtained in the awake and drowsy state is normal for age.  Clinical correlation: The diagnosis of  epilepsy is a clinical one. A normal EEG does not exclude this diagnosis. However there are no epileptiform features in this recording to sug    XR Chest 1 View  Result Date: 7/15/2025  XR CHEST 1 VW Date of Exam: 7/15/2025 2:23 PM EDT Indication: Acute Stroke Protocol (onset < 12 hrs) Comparison: 5/7/2024 Findings: Cardiac size is enlarged. There is an increase in the pulmonary vascular markings with interval development of diffuse interstitial edema. No focal consolidation is seen. There is no obvious pleural fluid.     Impression: Impression: Cardiomegaly with interval development of pulmonary edema. Electronically Signed: Jani Jung MD  7/15/2025 2:44 PM EDT  Workstation ID: [id number]    CT Angiogram Head w AI Analysis of LVO  Result Date: 7/15/2025  CT ANGIOGRAM HEAD W AI ANALYSIS OF LVO, CT ANGIOGRAM NECK, CT ABDOMEN PELVIS W CONTRAST Date of Exam: 7/15/2025 12:38 PM EDT Indication: Neuro Deficit, acute, Stroke suspected Neuro deficit, acute stroke suspected. Comparison: None available. Technique: CTA of the head and neck was performed after the uneventful intravenous administration of 90 mL Isovue-370. Axial IV contrast-enhanced CT of the abdomen and pelvis was performed. Reconstructed coronal and sagittal images were also obtained. In  addition, a 3-D volume rendered image was created for interpretation. Automated exposure control and iterative reconstruction methods were used. Findings: CTA head and neck: The lung apices demonstrate a partially imaged 3.8 cm nodular soft tissue finding within the upper mediastinum on the left, either an enlarged lymph node or mass. Evaluation of the neck soft tissues demonstrates no evidence of aerodigestive tract mass or pathologic cervical lymphadenopathy. Multilevel cervical spondylosis is present, without evidence of acute fracture or aggressive osseous lesion. Patent aortic arch with three-vessel branching. The subclavian arteries are patent bilaterally. On the  right, there is calcific atherosclerotic change present at the ICA origin with mild, less than 50% narrowing by NASCET criteria. Similar mild narrowing is present on the left. The vertebral arteries are normal in course and caliber bilaterally. Intracranially, the carotid siphons demonstrate calcific atherosclerotic changes with some mild narrowing of both ophthalmic segments. The anterior cerebral arteries are normal in course and caliber bilaterally. The right middle cerebral artery is normal. The left middle cerebral artery demonstrates no evidence of high-grade stenosis, occlusion or aneurysm. The vertebrobasilar system is patent. The posterior cerebral arteries are normal in course and caliber bilaterally. CT abdomen pelvis: The lung bases are clear. The body wall soft tissues demonstrate no acute or suspicious findings, with 2 adjacent presumed sebaceous cyst seen in the right gluteal region. The liver demonstrates low-attenuation findings most consistent  with cysts. The spleen, pancreas and bilateral adrenal glands demonstrate no acute findings. A homogeneous cystic appearing finding within the pancreatic head measures 22 x 21 mm. Normal gallbladder. Small and large bowel loops are nondilated. Colonic diverticular changes are present, without definite acute inflammatory change to suggest diverticulitis. The appendix is normal. There is no free fluid or pneumoperitoneum. Atherosclerotic, nonaneurysmal abdominal aorta. The pelvic viscera demonstrate no acute findings.     Impression: Impression: 1.Mild cervical and intracranial atherosclerotic changes are present as above, without evidence of flow-limiting stenosis, large vessel occlusion or aneurysm. 2.No acute findings in the abdomen and pelvis. 3.Incidentally noted 22 x 21 mm cystic finding within the pancreatic head. Correlation with any available prior imaging could be of use to document expected stability of this likely benign finding, otherwise as  indicated, MRI could be useful to further evaluate. 4.Incompletely characterized 3.8 cm nodular mass within the upper mediastinum, possibly an enlarged lymph node. Consider dedicated CT chest. Electronically Signed: Chase Birmingham MD  7/15/2025 1:27 PM EDT  Workstation ID: [id number]    CT Angiogram Neck  Result Date: 7/15/2025  CT ANGIOGRAM HEAD W AI ANALYSIS OF LVO, CT ANGIOGRAM NECK, CT ABDOMEN PELVIS W CONTRAST Date of Exam: 7/15/2025 12:38 PM EDT Indication: Neuro Deficit, acute, Stroke suspected Neuro deficit, acute stroke suspected. Comparison: None available. Technique: CTA of the head and neck was performed after the uneventful intravenous administration of 90 mL Isovue-370. Axial IV contrast-enhanced CT of the abdomen and pelvis was performed. Reconstructed coronal and sagittal images were also obtained. In  addition, a 3-D volume rendered image was created for interpretation. Automated exposure control and iterative reconstruction methods were used. Findings: CTA head and neck: The lung apices demonstrate a partially imaged 3.8 cm nodular soft tissue finding within the upper mediastinum on the left, either an enlarged lymph node or mass. Evaluation of the neck soft tissues demonstrates no evidence of aerodigestive tract mass or pathologic cervical lymphadenopathy. Multilevel cervical spondylosis is present, without evidence of acute fracture or aggressive osseous lesion. Patent aortic arch with three-vessel branching. The subclavian arteries are patent bilaterally. On the right, there is calcific atherosclerotic change present at the ICA origin with mild, less than 50% narrowing by NASCET criteria. Similar mild narrowing is present on the left. The vertebral arteries are normal in course and caliber bilaterally. Intracranially, the carotid siphons demonstrate calcific atherosclerotic changes with some mild narrowing of both ophthalmic segments. The anterior cerebral arteries are normal in course and caliber  bilaterally. The right middle cerebral artery is normal. The left middle cerebral artery demonstrates no evidence of high-grade stenosis, occlusion or aneurysm. The vertebrobasilar system is patent. The posterior cerebral arteries are normal in course and caliber bilaterally. CT abdomen pelvis: The lung bases are clear. The body wall soft tissues demonstrate no acute or suspicious findings, with 2 adjacent presumed sebaceous cyst seen in the right gluteal region. The liver demonstrates low-attenuation findings most consistent  with cysts. The spleen, pancreas and bilateral adrenal glands demonstrate no acute findings. A homogeneous cystic appearing finding within the pancreatic head measures 22 x 21 mm. Normal gallbladder. Small and large bowel loops are nondilated. Colonic diverticular changes are present, without definite acute inflammatory change to suggest diverticulitis. The appendix is normal. There is no free fluid or pneumoperitoneum. Atherosclerotic, nonaneurysmal abdominal aorta. The pelvic viscera demonstrate no acute findings.     Impression: Impression: 1.Mild cervical and intracranial atherosclerotic changes are present as above, without evidence of flow-limiting stenosis, large vessel occlusion or aneurysm. 2.No acute findings in the abdomen and pelvis. 3.Incidentally noted 22 x 21 mm cystic finding within the pancreatic head. Correlation with any available prior imaging could be of use to document expected stability of this likely benign finding, otherwise as indicated, MRI could be useful to further evaluate. 4.Incompletely characterized 3.8 cm nodular mass within the upper mediastinum, possibly an enlarged lymph node. Consider dedicated CT chest. Electronically Signed: Chase Birmingham MD  7/15/2025 1:27 PM EDT  Workstation ID: [id number]    CT Abdomen Pelvis With Contrast  Result Date: 7/15/2025  CT ANGIOGRAM HEAD W AI ANALYSIS OF LVO, CT ANGIOGRAM NECK, CT ABDOMEN PELVIS W CONTRAST Date of Exam:  7/15/2025 12:38 PM EDT Indication: Neuro Deficit, acute, Stroke suspected Neuro deficit, acute stroke suspected. Comparison: None available. Technique: CTA of the head and neck was performed after the uneventful intravenous administration of 90 mL Isovue-370. Axial IV contrast-enhanced CT of the abdomen and pelvis was performed. Reconstructed coronal and sagittal images were also obtained. In  addition, a 3-D volume rendered image was created for interpretation. Automated exposure control and iterative reconstruction methods were used. Findings: CTA head and neck: The lung apices demonstrate a partially imaged 3.8 cm nodular soft tissue finding within the upper mediastinum on the left, either an enlarged lymph node or mass. Evaluation of the neck soft tissues demonstrates no evidence of aerodigestive tract mass or pathologic cervical lymphadenopathy. Multilevel cervical spondylosis is present, without evidence of acute fracture or aggressive osseous lesion. Patent aortic arch with three-vessel branching. The subclavian arteries are patent bilaterally. On the right, there is calcific atherosclerotic change present at the ICA origin with mild, less than 50% narrowing by NASCET criteria. Similar mild narrowing is present on the left. The vertebral arteries are normal in course and caliber bilaterally. Intracranially, the carotid siphons demonstrate calcific atherosclerotic changes with some mild narrowing of both ophthalmic segments. The anterior cerebral arteries are normal in course and caliber bilaterally. The right middle cerebral artery is normal. The left middle cerebral artery demonstrates no evidence of high-grade stenosis, occlusion or aneurysm. The vertebrobasilar system is patent. The posterior cerebral arteries are normal in course and caliber bilaterally. CT abdomen pelvis: The lung bases are clear. The body wall soft tissues demonstrate no acute or suspicious findings, with 2 adjacent presumed sebaceous  cyst seen in the right gluteal region. The liver demonstrates low-attenuation findings most consistent  with cysts. The spleen, pancreas and bilateral adrenal glands demonstrate no acute findings. A homogeneous cystic appearing finding within the pancreatic head measures 22 x 21 mm. Normal gallbladder. Small and large bowel loops are nondilated. Colonic diverticular changes are present, without definite acute inflammatory change to suggest diverticulitis. The appendix is normal. There is no free fluid or pneumoperitoneum. Atherosclerotic, nonaneurysmal abdominal aorta. The pelvic viscera demonstrate no acute findings.     Impression: Impression: 1.Mild cervical and intracranial atherosclerotic changes are present as above, without evidence of flow-limiting stenosis, large vessel occlusion or aneurysm. 2.No acute findings in the abdomen and pelvis. 3.Incidentally noted 22 x 21 mm cystic finding within the pancreatic head. Correlation with any available prior imaging could be of use to document expected stability of this likely benign finding, otherwise as indicated, MRI could be useful to further evaluate. 4.Incompletely characterized 3.8 cm nodular mass within the upper mediastinum, possibly an enlarged lymph node. Consider dedicated CT chest. Electronically Signed: Chase Birmingham MD  7/15/2025 1:27 PM EDT  Workstation ID: [id number]    CT CEREBRAL PERFUSION WITH & WITHOUT CONTRAST  Result Date: 7/15/2025  CT CEREBRAL PERFUSION W WO CONTRAST Date of Exam: 7/15/2025 12:38 PM EDT Indication: Neuro Deficit, acute, Stroke suspected Neuro deficit, acute stroke suspected.  Comparison: CT head from earlier today Technique: Axial CT images of the brain were obtained prior to and after the administration of 50 mL Isovue-370. Core blood volume, core blood flow, mean transit time, and Tmax images were obtained utilizing the Rapid software protocol. A limited CT angiogram of the head was also performed to measure the blood vessel  density. The radiation dose reduction device was turned on for each scan per the ALARA (As Low as Reasonably Achievable) protocol. Findings: The intracranial cerebral perfusion appears normal.     Impression: Impression: Examination appears within normal limits. Electronically Signed: Ibrahima Ribeiro MD  7/15/2025 1:15 PM EDT  Workstation ID: [id number]    CT Head Without Contrast Stroke Protocol  Result Date: 7/15/2025  CT HEAD WO CONTRAST STROKE PROTOCOL Date of Exam: 7/15/2025 12:36 PM EDT Indication: Neuro deficit, acute, stroke suspected Neuro Deficit, acute, Stroke suspected. Comparison: None available Technique: Axial CT images were obtained of the head without contrast administration.  Reconstructed coronal images were also obtained. Automated exposure control and iterative construction methods were used. Scan Time: 1234 Results discussed with ALAN Myers at 1240, 7/15/2025. Findings: The calvarium appears intact. 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 Left maxillary sinus mucous cyst is noted. Paranasal sinuses and mastoids otherwise appear clear. Orbits appear unremarkable except for ocular lens replacements. There is moderately advanced generalized cerebral atrophy. Focal left parietal encephalomalacia presumably represents old infarct. Intermediate density of the medial portion of the area of encephalomalacia could at least potentially represent late subacute infarct, although this is not favored. Small focal low-attenuation area is seen in the left external capsule, nonspecific. There is an old lacunar-type infarct of the head of the caudate nucleus. No clearly acute infarct/edema is identified. No hemorrhage, hydrocephalus, mass or mass effect, or abnormal extra-axial collection is seen.     Impression: Impression: 1. Old, less likely late subacute infarct of the left parietal lobe. 2. Small chronic ischemic focus of the area of the left external capsule. 3. Old  infarct of the head of the left caudate nucleus. 4. No clearly acute intracranial abnormality is identified. Electronically Signed: Derek Garcia MD  7/15/2025 12:55 PM EDT  Workstation ID: [id number]          Current medications:  Scheduled Meds:apixaban, 5 mg, Oral, Q12H  atorvastatin, 40 mg, Oral, Nightly  bumetanide, 1 mg, Intravenous, Once  empagliflozin, 10 mg, Oral, Daily  insulin lispro, 2-9 Units, Subcutaneous, 4x Daily AC & at Bedtime  levothyroxine, 50 mcg, Oral, Q AM  losartan, 100 mg, Oral, Q24H  metoprolol tartrate, 12.5 mg, Oral, Q12H  pantoprazole, 40 mg, Oral, Q AM  primidone, 100 mg, Oral, Daily  sertraline, 200 mg, Oral, Daily  sodium chloride, 10 mL, Intravenous, Q12H  sodium chloride, 10 mL, Intravenous, Q12H  spironolactone, 25 mg, Oral, Daily  terazosin, 1 mg, Oral, Nightly  tiZANidine, 4 mg, Oral, Nightly  valACYclovir, 500 mg, Oral, Daily      Continuous Infusions:   PRN Meds:.  senna-docusate sodium **AND** polyethylene glycol **AND** bisacodyl **AND** bisacodyl    cloNIDine    dextrose    dextrose    glucagon (human recombinant)    hydrALAZINE    LORazepam    Magnesium Cardiology Dose Replacement - Follow Nurse / BPA Driven Protocol    metoprolol tartrate    nitroglycerin    Potassium Replacement - Follow Nurse / BPA Driven Protocol    sodium chloride    sodium chloride    sodium chloride    sodium chloride    sodium chloride    Assessment & Plan   Assessment & Plan     Active Hospital Problems    Diagnosis  POA    **CVA (cerebral vascular accident) [I63.9]  Yes      Resolved Hospital Problems   No resolved problems to display.        Brief Hospital Course to date:  Ibrahima Nava is a 80 y.o. male w history of HTN, anxiety on chronic benzos, CVA in 2012 (no residual deficit), essential tremor. Endorses dysarthria, dysphagia to solids and liquids, confusion.     This patient's problems and plans were partially entered by my partner and updated as appropriate by me 07/17/25.      Dysarthria  Expressive aphasia  Confusion   Prior stroke   -MRI brain negative for acute stroke. Noted chronic appearing infarct of L pre and post central gyri   -Stroke team followed, feel likely recrudescence of old stroke  -EEG negative  -Echo pending   -PT/OT recommend home   -UA ok   -Follow-up stroke clinic 1 month    Dysphagia  -noted to solids and liquids PTA  -endorses fullness R neck  -SLP follows, s/p MBS w/limited upper GI series, no evidence of obstruction and mild reflux   -CTA neck : Evaluation of the neck soft tissues demonstrates no evidence of aerodigestive tract mass or pathologic cervical lymphadenopathy.   -soft to chew textures, chopped, thin liquids     New Atrial Fibrillation  New LE edema, concern for new CHF  Pulmonary Edema  Hypoxia, new   -Echo pending  -Cardiology following, assistance appreciated   -TSH ok  -Bumex ordered this AM  -Jardiance, Aldactone, BB, Eliquis added  -Plan for [facility] ECV tomorrow     New Mediastinal mass, JANNA pulmonary mass  -CT chest reviewed with patient and family  -prior smoking Hx, quit 30 years ago  -endorses chronic cough x 1 year  -No weight loss or hemoptysis   -Interested in biopsy, was just started on Eliquis for A fib so will need to be held pending timing   -Consult to Pulmonology     Chronic Benzo use  -continue home meds     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         AM-PAC 6 Clicks Score (PT): 21 (07/17/25 3042)    CODE STATUS:   Code Status and Medical Interventions: CPR (Attempt to Resuscitate); Full  Support   Ordered at: 07/15/25 1515     Code Status (Patient has no pulse and is not breathing):    CPR (Attempt to Resuscitate)     Medical Interventions (Patient has pulse or is breathing):    Full Support     Level Of Support Discussed With:    Patient       Peggy AIDA Walsh DO  07/17/25

## 2025-07-17 NOTE — PLAN OF CARE
Problem: Adult Inpatient Plan of Care  Goal: Plan of Care Review  Outcome: Progressing  Goal: Patient-Specific Goal (Individualized)  Outcome: Progressing  Goal: Absence of Hospital-Acquired Illness or Injury  Outcome: Progressing  Intervention: Identify and Manage Fall Risk  Recent Flowsheet Documentation  Taken 7/17/2025 0400 by Bee Tellez RN  Safety Promotion/Fall Prevention:   activity supervised   assistive device/personal items within reach   clutter free environment maintained   nonskid shoes/slippers when out of bed   room organization consistent   safety round/check completed  Taken 7/17/2025 0200 by Bee Tellez RN  Safety Promotion/Fall Prevention:   activity supervised   assistive device/personal items within reach   clutter free environment maintained   room organization consistent   safety round/check completed  Taken 7/17/2025 0000 by Bee Tellez RN  Safety Promotion/Fall Prevention:   activity supervised   assistive device/personal items within reach   clutter free environment maintained   room organization consistent   safety round/check completed   nonskid shoes/slippers when out of bed  Taken 7/16/2025 2200 by Bee Tellez RN  Safety Promotion/Fall Prevention:   activity supervised   assistive device/personal items within reach   clutter free environment maintained   room organization consistent   safety round/check completed   nonskid shoes/slippers when out of bed  Taken 7/16/2025 2000 by Bee Tellez RN  Safety Promotion/Fall Prevention:   activity supervised   assistive device/personal items within reach   clutter free environment maintained   room organization consistent   safety round/check completed  Intervention: Prevent Skin Injury  Recent Flowsheet Documentation  Taken 7/17/2025 0400 by Bee Tellez RN  Body Position:   right   position maintained  Skin Protection:   incontinence pads utilized   transparent dressing maintained   silicone foam dressing in place  Taken 7/17/2025 0200 by Rosalinda  ERICA [place]  Body Position:   left   position maintained  Skin Protection:   incontinence pads utilized   transparent dressing maintained   silicone foam dressing in place   drying agents applied  Taken 7/17/2025 0000 by Bee Tellez RN  Body Position: supine  Skin Protection:   incontinence pads utilized   transparent dressing maintained   silicone foam dressing in place   drying agents applied  Taken 7/16/2025 2200 by Bee Tellez RN  Body Position:   right   position maintained  Skin Protection:   transparent dressing maintained   incontinence pads utilized   silicone foam dressing in place  Taken 7/16/2025 2000 by Bee Tellez RN  Body Position: position changed independently  Skin Protection:   transparent dressing maintained   incontinence pads utilized   silicone foam dressing in place   pulse oximeter probe site changed  Intervention: Prevent and Manage VTE (Venous Thromboembolism) Risk  Recent Flowsheet Documentation  Taken 7/17/2025 0400 by Bee Tellez RN  VTE Prevention/Management: SCDs (sequential compression devices) on  Taken 7/17/2025 0000 by Bee Tellez RN  VTE Prevention/Management: SCDs (sequential compression devices) on  Taken 7/16/2025 2000 by Bee Tellez RN  VTE Prevention/Management: SCDs (sequential compression devices) on  Intervention: Prevent Infection  Recent Flowsheet Documentation  Taken 7/17/2025 0400 by Bee Tellez RN  Infection Prevention:   hand hygiene promoted   rest/sleep promoted   environmental surveillance performed  Taken 7/17/2025 0200 by Bee Tellez RN  Infection Prevention:   hand hygiene promoted   environmental surveillance performed   rest/sleep promoted  Taken 7/17/2025 0000 by Bee Tellez RN  Infection Prevention:   rest/sleep promoted   hand hygiene promoted   environmental surveillance performed  Taken 7/16/2025 2200 by Bee Tellez RN  Infection Prevention:   hand hygiene promoted   rest/sleep promoted   environmental surveillance performed  Taken 7/16/2025 2000 by Bee Tellez  RN  Infection Prevention:   hand hygiene promoted   rest/sleep promoted   environmental surveillance performed  Goal: Optimal Comfort and Wellbeing  Outcome: Progressing  Intervention: Provide Person-Centered Care  Recent Flowsheet Documentation  Taken 7/17/2025 0400 by Bee Tellez RN  Trust Relationship/Rapport:   care explained   choices provided   thoughts/feelings acknowledged   questions answered   questions encouraged  Taken 7/17/2025 0000 by Bee Tellez RN  Trust Relationship/Rapport:   care explained   choices provided   thoughts/feelings acknowledged   emotional support provided  Taken 7/16/2025 2000 by Bee Tellez RN  Trust Relationship/Rapport:   care explained   choices provided   questions answered   questions encouraged   reassurance provided   thoughts/feelings acknowledged  Goal: Readiness for Transition of Care  Outcome: Progressing     Problem: Stroke, Ischemic (Includes Transient Ischemic Attack)  Goal: Optimal Coping  Outcome: Progressing  Intervention: Support Psychosocial Response to Stroke  Recent Flowsheet Documentation  Taken 7/17/2025 0400 by Bee Tellez RN  Supportive Measures:   active listening utilized   relaxation techniques promoted  Taken 7/17/2025 0000 by Bee Tellez RN  Supportive Measures:   active listening utilized   relaxation techniques promoted  Taken 7/16/2025 2000 by Bee Tellez RN  Supportive Measures:   active listening utilized   relaxation techniques promoted  Family/Support System Care:   self-care encouraged   support provided  Goal: Optimal Cerebral Tissue Perfusion  Outcome: Progressing  Intervention: Protect and Optimize Cerebral Perfusion  Recent Flowsheet Documentation  Taken 7/17/2025 0400 by Bee Tellez RN  Cerebral Perfusion Promotion: blood pressure monitored  Taken 7/17/2025 0000 by Bee Tellez RN  Cerebral Perfusion Promotion: blood pressure monitored  Taken 7/16/2025 2000 by Bee Tellez RN  Sensory Stimulation Regulation:   care clustered   lighting  decreased  Cerebral Perfusion Promotion: blood pressure monitored  Goal: Optimal Cognitive Function  Outcome: Progressing  Intervention: Optimize Cognitive Function  Recent Flowsheet Documentation  Taken 7/16/2025 2000 by Bee Tellez RN  Sensory Stimulation Regulation:   care clustered   lighting decreased  Goal: Optimal Functional Ability  Outcome: Progressing  Intervention: Optimize Functional Ability  Recent Flowsheet Documentation  Taken 7/17/2025 0400 by Bee Tellez RN  Activity Management:   activity encouraged   ambulated to bathroom  Taken 7/17/2025 0200 by Bee Tellez RN  Activity Management: activity encouraged  Taken 7/17/2025 0000 by Bee Tellez RN  Activity Management: activity encouraged  Taken 7/16/2025 2200 by Bee Tellez RN  Activity Management: activity encouraged  Taken 7/16/2025 2000 by Bee Tellez RN  Activity Management: activity encouraged  Goal: Effective Oxygenation and Ventilation  Outcome: Progressing  Intervention: Optimize Oxygenation and Ventilation  Recent Flowsheet Documentation  Taken 7/17/2025 0400 by Bee Tellez RN  Head of Bed ([facility]) Positioning: [facility] elevated  Airway/Ventilation Management: oxygen therapy provided  Taken 7/17/2025 0200 by Bee Tellez RN  Head of Bed ([facility]) Positioning: [facility] elevated  Airway/Ventilation Management: oxygen therapy provided  Taken 7/17/2025 0000 by Bee Tellez RN  Head of Bed ([facility]) Positioning: [facility] elevated  Airway/Ventilation Management: oxygen therapy provided  Taken 7/16/2025 2200 by Bee Tellez RN  Head of Bed ([facility]) Positioning: [facility] elevated  Airway/Ventilation Management: oxygen therapy provided  Taken 7/16/2025 2000 by Bee Tellez RN  Head of Bed ([facility]) Positioning: [facility] elevated  Airway/Ventilation Management: oxygen therapy provided  Goal: Safe and Effective Swallow  Outcome: Progressing     Problem: Comorbidity Management  Goal: Blood Pressure in Desired Range  Outcome: Progressing  Intervention: Maintain Blood Pressure Management  Recent Flowsheet  Documentation  Taken 7/17/2025 0400 by Bee Tellez RN  Medication Review/Management: medications reviewed  Taken 7/17/2025 0200 by Bee Tellez RN  Medication Review/Management: medications reviewed  Taken 7/17/2025 0000 by Bee Tellez RN  Medication Review/Management: medications reviewed  Taken 7/16/2025 2200 by Bee Tellez RN  Medication Review/Management: medications reviewed  Taken 7/16/2025 2000 by Bee Tellez RN  Medication Review/Management: medications reviewed     Problem: Fall Injury Risk  Goal: Absence of Fall and Fall-Related Injury  Outcome: Progressing  Intervention: Identify and Manage Contributors  Recent Flowsheet Documentation  Taken 7/17/2025 0400 by Bee Tellez RN  Medication Review/Management: medications reviewed  Taken 7/17/2025 0200 by Bee Tellez RN  Medication Review/Management: medications reviewed  Taken 7/17/2025 0000 by Bee Tellez RN  Medication Review/Management: medications reviewed  Taken 7/16/2025 2200 by Bee Tellez RN  Medication Review/Management: medications reviewed  Taken 7/16/2025 2000 by Bee Tellez RN  Medication Review/Management: medications reviewed  Intervention: Promote Injury-Free Environment  Recent Flowsheet Documentation  Taken 7/17/2025 0400 by Bee Tellez RN  Safety Promotion/Fall Prevention:   activity supervised   assistive device/personal items within reach   clutter free environment maintained   nonskid shoes/slippers when out of bed   room organization consistent   safety round/check completed  Taken 7/17/2025 0200 by Bee Tellez RN  Safety Promotion/Fall Prevention:   activity supervised   assistive device/personal items within reach   clutter free environment maintained   room organization consistent   safety round/check completed  Taken 7/17/2025 0000 by Bee Tellez RN  Safety Promotion/Fall Prevention:   activity supervised   assistive device/personal items within reach   clutter free environment maintained   room organization consistent   safety round/check  completed   nonskid shoes/slippers when out of bed  Taken 7/16/2025 2200 by Bee Tellez RN  Safety Promotion/Fall Prevention:   activity supervised   assistive device/personal items within reach   clutter free environment maintained   room organization consistent   safety round/check completed   nonskid shoes/slippers when out of bed  Taken 7/16/2025 2000 by Bee Tellez RN  Safety Promotion/Fall Prevention:   activity supervised   assistive device/personal items within reach   clutter free environment maintained   room organization consistent   safety round/check completed     Problem: Skin Injury Risk Increased  Goal: Skin Health and Integrity  Outcome: Progressing  Intervention: Optimize Skin Protection  Recent Flowsheet Documentation  Taken 7/17/2025 0400 by Bee Tellez RN  Activity Management:   activity encouraged   ambulated to bathroom  Pressure Reduction Techniques:   frequent weight shift encouraged   heels elevated off bed   pressure points protected  Head of Bed (HOB) Positioning: HOB elevated  Pressure Reduction Devices:   pressure-redistributing mattress utilized   positioning supports utilized  Skin Protection:   incontinence pads utilized   transparent dressing maintained   silicone foam dressing in place  Taken 7/17/2025 0200 by eBe Tellez RN  Activity Management: activity encouraged  Pressure Reduction Techniques:   frequent weight shift encouraged   heels elevated off bed   pressure points protected   weight shift assistance provided  Head of Bed (HOB) Positioning: HOB elevated  Pressure Reduction Devices:   pressure-redistributing mattress utilized   positioning supports utilized   heel offloading device utilized  Skin Protection:   incontinence pads utilized   transparent dressing maintained   silicone foam dressing in place   drying agents applied  Taken 7/17/2025 0000 by Bee Tellez RN  Activity Management: activity encouraged  Pressure Reduction Techniques:   frequent weight shift encouraged    heels elevated off bed   pressure points protected  Head of Bed ([facility]) Positioning: [facility] elevated  Pressure Reduction Devices:   pressure-redistributing mattress utilized   heel offloading device utilized   positioning supports utilized  Skin Protection:   incontinence pads utilized   transparent dressing maintained   silicone foam dressing in place   drying agents applied  Taken 7/16/2025 2200 by Bee Tellez RN  Activity Management: activity encouraged  Pressure Reduction Techniques:   frequent weight shift encouraged   heels elevated off bed   pressure points protected   weight shift assistance provided  Head of Bed ([facility]) Positioning: [facility] elevated  Pressure Reduction Devices:   pressure-redistributing mattress utilized   positioning supports utilized   heel offloading device utilized  Skin Protection:   transparent dressing maintained   incontinence pads utilized   silicone foam dressing in place  Taken 7/16/2025 2000 by Bee Tellez RN  Activity Management: activity encouraged  Pressure Reduction Techniques:   frequent weight shift encouraged   heels elevated off bed   pressure points protected   weight shift assistance provided  Head of Bed ([facility]) Positioning: [facility] elevated  Pressure Reduction Devices:   pressure-redistributing mattress utilized   positioning supports utilized   heel offloading device utilized  Skin Protection:   transparent dressing maintained   incontinence pads utilized   silicone foam dressing in place   pulse oximeter probe site changed   Goal Outcome Evaluation:

## 2025-07-17 NOTE — PROGRESS NOTES
[facility] at [facility]  IP Progress Note      Chief Complaint/Reason for service:  A-fib RVR #2 heart failure with unknown EF #3 exertional chest pain    Subjective   Subjective: Patient is awake and alert.  States he feels pretty good.  No neurologic symptoms.  He denies chest pain or shortness of breath.  He has not eaten anything today    Past medical, surgical, social and family history reviewed in the patient's electronic medical record.    Objective     Vital Sign Min/Max for last 24 hours  Temp  Min: 97.4 °F (36.3 °C)  Max: 98.3 °F (36.8 °C)   BP  Min: 117/83  Max: 214/109   Pulse  Min: 82  Max: 94   Resp  Min: 14  Max: 18   SpO2  Min: 94 %  Max: 97 %   Flow (L/min) (Oxygen Therapy)  Min: 2  Max: 2      Intake/Output Summary (Last 24 hours) at 7/17/2025 0803  Last data filed at 7/17/2025 0356  Gross per 24 hour   Intake 240 ml   Output 750 ml   Net -510 ml             Current Facility-Administered Medications:     apixaban (ELIQUIS) tablet 5 mg, 5 mg, Oral, Q12H, Igor Nath MD, 5 mg at 07/16/25 2056    atorvastatin (LIPITOR) tablet 40 mg, 40 mg, Oral, Nightly, Carlos Mendez MD, 40 mg at 07/16/25 2056    sennosides-docusate (PERICOLACE) 8.6-50 MG per tablet 2 tablet, 2 tablet, Oral, BID PRN **AND** polyethylene glycol (MIRALAX) packet 17 g, 17 g, Oral, Daily PRN **AND** bisacodyl (DULCOLAX) EC tablet 5 mg, 5 mg, Oral, Daily PRN **AND** bisacodyl (DULCOLAX) suppository 10 mg, 10 mg, Rectal, Daily PRN, Ruth Michaud MD    cloNIDine (CATAPRES) tablet 0.1 mg, 0.1 mg, Oral, Q1H PRN, Carlos Mendez MD    dextrose (D50W) (25 g/50 mL) IV injection 25 g, 25 g, Intravenous, Q15 Min PRN, Walsh, Peggy D, DO    dextrose (GLUTOSE) oral gel 15 g, 15 g, Oral, Q15 Min PRN, Peggy Walsh,     empagliflozin (JARDIANCE) tablet 10 mg, 10 mg, Oral, Daily, Carlos Mendez MD, 10 mg at 07/16/25 1521    glucagon (GLUCAGEN) injection 1 mg, 1 mg, Intramuscular,  Q15 Min PRN, Peggy Walsh DO    hydrALAZINE (APRESOLINE) injection 10 mg, 10 mg, Intravenous, Q6H PRN, Peggy Walsh, , 10 mg at 07/16/25 1803    Insulin Lispro (humaLOG) injection 2-9 Units, 2-9 Units, Subcutaneous, 4x Daily AC & at Bedtime, Peggy Walsh, , 8 Units at 07/16/25 2152    levothyroxine (SYNTHROID, LEVOTHROID) tablet 50 mcg, 50 mcg, Oral, Q AM, Peggy Walsh, , 50 mcg at 07/17/25 0608    LORazepam (ATIVAN) tablet 0.5 mg, 0.5 mg, Oral, Daily PRN, Pegyg Walsh DO    losartan (COZAAR) tablet 100 mg, 100 mg, Oral, Q24H, Carlos Mendez MD, 100 mg at 07/16/25 1559    Magnesium Cardiology Dose Replacement - Follow Nurse / BPA Driven Protocol, , Not Applicable, PRN, Juan Manuel Sandoval MD    metoprolol tartrate (LOPRESSOR) injection 5 mg, 5 mg, Intravenous, Q6H PRN, Peggy Walsh DO    metoprolol tartrate (LOPRESSOR) tablet 12.5 mg, 12.5 mg, Oral, Q12H, Carlos Mendez MD, 12.5 mg at 07/16/25 2109    nitroglycerin (NITROSTAT) SL tablet 0.4 mg, 0.4 mg, Sublingual, Q5 Min PRN, Ruth Michaud MD    pantoprazole (PROTONIX) EC tablet 40 mg, 40 mg, Oral, Q AM, Peggy Walsh, , 40 mg at 07/17/25 0608    Potassium Replacement - Follow Nurse / BPA Driven Protocol, , Not Applicable, Jaime NICOLE Nicholas D, MD    primidone (MYSOLINE) tablet 100 mg, 100 mg, Oral, Daily, Peggy Walsh DO, 100 mg at 07/16/25 1521    sertraline (ZOLOFT) tablet 200 mg, 200 mg, Oral, Daily, Peggy Walsh, , 200 mg at 07/16/25 1521    sodium chloride 0.9 % flush 10 mL, 10 mL, Intravenous, PRN, Juan Manuel Sandoval MD    sodium chloride 0.9 % flush 10 mL, 10 mL, Intravenous, Q12H, Lyric Dan APRN, 10 mL at 07/16/25 2109    sodium chloride 0.9 % flush 10 mL, 10 mL, Intravenous, PRN, Lyric Dan, ALAN    sodium chloride 0.9 % flush 10 mL, 10 mL, Intravenous, Q12H, Ruth Michaud MD, 10 mL at 07/16/25 2056    sodium chloride 0.9 % flush 10 mL, 10 mL, Intravenous,  Keily NICOLE Jocelyn, MD    sodium chloride 0.9 % infusion 40 mL, 40 mL, Intravenous, PRN, Lyric Dan APRN    sodium chloride 0.9 % infusion 40 mL, 40 mL, Intravenous, Keily NICOLE Jocelyn, MD    spironolactone (ALDACTONE) tablet 25 mg, 25 mg, Oral, Daily, Carlos Mendez MD, 25 mg at 07/16/25 1521    terazosin (HYTRIN) capsule 1 mg, 1 mg, Oral, Nightly, Peggy Walsh, DO, 1 mg at 07/16/25 2056    tiZANidine (ZANAFLEX) tablet 4 mg, 4 mg, Oral, Nightly, Peggy Walsh D, DO, 4 mg at 07/16/25 2056    valACYclovir (VALTREX) tablet 500 mg, 500 mg, Oral, Daily, Radha Walshia D, DO, 500 mg at 07/16/25 1521    Physical Exam: General Pleasant well-developed male who looks younger than stated age.  Nondyspneic nontachypneic current heart rate 100        HEENT: No JVP.  No icterus       Respiratory: Equal bilateral symmetrical expansion mostly clear       Cardiovascular: Irregular rate and rhythm with no significant murmur with 2+ pitting edema of the lower extremities       GI: Obese and soft       Lower Extremities: Positive edema       Neuro: Facial expression symmetric moves all 4 extremities       Skin: Warm and dry with pitting edema to palpation.  No open lesions       Psych: Pleasant affect    Results Review: Iron level yesterday was low at 52.  Output exceeds intake by 860 mL.  Heart rates in the 80s.  Blood pressures 117-214    Radiology Results:  Imaging Results (Last 72 Hours)       Procedure Component Value Units Date/Time    FL Video Swallow With Speech Single Contrast [[id number]] Collected: 07/16/25 1202     Updated: 07/16/25 1213    Narrative:      FL VIDEO SWALLOW W SPEECH SINGLE-CONTRAST, FL LIMITED UGI FOR MBS REFLUX SINGLE-CONTRAST    Date of Exam: 7/16/2025 10:00 AM EDT    Indication: dysphagia.       Comparison: None available.    Technique:   The speech pathologist administered food and/or liquid mixed with barium to the patient with cine/video imaging.  Imaging assistance was  provided to the speech pathologist and an image was saved. The camera was then lowered with the patient   remaining in lateral seated position with additional swallows of thin barium, to evaluate the visible portion of the esophagus.    Fluoroscopic Time: 1 minute 12 seconds    Number of Images: 6 cine loops    Findings:  Modified barium swallow: The patient was evaluated in the seated lateral position while taking a variety of consistencies by mouth under the direction of speech pathology. No aspiration was observed. Please see the speech pathologist's procedure report   for full details and recommendations.    Limited upper GI series: There is moderate residual barium in the upper two thirds of the esophagus following the modified barium swallow. Additional sips show no obvious stricture. There is mild intraesophageal reflux.      Impression:      Impression:    1. Fluoroscopy provided for modified barium swallow.    2. Limited upper GI series shows incomplete clearance with residual thin barium in the upper two thirds of the esophagus. No evidence of obstruction and only mild intraesophageal reflux.      Electronically Signed: Derek Garcia MD    7/16/2025 12:10 PM EDT    Workstation ID: [id number]    FL Limited Ugi For Mbs Reflux Single-Contrast [[id number]] Collected: 07/16/25 1202     Updated: 07/16/25 1213    Narrative:      FL VIDEO SWALLOW W SPEECH SINGLE-CONTRAST, FL LIMITED UGI FOR MBS REFLUX SINGLE-CONTRAST    Date of Exam: 7/16/2025 10:00 AM EDT    Indication: dysphagia.       Comparison: None available.    Technique:   The speech pathologist administered food and/or liquid mixed with barium to the patient with cine/video imaging.  Imaging assistance was provided to the speech pathologist and an image was saved. The camera was then lowered with the patient   remaining in lateral seated position with additional swallows of thin barium, to evaluate the visible portion of the esophagus.    Fluoroscopic Time: 1  minute 12 seconds    Number of Images: 6 cine loops    Findings:  Modified barium swallow: The patient was evaluated in the seated lateral position while taking a variety of consistencies by mouth under the direction of speech pathology. No aspiration was observed. Please see the speech pathologist's procedure report   for full details and recommendations.    Limited upper GI series: There is moderate residual barium in the upper two thirds of the esophagus following the modified barium swallow. Additional sips show no obvious stricture. There is mild intraesophageal reflux.      Impression:      Impression:    1. Fluoroscopy provided for modified barium swallow.    2. Limited upper GI series shows incomplete clearance with residual thin barium in the upper two thirds of the esophagus. No evidence of obstruction and only mild intraesophageal reflux.      Electronically Signed: Derek Garcia MD    7/16/2025 12:10 PM EDT    Workstation ID: [id number]    MRI Brain Without Contrast [[id number]] Collected: 07/16/25 0650     Updated: 07/16/25 0849    Narrative:      MRI BRAIN WO CONTRAST    Date of Exam: 7/16/2025 2:32 AM EDT    Indication: Stroke, follow up  AMS, dysarthria, dysphagia - subacute L MCA infarct on CT head.     Comparison: CT 1 day prior    Technique:  Routine multiplanar/multisequence sequence images of the brain were obtained without contrast administration.      Findings:  No acute infarct is present on diffusion weighted sequences. Midline structures appear normal and the craniocervical junction is satisfactory. Age-related changes are present with mild generalized volume loss and mild typical T2 hyperintense subcortical   and pontine white matter changes, nonspecific but favored to reflect sequela of chronic microvascular ischemia. There are also areas of old infarct present within the left perirolandic region and small bilateral chronic basal ganglia lacunar infarcts.   There is no evidence of intracranial  hemorrhage, mass or mass effect. The ventricles demonstrate mild ex vacuo prominence. The orbits are normal. The paranasal sinuses demonstrate small mucous retention cysts in the left maxillary sinus.      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 There is no evidence of recent infarct, hemorrhage, mass or mass effect.        Electronically Signed: Chase Birmingham MD    7/16/2025 8:46 AM EDT    Workstation ID: [id number]    XR Chest 1 View [[id number]] Collected: 07/15/25 1443     Updated: 07/15/25 1447    Narrative:      XR CHEST 1 VW    Date of Exam: 7/15/2025 2:23 PM EDT    Indication: Acute Stroke Protocol (onset < 12 hrs)    Comparison: 5/7/2024    Findings:  Cardiac size is enlarged. There is an increase in the pulmonary vascular markings with interval development of diffuse interstitial edema. No focal consolidation is seen. There is no obvious pleural fluid.      Impression:      Impression:  Cardiomegaly with interval development of pulmonary edema.        Electronically Signed: Jani Jung MD    7/15/2025 2:44 PM EDT    Workstation ID: [id number]    CT Angiogram Head w AI Analysis of LVO [[id number]] Collected: 07/15/25 1313     Updated: 07/15/25 1330    Narrative:      CT ANGIOGRAM HEAD W AI ANALYSIS OF LVO, CT ANGIOGRAM NECK, CT ABDOMEN PELVIS W CONTRAST    Date of Exam: 7/15/2025 12:38 PM EDT    Indication: Neuro Deficit, acute, Stroke suspected  Neuro deficit, acute stroke suspected.    Comparison: None available.    Technique: CTA of the head and neck was performed after the uneventful intravenous administration of 90 mL Isovue-370. Axial IV contrast-enhanced CT of the abdomen and pelvis was performed. Reconstructed coronal and sagittal images were also obtained. In   addition, a 3-D volume rendered image was created for interpretation. Automated exposure control and  iterative reconstruction methods were used.      Findings:  CTA head and neck: The lung apices demonstrate a partially imaged 3.8 cm nodular soft tissue finding within the upper mediastinum on the left, either an enlarged lymph node or mass. Evaluation of the neck soft tissues demonstrates no evidence of   aerodigestive tract mass or pathologic cervical lymphadenopathy. Multilevel cervical spondylosis is present, without evidence of acute fracture or aggressive osseous lesion. Patent aortic arch with three-vessel branching. The subclavian arteries are   patent bilaterally. On the right, there is calcific atherosclerotic change present at the ICA origin with mild, less than 50% narrowing by NASCET criteria. Similar mild narrowing is present on the left. The vertebral arteries are normal in course and   caliber bilaterally. Intracranially, the carotid siphons demonstrate calcific atherosclerotic changes with some mild narrowing of both ophthalmic segments. The anterior cerebral arteries are normal in course and caliber bilaterally. The right middle   cerebral artery is normal. The left middle cerebral artery demonstrates no evidence of high-grade stenosis, occlusion or aneurysm. The vertebrobasilar system is patent. The posterior cerebral arteries are normal in course and caliber bilaterally.    CT abdomen pelvis: The lung bases are clear. The body wall soft tissues demonstrate no acute or suspicious findings, with 2 adjacent presumed sebaceous cyst seen in the right gluteal region. The liver demonstrates low-attenuation findings most consistent   with cysts. The spleen, pancreas and bilateral adrenal glands demonstrate no acute findings. A homogeneous cystic appearing finding within the pancreatic head measures 22 x 21 mm. Normal gallbladder. Small and large bowel loops are nondilated. Colonic   diverticular changes are present, without definite acute inflammatory change to suggest diverticulitis. The appendix is  normal. There is no free fluid or pneumoperitoneum. Atherosclerotic, nonaneurysmal abdominal aorta. The pelvic viscera demonstrate no   acute findings.        Impression:      Impression:  1.Mild cervical and intracranial atherosclerotic changes are present as above, without evidence of flow-limiting stenosis, large vessel occlusion or aneurysm.  2.No acute findings in the abdomen and pelvis.  3.Incidentally noted 22 x 21 mm cystic finding within the pancreatic head. Correlation with any available prior imaging could be of use to document expected stability of this likely benign finding, otherwise as indicated, MRI could be useful to further   evaluate.  4.Incompletely characterized 3.8 cm nodular mass within the upper mediastinum, possibly an enlarged lymph node. Consider dedicated CT chest.        Electronically Signed: Chase Birmingham MD    7/15/2025 1:27 PM EDT    Workstation ID: [id number]    CT Angiogram Neck [[id number]] Collected: 07/15/25 1313     Updated: 07/15/25 1330    Narrative:      CT ANGIOGRAM HEAD W AI ANALYSIS OF LVO, CT ANGIOGRAM NECK, CT ABDOMEN PELVIS W CONTRAST    Date of Exam: 7/15/2025 12:38 PM EDT    Indication: Neuro Deficit, acute, Stroke suspected  Neuro deficit, acute stroke suspected.    Comparison: None available.    Technique: CTA of the head and neck was performed after the uneventful intravenous administration of 90 mL Isovue-370. Axial IV contrast-enhanced CT of the abdomen and pelvis was performed. Reconstructed coronal and sagittal images were also obtained. In   addition, a 3-D volume rendered image was created for interpretation. Automated exposure control and iterative reconstruction methods were used.      Findings:  CTA head and neck: The lung apices demonstrate a partially imaged 3.8 cm nodular soft tissue finding within the upper mediastinum on the left, either an enlarged lymph node or mass. Evaluation of the neck soft tissues demonstrates no evidence of   aerodigestive  tract mass or pathologic cervical lymphadenopathy. Multilevel cervical spondylosis is present, without evidence of acute fracture or aggressive osseous lesion. Patent aortic arch with three-vessel branching. The subclavian arteries are   patent bilaterally. On the right, there is calcific atherosclerotic change present at the ICA origin with mild, less than 50% narrowing by NASCET criteria. Similar mild narrowing is present on the left. The vertebral arteries are normal in course and   caliber bilaterally. Intracranially, the carotid siphons demonstrate calcific atherosclerotic changes with some mild narrowing of both ophthalmic segments. The anterior cerebral arteries are normal in course and caliber bilaterally. The right middle   cerebral artery is normal. The left middle cerebral artery demonstrates no evidence of high-grade stenosis, occlusion or aneurysm. The vertebrobasilar system is patent. The posterior cerebral arteries are normal in course and caliber bilaterally.    CT abdomen pelvis: The lung bases are clear. The body wall soft tissues demonstrate no acute or suspicious findings, with 2 adjacent presumed sebaceous cyst seen in the right gluteal region. The liver demonstrates low-attenuation findings most consistent   with cysts. The spleen, pancreas and bilateral adrenal glands demonstrate no acute findings. A homogeneous cystic appearing finding within the pancreatic head measures 22 x 21 mm. Normal gallbladder. Small and large bowel loops are nondilated. Colonic   diverticular changes are present, without definite acute inflammatory change to suggest diverticulitis. The appendix is normal. There is no free fluid or pneumoperitoneum. Atherosclerotic, nonaneurysmal abdominal aorta. The pelvic viscera demonstrate no   acute findings.        Impression:      Impression:  1.Mild cervical and intracranial atherosclerotic changes are present as above, without evidence of flow-limiting stenosis, large vessel  occlusion or aneurysm.  2.No acute findings in the abdomen and pelvis.  3.Incidentally noted 22 x 21 mm cystic finding within the pancreatic head. Correlation with any available prior imaging could be of use to document expected stability of this likely benign finding, otherwise as indicated, MRI could be useful to further   evaluate.  4.Incompletely characterized 3.8 cm nodular mass within the upper mediastinum, possibly an enlarged lymph node. Consider dedicated CT chest.        Electronically Signed: Chase Birmingham MD    7/15/2025 1:27 PM EDT    Workstation ID: [id number]    CT Abdomen Pelvis With Contrast [[id number]] Collected: 07/15/25 1313     Updated: 07/15/25 1330    Narrative:      CT ANGIOGRAM HEAD W AI ANALYSIS OF LVO, CT ANGIOGRAM NECK, CT ABDOMEN PELVIS W CONTRAST    Date of Exam: 7/15/2025 12:38 PM EDT    Indication: Neuro Deficit, acute, Stroke suspected  Neuro deficit, acute stroke suspected.    Comparison: None available.    Technique: CTA of the head and neck was performed after the uneventful intravenous administration of 90 mL Isovue-370. Axial IV contrast-enhanced CT of the abdomen and pelvis was performed. Reconstructed coronal and sagittal images were also obtained. In   addition, a 3-D volume rendered image was created for interpretation. Automated exposure control and iterative reconstruction methods were used.      Findings:  CTA head and neck: The lung apices demonstrate a partially imaged 3.8 cm nodular soft tissue finding within the upper mediastinum on the left, either an enlarged lymph node or mass. Evaluation of the neck soft tissues demonstrates no evidence of   aerodigestive tract mass or pathologic cervical lymphadenopathy. Multilevel cervical spondylosis is present, without evidence of acute fracture or aggressive osseous lesion. Patent aortic arch with three-vessel branching. The subclavian arteries are   patent bilaterally. On the right, there is calcific atherosclerotic change  present at the ICA origin with mild, less than 50% narrowing by NASCET criteria. Similar mild narrowing is present on the left. The vertebral arteries are normal in course and   caliber bilaterally. Intracranially, the carotid siphons demonstrate calcific atherosclerotic changes with some mild narrowing of both ophthalmic segments. The anterior cerebral arteries are normal in course and caliber bilaterally. The right middle   cerebral artery is normal. The left middle cerebral artery demonstrates no evidence of high-grade stenosis, occlusion or aneurysm. The vertebrobasilar system is patent. The posterior cerebral arteries are normal in course and caliber bilaterally.    CT abdomen pelvis: The lung bases are clear. The body wall soft tissues demonstrate no acute or suspicious findings, with 2 adjacent presumed sebaceous cyst seen in the right gluteal region. The liver demonstrates low-attenuation findings most consistent   with cysts. The spleen, pancreas and bilateral adrenal glands demonstrate no acute findings. A homogeneous cystic appearing finding within the pancreatic head measures 22 x 21 mm. Normal gallbladder. Small and large bowel loops are nondilated. Colonic   diverticular changes are present, without definite acute inflammatory change to suggest diverticulitis. The appendix is normal. There is no free fluid or pneumoperitoneum. Atherosclerotic, nonaneurysmal abdominal aorta. The pelvic viscera demonstrate no   acute findings.        Impression:      Impression:  1.Mild cervical and intracranial atherosclerotic changes are present as above, without evidence of flow-limiting stenosis, large vessel occlusion or aneurysm.  2.No acute findings in the abdomen and pelvis.  3.Incidentally noted 22 x 21 mm cystic finding within the pancreatic head. Correlation with any available prior imaging could be of use to document expected stability of this likely benign finding, otherwise as indicated, MRI could be useful  to further   evaluate.  4.Incompletely characterized 3.8 cm nodular mass within the upper mediastinum, possibly an enlarged lymph node. Consider dedicated CT chest.        Electronically Signed: Chase Birmingham MD    7/15/2025 1:27 PM EDT    Workstation ID: [id number]    CT CEREBRAL PERFUSION WITH & WITHOUT CONTRAST [[id number]] Collected: 07/15/25 1312     Updated: 07/15/25 1318    Narrative:      CT CEREBRAL PERFUSION W WO CONTRAST    Date of Exam: 7/15/2025 12:38 PM EDT    Indication: Neuro Deficit, acute, Stroke suspected  Neuro deficit, acute stroke suspected.     Comparison: CT head from earlier today    Technique: Axial CT images of the brain were obtained prior to and after the administration of 50 mL Isovue-370. Core blood volume, core blood flow, mean transit time, and Tmax images were obtained utilizing the Rapid software protocol. A limited CT   angiogram of the head was also performed to measure the blood vessel density.    The radiation dose reduction device was turned on for each scan per the ALARA (As Low as Reasonably Achievable) protocol.      Findings:  The intracranial cerebral perfusion appears normal.      Impression:      Impression:  Examination appears within normal limits.        Electronically Signed: Ibrahima Ribeiro MD    7/15/2025 1:15 PM EDT    Workstation ID: [id number]    CT Head Without Contrast Stroke Protocol [[id number]] Collected: 07/15/25 1247     Updated: 07/15/25 1258    Narrative:      CT HEAD WO CONTRAST STROKE PROTOCOL    Date of Exam: 7/15/2025 12:36 PM EDT    Indication: Neuro deficit, acute, stroke suspected  Neuro Deficit, acute, Stroke suspected.    Comparison: None available    Technique: Axial CT images were obtained of the head without contrast administration.  Reconstructed coronal images were also obtained. Automated exposure control and iterative construction methods were used.    Scan Time: 1234  Results discussed with ALAN Myers at 1240,  7/15/2025.      Findings:  The calvarium appears intact. 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 Left maxillary sinus mucous cyst is noted. Paranasal sinuses and mastoids otherwise appear clear. Orbits appear unremarkable except for ocular lens   replacements.    There is moderately advanced generalized cerebral atrophy. Focal left parietal encephalomalacia presumably represents old infarct. Intermediate density of the medial portion of the area of encephalomalacia could at least potentially represent late   subacute infarct, although this is not favored.     Small focal low-attenuation area is seen in the left external capsule, nonspecific. There is an old lacunar-type infarct of the head of the caudate nucleus.    No clearly acute infarct/edema is identified. No hemorrhage, hydrocephalus, mass or mass effect, or abnormal extra-axial collection is seen.      Impression:      Impression:      1. Old, less likely late subacute infarct of the left parietal lobe.    2. Small chronic ischemic focus of the area of the left external capsule.    3. 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    4. No clearly acute intracranial abnormality is identified.        Electronically Signed: Derek Garcia MD    7/15/2025 12:55 PM EDT    Workstation ID: [id number]            EKG: A-fib controlled ventricular response    ECHO: Pending    Tele: Patient is having runs of A-fib RVR.  No significant pauses    Assessment   Assessment/Plan: Patient presented with neurologic symptoms.  Was found to have A-fib.  He was started on Eliquis yesterday and beta-blockers.  He still has significant RVR with activity.  Since the patient did not actually have a stroke by MRI we will see if we can get him on the schedule today for a [place] guided cardioversion.  If not today we will plan on doing it tomorrow  2 heart failure with reduced EF probably secondary to A-fib.  Still waiting on echo.  Will give a dose of Bumex  3  exertional chest pain-hopefully this will resolve once the patient is in sinus rhythm  4 iron deficiency anemia-will start p.o. iron  5 no evidence of acute stroke by MRI    Carlos Mendez MD  07/17/25  08:03 EDT

## 2025-07-17 NOTE — CONSULTS
Completed living will with pt.  Copy in chart; faxed to HIM. Visited family for support after diagnosis.

## 2025-07-17 NOTE — PLAN OF CARE
Goal Outcome Evaluation:  Plan of Care Reviewed With: patient        Progress: no change  Outcome Evaluation: Patient willing to participate in therapeutic activity. His mobility is slow, requiring rest breaks due to Afib and elevated rate.    Anticipated Discharge Disposition (PT): home with assist, home with outpatient therapy services

## 2025-07-17 NOTE — THERAPY TREATMENT NOTE
Patient Name: Ibrahima Nava  : 1945    MRN: [id number]                              Today's Date: 2025       Admit Date: 7/15/2025    Visit Dx:     ICD-10-CM ICD-9-CM   1. Aphasia  R47.01 784.3   2. Esophageal dysphagia  R13.19 787.29   3. Altered mental status, unspecified altered mental status type  R41.82 780.97   4. Hypokalemia  E87.6 276.8   5. History of stroke  Z86.73 V12.54     Patient Active Problem List   Diagnosis    Primary localized osteoarthrosis of shoulder region    Hypertension    Impaired physical mobility    Acute pain of left shoulder    Status post reverse arthroplasty of shoulder, left    CVA (cerebral vascular accident)     Past Medical History:   Diagnosis Date    Anxiety     Arthritis     Hypertension     CONTROLLED WITH MEDS PER PT     Shoulder pain, left     Sleep apnea     did not tolerate CPAP    Stroke     -- NO RESIDUAL PROBLEMS    Tremor of right hand     Wears dentures     UPPER AND LOWER PLATE      Past Surgical History:   Procedure Laterality Date    CARDIAC CATHETERIZATION      NO CORONARY STENTS     COLONOSCOPY      WI ARTHROPLASTY GLENOHUMERAL JOINT TOTAL SHOULDER Left 2017    Procedure: LEFT TOTAL SHOULDER ARTHROPLASTY ;  Surgeon: Michael Xavier MD;  Location:  [facility];  Service: Orthopedics    TOTAL KNEE ARTHROPLASTY Bilateral     TOTAL SHOULDER REPLACEMENT Right     TOTAL SHOULDER REVISION Left 2024    Procedure: REVISION TOTAL SHOULDER ARTHROPLASTY TO REVERSE TOTAL SHOULDER ATHROPLASTY - LEFT;  Surgeon: Michael Xavier MD;  Location:  [facility];  Service: Orthopedics;  Laterality: Left;      General Information       Row Name 25 1147          Physical Therapy Time and Intention    Document Type therapy note (daily note)  -SC     Mode of Treatment physical therapy  -SC       Row Name 25 1147          General Information    Patient Profile Reviewed yes  -SC     Existing Precautions/Restrictions fall;other (see  comments)  afib with tachycardia  -SC       Row Name 07/17/25 1147          Cognition    Orientation Status (Cognition) oriented x 4  -SC       Row Name 07/17/25 1147          Safety Issues/Impairments Affecting Functional Mobility    Impairments Affecting Function (Mobility) balance;endurance/activity tolerance  -SC     Comment, Safety Issues/Impairments (Mobility) alert, following commands  -SC               User Key  (r) = Recorded By, (t) = Taken By, (c) = Cosigned By      Initials Name Provider Type    SC Marian Howard, PT Physical Therapist                   Mobility       Row Name 07/17/25 1147          Bed Mobility    Bed Mobility supine-sit;scooting/bridging;sit-supine  -SC     Scooting/Bridging [place] (Bed Mobility) independent  -SC     Supine-Sit [place] (Bed Mobility) independent  -SC     Sit-Supine [place] (Bed Mobility) independent  -SC       Row Name 07/17/25 1147          Sit-Stand Transfer    Sit-Stand [place] (Transfers) standby assist  -[facility] Name 07/17/25 1147          Gait/Stairs (Locomotion)    [place] Level (Gait) standby assist  -SC     Assistive Device (Gait) walker, front-wheeled  -SC     Distance in Feet (Gait) 100  -SC     Deviations/Abnormal Patterns (Gait) dennise decreased;gait speed decreased;stride length decreased;weight shifting decreased  -SC     Bilateral Gait Deviations forward flexed posture  -SC     Comment, (Gait/Stairs) Encouraged upright posture and staying close to walker. One standing rest break needed.  -SC               User Key  (r) = Recorded By, (t) = Taken By, (c) = Cosigned By      Initials Name Provider Type    SC Marian Howard PT Physical Therapist                   Obj/Interventions       Row Name 07/17/25 1148          Motor Skills    Therapeutic Exercise hip;knee;ankle;shoulder  -[facility] Name 07/17/25 1148          Shoulder (Therapeutic Exercise)    Shoulder (Therapeutic Exercise) AROM (active range of motion)  -SC      Shoulder AROM (Therapeutic Exercise) bilateral;other (see comments);10 repetitions  seated rowing  -[facility] Name 07/17/25 1148          Hip (Therapeutic Exercise)    Hip (Therapeutic Exercise) AROM (active range of motion)  -SC     Hip AROM (Therapeutic Exercise) bilateral;flexion;15 repititions;[facility] Name 07/17/25 1148          Knee (Therapeutic Exercise)    Knee (Therapeutic Exercise) AROM (active range of motion)  -SC     Knee AROM (Therapeutic Exercise) right;bilateral;flexion;LAQ (long arc quad);15 repititions;[facility] Name 07/17/25 1148          Ankle (Therapeutic Exercise)    Ankle (Therapeutic Exercise) AROM (active range of motion)  -SC     Ankle AROM (Therapeutic Exercise) bilateral;dorsiflexion;10 repetitions;plantarflexion;sitting  [facility] Name 07/17/25 1148          Balance    Balance Assessment standing dynamic balance  -SC     Dynamic Standing Balance standby assist  -SC     Position/Device Used, Standing Balance supported;walker, rolling  -SC     Comment, Balance no LOB  -SC               User Key  (r) = Recorded By, (t) = Taken By, (c) = Cosigned By      Initials Name Provider Type    SC Marian Howard, PT Physical Therapist                   Goals/Plan    No documentation.                  Clinical Impression       Row Name 07/17/25 1150          Pain    Pretreatment Pain Rating 0/10 - no pain  -SC     Posttreatment Pain Rating 0/10 - no pain  -SC       Row Name 07/17/25 1150          Plan of Care Review    Plan of Care Reviewed With patient  -SC     Progress no change  -SC     Outcome Evaluation Patient willing to participate in therapeutic activity. His mobility is slow, requiring rest breaks due to Afib and elevated rate.  -SC       Row Name 07/17/25 1150          Therapy Assessment/Plan (PT)    Patient/Family Therapy Goals Statement (PT) go home  -SC     Rehab Potential (PT) good  -SC     Criteria for Skilled Interventions Met (PT) yes;meets  criteria;skilled treatment is necessary  -SC     Therapy Frequency (PT) daily  -SC       Row Name 07/17/25 1150          Vital Signs    Pretreatment Heart Rate (beats/min) 100  -SC     Intratreatment Heart Rate (beats/min) 144  -SC     Posttreatment Heart Rate (beats/min) 116  -SC     Pre SpO2 (%) 96  -SC     O2 Delivery Pre Treatment supplemental O2  -SC     Post SpO2 (%) 93  -SC     O2 Delivery Post Treatment supplemental O2  -SC       Row Name 07/17/25 1150          Positioning and Restraints    Pre-Treatment Position in bed  -SC     Post Treatment Position bed  -SC     In Bed supine;encouraged to call for assist;exit alarm on  -SC               User Key  (r) = Recorded By, (t) = Taken By, (c) = Cosigned By      Initials Name Provider Type    Marian Pappas, PT Physical Therapist                   Outcome Measures       Row Name 07/17/25 1152 07/17/25 0838       How much help from another person do you currently need...    Turning from your back to your side while in flat bed without using bedrails? 4  -SC 4  -DF    Moving from lying on back to sitting on the side of a flat bed without bedrails? 4  -SC 3  -DF    Moving to and from a bed to a chair (including a wheelchair)? 3  -SC 3  -DF    Standing up from a chair using your arms (e.g., wheelchair, bedside chair)? 4  -SC 3  -DF    Climbing 3-5 steps with a railing? 3  -SC 2  -DF    To walk in hospital room? 3  -SC 3  -DF    AM-PAC 6 Clicks Score (PT) 21  -SC 18  -DF    Highest Level of Mobility Goal Walk 10 Steps or More-6  -SC Walk 10 Steps or More-6  -DF      Row Name 07/17/25 1152          Functional Assessment    Outcome Measure Options Modified [place];AM-PAC 6 Clicks Basic Mobility (PT)  -SC               User Key  (r) = Recorded By, (t) = Taken By, (c) = Cosigned By      Initials Name Provider Type    Marian Pappas, PT Physical Therapist    DF Zeina Blake, RN Registered Nurse                                 Physical Therapy Education        Title: PT OT SLP Therapies (In Progress)       Topic: Physical Therapy (Done)       Point: Mobility training (Done)       Learning Progress Summary            Patient Eager, E, VU by SC at 7/17/2025 1155    Comment: reviewed benefits of activity    Eager, E, VU,DU by SC at 7/17/2025 1152    Comment: reviewed HEP    Acceptance, E, VU by KR at 7/16/2025 1410   Family Acceptance, E, VU by KR at 7/16/2025 1410                      Point: Home exercise program (Done)       Learning Progress Summary            Patient Eager, E, VU by SC at 7/17/2025 1155    Comment: reviewed benefits of activity    Eager, E, VU,DU by SC at 7/17/2025 1152    Comment: reviewed HEP                      Point: Body mechanics (Done)       Learning Progress Summary            Patient Eager, E, VU by SC at 7/17/2025 1155    Comment: reviewed benefits of activity    Eager, E, VU,DU by SC at 7/17/2025 1152    Comment: reviewed HEP    Acceptance, E, VU by  at 7/16/2025 1410   Family Acceptance, E, VU by KR at 7/16/2025 1410                      Point: Precautions (Done)       Learning Progress Summary            Patient Eager, E, VU by SC at 7/17/2025 1155    Comment: reviewed benefits of activity    Eager, E, VU,DU by SC at 7/17/2025 1152    Comment: reviewed HEP    Acceptance, E, VU by  at 7/16/2025 1410   Family Acceptance, E, VU by  at 7/16/2025 1410                                      User Key       Initials Effective Dates Name Provider Type Discipline    SC 02/03/23 -  Marian Howard, PT Physical Therapist PT     12/30/22 -  Coco Casarez PT Physical Therapist PT                  PT Recommendation and Plan     Progress: no change  Outcome Evaluation: Patient willing to participate in therapeutic activity. His mobility is slow, requiring rest breaks due to Afib and elevated rate.     Time Calculation:         PT Charges       Row Name 07/17/25 1110             Time Calculation    Start Time 1119  -SC      PT Received On 07/17/25   -SC      PT Goal Re-Cert Due Date 07/26/25  -SC         Timed Charges    20161 - PT Therapeutic Exercise Minutes 8  -SC      46502 - Gait Training Minutes  10  -SC      29416 - PT Therapeutic Activity Minutes 5  -SC         Total Minutes    Timed Charges Total Minutes 23  -SC       Total Minutes 23  -SC                User Key  (r) = Recorded By, (t) = Taken By, (c) = Cosigned By      Initials Name Provider Type    SC Lee, Marian DE SOUZA PT Physical Therapist                  Therapy Charges for Today       Code Description Service Date Service Provider Modifiers Qty    09132474637 HC PT THER PROC EA 15 MIN 7/17/2025 Marian Howard, PT GP 1    34285770113 HC GAIT TRAINING EA 15 MIN 7/17/2025 Lee, Marian DE SOUZA, PT GP 1            PT G-Codes  Outcome Measure Options: Modified [place], AM-PAC 6 Clicks Basic Mobility (PT)  AM-PAC 6 Clicks Score (PT): 21  AM-PAC 6 Clicks Score (OT): 17  Modified [place] Scale: 2 - Slight disability.  Unable to carry out all previous activities but able to look after own affairs without assistance.  PT Discharge Summary  Anticipated Discharge Disposition (PT): home with assist, home with outpatient therapy services    Marian Howard, PT  7/17/2025

## 2025-07-18 ENCOUNTER — APPOINTMENT (OUTPATIENT)
Dept: CARDIOLOGY | Facility: HOSPITAL | Age: 80
DRG: 308 | End: 2025-07-18
Payer: MEDICARE

## 2025-07-18 LAB
ANION GAP SERPL CALCULATED.3IONS-SCNC: 13.4 MMOL/L (ref 5–15)
BUN SERPL-MCNC: 22 MG/DL (ref 8–23)
BUN/CREAT SERPL: 24.7 (ref 7–25)
CALCIUM SPEC-SCNC: 8.8 MG/DL (ref 8.6–10.5)
CHLORIDE SERPL-SCNC: 103 MMOL/L (ref 98–107)
CO2 SERPL-SCNC: 31.6 MMOL/L (ref 22–29)
CREAT SERPL-MCNC: 0.89 MG/DL (ref 0.76–1.27)
DEPRECATED RDW RBC AUTO: 62 FL (ref 37–54)
EGFRCR SERPLBLD CKD-EPI 2021: 86.6 ML/MIN/1.73
ERYTHROCYTE [DISTWIDTH] IN BLOOD BY AUTOMATED COUNT: 18.3 % (ref 12.3–15.4)
GLUCOSE BLDC GLUCOMTR-MCNC: 124 MG/DL (ref 70–130)
GLUCOSE BLDC GLUCOMTR-MCNC: 125 MG/DL (ref 70–130)
GLUCOSE BLDC GLUCOMTR-MCNC: 126 MG/DL (ref 70–130)
GLUCOSE BLDC GLUCOMTR-MCNC: 185 MG/DL (ref 70–130)
GLUCOSE SERPL-MCNC: 119 MG/DL (ref 65–99)
HCT VFR BLD AUTO: 31.1 % (ref 37.5–51)
HGB BLD-MCNC: 8.2 G/DL (ref 13–17.7)
MAGNESIUM SERPL-MCNC: 2.3 MG/DL (ref 1.6–2.4)
MCH RBC QN AUTO: 25.5 PG (ref 26.6–33)
MCHC RBC AUTO-ENTMCNC: 26.4 G/DL (ref 31.5–35.7)
MCV RBC AUTO: 96.6 FL (ref 79–97)
PLATELET # BLD AUTO: 128 10*3/MM3 (ref 140–450)
PMV BLD AUTO: 10.9 FL (ref 6–12)
POTASSIUM SERPL-SCNC: 3 MMOL/L (ref 3.5–5.2)
RBC # BLD AUTO: 3.22 10*6/MM3 (ref 4.14–5.8)
SODIUM SERPL-SCNC: 148 MMOL/L (ref 136–145)
WBC NRBC COR # BLD AUTO: 7.57 10*3/MM3 (ref 3.4–10.8)

## 2025-07-18 PROCEDURE — 25010000002 FENTANYL CITRATE (PF) 50 MCG/ML SOLUTION: Performed by: INTERNAL MEDICINE

## 2025-07-18 PROCEDURE — 80048 BASIC METABOLIC PNL TOTAL CA: CPT | Performed by: FAMILY MEDICINE

## 2025-07-18 PROCEDURE — 93312 ECHO TRANSESOPHAGEAL: CPT

## 2025-07-18 PROCEDURE — 93320 DOPPLER ECHO COMPLETE: CPT | Performed by: INTERNAL MEDICINE

## 2025-07-18 PROCEDURE — 25010000002 MIDAZOLAM PER 1 MG: Performed by: INTERNAL MEDICINE

## 2025-07-18 PROCEDURE — 25010000002 HYDRALAZINE PER 20 MG: Performed by: FAMILY MEDICINE

## 2025-07-18 PROCEDURE — 25010000002 METOPROLOL TARTRATE 5 MG/5ML SOLUTION: Performed by: FAMILY MEDICINE

## 2025-07-18 PROCEDURE — 83735 ASSAY OF MAGNESIUM: CPT | Performed by: FAMILY MEDICINE

## 2025-07-18 PROCEDURE — 93005 ELECTROCARDIOGRAM TRACING: CPT | Performed by: INTERNAL MEDICINE

## 2025-07-18 PROCEDURE — 93312 ECHO TRANSESOPHAGEAL: CPT | Performed by: INTERNAL MEDICINE

## 2025-07-18 PROCEDURE — 93320 DOPPLER ECHO COMPLETE: CPT

## 2025-07-18 PROCEDURE — 99232 SBSQ HOSP IP/OBS MODERATE 35: CPT | Performed by: FAMILY MEDICINE

## 2025-07-18 PROCEDURE — 85027 COMPLETE CBC AUTOMATED: CPT | Performed by: FAMILY MEDICINE

## 2025-07-18 PROCEDURE — 5A2204Z RESTORATION OF CARDIAC RHYTHM, SINGLE: ICD-10-PCS | Performed by: INTERNAL MEDICINE

## 2025-07-18 PROCEDURE — 92960 CARDIOVERSION ELECTRIC EXT: CPT

## 2025-07-18 PROCEDURE — 92960 CARDIOVERSION ELECTRIC EXT: CPT | Performed by: INTERNAL MEDICINE

## 2025-07-18 PROCEDURE — 82948 REAGENT STRIP/BLOOD GLUCOSE: CPT

## 2025-07-18 PROCEDURE — 93010 ELECTROCARDIOGRAM REPORT: CPT | Performed by: INTERNAL MEDICINE

## 2025-07-18 PROCEDURE — 93325 DOPPLER ECHO COLOR FLOW MAPG: CPT

## 2025-07-18 PROCEDURE — 99232 SBSQ HOSP IP/OBS MODERATE 35: CPT | Performed by: INTERNAL MEDICINE

## 2025-07-18 PROCEDURE — 93325 DOPPLER ECHO COLOR FLOW MAPG: CPT | Performed by: INTERNAL MEDICINE

## 2025-07-18 RX ORDER — MIDAZOLAM HYDROCHLORIDE 1 MG/ML
INJECTION, SOLUTION INTRAMUSCULAR; INTRAVENOUS
Status: COMPLETED | OUTPATIENT
Start: 2025-07-18 | End: 2025-07-18

## 2025-07-18 RX ORDER — BUMETANIDE 1 MG/1
1 TABLET ORAL DAILY
Status: DISCONTINUED | OUTPATIENT
Start: 2025-07-18 | End: 2025-07-19

## 2025-07-18 RX ORDER — ETOMIDATE 2 MG/ML
0.1 INJECTION INTRAVENOUS ONCE
Status: DISCONTINUED | OUTPATIENT
Start: 2025-07-18 | End: 2025-07-20

## 2025-07-18 RX ORDER — FENTANYL CITRATE 50 UG/ML
INJECTION, SOLUTION INTRAMUSCULAR; INTRAVENOUS
Status: COMPLETED | OUTPATIENT
Start: 2025-07-18 | End: 2025-07-18

## 2025-07-18 RX ORDER — POTASSIUM CHLORIDE 1500 MG/1
40 TABLET, EXTENDED RELEASE ORAL EVERY 4 HOURS
Status: COMPLETED | OUTPATIENT
Start: 2025-07-18 | End: 2025-07-18

## 2025-07-18 RX ORDER — NALOXONE HCL 0.4 MG/ML
0.4 VIAL (ML) INJECTION ONCE AS NEEDED
Status: DISCONTINUED | OUTPATIENT
Start: 2025-07-18 | End: 2025-07-20

## 2025-07-18 RX ORDER — FENTANYL CITRATE 50 UG/ML
50-100 INJECTION, SOLUTION INTRAMUSCULAR; INTRAVENOUS ONCE AS NEEDED
Refills: 0 | Status: DISCONTINUED | OUTPATIENT
Start: 2025-07-18 | End: 2025-07-20

## 2025-07-18 RX ORDER — FLUMAZENIL 0.1 MG/ML
0.5 INJECTION INTRAVENOUS ONCE AS NEEDED
Status: DISCONTINUED | OUTPATIENT
Start: 2025-07-18 | End: 2025-07-20

## 2025-07-18 RX ORDER — MIDAZOLAM HYDROCHLORIDE 1 MG/ML
2-8 INJECTION, SOLUTION INTRAMUSCULAR; INTRAVENOUS ONCE AS NEEDED
Status: DISCONTINUED | OUTPATIENT
Start: 2025-07-18 | End: 2025-07-20

## 2025-07-18 RX ORDER — AMIODARONE HYDROCHLORIDE 200 MG/1
200 TABLET ORAL
Status: DISCONTINUED | OUTPATIENT
Start: 2025-07-18 | End: 2025-07-19

## 2025-07-18 RX ORDER — ETOMIDATE 2 MG/ML
INJECTION INTRAVENOUS
Status: COMPLETED | OUTPATIENT
Start: 2025-07-18 | End: 2025-07-18

## 2025-07-18 RX ADMIN — POTASSIUM CHLORIDE 40 MEQ: 1500 TABLET, EXTENDED RELEASE ORAL at 17:35

## 2025-07-18 RX ADMIN — MIDAZOLAM HYDROCHLORIDE 2 MG: 1 INJECTION, SOLUTION INTRAMUSCULAR; INTRAVENOUS at 15:22

## 2025-07-18 RX ADMIN — AMLODIPINE BESYLATE 5 MG: 5 TABLET ORAL at 08:22

## 2025-07-18 RX ADMIN — VALACYCLOVIR HYDROCHLORIDE 500 MG: 500 TABLET, FILM COATED ORAL at 08:23

## 2025-07-18 RX ADMIN — BUMETANIDE 1 MG: 1 TABLET ORAL at 08:23

## 2025-07-18 RX ADMIN — POTASSIUM CHLORIDE 40 MEQ: 1500 TABLET, EXTENDED RELEASE ORAL at 12:03

## 2025-07-18 RX ADMIN — POTASSIUM CHLORIDE 40 MEQ: 1500 TABLET, EXTENDED RELEASE ORAL at 20:14

## 2025-07-18 RX ADMIN — METOPROLOL TARTRATE 25 MG: 25 TABLET, FILM COATED ORAL at 08:22

## 2025-07-18 RX ADMIN — FERROUS SULFATE TAB 325 MG (65 MG ELEMENTAL FE) 325 MG: 325 (65 FE) TAB at 08:22

## 2025-07-18 RX ADMIN — TIZANIDINE 4 MG: 4 TABLET ORAL at 20:15

## 2025-07-18 RX ADMIN — FENTANYL CITRATE 50 MCG: 50 INJECTION, SOLUTION INTRAMUSCULAR; INTRAVENOUS at 15:28

## 2025-07-18 RX ADMIN — HYDRALAZINE HYDROCHLORIDE 10 MG: 20 INJECTION INTRAMUSCULAR; INTRAVENOUS at 17:33

## 2025-07-18 RX ADMIN — ETOMIDATE 8 MG: 40 INJECTION, SOLUTION INTRAVENOUS at 15:17

## 2025-07-18 RX ADMIN — Medication 10 ML: at 20:17

## 2025-07-18 RX ADMIN — Medication 10 ML: at 08:24

## 2025-07-18 RX ADMIN — METOPROLOL TARTRATE 25 MG: 25 TABLET, FILM COATED ORAL at 20:16

## 2025-07-18 RX ADMIN — LEVOTHYROXINE SODIUM 50 MCG: 50 TABLET ORAL at 05:54

## 2025-07-18 RX ADMIN — PRIMIDONE 100 MG: 50 TABLET ORAL at 08:22

## 2025-07-18 RX ADMIN — MIDAZOLAM HYDROCHLORIDE 2 MG: 1 INJECTION, SOLUTION INTRAMUSCULAR; INTRAVENOUS at 15:28

## 2025-07-18 RX ADMIN — ATORVASTATIN CALCIUM 40 MG: 40 TABLET, FILM COATED ORAL at 20:14

## 2025-07-18 RX ADMIN — PANTOPRAZOLE SODIUM 40 MG: 40 TABLET, DELAYED RELEASE ORAL at 05:54

## 2025-07-18 RX ADMIN — FENTANYL CITRATE 50 MCG: 50 INJECTION, SOLUTION INTRAMUSCULAR; INTRAVENOUS at 15:20

## 2025-07-18 RX ADMIN — APIXABAN 5 MG: 5 TABLET, FILM COATED ORAL at 20:16

## 2025-07-18 RX ADMIN — APIXABAN 5 MG: 5 TABLET, FILM COATED ORAL at 08:23

## 2025-07-18 RX ADMIN — METOPROLOL TARTRATE 5 MG: 5 INJECTION INTRAVENOUS at 17:09

## 2025-07-18 RX ADMIN — LOSARTAN POTASSIUM 100 MG: 50 TABLET, FILM COATED ORAL at 08:22

## 2025-07-18 RX ADMIN — MIDAZOLAM HYDROCHLORIDE 2 MG: 1 INJECTION, SOLUTION INTRAMUSCULAR; INTRAVENOUS at 15:18

## 2025-07-18 RX ADMIN — TERAZOSIN HYDROCHLORIDE 1 MG: 1 CAPSULE ORAL at 20:16

## 2025-07-18 RX ADMIN — AMIODARONE HYDROCHLORIDE 200 MG: 200 TABLET ORAL at 08:23

## 2025-07-18 RX ADMIN — SPIRONOLACTONE 25 MG: 25 TABLET ORAL at 08:23

## 2025-07-18 RX ADMIN — EMPAGLIFLOZIN 10 MG: 10 TABLET, FILM COATED ORAL at 08:23

## 2025-07-18 RX ADMIN — SERTRALINE HYDROCHLORIDE 200 MG: 100 TABLET, FILM COATED ORAL at 08:23

## 2025-07-18 NOTE — DISCHARGE PLACEMENT REQUEST
"Radha Nava (80 y.o. Male)     Codie Parekh, RN  [phone]        Date of Birth   1945    Social Security Number       Address   [address] [place] [address]    Home Phone   [phone]    MRN   [id number]       Shinto   Mu-ism    Marital Status                               Admission Date   7/15/2025    Admission Type   Emergency    Admitting Provider   Peggy Walsh DO    Attending Provider   Peggy Walsh DO    Department, Room/Bed   [facility] 3E, S342/1       Discharge Date       Discharge Disposition       Discharge Destination                                 Attending Provider: Peggy Walsh DO    Allergies: No Known Allergies    Isolation: None   Infection: None   Code Status: CPR    Ht: 172.7 cm (67.99\")   Wt: 133 kg (293 lb 3.4 oz)    Admission Cmt: None   Principal Problem: CVA (cerebral vascular accident) [I63.9]                   Active Insurance as of 7/15/2025       Primary Coverage       Payor Plan Insurance Group Employer/Plan Group    MEDICARE MEDICARE A & B        Payor Plan Address Payor Plan Phone Number Payor Plan Fax Number Effective Dates    [address] [phone]  2010 - None Entered    [place] [address]         Subscriber Name Subscriber Birth Date Member ID       RADHA NAVA 1945 [id number]                     Emergency Contacts        (Rel.) Home Phone Work Phone Mobile Phone    Oralia Nava (Spouse) -- -- [phone]    ParthMeghany (Daughter) -- -- [phone]    Freda Laughlin (Daughter) [phone] -- --             [facility] 3E  [address] [facility] [address]  Phone:  [phone]  Fax:  [phone] Date: 2025      Ambulatory Referral to Physical Therapy for Evaluation & Treatment     Patient:  Radha Nava MRN:  [id number]   [address] [place], [place] [address] :  1945  SSN:    Phone: [phone] Sex:  M    "   INSURANCE PAYOR PLAN GROUP # SUBSCRIBER ID   Primary:    MEDICARE [id number]   [id number]      Referring Provider Information:  ANDREW WILSON Phone: [phone] Fax: [phone]       Referral Information:   # Visits:  1 Referral Type: Physical Therapy [AE1]   Urgency:  Routine Referral Reason: Specialty Services Required   Start Date: 2025 End Date:  To be determined by Insurer   Diagnosis: History of stroke (Z86.73)      Refer to Dept:   Refer to Provider:   Refer to Provider Phone:   Refer to Facility:       Specialty needed: Evaluate and treat  Follow-up needed: Yes     This document serves as a request of services and does not constitute Insurance authorization or approval of services.  To determine eligibility, please contact the members Insurance carrier to verify and review coverage.     If you have medical questions regarding this request for services. Please contact [address] at [phone] during normal business hours.        Authorizing Provider:Andrew Wilson DO  Authorizing Provider's NPI: [id number]  Order Entered By: Codie Parekh RN 2025 10:56 AM     Electronically signed by: Andrew Wilson DO 2025 10:56 AM          History & Physical        Keily, MD Ruth at 07/15/25 1502              [facility] Medicine Services  HISTORY AND PHYSICAL    Patient Name: Ibrahima Nava  : 1945  MRN: [id number]  Primary Care Physician: Sebastien Lowe MD  Date of admission: 7/15/2025      Subjective  Subjective     Chief Complaint:  speech changes, reported dysarthria and dysphagia     HPI:  Ibrahima Nava is a 80 y.o. male w history of HTN, anxiety on chronic benzos, CVA in 2012 (no residual deficit), essential tremor.   His daughter, an RN, saw him yesterday after 2-3 days away and noted slurred speech, trouble swallowing liquid, and apparent confusion/slow mentation.  Pt also had had urinary incontinence which is very  unusual for him.   No focal numbnesss/weakness.  Patient himself thinks he is having trouble making words come out, also notes some trouble walking but cannot explain it well.  Veers into tangential conversation about a gastric sleeve and his diastasis recti.        Personal History     Past Medical History:   Diagnosis Date    Anxiety     Arthritis     Hypertension     CONTROLLED WITH MEDS PER PT     Shoulder pain, left     Sleep apnea     did not tolerate CPAP    Stroke     2012-- NO RESIDUAL PROBLEMS    Tremor of right hand     Wears dentures     UPPER AND LOWER PLATE            Past Surgical History:   Procedure Laterality Date    CARDIAC CATHETERIZATION  2016    NO CORONARY STENTS     COLONOSCOPY  2014    GA ARTHROPLASTY GLENOHUMERAL JOINT TOTAL SHOULDER Left 06/12/2017    Procedure: LEFT TOTAL SHOULDER ARTHROPLASTY ;  Surgeon: Michael Xavier MD;  Location:  [facility];  Service: Orthopedics    TOTAL KNEE ARTHROPLASTY Bilateral     TOTAL SHOULDER REPLACEMENT Right     TOTAL SHOULDER REVISION Left 5/20/2024    Procedure: REVISION TOTAL SHOULDER ARTHROPLASTY TO REVERSE TOTAL SHOULDER ATHROPLASTY - LEFT;  Surgeon: Michael Xavier MD;  Location:  [facility];  Service: Orthopedics;  Laterality: Left;       Family History: family history is not on file.     Social History:  reports that he has quit smoking. His smoking use included cigarettes. He has never used smokeless tobacco. He reports that he does not drink alcohol and does not use drugs.  Social History     Social History Narrative    Not on file       Medications:  Available home medication information reviewed.  LORazepam, NON FORMULARY, allopurinol, aspirin, docusate sodium, ferrous sulfate, levothyroxine, pantoprazole, pravastatin, primidone, sertraline, telmisartan, tiZANidine, and valACYclovir    No Known Allergies    Objective  Objective     Vital Signs:   Temp:  [98 °F (36.7 °C)] 98 °F (36.7 °C)  Heart Rate:  [] 89  Resp:  [18] 18  BP:  (143-187)/() 166/105  Flow (L/min) (Oxygen Therapy):  [2] 2  Total (NIH Stroke Scale): 1    Physical Exam   Gen:  WD/WN man in bed, awake and conversant, speech slow and somewhat halting, seems to have trouble expressing his ideas.    Neuro: alert and oriented, clear speech but slow/halting, follows commands, grossly nonfocal, tremor   HEENT:  NC/AT   Neck:  Supple, no LAD  Heart RRR   Lungs CTA   Abd:  Soft, nontender  Extrem:  No c/c/e      Result Review:  I have personally reviewed the results from the time of this admission to 7/15/2025 15:02 EDT and agree with these findings:  [x]  Laboratory list / accordion  []  Microbiology  [x]  Radiology  [x]  EKG/Telemetry   []  Cardiology/Vascular   []  Pathology  []  Old records  []  Other:  Most notable findings include: head CT, CTAs, noted.  MRI pending.        LAB RESULTS:      Lab 07/15/25  1243 07/15/25  1239   WBC  --  11.13*   HEMOGLOBIN  --  9.0*   HEMOGLOBIN, POC 10.2*  --    HEMATOCRIT  --  31.2*   HEMATOCRIT POC 30*  --    PLATELETS  --  176   NEUTROS ABS  --  9.07*   IMMATURE GRANS (ABS)  --  0.32*   LYMPHS ABS  --  1.22   MONOS ABS  --  0.50   EOS ABS  --  0.00   MCV  --  87.4   PROTIME  --  14.8   INR  --  1.09         Lab 07/15/25  1243 07/15/25  1239   SODIUM  --  142   POTASSIUM  --  2.5*   CHLORIDE  --  97*   CO2  --  32.6*   ANION GAP  --  12.4   BUN  --  22.2   CREATININE 0.90 0.92   EGFR 86.3 84.1   GLUCOSE  --  252*   CALCIUM  --  8.9   MAGNESIUM  --  2.2   HEMOGLOBIN A1C  --  6.34*   TSH  --  0.876         Lab 07/15/25  1239   TOTAL PROTEIN 5.6*   ALBUMIN 3.7   GLOBULIN 1.9   ALT (SGPT) 58*   AST (SGOT) 75*   BILIRUBIN 0.4   ALK PHOS 85         Lab 07/15/25  1239   PROBNP 3,433.0*   HSTROP T 46*                 Lab 07/15/25  1316   PH, ARTERIAL 7.489*   PCO2, ARTERIAL 50.2*   PO2 ART 52.2*   FIO2 21   HCO3 ART 38.2*   BASE EXCESS ART 13.3*   CARBOXYHEMOGLOBIN 1.8     UA          7/15/2025    13:19   Urinalysis   Squamous Epithelial Cells,  UA 0-2    Specific Gravity, UA >1.030    Ketones, UA Trace    Blood, UA Moderate (2+)    Leukocytes, UA Negative    Nitrite, UA Negative    RBC, UA 11-20    WBC, UA 0-2    Bacteria, UA None Seen        Microbiology Results (last 10 days)       ** No results found for the last 240 hours. **            XR Chest 1 View  Result Date: 7/15/2025  XR CHEST 1 VW Date of Exam: 7/15/2025 2:23 PM EDT Indication: Acute Stroke Protocol (onset < 12 hrs) Comparison: 5/7/2024 Findings: Cardiac size is enlarged. There is an increase in the pulmonary vascular markings with interval development of diffuse interstitial edema. No focal consolidation is seen. There is no obvious pleural fluid.     Impression: Impression: Cardiomegaly with interval development of pulmonary edema. Electronically Signed: Jani Jung MD  7/15/2025 2:44 PM EDT  Workstation ID: [id number]    CT Angiogram Head w AI Analysis of LVO  Result Date: 7/15/2025  CT ANGIOGRAM HEAD W AI ANALYSIS OF LVO, CT ANGIOGRAM NECK, CT ABDOMEN PELVIS W CONTRAST Date of Exam: 7/15/2025 12:38 PM EDT Indication: Neuro Deficit, acute, Stroke suspected Neuro deficit, acute stroke suspected. Comparison: None available. Technique: CTA of the head and neck was performed after the uneventful intravenous administration of 90 mL Isovue-370. Axial IV contrast-enhanced CT of the abdomen and pelvis was performed. Reconstructed coronal and sagittal images were also obtained. In  addition, a 3-D volume rendered image was created for interpretation. Automated exposure control and iterative reconstruction methods were used. Findings: CTA head and neck: The lung apices demonstrate a partially imaged 3.8 cm nodular soft tissue finding within the upper mediastinum on the left, either an enlarged lymph node or mass. Evaluation of the neck soft tissues demonstrates no evidence of aerodigestive tract mass or pathologic cervical lymphadenopathy. Multilevel cervical spondylosis is present, without evidence  of acute fracture or aggressive osseous lesion. Patent aortic arch with three-vessel branching. The subclavian arteries are patent bilaterally. On the right, there is calcific atherosclerotic change present at the ICA origin with mild, less than 50% narrowing by NASCET criteria. Similar mild narrowing is present on the left. The vertebral arteries are normal in course and caliber bilaterally. Intracranially, the carotid siphons demonstrate calcific atherosclerotic changes with some mild narrowing of both ophthalmic segments. The anterior cerebral arteries are normal in course and caliber bilaterally. The right middle cerebral artery is normal. The left middle cerebral artery demonstrates no evidence of high-grade stenosis, occlusion or aneurysm. The vertebrobasilar system is patent. The posterior cerebral arteries are normal in course and caliber bilaterally. CT abdomen pelvis: The lung bases are clear. The body wall soft tissues demonstrate no acute or suspicious findings, with 2 adjacent presumed sebaceous cyst seen in the right gluteal region. The liver demonstrates low-attenuation findings most consistent  with cysts. The spleen, pancreas and bilateral adrenal glands demonstrate no acute findings. A homogeneous cystic appearing finding within the pancreatic head measures 22 x 21 mm. Normal gallbladder. Small and large bowel loops are nondilated. Colonic diverticular changes are present, without definite acute inflammatory change to suggest diverticulitis. The appendix is normal. There is no free fluid or pneumoperitoneum. Atherosclerotic, nonaneurysmal abdominal aorta. The pelvic viscera demonstrate no acute findings.     Impression: Impression: 1.Mild cervical and intracranial atherosclerotic changes are present as above, without evidence of flow-limiting stenosis, large vessel occlusion or aneurysm. 2.No acute findings in the abdomen and pelvis. 3.Incidentally noted 22 x 21 mm cystic finding within the  pancreatic head. Correlation with any available prior imaging could be of use to document expected stability of this likely benign finding, otherwise as indicated, MRI could be useful to further evaluate. 4.Incompletely characterized 3.8 cm nodular mass within the upper mediastinum, possibly an enlarged lymph node. Consider dedicated CT chest. Electronically Signed: Chase Birmingham MD  7/15/2025 1:27 PM EDT  Workstation ID: [id number]    CT Angiogram Neck  Result Date: 7/15/2025  CT ANGIOGRAM HEAD W AI ANALYSIS OF LVO, CT ANGIOGRAM NECK, CT ABDOMEN PELVIS W CONTRAST Date of Exam: 7/15/2025 12:38 PM EDT Indication: Neuro Deficit, acute, Stroke suspected Neuro deficit, acute stroke suspected. Comparison: None available. Technique: CTA of the head and neck was performed after the uneventful intravenous administration of 90 mL Isovue-370. Axial IV contrast-enhanced CT of the abdomen and pelvis was performed. Reconstructed coronal and sagittal images were also obtained. In  addition, a 3-D volume rendered image was created for interpretation. Automated exposure control and iterative reconstruction methods were used. Findings: CTA head and neck: The lung apices demonstrate a partially imaged 3.8 cm nodular soft tissue finding within the upper mediastinum on the left, either an enlarged lymph node or mass. Evaluation of the neck soft tissues demonstrates no evidence of aerodigestive tract mass or pathologic cervical lymphadenopathy. Multilevel cervical spondylosis is present, without evidence of acute fracture or aggressive osseous lesion. Patent aortic arch with three-vessel branching. The subclavian arteries are patent bilaterally. On the right, there is calcific atherosclerotic change present at the ICA origin with mild, less than 50% narrowing by NASCET criteria. Similar mild narrowing is present on the left. The vertebral arteries are normal in course and caliber bilaterally. Intracranially, the carotid siphons  demonstrate calcific atherosclerotic changes with some mild narrowing of both ophthalmic segments. The anterior cerebral arteries are normal in course and caliber bilaterally. The right middle cerebral artery is normal. The left middle cerebral artery demonstrates no evidence of high-grade stenosis, occlusion or aneurysm. The vertebrobasilar system is patent. The posterior cerebral arteries are normal in course and caliber bilaterally. CT abdomen pelvis: The lung bases are clear. The body wall soft tissues demonstrate no acute or suspicious findings, with 2 adjacent presumed sebaceous cyst seen in the right gluteal region. The liver demonstrates low-attenuation findings most consistent  with cysts. The spleen, pancreas and bilateral adrenal glands demonstrate no acute findings. A homogeneous cystic appearing finding within the pancreatic head measures 22 x 21 mm. Normal gallbladder. Small and large bowel loops are nondilated. Colonic diverticular changes are present, without definite acute inflammatory change to suggest diverticulitis. The appendix is normal. There is no free fluid or pneumoperitoneum. Atherosclerotic, nonaneurysmal abdominal aorta. The pelvic viscera demonstrate no acute findings.     Impression: Impression: 1.Mild cervical and intracranial atherosclerotic changes are present as above, without evidence of flow-limiting stenosis, large vessel occlusion or aneurysm. 2.No acute findings in the abdomen and pelvis. 3.Incidentally noted 22 x 21 mm cystic finding within the pancreatic head. Correlation with any available prior imaging could be of use to document expected stability of this likely benign finding, otherwise as indicated, MRI could be useful to further evaluate. 4.Incompletely characterized 3.8 cm nodular mass within the upper mediastinum, possibly an enlarged lymph node. Consider dedicated CT chest. Electronically Signed: Chase Birmingham MD  7/15/2025 1:27 PM EDT  Workstation ID:  [id number]    CT Abdomen Pelvis With Contrast  Result Date: 7/15/2025  CT ANGIOGRAM HEAD W AI ANALYSIS OF LVO, CT ANGIOGRAM NECK, CT ABDOMEN PELVIS W CONTRAST Date of Exam: 7/15/2025 12:38 PM EDT Indication: Neuro Deficit, acute, Stroke suspected Neuro deficit, acute stroke suspected. Comparison: None available. Technique: CTA of the head and neck was performed after the uneventful intravenous administration of 90 mL Isovue-370. Axial IV contrast-enhanced CT of the abdomen and pelvis was performed. Reconstructed coronal and sagittal images were also obtained. In  addition, a 3-D volume rendered image was created for interpretation. Automated exposure control and iterative reconstruction methods were used. Findings: CTA head and neck: The lung apices demonstrate a partially imaged 3.8 cm nodular soft tissue finding within the upper mediastinum on the left, either an enlarged lymph node or mass. Evaluation of the neck soft tissues demonstrates no evidence of aerodigestive tract mass or pathologic cervical lymphadenopathy. Multilevel cervical spondylosis is present, without evidence of acute fracture or aggressive osseous lesion. Patent aortic arch with three-vessel branching. The subclavian arteries are patent bilaterally. On the right, there is calcific atherosclerotic change present at the ICA origin with mild, less than 50% narrowing by NASCET criteria. Similar mild narrowing is present on the left. The vertebral arteries are normal in course and caliber bilaterally. Intracranially, the carotid siphons demonstrate calcific atherosclerotic changes with some mild narrowing of both ophthalmic segments. The anterior cerebral arteries are normal in course and caliber bilaterally. The right middle cerebral artery is normal. The left middle cerebral artery demonstrates no evidence of high-grade stenosis, occlusion or aneurysm. The vertebrobasilar system is patent. The posterior cerebral arteries are normal in course and  caliber bilaterally. CT abdomen pelvis: The lung bases are clear. The body wall soft tissues demonstrate no acute or suspicious findings, with 2 adjacent presumed sebaceous cyst seen in the right gluteal region. The liver demonstrates low-attenuation findings most consistent  with cysts. The spleen, pancreas and bilateral adrenal glands demonstrate no acute findings. A homogeneous cystic appearing finding within the pancreatic head measures 22 x 21 mm. Normal gallbladder. Small and large bowel loops are nondilated. Colonic diverticular changes are present, without definite acute inflammatory change to suggest diverticulitis. The appendix is normal. There is no free fluid or pneumoperitoneum. Atherosclerotic, nonaneurysmal abdominal aorta. The pelvic viscera demonstrate no acute findings.     Impression: Impression: 1.Mild cervical and intracranial atherosclerotic changes are present as above, without evidence of flow-limiting stenosis, large vessel occlusion or aneurysm. 2.No acute findings in the abdomen and pelvis. 3.Incidentally noted 22 x 21 mm cystic finding within the pancreatic head. Correlation with any available prior imaging could be of use to document expected stability of this likely benign finding, otherwise as indicated, MRI could be useful to further evaluate. 4.Incompletely characterized 3.8 cm nodular mass within the upper mediastinum, possibly an enlarged lymph node. Consider dedicated CT chest. Electronically Signed: Chase Birmingham MD  7/15/2025 1:27 PM EDT  Workstation ID: [id number]    CT CEREBRAL PERFUSION WITH & WITHOUT CONTRAST  Result Date: 7/15/2025  CT CEREBRAL PERFUSION W WO CONTRAST Date of Exam: 7/15/2025 12:38 PM EDT Indication: Neuro Deficit, acute, Stroke suspected Neuro deficit, acute stroke suspected.  Comparison: CT head from earlier today Technique: Axial CT images of the brain were obtained prior to and after the administration of 50 mL Isovue-370. Core blood volume, core blood  flow, mean transit time, and Tmax images were obtained utilizing the Rapid software protocol. A limited CT angiogram of the head was also performed to measure the blood vessel density. The radiation dose reduction device was turned on for each scan per the ALARA (As Low as Reasonably Achievable) protocol. Findings: The intracranial cerebral perfusion appears normal.     Impression: Impression: Examination appears within normal limits. Electronically Signed: Ibrahima Ribeiro MD  7/15/2025 1:15 PM EDT  Workstation ID: [id number]    CT Head Without Contrast Stroke Protocol  Result Date: 7/15/2025  CT HEAD WO CONTRAST STROKE PROTOCOL Date of Exam: 7/15/2025 12:36 PM EDT Indication: Neuro deficit, acute, stroke suspected Neuro Deficit, acute, Stroke suspected. Comparison: None available Technique: Axial CT images were obtained of the head without contrast administration.  Reconstructed coronal images were also obtained. Automated exposure control and iterative construction methods were used. Scan Time: 1234 Results discussed with ALAN Myers at 1240, 7/15/2025. Findings: The calvarium appears intact. 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 Left maxillary sinus mucous cyst is noted. Paranasal sinuses and mastoids otherwise appear clear. Orbits appear unremarkable except for ocular lens replacements. There is moderately advanced generalized cerebral atrophy. Focal left parietal encephalomalacia presumably represents old infarct. Intermediate density of the medial portion of the area of encephalomalacia could at least potentially represent late subacute infarct, although this is not favored. Small focal low-attenuation area is seen in the left external capsule, nonspecific. There is an old lacunar-type infarct of the head of the caudate nucleus. No clearly acute infarct/edema is identified. No hemorrhage, hydrocephalus, mass or mass effect, or abnormal extra-axial collection is seen.      Impression: Impression: 1. Old, less likely late subacute infarct of the left parietal lobe. 2. Small chronic ischemic focus of the area of the left external capsule. 3. 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 4. No clearly acute intracranial abnormality is identified. Electronically Signed: Derek Garcia MD  7/15/2025 12:55 PM EDT  Workstation ID: [id number]          Assessment & Plan  Assessment & Plan       CVA (cerebral vascular accident)    80 yr old man w hisotry of CVA 2021, HTN, anxiety, found at home with confusion, aphasia, dysarthria.        Suspected CVA   Vs encephalopathy or seizure disorder   - change in speech, mentation noted by dtr without focal deficit   - stroke team consulted  - head CT shows old CVAs, possible late subacute CVA in left parietal   - ASA, Plavix, statin.  Await MRI.  Checking EEG.  Loaded w Keppra     Chronic benzo use  - ativan 1mg BID prescribed longterm     Pancreatic head cystic mass  - seen on abd CT.  2x2cm.  Consider dedicated imaging at some point .  Need to ask pt/family if a recent CT abd has been done for comparison     Upper mediastinal nodule/mass vs LAD  - seen on abd CT       VTE Prophylaxis:  Mechanical VTE prophylaxis orders are signed & held.            CODE STATUS:    There are no questions and answers to display.       Expected Discharge   Expected discharge date/ time has not been documented.     Ruth Michaud MD  07/15/25      Electronically signed by Ruth Michaud MD at 07/15/25 3850

## 2025-07-18 NOTE — PLAN OF CARE
Problem: Adult Inpatient Plan of Care  Goal: Plan of Care Review  Outcome: Progressing  Flowsheets (Taken 7/18/2025 0646)  Outcome Evaluation: Pt A&O x4.  Up in chair.  No complaints of pain.  NPO after breakfast for cardioversion at 1500.  Call light withi n reach.  Will continue to monitor.  Plan of Care Reviewed With: patient  Goal: Patient-Specific Goal (Individualized)  Outcome: Progressing  Goal: Absence of Hospital-Acquired Illness or Injury  Outcome: Progressing  Intervention: Identify and Manage Fall Risk  Recent Flowsheet Documentation  Taken 7/18/2025 0600 by Kaitlin Olmedo RN  Safety Promotion/Fall Prevention: safety round/check completed  Taken 7/18/2025 0400 by Kaitlin Olmedo RN  Safety Promotion/Fall Prevention: safety round/check completed  Taken 7/18/2025 0200 by Kaitlin Olmedo RN  Safety Promotion/Fall Prevention: safety round/check completed  Taken 7/18/2025 0000 by Kaitlin Olmedo RN  Safety Promotion/Fall Prevention: safety round/check completed  Taken 7/17/2025 2200 by Kaitlin Olmedo RN  Safety Promotion/Fall Prevention: safety round/check completed  Taken 7/17/2025 2000 by Kaitlin Olmedo RN  Safety Promotion/Fall Prevention:   activity supervised   assistive device/personal items within reach   clutter free environment maintained   fall prevention program maintained   nonskid shoes/slippers when out of bed   room organization consistent  Intervention: Prevent Skin Injury  Recent Flowsheet Documentation  Taken 7/18/2025 0600 by Kaitlin Olmedo RN  Body Position: position changed independently  Skin Protection: incontinence pads utilized  Taken 7/18/2025 0400 by Kaitlin Olmedo RN  Body Position: position changed independently  Skin Protection: incontinence pads utilized  Taken 7/18/2025 0200 by Kaitlin Olmedo RN  Skin Protection: incontinence pads utilized  Taken 7/18/2025 0000 by Kaitlin Olmedo RN  Skin Protection: incontinence pads utilized  Taken 7/17/2025 2200  by Olmedo, Kaitlin, RN  Skin Protection: incontinence pads utilized  Taken 7/17/2025 2000 by Kaitlin Olmedo RN  Body Position: position changed independently  Skin Protection: incontinence pads utilized  Intervention: Prevent and Manage VTE (Venous Thromboembolism) Risk  Recent Flowsheet Documentation  Taken 7/17/2025 2000 by Kaitlin Olmedo RN  VTE Prevention/Management: (See MAR) other (see comments)  Intervention: Prevent Infection  Recent Flowsheet Documentation  Taken 7/18/2025 0600 by Kaitlin Olmedo RN  Infection Prevention: rest/sleep promoted  Taken 7/18/2025 0400 by Kaitlin Olmedo RN  Infection Prevention: rest/sleep promoted  Taken 7/18/2025 0200 by Kaitlin Olmedo RN  Infection Prevention: rest/sleep promoted  Taken 7/18/2025 0000 by Kaitlin Olmedo RN  Infection Prevention: rest/sleep promoted  Taken 7/17/2025 2200 by Kaitlin Olmedo RN  Infection Prevention: rest/sleep promoted  Taken 7/17/2025 2000 by Kaitlin Olmedo RN  Infection Prevention:   environmental surveillance performed   hand hygiene promoted   single patient room provided  Goal: Optimal Comfort and Wellbeing  Outcome: Progressing  Intervention: Provide Person-Centered Care  Recent Flowsheet Documentation  Taken 7/17/2025 2000 by Kaitlin Olmedo RN  Trust Relationship/Rapport: care explained  Goal: Readiness for Transition of Care  Outcome: Progressing     Problem: Stroke, Ischemic (Includes Transient Ischemic Attack)  Goal: Optimal Coping  Outcome: Progressing  Intervention: Support Psychosocial Response to Stroke  Recent Flowsheet Documentation  Taken 7/17/2025 2000 by Kaitlin Olmedo RN  Family/Support System Care: self-care encouraged  Goal: Optimal Cerebral Tissue Perfusion  Outcome: Progressing  Goal: Optimal Cognitive Function  Outcome: Progressing  Goal: Optimal Functional Ability  Outcome: Progressing  Intervention: Optimize Functional Ability  Recent Flowsheet Documentation  Taken 7/18/2025 0600 by  Kaitlin Olmedo RN  Activity Management: up in chair  Taken 7/18/2025 0400 by Kaitlin Olmedo RN  Activity Management: up in chair  Taken 7/18/2025 0200 by Kaitlin Olmedo RN  Activity Management: up in chair  Taken 7/18/2025 0000 by Kaitlin Olmedo RN  Activity Management: up in chair  Taken 7/17/2025 2200 by Kaitlin Olmedo RN  Activity Management: up in chair  Taken 7/17/2025 2000 by Kaitlin Olmedo RN  Activity Management:   activity encouraged   up in chair  Goal: Effective Oxygenation and Ventilation  Outcome: Progressing  Intervention: Optimize Oxygenation and Ventilation  Recent Flowsheet Documentation  Taken 7/18/2025 0600 by Kaitlin Olmedo RN  Head of Bed (HOB) Positioning: HOB at 30-45 degrees  Taken 7/17/2025 2000 by Kaitlin Olmedo RN  Head of Bed (HOB) Positioning: HOB at 30-45 degrees  Goal: Safe and Effective Swallow  Outcome: Progressing     Problem: Comorbidity Management  Goal: Blood Pressure in Desired Range  Outcome: Progressing     Problem: Fall Injury Risk  Goal: Absence of Fall and Fall-Related Injury  Outcome: Progressing  Intervention: Promote Injury-Free Environment  Recent Flowsheet Documentation  Taken 7/18/2025 0600 by Kaitlin Olmedo RN  Safety Promotion/Fall Prevention: safety round/check completed  Taken 7/18/2025 0400 by Kaitlin Olmedo RN  Safety Promotion/Fall Prevention: safety round/check completed  Taken 7/18/2025 0200 by Kaitlin Olmedo RN  Safety Promotion/Fall Prevention: safety round/check completed  Taken 7/18/2025 0000 by Kaitlin Olmedo RN  Safety Promotion/Fall Prevention: safety round/check completed  Taken 7/17/2025 2200 by Kaitlin Olmedo RN  Safety Promotion/Fall Prevention: safety round/check completed  Taken 7/17/2025 2000 by Kaitlin Olmedo RN  Safety Promotion/Fall Prevention:   activity supervised   assistive device/personal items within reach   clutter free environment maintained   fall prevention program maintained   nonskid  shoes/slippers when out of bed   room organization consistent     Problem: Skin Injury Risk Increased  Goal: Skin Health and Integrity  Outcome: Progressing  Intervention: Optimize Skin Protection  Recent Flowsheet Documentation  Taken 7/18/2025 0600 by Kaitlin Olmedo RN  Activity Management: up in chair  Pressure Reduction Techniques: frequent weight shift encouraged  Head of Bed (HOB) Positioning: HOB at 30-45 degrees  Pressure Reduction Devices: pressure-redistributing mattress utilized  Skin Protection: incontinence pads utilized  Taken 7/18/2025 0400 by Kaitlin Olmedo RN  Activity Management: up in chair  Pressure Reduction Techniques: frequent weight shift encouraged  Pressure Reduction Devices: pressure-redistributing mattress utilized  Skin Protection: incontinence pads utilized  Taken 7/18/2025 0200 by Kaitlin Olmedo RN  Activity Management: up in chair  Pressure Reduction Techniques: frequent weight shift encouraged  Pressure Reduction Devices: pressure-redistributing mattress utilized  Skin Protection: incontinence pads utilized  Taken 7/18/2025 0000 by Kaitlin Olmedo RN  Activity Management: up in chair  Pressure Reduction Techniques: frequent weight shift encouraged  Pressure Reduction Devices: pressure-redistributing mattress utilized  Skin Protection: incontinence pads utilized  Taken 7/17/2025 2200 by Kaitlin Olmedo RN  Activity Management: up in chair  Pressure Reduction Techniques: frequent weight shift encouraged  Pressure Reduction Devices: pressure-redistributing mattress utilized  Skin Protection: incontinence pads utilized  Taken 7/17/2025 2000 by Kaitlin Olmedo RN  Activity Management:   activity encouraged   up in chair  Pressure Reduction Techniques: frequent weight shift encouraged  Head of Bed (HOB) Positioning: HOB at 30-45 degrees  Pressure Reduction Devices: pressure-redistributing mattress utilized  Skin Protection: incontinence pads utilized   Goal Outcome  Evaluation:  Plan of Care Reviewed With: patient           Outcome Evaluation: Pt A&O x4.  Up in chair.  No complaints of pain.  NPO after breakfast for cardioversion at 1500.  Call light withi n reach.  Will continue to monitor.

## 2025-07-18 NOTE — PROGRESS NOTES
[facility]  PROGRESS NOTE    Patient Name: Ibrahima Nava  : 1945  MRN: [id number]    Date of Admission: 7/15/2025  Primary Care Physician: Sebastien Lowe MD    Subjective   Subjective     CC:  F/U dysphagia, dysarthria, confusion. New lung mass.    HPI:  Patient seen and examined. No new complaints. Up in chair. Going for SALVADOR/ECV today. Discussed plan for outpatient follow-up/bronchoscopy to further evaluate this lung mass.     Objective   Objective     Vital Signs:   Temp:  [97.5 °F (36.4 °C)-98.6 °F (37 °C)] 97.9 °F (36.6 °C)  Heart Rate:  [] 94  Resp:  [16-18] 17  BP: (142-166)/(74-95) 164/95  Flow (L/min) (Oxygen Therapy):  [2] 2     Physical Exam:  Constitutional: No acute distress, awake, alert  HENT: NCAT, mucous membranes moist, [place]  Respiratory: decreased in bases, respiratory effort normal 2L NC  Cardiovascular: Irregular, no murmurs, rubs, or gallops  Gastrointestinal: Positive bowel sounds, soft, non-tender, no R/R/G, non-distended  Musculoskeletal:  4+ pitting B/L LE edema  Psychiatric: Appropriate affect, cooperative  Neurologic: Oriented x 3, GOVEA, speech difficult to understand at times  Skin: No rashes     Results Reviewed:  LAB RESULTS:      Lab 25  0719 25  1517 07/15/25  1425 07/15/25  1243 07/15/25  1239   WBC 7.57 9.61  --   --  11.13*   HEMOGLOBIN 8.2* 8.3*  --   --  9.0*   HEMOGLOBIN, POC  --   --   --  10.2*  --    HEMATOCRIT 31.1* 28.6*  --   --  31.2*   HEMATOCRIT POC  --   --   --  30*  --    PLATELETS 128* 150  --   --  176   NEUTROS ABS  --   --   --   --  9.07*   IMMATURE GRANS (ABS)  --   --   --   --  0.32*   LYMPHS ABS  --   --   --   --  1.22   MONOS ABS  --   --   --   --  0.50   EOS ABS  --   --   --   --  0.00   MCV 96.6 86.9  --   --  87.4   PROTIME  --   --   --   --  14.8   HSTROP T  --   --  45*  --  46*         Lab 25  1034 25  1152 25  1507 07/15/25  1243 07/15/25  1239   SODIUM 148*   --  142  --  142   POTASSIUM 3.0* 3.7 2.7*  --  2.5*   CHLORIDE 103  --  98  --  97*   CO2 31.6*  --  33.0*  --  32.6*   ANION GAP 13.4  --  11.0  --  12.4   BUN 22.0  --  20.9  --  22.2   CREATININE 0.89  --  0.84 0.90 0.92   EGFR 86.6  --  88.2 86.3 84.1   GLUCOSE 119*  --  227*  --  252*   CALCIUM 8.8  --  8.7  --  8.9   MAGNESIUM 2.3  --   --   --  2.2   HEMOGLOBIN A1C  --   --   --   --  6.34*   TSH  --   --   --   --  0.876         Lab 07/15/25  1239   TOTAL PROTEIN 5.6*   ALBUMIN 3.7   GLOBULIN 1.9   ALT (SGPT) 58*   AST (SGOT) 75*   BILIRUBIN 0.4   ALK PHOS 85         Lab 07/17/25  1152 07/15/25  1425 07/15/25  1239   PROBNP 2,423.0*  --  3,433.0*   HSTROP T  --  45* 46*   PROTIME  --   --  14.8   INR  --   --  1.09         Lab 07/16/25  1507   CHOLESTEROL 157   LDL CHOL 77   HDL CHOL 40   TRIGLYCERIDES 240*         Lab 07/16/25  1605   IRON 52*   IRON SATURATION (TSAT) 14*   TIBC 383   TRANSFERRIN 257         Lab 07/15/25  1316   PH, ARTERIAL 7.489*   PCO2, ARTERIAL 50.2*   PO2 ART 52.2*   FIO2 21   HCO3 ART 38.2*   BASE EXCESS ART 13.3*   CARBOXYHEMOGLOBIN 1.8     Brief Urine Lab Results  (Last result in the past 365 days)        Color   Clarity   Blood   Leuk Est   Nitrite   Protein   CREAT   Urine HCG        07/15/25 1319 Yellow   Clear   Moderate (2+)   Negative   Negative   30 mg/dL (1+)                   Microbiology Results Abnormal       None            CT Chest Without Contrast Diagnostic  Result Date: 7/17/2025  CT CHEST WO CONTRAST DIAGNOSTIC Date of Exam: 7/17/2025 10:03 AM EDT Indication: nodular mass upper mediastinum noted on CT A/P. Comparison: No prior chest CT scan.; CTA neck 7/15/2025 which includes a portion of the upper chest Technique: Axial CT images were obtained of the chest without contrast administration.  Reconstructed coronal and sagittal images were also obtained. Automated exposure control and iterative construction methods were used. Findings: There is a rounded,  approximately 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 This appears contiguous with an elongated pleural-based mass extending along the left lateral mediastinal margin, which measures up to approximately 5.5 x 3 cm. Precise delineation from adjacent pulmonary vessels is difficult without IV contrast and only  the upper portion of the mediastinal mass was included on the previous neck CT scan. There are several shotty small mediastinal nodes with rounded morphology of questionable significance, possibly reactive, the largest a left superior mediastinal node 1 cm in diameter image 35/2. There is only trace pericardial fluid. There is a very small apparently free-flowing left pleural effusion. The larger central airways appear patent. Images of the lungs show multifocal reticular and groundglass changes primarily in the upper lungs, and a small amount of septal thickening, potentially a combination of bilateral pneumonia and early interstitial edema less likely all representing an unusual pattern of pulmonary interstitial edema. Bony structures appear to be intact. Previously noted 2.1 cm low-density lesion/cyst in the pancreatic head is faintly visible on today's exam. Water density right renal midpole cyst again noted. No acute upper abdominal disease is seen. 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     Impression: Impression: 1. Approximately 4.5 cm mediastinal mass corresponding to previous CTA neck findings, and contiguous with a 5.5 x 3 cm pleural-based left upper lobe pulmonary mass, presumably primary neoplasm. 2. Few shotty mediastinal lymph nodes with rounded morphology, whether reactive, or early metastatic adenopathy. 3. Mixed pattern of pulmonary parenchymal disease primarily of the upper lobes, with some septal thickening suggesting pulmonary edema, and reticulonodular and groundglass changes more likely to represent pneumonia than unusual  asymmetric pulmonary edema. 4. Very small left pleural effusion again noted as well as previously described pancreatic cyst or IPMN. Electronically Signed: Derek Garcia MD  7/17/2025 10:56 AM EDT  Workstation ID: [id number]      Results for orders placed during the hospital encounter of 07/15/25    Adult Transthoracic Echo Complete W/ Cont if Necessary Per Protocol (With Agitated Saline) 07/17/2025  2:12 PM    Interpretation Summary    Left ventricular systolic function is normal. Left ventricular ejection fraction appears to be 51 - 55%.    Left ventricular wall thickness is consistent with mild concentric hypertrophy.    The right atrial cavity is borderline dilated.    Mild to moderate mitral valve regurgitation is present.    Estimated right ventricular systolic pressure from tricuspid regurgitation is mildly elevated (35-45 mmHg).      Current medications:  Scheduled Meds:amiodarone, 200 mg, Oral, Q24H  amLODIPine, 5 mg, Oral, Q24H  apixaban, 5 mg, Oral, Q12H  atorvastatin, 40 mg, Oral, Nightly  bumetanide, 1 mg, Oral, Daily  empagliflozin, 10 mg, Oral, Daily  ferrous sulfate, 325 mg, Oral, Daily With Breakfast  insulin lispro, 2-9 Units, Subcutaneous, 4x Daily AC & at Bedtime  levothyroxine, 50 mcg, Oral, Q AM  losartan, 100 mg, Oral, Q24H  metoprolol tartrate, 25 mg, Oral, Q12H  pantoprazole, 40 mg, Oral, Q AM  potassium chloride ER, 40 mEq, Oral, Q4H  primidone, 100 mg, Oral, Daily  sertraline, 200 mg, Oral, Daily  sodium chloride, 10 mL, Intravenous, Q12H  sodium chloride, 10 mL, Intravenous, Q12H  spironolactone, 25 mg, Oral, Daily  terazosin, 1 mg, Oral, Nightly  tiZANidine, 4 mg, Oral, Nightly  valACYclovir, 500 mg, Oral, Daily      Continuous Infusions:   PRN Meds:.  senna-docusate sodium **AND** polyethylene glycol **AND** bisacodyl **AND** bisacodyl    cloNIDine    dextrose    dextrose    glucagon (human recombinant)    hydrALAZINE    LORazepam    Magnesium Cardiology Dose Replacement - Follow Nurse / BPA  Driven Protocol    metoprolol tartrate    nitroglycerin    Potassium Replacement - Follow Nurse / BPA Driven Protocol    sodium chloride    sodium chloride    sodium chloride    sodium chloride    sodium chloride    Assessment & Plan   Assessment & Plan     Active Hospital Problems    Diagnosis  POA    **CVA (cerebral vascular accident) [I63.9]  Yes    Mediastinal mass [J98.59]  Unknown      Resolved Hospital Problems   No resolved problems to display.        Brief Hospital Course to date:  Ibrahima Nava is a 80 y.o. male w history of HTN, anxiety on chronic benzos, CVA in 2012 (no residual deficit), essential tremor. Endorses dysarthria, dysphagia to solids and liquids, confusion.     This patient's problems and plans were partially entered by my partner and updated as appropriate by me 07/18/25.     Dysarthria  Expressive aphasia  Confusion   Prior stroke   -MRI brain negative for acute stroke. Noted chronic appearing infarct of L pre and post central gyri   -Stroke team followed, feel likely recrudescence of old stroke  -EEG negative  -Echo with normal EF  -PT/OT recommend home   -UA ok   -Follow-up stroke clinic 1 month    Dysphagia  -noted to solids and liquids PTA  -endorses fullness R neck  -SLP follows, s/p MBS w/limited upper GI series, no evidence of obstruction and mild reflux   -CTA neck : Evaluation of the neck soft tissues demonstrates no evidence of aerodigestive tract mass or pathologic cervical lymphadenopathy.   -soft to chew textures, chopped, thin liquids     New Atrial Fibrillation  New LE edema, concern for new CHF  Pulmonary Edema  Hypoxia, new   -Echo with normal EF   -Cardiology following, assistance appreciated   -TSH ok  -Jardiance, Aldactone, BB, Eliquis, Bumex, amiodarone    -Plan for SALVADOR ECV today     New Mediastinal mass, JANNA pulmonary mass  -CT chest has been reviewed with patient and family  -prior smoking Hx, quit 30 years ago  -endorses chronic cough x 1 year  -No weight loss or  hemoptysis   -Interested in biopsy, was just started on Eliquis for A fib so will need to be held pending timing   -discussed with Dr Sams. 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         AM-PAC 6 Clicks Score (PT): 18 (07/18/25 0800)    CODE STATUS:   Code Status and Medical Interventions: CPR (Attempt to Resuscitate); Full Support   Ordered at: 07/15/25 9405     Code Status (Patient has no pulse and is not breathing):    CPR (Attempt to Resuscitate)     Medical Interventions (Patient has pulse or is breathing):    Full Support     Level Of Support Discussed With:    Patient       Peggy PARRA DO [place]  07/18/25

## 2025-07-18 NOTE — CASE MANAGEMENT/SOCIAL WORK
Continued Stay Note  [facility]     Patient Name: Ibrahima Nava  MRN: [id number]  Today's Date: 7/18/2025    Admit Date: 7/15/2025    Plan: Home w/Outpatient PT   Discharge Plan       Row Name 07/18/25 1109       Plan    Plan Home w/Outpatient PT    Patient/Family in Agreement with Plan yes    Plan Comments Spoke with patient in room.  Therapy recommends outpatient PT at discharge.  Patient agreeable.  Order faxed to [facility].  They will call patient to schedule after discharge.  Patient currently wearing O2@2L, not on at home.  CM will continue to follow.    Final Discharge Disposition Code 01 - home or self-care                   Discharge Codes    No documentation.                 Expected Discharge Date and Time       Expected Discharge Date Expected Discharge Time    Jul 19, 2025               Codie Parekh RN

## 2025-07-18 NOTE — SIGNIFICANT NOTE
Met with the patient, his wife and daughter at bedside.  Discussed that I have scheduled patient to pulmonary clinic on 7/28 with Dr. King to discuss the procedure and set him up for navigational bronchoscopy.  Patient is post undergo SALVADOR with ECV today.  Discussed that it will be beneficial for him to have anticoagulation after the procedure for few days before we hold it for the procedure and also I want him to recover from his current illness and congestive heart failure flareup before he undergoes general anesthesia.  Risks of procedure discussed in detail.  Questions answered. Patient is amenable to proceeding.  They already have appointment information.  If there is any other concern I will be happy to revisit with the patient.

## 2025-07-18 NOTE — PROGRESS NOTES
[facility] at [facility]  IP Progress Note      Chief Complaint/Reason for service: #1 heart failure with preserved EF #2 atrial fibrillation #3 CT evidence of coronary artery disease with history of exertional angina    Subjective   Subjective: Patient is awake and alert.  He denies chest pain or shortness of breath.    Past medical, surgical, social and family history reviewed in the patient's electronic medical record.    Objective     Vital Sign Min/Max for last 24 hours  Temp  Min: 97.5 °F (36.4 °C)  Max: 98.6 °F (37 °C)   BP  Min: 142/77  Max: 188/104   Pulse  Min: 91  Max: 113   Resp  Min: 17  Max: 18   SpO2  Min: 95 %  Max: 98 %   Flow (L/min) (Oxygen Therapy)  Min: 2  Max: 2      Intake/Output Summary (Last 24 hours) at 7/18/2025 0759  Last data filed at 7/18/2025 0334  Gross per 24 hour   Intake 200 ml   Output 600 ml   Net -400 ml             Current Facility-Administered Medications:     amLODIPine (NORVASC) tablet 5 mg, 5 mg, Oral, Q24H, Carlos Mendez MD, 5 mg at 07/17/25 1535    apixaban (ELIQUIS) tablet 5 mg, 5 mg, Oral, Q12H, Igor Nath MD, 5 mg at 07/17/25 2108    atorvastatin (LIPITOR) tablet 40 mg, 40 mg, Oral, Nightly, Carlos Mendez MD, 40 mg at 07/17/25 2108    sennosides-docusate (PERICOLACE) 8.6-50 MG per tablet 2 tablet, 2 tablet, Oral, BID PRN **AND** polyethylene glycol (MIRALAX) packet 17 g, 17 g, Oral, Daily PRN **AND** bisacodyl (DULCOLAX) EC tablet 5 mg, 5 mg, Oral, Daily PRN **AND** bisacodyl (DULCOLAX) suppository 10 mg, 10 mg, Rectal, Daily PRN, Ruth Michaud MD    bumetanide (BUMEX) tablet 1 mg, 1 mg, Oral, Daily, Carlos Mendez MD    cloNIDine (CATAPRES) tablet 0.1 mg, 0.1 mg, Oral, Q1H PRN, Carlos Mendez MD, 0.1 mg at 07/17/25 1241    dextrose (D50W) (25 g/50 mL) IV injection 25 g, 25 g, Intravenous, Q15 Min PRN, Peggy Walsh DO    dextrose (GLUTOSE) oral gel 15 g, 15 g, Oral, Q15 Min PRN, Nico,  Peggy PARRA DO    empagliflozin (JARDIANCE) tablet 10 mg, 10 mg, Oral, Daily, Carlos Mendez MD, 10 mg at 07/17/25 0838    ferrous sulfate tablet 325 mg, 325 mg, Oral, Daily With Breakfast, Carlos Mendez MD    glucagon (GLUCAGEN) injection 1 mg, 1 mg, Intramuscular, Q15 Min PRN, Peggy Walsh DO    hydrALAZINE (APRESOLINE) injection 10 mg, 10 mg, Intravenous, Q6H PRN, Peggy Walsh, , 10 mg at 07/17/25 1035    Insulin Lispro (humaLOG) injection 2-9 Units, 2-9 Units, Subcutaneous, 4x Daily AC & at Bedtime, Peggy Walsh DO, 2 Units at 07/17/25 1707    levothyroxine (SYNTHROID, LEVOTHROID) tablet 50 mcg, 50 mcg, Oral, Q AM, Peggy Walsh DO, 50 mcg at 07/18/25 0554    LORazepam (ATIVAN) tablet 0.5 mg, 0.5 mg, Oral, Daily PRN, Peggy Walsh DO, 0.5 mg at 07/17/25 2109    losartan (COZAAR) tablet 100 mg, 100 mg, Oral, Q24H, Carlos Mendez MD, 100 mg at 07/17/25 0839    Magnesium Cardiology Dose Replacement - Follow Nurse / BPA Driven Protocol, , Not Applicable, PRN, Juan Manuel Sandoval MD    metoprolol tartrate (LOPRESSOR) injection 5 mg, 5 mg, Intravenous, Q6H PRN, Peggy Walsh DO    metoprolol tartrate (LOPRESSOR) tablet 25 mg, 25 mg, Oral, Q12H, Carlos Mendez MD, 25 mg at 07/17/25 2108    nitroglycerin (NITROSTAT) SL tablet 0.4 mg, 0.4 mg, Sublingual, Q5 Min PRN, Ruth Michaud MD    pantoprazole (PROTONIX) EC tablet 40 mg, 40 mg, Oral, Q AM, Peggy Walsh DO, 40 mg at 07/18/25 0554    Potassium Replacement - Follow Nurse / BPA Driven Protocol, , Not Applicable, PRJaime HILLIARD Nicholas D, MD    primidone (MYSOLINE) tablet 100 mg, 100 mg, Oral, Daily, Peggy Walsh DO, 100 mg at 07/17/25 0838    sertraline (ZOLOFT) tablet 200 mg, 200 mg, Oral, Daily, Peggy Walsh DO, 200 mg at 07/17/25 0838    sodium chloride 0.9 % flush 10 mL, 10 mL, Intravenous, PRN, Juan Manuel Sandoval MD    sodium chloride 0.9 % flush 10 mL, 10 mL, Intravenous, Q12H,  Lyric Dan APRN, 10 mL at 07/16/25 2109    sodium chloride 0.9 % flush 10 mL, 10 mL, Intravenous, PRN, Lyric Dan APRN    sodium chloride 0.9 % flush 10 mL, 10 mL, Intravenous, Q12H, Ruth Michaud MD, 10 mL at 07/17/25 0839    sodium chloride 0.9 % flush 10 mL, 10 mL, Intravenous, PRN, Ruth Michaud MD    sodium chloride 0.9 % infusion 40 mL, 40 mL, Intravenous, PRN, Lyric Dan APRN    sodium chloride 0.9 % infusion 40 mL, 40 mL, Intravenous, PRN, Ruth Michaud MD    spironolactone (ALDACTONE) tablet 25 mg, 25 mg, Oral, Daily, Carlos Mendez MD, 25 mg at 07/17/25 0839    terazosin (HYTRIN) capsule 1 mg, 1 mg, Oral, Nightly, Peggy Walsh, DO, 1 mg at 07/17/25 2109    tiZANidine (ZANAFLEX) tablet 4 mg, 4 mg, Oral, Nightly, Peggy Walsh, DO, 4 mg at 07/17/25 2108    valACYclovir (VALTREX) tablet 500 mg, 500 mg, Oral, Daily, Peggy Walsh DO, 500 mg at 07/17/25 0838    Physical Exam: General Pleasant well-developed male in a recliner not dyspneic tachypneic at rest        HEENT: No JVP.  Nasal tube present.       Respiratory: Equal bilateral symmetrical expansion and clear to auscultation anteriorly       Cardiovascular: Irregular rate and rhythm and no edema to palpation       GI: Obese and soft       Lower Extremities: No lesions       Neuro: Facial expressions are symmetrical moves all 4 extremities       Skin: Warm and dry       Psych: Pleasant affect    Results Review: Patient is a net -1.2 L.  Heart rates 90s to 113.  Blood pressures improved to 142-153 systolic GFR was 88 yesterday    Radiology Results:  Imaging Results (Last 72 Hours)       Procedure Component Value Units Date/Time    CT Chest Without Contrast Diagnostic [[id number]] Collected: 07/17/25 1045     Updated: 07/17/25 1100    Narrative:      CT CHEST WO CONTRAST DIAGNOSTIC    Date of Exam: 7/17/2025 10:03 AM EDT    Indication: nodular mass upper mediastinum noted on CT A/P.    Comparison: No prior chest  CT scan.; CTA neck 7/15/2025 which includes a portion of the upper chest    Technique: Axial CT images were obtained of the chest without contrast administration.  Reconstructed coronal and sagittal images were also obtained. Automated exposure control and iterative construction methods were used.      Findings:  There is a rounded, approximately 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     This appears contiguous with an elongated pleural-based mass extending along the left lateral mediastinal margin, which measures up to approximately 5.5 x 3 cm. Precise delineation from adjacent pulmonary vessels is difficult without IV contrast and only   the upper portion of the mediastinal mass was included on the previous neck CT scan.    There are several shotty small mediastinal nodes with rounded morphology of questionable significance, possibly reactive, the largest a left superior mediastinal node 1 cm in diameter image 35/2. There is only trace pericardial fluid. There is a very   small apparently free-flowing left pleural effusion. The larger central airways appear patent.    Images of the lungs show multifocal reticular and groundglass changes primarily in the upper lungs, and a small amount of septal thickening, potentially a combination of bilateral pneumonia and early interstitial edema less likely all representing an   unusual pattern of pulmonary interstitial edema. Bony structures appear to be intact. Previously noted 2.1 cm low-density lesion/cyst in the pancreatic head is faintly visible on today's exam. Water density right renal midpole cyst again noted. No acute   upper abdominal disease is seen. 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          Impression:      Impression:    1. Approximately 4.5 cm mediastinal mass corresponding to previous CTA neck findings, and contiguous with a 5.5 x 3 cm pleural-based left upper lobe pulmonary mass, presumably  primary neoplasm.    2. Few shotty mediastinal lymph nodes with rounded morphology, whether reactive, or early metastatic adenopathy.    3. Mixed pattern of pulmonary parenchymal disease primarily of the upper lobes, with some septal thickening suggesting pulmonary edema, and reticulonodular and groundglass changes more likely to represent pneumonia than unusual asymmetric pulmonary   edema.    4. Very small left pleural effusion again noted as well as previously described pancreatic cyst or IPMN.        Electronically Signed: Derek Garcia MD    7/17/2025 10:56 AM EDT    Workstation ID: [id number]    FL Video Swallow With Speech Single Contrast [[id number]] Collected: 07/16/25 1202     Updated: 07/16/25 1213    Narrative:      FL VIDEO SWALLOW W SPEECH SINGLE-CONTRAST, FL LIMITED UGI FOR MBS REFLUX SINGLE-CONTRAST    Date of Exam: 7/16/2025 10:00 AM EDT    Indication: dysphagia.       Comparison: None available.    Technique:   The speech pathologist administered food and/or liquid mixed with barium to the patient with cine/video imaging.  Imaging assistance was provided to the speech pathologist and an image was saved. The camera was then lowered with the patient   remaining in lateral seated position with additional swallows of thin barium, to evaluate the visible portion of the esophagus.    Fluoroscopic Time: 1 minute 12 seconds    Number of Images: 6 cine loops    Findings:  Modified barium swallow: The patient was evaluated in the seated lateral position while taking a variety of consistencies by mouth under the direction of speech pathology. No aspiration was observed. Please see the speech pathologist's procedure report   for full details and recommendations.    Limited upper GI series: There is moderate residual barium in the upper two thirds of the esophagus following the modified barium swallow. Additional sips show no obvious stricture. There is mild intraesophageal reflux.      Impression:       Impression:    1. Fluoroscopy provided for modified barium swallow.    2. Limited upper GI series shows incomplete clearance with residual thin barium in the upper two thirds of the esophagus. No evidence of obstruction and only mild intraesophageal reflux.      Electronically Signed: Derek Garcia MD    7/16/2025 12:10 PM EDT    Workstation ID: [id number]    FL Limited Ugi For Mbs Reflux Single-Contrast [[id number]] Collected: 07/16/25 1202     Updated: 07/16/25 1213    Narrative:      FL VIDEO SWALLOW W SPEECH SINGLE-CONTRAST, FL LIMITED UGI FOR MBS REFLUX SINGLE-CONTRAST    Date of Exam: 7/16/2025 10:00 AM EDT    Indication: dysphagia.       Comparison: None available.    Technique:   The speech pathologist administered food and/or liquid mixed with barium to the patient with cine/video imaging.  Imaging assistance was provided to the speech pathologist and an image was saved. The camera was then lowered with the patient   remaining in lateral seated position with additional swallows of thin barium, to evaluate the visible portion of the esophagus.    Fluoroscopic Time: 1 minute 12 seconds    Number of Images: 6 cine loops    Findings:  Modified barium swallow: The patient was evaluated in the seated lateral position while taking a variety of consistencies by mouth under the direction of speech pathology. No aspiration was observed. Please see the speech pathologist's procedure report   for full details and recommendations.    Limited upper GI series: There is moderate residual barium in the upper two thirds of the esophagus following the modified barium swallow. Additional sips show no obvious stricture. There is mild intraesophageal reflux.      Impression:      Impression:    1. Fluoroscopy provided for modified barium swallow.    2. Limited upper GI series shows incomplete clearance with residual thin barium in the upper two thirds of the esophagus. No evidence of obstruction and only mild intraesophageal  reflux.      Electronically Signed: Derek Garcia MD    7/16/2025 12:10 PM EDT    Workstation ID: [id number]    MRI Brain Without Contrast [[id number]] Collected: 07/16/25 0650     Updated: 07/16/25 0849    Narrative:      MRI BRAIN WO CONTRAST    Date of Exam: 7/16/2025 2:32 AM EDT    Indication: Stroke, follow up  AMS, dysarthria, dysphagia - subacute L MCA infarct on CT head.     Comparison: CT 1 day prior    Technique:  Routine multiplanar/multisequence sequence images of the brain were obtained without contrast administration.      Findings:  No acute infarct is present on diffusion weighted sequences. Midline structures appear normal and the craniocervical junction is satisfactory. Age-related changes are present with mild generalized volume loss and mild typical T2 hyperintense subcortical   and pontine white matter changes, nonspecific but favored to reflect sequela of chronic microvascular ischemia. There are also areas of old infarct present within the left perirolandic region and small bilateral chronic basal ganglia lacunar infarcts.   There is no evidence of intracranial hemorrhage, mass or mass effect. The ventricles demonstrate mild ex vacuo prominence. The orbits are normal. The paranasal sinuses demonstrate small mucous retention cysts in the left maxillary sinus.      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 There is no evidence of recent infarct, hemorrhage, mass or mass effect.        Electronically Signed: Chase Birmingham MD    7/16/2025 8:46 AM EDT    Workstation ID: [id number]    XR Chest 1 View [[id number]] Collected: 07/15/25 1443     Updated: 07/15/25 1447    Narrative:      XR CHEST 1 VW    Date of Exam: 7/15/2025 2:23 PM EDT    Indication: Acute Stroke Protocol (onset < 12 hrs)    Comparison: 5/7/2024    Findings:  Cardiac size is enlarged. There is an increase in  the pulmonary vascular markings with interval development of diffuse interstitial edema. No focal consolidation is seen. There is no obvious pleural fluid.      Impression:      Impression:  Cardiomegaly with interval development of pulmonary edema.        Electronically Signed: Jani Jung MD    7/15/2025 2:44 PM EDT    Workstation ID: [id number]    CT Angiogram Head w AI Analysis of LVO [[id number]] Collected: 07/15/25 1313     Updated: 07/15/25 1330    Narrative:      CT ANGIOGRAM HEAD W AI ANALYSIS OF LVO, CT ANGIOGRAM NECK, CT ABDOMEN PELVIS W CONTRAST    Date of Exam: 7/15/2025 12:38 PM EDT    Indication: Neuro Deficit, acute, Stroke suspected  Neuro deficit, acute stroke suspected.    Comparison: None available.    Technique: CTA of the head and neck was performed after the uneventful intravenous administration of 90 mL Isovue-370. Axial IV contrast-enhanced CT of the abdomen and pelvis was performed. Reconstructed coronal and sagittal images were also obtained. In   addition, a 3-D volume rendered image was created for interpretation. Automated exposure control and iterative reconstruction methods were used.      Findings:  CTA head and neck: The lung apices demonstrate a partially imaged 3.8 cm nodular soft tissue finding within the upper mediastinum on the left, either an enlarged lymph node or mass. Evaluation of the neck soft tissues demonstrates no evidence of   aerodigestive tract mass or pathologic cervical lymphadenopathy. Multilevel cervical spondylosis is present, without evidence of acute fracture or aggressive osseous lesion. Patent aortic arch with three-vessel branching. The subclavian arteries are   patent bilaterally. On the right, there is calcific atherosclerotic change present at the ICA origin with mild, less than 50% narrowing by NASCET criteria. Similar mild narrowing is present on the left. The vertebral arteries are normal in course and   caliber bilaterally. Intracranially, the  carotid siphons demonstrate calcific atherosclerotic changes with some mild narrowing of both ophthalmic segments. The anterior cerebral arteries are normal in course and caliber bilaterally. The right middle   cerebral artery is normal. The left middle cerebral artery demonstrates no evidence of high-grade stenosis, occlusion or aneurysm. The vertebrobasilar system is patent. The posterior cerebral arteries are normal in course and caliber bilaterally.    CT abdomen pelvis: The lung bases are clear. The body wall soft tissues demonstrate no acute or suspicious findings, with 2 adjacent presumed sebaceous cyst seen in the right gluteal region. The liver demonstrates low-attenuation findings most consistent   with cysts. The spleen, pancreas and bilateral adrenal glands demonstrate no acute findings. A homogeneous cystic appearing finding within the pancreatic head measures 22 x 21 mm. Normal gallbladder. Small and large bowel loops are nondilated. Colonic   diverticular changes are present, without definite acute inflammatory change to suggest diverticulitis. The appendix is normal. There is no free fluid or pneumoperitoneum. Atherosclerotic, nonaneurysmal abdominal aorta. The pelvic viscera demonstrate no   acute findings.        Impression:      Impression:  1.Mild cervical and intracranial atherosclerotic changes are present as above, without evidence of flow-limiting stenosis, large vessel occlusion or aneurysm.  2.No acute findings in the abdomen and pelvis.  3.Incidentally noted 22 x 21 mm cystic finding within the pancreatic head. Correlation with any available prior imaging could be of use to document expected stability of this likely benign finding, otherwise as indicated, MRI could be useful to further   evaluate.  4.Incompletely characterized 3.8 cm nodular mass within the upper mediastinum, possibly an enlarged lymph node. Consider dedicated CT chest.        Electronically Signed: Chase Birmingham MD     7/15/2025 1:27 PM EDT    Workstation ID: [id number]    CT Angiogram Neck [[id number]] Collected: 07/15/25 1313     Updated: 07/15/25 1330    Narrative:      CT ANGIOGRAM HEAD W AI ANALYSIS OF LVO, CT ANGIOGRAM NECK, CT ABDOMEN PELVIS W CONTRAST    Date of Exam: 7/15/2025 12:38 PM EDT    Indication: Neuro Deficit, acute, Stroke suspected  Neuro deficit, acute stroke suspected.    Comparison: None available.    Technique: CTA of the head and neck was performed after the uneventful intravenous administration of 90 mL Isovue-370. Axial IV contrast-enhanced CT of the abdomen and pelvis was performed. Reconstructed coronal and sagittal images were also obtained. In   addition, a 3-D volume rendered image was created for interpretation. Automated exposure control and iterative reconstruction methods were used.      Findings:  CTA head and neck: The lung apices demonstrate a partially imaged 3.8 cm nodular soft tissue finding within the upper mediastinum on the left, either an enlarged lymph node or mass. Evaluation of the neck soft tissues demonstrates no evidence of   aerodigestive tract mass or pathologic cervical lymphadenopathy. Multilevel cervical spondylosis is present, without evidence of acute fracture or aggressive osseous lesion. Patent aortic arch with three-vessel branching. The subclavian arteries are   patent bilaterally. On the right, there is calcific atherosclerotic change present at the ICA origin with mild, less than 50% narrowing by NASCET criteria. Similar mild narrowing is present on the left. The vertebral arteries are normal in course and   caliber bilaterally. Intracranially, the carotid siphons demonstrate calcific atherosclerotic changes with some mild narrowing of both ophthalmic segments. The anterior cerebral arteries are normal in course and caliber bilaterally. The right middle   cerebral artery is normal. The left middle cerebral artery demonstrates no evidence of high-grade stenosis,  occlusion or aneurysm. The vertebrobasilar system is patent. The posterior cerebral arteries are normal in course and caliber bilaterally.    CT abdomen pelvis: The lung bases are clear. The body wall soft tissues demonstrate no acute or suspicious findings, with 2 adjacent presumed sebaceous cyst seen in the right gluteal region. The liver demonstrates low-attenuation findings most consistent   with cysts. The spleen, pancreas and bilateral adrenal glands demonstrate no acute findings. A homogeneous cystic appearing finding within the pancreatic head measures 22 x 21 mm. Normal gallbladder. Small and large bowel loops are nondilated. Colonic   diverticular changes are present, without definite acute inflammatory change to suggest diverticulitis. The appendix is normal. There is no free fluid or pneumoperitoneum. Atherosclerotic, nonaneurysmal abdominal aorta. The pelvic viscera demonstrate no   acute findings.        Impression:      Impression:  1.Mild cervical and intracranial atherosclerotic changes are present as above, without evidence of flow-limiting stenosis, large vessel occlusion or aneurysm.  2.No acute findings in the abdomen and pelvis.  3.Incidentally noted 22 x 21 mm cystic finding within the pancreatic head. Correlation with any available prior imaging could be of use to document expected stability of this likely benign finding, otherwise as indicated, MRI could be useful to further   evaluate.  4.Incompletely characterized 3.8 cm nodular mass within the upper mediastinum, possibly an enlarged lymph node. Consider dedicated CT chest.        Electronically Signed: Chase Birmingham MD    7/15/2025 1:27 PM EDT    Workstation ID: [id number]    CT Abdomen Pelvis With Contrast [[id number]] Collected: 07/15/25 1313     Updated: 07/15/25 1330    Narrative:      CT ANGIOGRAM HEAD W AI ANALYSIS OF LVO, CT ANGIOGRAM NECK, CT ABDOMEN PELVIS W CONTRAST    Date of Exam: 7/15/2025 12:38 PM EDT    Indication: Neuro  Deficit, acute, Stroke suspected  Neuro deficit, acute stroke suspected.    Comparison: None available.    Technique: CTA of the head and neck was performed after the uneventful intravenous administration of 90 mL Isovue-370. Axial IV contrast-enhanced CT of the abdomen and pelvis was performed. Reconstructed coronal and sagittal images were also obtained. In   addition, a 3-D volume rendered image was created for interpretation. Automated exposure control and iterative reconstruction methods were used.      Findings:  CTA head and neck: The lung apices demonstrate a partially imaged 3.8 cm nodular soft tissue finding within the upper mediastinum on the left, either an enlarged lymph node or mass. Evaluation of the neck soft tissues demonstrates no evidence of   aerodigestive tract mass or pathologic cervical lymphadenopathy. Multilevel cervical spondylosis is present, without evidence of acute fracture or aggressive osseous lesion. Patent aortic arch with three-vessel branching. The subclavian arteries are   patent bilaterally. On the right, there is calcific atherosclerotic change present at the ICA origin with mild, less than 50% narrowing by NASCET criteria. Similar mild narrowing is present on the left. The vertebral arteries are normal in course and   caliber bilaterally. Intracranially, the carotid siphons demonstrate calcific atherosclerotic changes with some mild narrowing of both ophthalmic segments. The anterior cerebral arteries are normal in course and caliber bilaterally. The right middle   cerebral artery is normal. The left middle cerebral artery demonstrates no evidence of high-grade stenosis, occlusion or aneurysm. The vertebrobasilar system is patent. The posterior cerebral arteries are normal in course and caliber bilaterally.    CT abdomen pelvis: The lung bases are clear. The body wall soft tissues demonstrate no acute or suspicious findings, with 2 adjacent presumed sebaceous cyst seen in the  right gluteal region. The liver demonstrates low-attenuation findings most consistent   with cysts. The spleen, pancreas and bilateral adrenal glands demonstrate no acute findings. A homogeneous cystic appearing finding within the pancreatic head measures 22 x 21 mm. Normal gallbladder. Small and large bowel loops are nondilated. Colonic   diverticular changes are present, without definite acute inflammatory change to suggest diverticulitis. The appendix is normal. There is no free fluid or pneumoperitoneum. Atherosclerotic, nonaneurysmal abdominal aorta. The pelvic viscera demonstrate no   acute findings.        Impression:      Impression:  1.Mild cervical and intracranial atherosclerotic changes are present as above, without evidence of flow-limiting stenosis, large vessel occlusion or aneurysm.  2.No acute findings in the abdomen and pelvis.  3.Incidentally noted 22 x 21 mm cystic finding within the pancreatic head. Correlation with any available prior imaging could be of use to document expected stability of this likely benign finding, otherwise as indicated, MRI could be useful to further   evaluate.  4.Incompletely characterized 3.8 cm nodular mass within the upper mediastinum, possibly an enlarged lymph node. Consider dedicated CT chest.        Electronically Signed: Chase Birmingham MD    7/15/2025 1:27 PM EDT    Workstation ID: [id number]    CT CEREBRAL PERFUSION WITH & WITHOUT CONTRAST [[id number]] Collected: 07/15/25 1312     Updated: 07/15/25 1318    Narrative:      CT CEREBRAL PERFUSION W WO CONTRAST    Date of Exam: 7/15/2025 12:38 PM EDT    Indication: Neuro Deficit, acute, Stroke suspected  Neuro deficit, acute stroke suspected.     Comparison: CT head from earlier today    Technique: Axial CT images of the brain were obtained prior to and after the administration of 50 mL Isovue-370. Core blood volume, core blood flow, mean transit time, and Tmax images were obtained utilizing the Rapid software  protocol. A limited CT   angiogram of the head was also performed to measure the blood vessel density.    The radiation dose reduction device was turned on for each scan per the ALARA (As Low as Reasonably Achievable) protocol.      Findings:  The intracranial cerebral perfusion appears normal.      Impression:      Impression:  Examination appears within normal limits.        Electronically Signed: Ibrahima Ribeiro MD    7/15/2025 1:15 PM EDT    Workstation ID: [id number]    CT Head Without Contrast Stroke Protocol [[id number]] Collected: 07/15/25 1247     Updated: 07/15/25 1258    Narrative:      CT HEAD WO CONTRAST STROKE PROTOCOL    Date of Exam: 7/15/2025 12:36 PM EDT    Indication: Neuro deficit, acute, stroke suspected  Neuro Deficit, acute, Stroke suspected.    Comparison: None available    Technique: Axial CT images were obtained of the head without contrast administration.  Reconstructed coronal images were also obtained. Automated exposure control and iterative construction methods were used.    Scan Time: 1234  Results discussed with ALAN Myers at 1240, 7/15/2025.      Findings:  The calvarium appears intact. 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 Left maxillary sinus mucous cyst is noted. Paranasal sinuses and mastoids otherwise appear clear. Orbits appear unremarkable except for ocular lens   replacements.    There is moderately advanced generalized cerebral atrophy. Focal left parietal encephalomalacia presumably represents old infarct. Intermediate density of the medial portion of the area of encephalomalacia could at least potentially represent late   subacute infarct, although this is not favored.     Small focal low-attenuation area is seen in the left external capsule, nonspecific. There is an old lacunar-type infarct of the head of the caudate nucleus.    No clearly acute infarct/edema is identified. No hemorrhage, hydrocephalus, mass or mass effect, or abnormal  extra-axial collection is seen.      Impression:      Impression:      1. Old, less likely late subacute infarct of the left parietal lobe.    2. Small chronic ischemic focus of the area of the left external capsule.    3. 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    4. No clearly acute intracranial abnormality is identified.        Electronically Signed: Derek Garcia MD    7/15/2025 12:55 PM EDT    Workstation ID: [id number]            EKG: Atrial fibrillation    ECHO: EF 51 to 55%    Tele: Atrial fibrillation    Assessment   Assessment/Plan: Heart failure with preserved EF probably secondary to atrial fibrillation.  Patient is on Jardiance, metoprolol, will start Bumex 1 mg p.o. daily  2 atrial fibrillation-patient scheduled for SALVADOR cardioversion today.  Will go ahead and give 1 dose of amiodarone 200 mg p.o.  Would like to send him home on amiodarone depending on his QTc in the morning.  3 exertional angina-I reviewed his CT scan yesterday which showed significant calcification and disease of his coronary vessels.  Hopefully his angina will resolve with getting him in sinus rhythm and doing rate control  It is possible that if the patient goes into sinus rhythm, and has an acceptable QTc and is feeling well to go home tomorrow    Carlos Mendez MD  07/18/25  07:59 EDT

## 2025-07-19 LAB
ANION GAP SERPL CALCULATED.3IONS-SCNC: 13.7 MMOL/L (ref 5–15)
BH CV VAS BP RIGHT ARM: NORMAL MMHG
BUN SERPL-MCNC: 24 MG/DL (ref 8–23)
BUN/CREAT SERPL: 27.3 (ref 7–25)
CALCIUM SPEC-SCNC: 8.7 MG/DL (ref 8.6–10.5)
CHLORIDE SERPL-SCNC: 105 MMOL/L (ref 98–107)
CO2 SERPL-SCNC: 29.3 MMOL/L (ref 22–29)
CREAT SERPL-MCNC: 0.88 MG/DL (ref 0.76–1.27)
DEPRECATED RDW RBC AUTO: 56.5 FL (ref 37–54)
EGFRCR SERPLBLD CKD-EPI 2021: 86.9 ML/MIN/1.73
ERYTHROCYTE [DISTWIDTH] IN BLOOD BY AUTOMATED COUNT: 18.4 % (ref 12.3–15.4)
GLUCOSE BLDC GLUCOMTR-MCNC: 102 MG/DL (ref 70–130)
GLUCOSE BLDC GLUCOMTR-MCNC: 224 MG/DL (ref 70–130)
GLUCOSE BLDC GLUCOMTR-MCNC: 274 MG/DL (ref 70–130)
GLUCOSE BLDC GLUCOMTR-MCNC: 83 MG/DL (ref 70–130)
GLUCOSE SERPL-MCNC: 101 MG/DL (ref 65–99)
HCT VFR BLD AUTO: 26.3 % (ref 37.5–51)
HGB BLD-MCNC: 7.6 G/DL (ref 13–17.7)
MAGNESIUM SERPL-MCNC: 2.3 MG/DL (ref 1.6–2.4)
MCH RBC QN AUTO: 25.4 PG (ref 26.6–33)
MCHC RBC AUTO-ENTMCNC: 28.9 G/DL (ref 31.5–35.7)
MCV RBC AUTO: 88 FL (ref 79–97)
PLATELET # BLD AUTO: 148 10*3/MM3 (ref 140–450)
PMV BLD AUTO: 10.5 FL (ref 6–12)
POTASSIUM SERPL-SCNC: 3.4 MMOL/L (ref 3.5–5.2)
POTASSIUM SERPL-SCNC: 3.7 MMOL/L (ref 3.5–5.2)
QT INTERVAL: 344 MS
QTC INTERVAL: 441 MS
RBC # BLD AUTO: 2.99 10*6/MM3 (ref 4.14–5.8)
SODIUM SERPL-SCNC: 148 MMOL/L (ref 136–145)
WBC NRBC COR # BLD AUTO: 9.82 10*3/MM3 (ref 3.4–10.8)

## 2025-07-19 PROCEDURE — 99232 SBSQ HOSP IP/OBS MODERATE 35: CPT | Performed by: FAMILY MEDICINE

## 2025-07-19 PROCEDURE — 93010 ELECTROCARDIOGRAM REPORT: CPT | Performed by: INTERNAL MEDICINE

## 2025-07-19 PROCEDURE — 85027 COMPLETE CBC AUTOMATED: CPT | Performed by: FAMILY MEDICINE

## 2025-07-19 PROCEDURE — 99232 SBSQ HOSP IP/OBS MODERATE 35: CPT | Performed by: INTERNAL MEDICINE

## 2025-07-19 PROCEDURE — 25010000002 AMIODARONE IN DEXTROSE 5% 150-4.21 MG/100ML-% SOLUTION: Performed by: INTERNAL MEDICINE

## 2025-07-19 PROCEDURE — 80048 BASIC METABOLIC PNL TOTAL CA: CPT | Performed by: FAMILY MEDICINE

## 2025-07-19 PROCEDURE — 82948 REAGENT STRIP/BLOOD GLUCOSE: CPT

## 2025-07-19 PROCEDURE — 63710000001 INSULIN LISPRO (HUMAN) PER 5 UNITS: Performed by: FAMILY MEDICINE

## 2025-07-19 PROCEDURE — 25010000002 AMIODARONE IN DEXTROSE 5% 360-4.14 MG/200ML-% SOLUTION: Performed by: INTERNAL MEDICINE

## 2025-07-19 PROCEDURE — 84132 ASSAY OF SERUM POTASSIUM: CPT | Performed by: FAMILY MEDICINE

## 2025-07-19 PROCEDURE — 93005 ELECTROCARDIOGRAM TRACING: CPT | Performed by: INTERNAL MEDICINE

## 2025-07-19 PROCEDURE — 83735 ASSAY OF MAGNESIUM: CPT | Performed by: FAMILY MEDICINE

## 2025-07-19 RX ORDER — BUMETANIDE 1 MG/1
1 TABLET ORAL
Status: DISCONTINUED | OUTPATIENT
Start: 2025-07-19 | End: 2025-07-25

## 2025-07-19 RX ORDER — AMIODARONE HYDROCHLORIDE 200 MG/1
200 TABLET ORAL EVERY 12 HOURS
Status: DISCONTINUED | OUTPATIENT
Start: 2025-07-27 | End: 2025-07-25

## 2025-07-19 RX ORDER — AMIODARONE HYDROCHLORIDE 200 MG/1
200 TABLET ORAL DAILY
Status: DISCONTINUED | OUTPATIENT
Start: 2025-08-10 | End: 2025-07-25

## 2025-07-19 RX ORDER — ACETAMINOPHEN 325 MG/1
650 TABLET ORAL EVERY 6 HOURS PRN
Status: DISCONTINUED | OUTPATIENT
Start: 2025-07-19 | End: 2025-07-29 | Stop reason: HOSPADM

## 2025-07-19 RX ORDER — AMIODARONE HYDROCHLORIDE 200 MG/1
200 TABLET ORAL
Status: DISCONTINUED | OUTPATIENT
Start: 2025-07-20 | End: 2025-07-19

## 2025-07-19 RX ORDER — POTASSIUM CHLORIDE 1500 MG/1
40 TABLET, EXTENDED RELEASE ORAL EVERY 4 HOURS
Status: COMPLETED | OUTPATIENT
Start: 2025-07-19 | End: 2025-07-19

## 2025-07-19 RX ORDER — AMIODARONE HYDROCHLORIDE 200 MG/1
200 TABLET ORAL ONCE
Status: COMPLETED | OUTPATIENT
Start: 2025-07-20 | End: 2025-07-20

## 2025-07-19 RX ORDER — AMIODARONE HYDROCHLORIDE 200 MG/1
200 TABLET ORAL EVERY 8 HOURS
Status: DISCONTINUED | OUTPATIENT
Start: 2025-07-20 | End: 2025-07-25

## 2025-07-19 RX ADMIN — VALACYCLOVIR HYDROCHLORIDE 500 MG: 500 TABLET, FILM COATED ORAL at 08:41

## 2025-07-19 RX ADMIN — AMIODARONE HYDROCHLORIDE 1 MG/MIN: 1.8 INJECTION, SOLUTION INTRAVENOUS at 12:43

## 2025-07-19 RX ADMIN — Medication 10 ML: at 20:08

## 2025-07-19 RX ADMIN — METOPROLOL TARTRATE 25 MG: 25 TABLET, FILM COATED ORAL at 08:40

## 2025-07-19 RX ADMIN — FERROUS SULFATE TAB 325 MG (65 MG ELEMENTAL FE) 325 MG: 325 (65 FE) TAB at 08:40

## 2025-07-19 RX ADMIN — ATORVASTATIN CALCIUM 40 MG: 40 TABLET, FILM COATED ORAL at 20:07

## 2025-07-19 RX ADMIN — BUMETANIDE 1 MG: 1 TABLET ORAL at 08:41

## 2025-07-19 RX ADMIN — METOPROLOL TARTRATE 25 MG: 25 TABLET, FILM COATED ORAL at 20:07

## 2025-07-19 RX ADMIN — SPIRONOLACTONE 25 MG: 25 TABLET ORAL at 08:40

## 2025-07-19 RX ADMIN — TIZANIDINE 4 MG: 4 TABLET ORAL at 20:07

## 2025-07-19 RX ADMIN — POTASSIUM CHLORIDE 40 MEQ: 1500 TABLET, EXTENDED RELEASE ORAL at 03:44

## 2025-07-19 RX ADMIN — TERAZOSIN HYDROCHLORIDE 1 MG: 1 CAPSULE ORAL at 20:08

## 2025-07-19 RX ADMIN — INSULIN LISPRO 4 UNITS: 100 INJECTION, SOLUTION INTRAVENOUS; SUBCUTANEOUS at 20:06

## 2025-07-19 RX ADMIN — INSULIN LISPRO 4 UNITS: 100 INJECTION, SOLUTION INTRAVENOUS; SUBCUTANEOUS at 17:19

## 2025-07-19 RX ADMIN — APIXABAN 5 MG: 5 TABLET, FILM COATED ORAL at 20:07

## 2025-07-19 RX ADMIN — AMLODIPINE BESYLATE 5 MG: 5 TABLET ORAL at 08:40

## 2025-07-19 RX ADMIN — SERTRALINE HYDROCHLORIDE 200 MG: 100 TABLET, FILM COATED ORAL at 08:41

## 2025-07-19 RX ADMIN — AMIODARONE HYDROCHLORIDE 200 MG: 200 TABLET ORAL at 08:41

## 2025-07-19 RX ADMIN — POTASSIUM CHLORIDE 40 MEQ: 1500 TABLET, EXTENDED RELEASE ORAL at 08:40

## 2025-07-19 RX ADMIN — BUMETANIDE 1 MG: 1 TABLET ORAL at 17:22

## 2025-07-19 RX ADMIN — AMIODARONE HYDROCHLORIDE 0.5 MG/MIN: 1.8 INJECTION, SOLUTION INTRAVENOUS at 18:43

## 2025-07-19 RX ADMIN — ACETAMINOPHEN 650 MG: 325 TABLET ORAL at 20:07

## 2025-07-19 RX ADMIN — EMPAGLIFLOZIN 10 MG: 10 TABLET, FILM COATED ORAL at 08:40

## 2025-07-19 RX ADMIN — AMIODARONE HYDROCHLORIDE 150 MG: 1.5 INJECTION, SOLUTION INTRAVENOUS at 12:27

## 2025-07-19 RX ADMIN — LOSARTAN POTASSIUM 100 MG: 50 TABLET, FILM COATED ORAL at 08:40

## 2025-07-19 RX ADMIN — PRIMIDONE 100 MG: 50 TABLET ORAL at 08:42

## 2025-07-19 RX ADMIN — LEVOTHYROXINE SODIUM 50 MCG: 50 TABLET ORAL at 05:43

## 2025-07-19 RX ADMIN — Medication 10 ML: at 09:09

## 2025-07-19 RX ADMIN — PANTOPRAZOLE SODIUM 40 MG: 40 TABLET, DELAYED RELEASE ORAL at 05:43

## 2025-07-19 RX ADMIN — APIXABAN 5 MG: 5 TABLET, FILM COATED ORAL at 08:40

## 2025-07-19 NOTE — PROGRESS NOTES
"[facility] Progress Note     LOS: 4 days   Patient Care Team:  Sebastien Lowe MD as PCP - General  Sebastien Lowe MD as PCP - Family Medicine  Bacilio Winn MD as Consulting Physician (Cardiology)  PCP:  Sebastien Lowe MD    Chief Complaint: Persistent atrial fibrillation.    SUBJECTIVE: Resting comfortably in bedside chair.  Denies chest pain or palpitations.  Shortness of breath improving.      Review of Systems:   All systems have been reviewed and are negative with the exception of those mentioned above.      OBJECTIVE:    Vital Sign Min/Max for last 24 hours  Temp  Min: 97.6 °F (36.4 °C)  Max: 98.2 °F (36.8 °C)   BP  Min: 109/72  Max: 190/96   Pulse  Min: 84  Max: 133   Resp  Min: 16  Max: 20   SpO2  Min: 83 %  Max: 100 %   No data recorded   Weight  Min: 133 kg (293 lb 3.4 oz)  Max: 133 kg (293 lb 3.4 oz)     Flowsheet Rows      Flowsheet Row First Filed Value   Admission Height 172.7 cm (68\") Documented at 07/15/2025 1227   Admission Weight 132 kg (291 lb) Documented at 07/15/2025 1227            Telemetry: Sinus rhythm with frequent and consecutive premature atrial contractions.      Intake/Output Summary (Last 24 hours) at 7/19/2025 1056  Last data filed at 7/19/2025 0900  Gross per 24 hour   Intake 400 ml   Output 975 ml   Net -575 ml     Intake & Output (last 3 days)         07/16 0701 07/17 0700 07/17 0701 07/18 0700 07/18 0701  07/19 0700 07/19 0701  07/20 0700    P.O. 240 200  400    Total Intake(mL/kg) 240 (1.8) 200 (1.5)  400 (3)    Urine (mL/kg/hr) 750 (0.2) 600 (0.2) 975 (0.3)     Stool 0       Total Output 750 600 975     Net -510 -400 -975 +400            Urine Unmeasured Occurrence 4 x   1 x    Stool Unmeasured Occurrence 3 x                Physical Exam:    General Appearance:    Alert, cooperative, no distress, appears stated age   Neck:   Supple, symmetrical, trachea midline.   Lungs:     Clear to auscultation bilaterally, " respirations unlabored   Chest Wall:    No tenderness or deformity    Heart:  Irregular irregular, S1 and S2 normal, no murmur, rub   or gallop, normal carotid impulse bilaterally without bruit.   Extremities:   Extremities normal, atraumatic, no cyanosis 1+ bilateral lower extremity edema.     Pulses:   2+ and symmetric all extremities   Skin:   Skin color, texture, turgor normal, no rashes or lesions      LABS/DIAGNOSTIC DATA:  Results from last 7 days   Lab Units 07/18/25  0719 07/16/25  1517 07/15/25  1243 07/15/25  1239   WBC 10*3/mm3 7.57 9.61  --  11.13*   HEMOGLOBIN g/dL 8.2* 8.3*  --  9.0*   HEMOGLOBIN, POC g/dL  --   --  10.2*  --    HEMATOCRIT % 31.1* 28.6*  --  31.2*   HEMATOCRIT POC %  --   --  30*  --    PLATELETS 10*3/mm3 128* 150  --  176     Lab Results   Lab Value Date/Time    TROPONINT 45 (H) 07/15/2025 1425    TROPONINT 46 (H) 07/15/2025 1239     Results from last 7 days   Lab Units 07/15/25  1239   INR  1.09     Results from last 7 days   Lab Units 07/19/25  0130 07/18/25  1034 07/17/25  1152 07/16/25  1507 07/15/25  1243 07/15/25  1239   SODIUM mmol/L  --  148*  --  142  --  142   POTASSIUM mmol/L 3.4* 3.0* 3.7 2.7*  --  2.5*   CHLORIDE mmol/L  --  103  --  98  --  97*   CO2 mmol/L  --  31.6*  --  33.0*  --  32.6*   BUN mg/dL  --  22.0  --  20.9  --  22.2   CREATININE mg/dL  --  0.89  --  0.84 0.90 0.92   CALCIUM mg/dL  --  8.8  --  8.7  --  8.9   BILIRUBIN mg/dL  --   --   --   --   --  0.4   ALK PHOS U/L  --   --   --   --   --  85   ALT (SGPT) U/L  --   --   --   --   --  58*   AST (SGOT) U/L  --   --   --   --   --  75*   GLUCOSE mg/dL  --  119*  --  227*  --  252*     Results from last 7 days   Lab Units 07/15/25  1239   HEMOGLOBIN A1C % 6.34*     Results from last 7 days   Lab Units 07/16/25  1507   CHOLESTEROL mg/dL 157   TRIGLYCERIDES mg/dL 240*   HDL CHOL mg/dL 40   LDL CHOL mg/dL 77     Results from last 7 days   Lab Units 07/15/25  1239   TSH uIU/mL 0.876           Medication Review:    amiodarone, 150 mg, Intravenous, Once   Followed by  [START ON 7/20/2025] amiodarone, 200 mg, Oral, Once   Followed by  [START ON 7/20/2025] amiodarone, 200 mg, Oral, Q8H   Followed by  [START ON 7/27/2025] amiodarone, 200 mg, Oral, Q12H   Followed by  [START ON 8/10/2025] amiodarone, 200 mg, Oral, Daily  amLODIPine, 5 mg, Oral, Q24H  apixaban, 5 mg, Oral, Q12H  atorvastatin, 40 mg, Oral, Nightly  bumetanide, 1 mg, Oral, BID Diuretics  empagliflozin, 10 mg, Oral, Daily  etomidate, 0.1 mg/kg, Intravenous, Once  ferrous sulfate, 325 mg, Oral, Daily With Breakfast  insulin lispro, 2-9 Units, Subcutaneous, 4x Daily AC & at Bedtime  levothyroxine, 50 mcg, Oral, Q AM  losartan, 100 mg, Oral, Q24H  metoprolol tartrate, 25 mg, Oral, Q12H  pantoprazole, 40 mg, Oral, Q AM  primidone, 100 mg, Oral, Daily  sertraline, 200 mg, Oral, Daily  sodium chloride, 10 mL, Intravenous, Q12H  sodium chloride, 10 mL, Intravenous, Q12H  spironolactone, 25 mg, Oral, Daily  terazosin, 1 mg, Oral, Nightly  tiZANidine, 4 mg, Oral, Nightly  valACYclovir, 500 mg, Oral, Daily       amiodarone, 1 mg/min   Followed by  amiodarone, 0.5 mg/min         ASSESSMENT/PLAN:    CVA (cerebral vascular accident)    Mediastinal mass        Persistent atrial fibrillation: Status post SALVADOR guided ECV.  Telemetry today reveals sinus rhythm with frequent and consecutive premature atrial contractions.  Starting IV amiodarone load.  Continue Eliquis 5 mg p.o. twice daily for stroke prophylaxis.    Heart failure with preserved ejection fraction, acute on chronic: Continue gradual diuresis and rhythm control.  Increasing Bumex to 1 mg p.o. twice daily.    Hypertension: Afterload reasonable well-controlled.          Jani Cohen III, MD   07/19/25  10:56 EDT

## 2025-07-19 NOTE — PLAN OF CARE
Problem: Adult Inpatient Plan of Care  Goal: Plan of Care Review  Outcome: Progressing  Goal: Patient-Specific Goal (Individualized)  Outcome: Progressing  Goal: Absence of Hospital-Acquired Illness or Injury  Outcome: Progressing  Intervention: Identify and Manage Fall Risk  Recent Flowsheet Documentation  Taken 7/19/2025 0400 by Rani Camp RN  Safety Promotion/Fall Prevention:   activity supervised   assistive device/personal items within reach   clutter free environment maintained   fall prevention program maintained   lighting adjusted   nonskid shoes/slippers when out of bed   room organization consistent   safety round/check completed  Taken 7/19/2025 0200 by Rani Camp RN  Safety Promotion/Fall Prevention:   activity supervised   assistive device/personal items within reach   clutter free environment maintained   fall prevention program maintained   lighting adjusted   nonskid shoes/slippers when out of bed   room organization consistent   safety round/check completed  Taken 7/19/2025 0000 by Rani Camp RN  Safety Promotion/Fall Prevention:   activity supervised   assistive device/personal items within reach   clutter free environment maintained   fall prevention program maintained   lighting adjusted   nonskid shoes/slippers when out of bed   room organization consistent   safety round/check completed  Taken 7/18/2025 2200 by Rani Camp RN  Safety Promotion/Fall Prevention:   activity supervised   assistive device/personal items within reach   clutter free environment maintained   fall prevention program maintained   lighting adjusted   nonskid shoes/slippers when out of bed   room organization consistent   safety round/check completed  Taken 7/18/2025 2000 by Rani Camp RN  Safety Promotion/Fall Prevention:   activity supervised   assistive device/personal items within reach   clutter free environment maintained   fall prevention program maintained   lighting adjusted    nonskid shoes/slippers when out of bed   room organization consistent   safety round/check completed  Intervention: Prevent Skin Injury  Recent Flowsheet Documentation  Taken 7/19/2025 0400 by Rani Camp RN  Body Position:   heels elevated   turned   right  Skin Protection:   transparent dressing maintained   silicone foam dressing in place   incontinence pads utilized  Taken 7/19/2025 0200 by Rani Camp RN  Body Position:   heels elevated   right   turned  Skin Protection:   transparent dressing maintained   silicone foam dressing in place   incontinence pads utilized  Taken 7/19/2025 0000 by Rani Camp RN  Body Position:   heels elevated   left   side-lying  Skin Protection:   transparent dressing maintained   silicone foam dressing in place   incontinence pads utilized  Taken 7/18/2025 2200 by Rani Camp RN  Body Position:   heels elevated   supine  Skin Protection:   transparent dressing maintained   silicone foam dressing in place   incontinence pads utilized  Taken 7/18/2025 2000 by Rani Camp RN  Body Position: supine  Skin Protection:   transparent dressing maintained   incontinence pads utilized   silicone foam dressing in place  Intervention: Prevent and Manage VTE (Venous Thromboembolism) Risk  Recent Flowsheet Documentation  Taken 7/18/2025 2000 by Rani Camp RN  VTE Prevention/Management: (See MAR)   SCDs (sequential compression devices) on   bilateral   other (see comments)  Intervention: Prevent Infection  Recent Flowsheet Documentation  Taken 7/19/2025 0400 by Rani Camp RN  Infection Prevention:   environmental surveillance performed   equipment surfaces disinfected   hand hygiene promoted   personal protective equipment utilized   rest/sleep promoted   single patient room provided  Taken 7/19/2025 0200 by Rani Camp RN  Infection Prevention:   environmental surveillance performed   equipment surfaces disinfected   hand hygiene promoted    personal protective equipment utilized   rest/sleep promoted   single patient room provided  Taken 7/19/2025 0000 by Rani Camp RN  Infection Prevention:   environmental surveillance performed   equipment surfaces disinfected   hand hygiene promoted   personal protective equipment utilized   rest/sleep promoted   single patient room provided  Taken 7/18/2025 2200 by Rani Camp RN  Infection Prevention:   environmental surveillance performed   equipment surfaces disinfected   hand hygiene promoted   personal protective equipment utilized   rest/sleep promoted   single patient room provided  Taken 7/18/2025 2000 by Rani Camp RN  Infection Prevention:   environmental surveillance performed   equipment surfaces disinfected   hand hygiene promoted   personal protective equipment utilized   rest/sleep promoted   single patient room provided  Goal: Optimal Comfort and Wellbeing  Outcome: Progressing  Intervention: Provide Person-Centered Care  Recent Flowsheet Documentation  Taken 7/18/2025 2000 by Rani Camp RN  Trust Relationship/Rapport:   care explained   questions answered   questions encouraged   choices provided  Goal: Readiness for Transition of Care  Outcome: Progressing     Problem: Stroke, Ischemic (Includes Transient Ischemic Attack)  Goal: Optimal Coping  Outcome: Progressing  Intervention: Support Psychosocial Response to Stroke  Recent Flowsheet Documentation  Taken 7/19/2025 0400 by Rani Camp RN  Supportive Measures:   active listening utilized   self-care encouraged  Taken 7/19/2025 0200 by Rani Camp RN  Supportive Measures:   active listening utilized   self-care encouraged  Taken 7/19/2025 0000 by Rani Camp RN  Supportive Measures:   active listening utilized   self-care encouraged  Taken 7/18/2025 2200 by Rani Camp RN  Supportive Measures:   active listening utilized   self-care encouraged  Taken 7/18/2025 2000 by Rani Camp  RN  Supportive Measures:   active listening utilized   self-care encouraged  Family/Support System Care: self-care encouraged  Goal: Optimal Cerebral Tissue Perfusion  Outcome: Progressing  Intervention: Protect and Optimize Cerebral Perfusion  Recent Flowsheet Documentation  Taken 7/19/2025 0400 by Rani Camp RN  Sensory Stimulation Regulation:   lighting decreased   care clustered   quiet environment promoted   television on  Cerebral Perfusion Promotion: blood pressure monitored  Taken 7/19/2025 0200 by Rani Camp RN  Sensory Stimulation Regulation:   lighting decreased   care clustered   quiet environment promoted   television on  Cerebral Perfusion Promotion: blood pressure monitored  Taken 7/19/2025 0000 by Rani Camp RN  Sensory Stimulation Regulation:   lighting decreased   care clustered   quiet environment promoted   television on  Cerebral Perfusion Promotion: blood pressure monitored  Taken 7/18/2025 2200 by Rani Camp RN  Sensory Stimulation Regulation:   lighting decreased   care clustered   quiet environment promoted   television on  Cerebral Perfusion Promotion: blood pressure monitored  Taken 7/18/2025 2000 by Rani Camp RN  Sensory Stimulation Regulation:   television on   quiet environment promoted   care clustered   lighting decreased  Cerebral Perfusion Promotion: blood pressure monitored  Goal: Optimal Cognitive Function  Outcome: Progressing  Intervention: Optimize Cognitive Function  Recent Flowsheet Documentation  Taken 7/19/2025 0400 by Rani Camp RN  Sensory Stimulation Regulation:   lighting decreased   care clustered   quiet environment promoted   television on  Taken 7/19/2025 0200 by Rani Camp RN  Sensory Stimulation Regulation:   lighting decreased   care clustered   quiet environment promoted   television on  Taken 7/19/2025 0000 by Rani Camp RN  Sensory Stimulation Regulation:   lighting decreased   care clustered   quiet  environment promoted   television on  Taken 7/18/2025 2200 by Rani Camp RN  Sensory Stimulation Regulation:   lighting decreased   care clustered   quiet environment promoted   television on  Taken 7/18/2025 2000 by aRni Camp RN  Sensory Stimulation Regulation:   television on   quiet environment promoted   care clustered   lighting decreased  Goal: Optimal Functional Ability  Outcome: Progressing  Intervention: Optimize Functional Ability  Recent Flowsheet Documentation  Taken 7/19/2025 0400 by Rani Camp RN  Activity Management: activity encouraged  Self-Care Promotion:   independence encouraged   BADL personal objects within reach  Taken 7/19/2025 0200 by Rani Camp RN  Activity Management: activity encouraged  Self-Care Promotion:   independence encouraged   BADL personal objects within reach  Taken 7/19/2025 0000 by Rani Camp RN  Activity Management: activity encouraged  Self-Care Promotion:   independence encouraged   BADL personal objects within reach  Taken 7/18/2025 2200 by Rani Camp RN  Activity Management: activity encouraged  Self-Care Promotion:   independence encouraged   BADL personal objects within reach  Taken 7/18/2025 2000 by Rani Camp RN  Activity Management: activity encouraged  Self-Care Promotion:   independence encouraged   BADL personal objects within reach  Goal: Effective Oxygenation and Ventilation  Outcome: Progressing  Intervention: Optimize Oxygenation and Ventilation  Recent Flowsheet Documentation  Taken 7/19/2025 0400 by Rani Camp RN  Head of Bed (HOB) Positioning: HOB elevated  Taken 7/19/2025 0200 by Rani Camp RN  Head of Bed (HOB) Positioning: HOB elevated  Taken 7/19/2025 0000 by Rani Camp RN  Head of Bed (HOB) Positioning: HOB elevated  Taken 7/18/2025 2200 by Rani Camp RN  Head of Bed (HOB) Positioning: HOB elevated  Taken 7/18/2025 2000 by Rani Camp RN  Head of Bed (HOB) Positioning:  HOB elevated  Airway/Ventilation Management: oxygen therapy provided  Goal: Safe and Effective Swallow  Outcome: Progressing     Problem: Comorbidity Management  Goal: Blood Pressure in Desired Range  Outcome: Progressing  Intervention: Maintain Blood Pressure Management  Recent Flowsheet Documentation  Taken 7/18/2025 2000 by Rani Camp RN  Medication Review/Management: medications reviewed     Problem: Fall Injury Risk  Goal: Absence of Fall and Fall-Related Injury  Outcome: Progressing  Intervention: Identify and Manage Contributors  Recent Flowsheet Documentation  Taken 7/19/2025 0400 by Rani Camp RN  Self-Care Promotion:   independence encouraged   BADL personal objects within reach  Taken 7/19/2025 0200 by Rani Camp RN  Self-Care Promotion:   independence encouraged   BADL personal objects within reach  Taken 7/19/2025 0000 by Rani Camp RN  Self-Care Promotion:   independence encouraged   BADL personal objects within reach  Taken 7/18/2025 2200 by Rani Camp RN  Self-Care Promotion:   independence encouraged   BADL personal objects within reach  Taken 7/18/2025 2000 by Rani Camp RN  Medication Review/Management: medications reviewed  Self-Care Promotion:   independence encouraged   BADL personal objects within reach  Intervention: Promote Injury-Free Environment  Recent Flowsheet Documentation  Taken 7/19/2025 0400 by Rani Camp RN  Safety Promotion/Fall Prevention:   activity supervised   assistive device/personal items within reach   clutter free environment maintained   fall prevention program maintained   lighting adjusted   nonskid shoes/slippers when out of bed   room organization consistent   safety round/check completed  Taken 7/19/2025 0200 by Rani Camp RN  Safety Promotion/Fall Prevention:   activity supervised   assistive device/personal items within reach   clutter free environment maintained   fall prevention program maintained    lighting adjusted   nonskid shoes/slippers when out of bed   room organization consistent   safety round/check completed  Taken 7/19/2025 0000 by Rani Camp RN  Safety Promotion/Fall Prevention:   activity supervised   assistive device/personal items within reach   clutter free environment maintained   fall prevention program maintained   lighting adjusted   nonskid shoes/slippers when out of bed   room organization consistent   safety round/check completed  Taken 7/18/2025 2200 by Rani Camp RN  Safety Promotion/Fall Prevention:   activity supervised   assistive device/personal items within reach   clutter free environment maintained   fall prevention program maintained   lighting adjusted   nonskid shoes/slippers when out of bed   room organization consistent   safety round/check completed  Taken 7/18/2025 2000 by Rani Camp RN  Safety Promotion/Fall Prevention:   activity supervised   assistive device/personal items within reach   clutter free environment maintained   fall prevention program maintained   lighting adjusted   nonskid shoes/slippers when out of bed   room organization consistent   safety round/check completed     Problem: Skin Injury Risk Increased  Goal: Skin Health and Integrity  Outcome: Progressing  Intervention: Optimize Skin Protection  Recent Flowsheet Documentation  Taken 7/19/2025 0400 by Rani Camp RN  Activity Management: activity encouraged  Pressure Reduction Techniques:   frequent weight shift encouraged   heels elevated off bed   weight shift assistance provided   pressure points protected  Head of Bed (HOB) Positioning: HOB elevated  Pressure Reduction Devices:   heel offloading device utilized   pressure-redistributing mattress utilized   foam padding utilized  Skin Protection:   transparent dressing maintained   silicone foam dressing in place   incontinence pads utilized  Taken 7/19/2025 0200 by Rani Camp RN  Activity Management: activity  encouraged  Pressure Reduction Techniques:   frequent weight shift encouraged   heels elevated off bed   weight shift assistance provided   pressure points protected  Head of Bed (HOB) Positioning: HOB elevated  Pressure Reduction Devices:   heel offloading device utilized   pressure-redistributing mattress utilized   foam padding utilized  Skin Protection:   transparent dressing maintained   silicone foam dressing in place   incontinence pads utilized  Taken 7/19/2025 0000 by Rani Camp RN  Activity Management: activity encouraged  Pressure Reduction Techniques:   frequent weight shift encouraged   heels elevated off bed   weight shift assistance provided   pressure points protected  Head of Bed (HOB) Positioning: HOB elevated  Pressure Reduction Devices:   heel offloading device utilized   pressure-redistributing mattress utilized   foam padding utilized  Skin Protection:   transparent dressing maintained   silicone foam dressing in place   incontinence pads utilized  Taken 7/18/2025 2200 by Rani Camp RN  Activity Management: activity encouraged  Pressure Reduction Techniques:   frequent weight shift encouraged   heels elevated off bed   weight shift assistance provided   pressure points protected  Head of Bed (HOB) Positioning: HOB elevated  Pressure Reduction Devices:   heel offloading device utilized   pressure-redistributing mattress utilized   foam padding utilized  Skin Protection:   transparent dressing maintained   silicone foam dressing in place   incontinence pads utilized  Taken 7/18/2025 2000 by Rani Camp RN  Activity Management: activity encouraged  Pressure Reduction Techniques:   frequent weight shift encouraged   weight shift assistance provided   heels elevated off bed   pressure points protected  Head of Bed (HOB) Positioning: HOB elevated  Pressure Reduction Devices:   pressure-redistributing mattress utilized   heel offloading device utilized   positioning supports  utilized  Skin Protection:   transparent dressing maintained   incontinence pads utilized   silicone foam dressing in place   Goal Outcome Evaluation:         Patient A&Ox4. 2L NC. A-fib with frequent PACs and rare PVCs on monitor.  Blood pressure has been stable with out need for PRN BP medications this shift.  Plan of care ongoing.

## 2025-07-19 NOTE — PROGRESS NOTES
[facility] Medicine Services  PROGRESS NOTE    Patient Name: Ibrahima Nava  : 1945  MRN: [id number]    Date of Admission: 7/15/2025  Primary Care Physician: Sebastien Lowe MD    Subjective   Subjective     CC:  F/U new a fib, lung mass     HPI:  Patient seen and examined. No new complaints. Up in chair. Had SALVADOR/ECV yesterday. Appears to be in SR with very frequent PACs/PVCs. LE edema mildly improved. Doesn't want breakfast this morning, states he's just not hungry. Denies nausea.    Objective   Objective     Vital Signs:   Temp:  [97.6 °F (36.4 °C)-98.2 °F (36.8 °C)] 98.1 °F (36.7 °C)  Heart Rate:  [] 110  Resp:  [16-20] 20  BP: (109-190)/() 154/77  Flow (L/min) (Oxygen Therapy):  [2-10] 2     Physical Exam:  Constitutional: No acute distress, awake, alert, up in chair, pleasant   HENT: NCAT, mucous membranes moist, [place]  Respiratory: decreased in bases, respiratory effort normal 2L NC  Cardiovascular: Sinus with frequent PACs/PVCs, no murmurs, rubs, or gallops  Gastrointestinal: Positive bowel sounds, soft, non-tender, no R/R/G, non-distended  Musculoskeletal:  3+ pitting B/L LE edema  Psychiatric: Appropriate affect, cooperative  Neurologic: Oriented x 3, GOVEA, speech difficult to understand at times/mumbles  Skin: venous stasis skin changes     Results Reviewed:  LAB RESULTS:      Lab 25  0719 25  1517 07/15/25  1425 07/15/25  1243 07/15/25  1239   WBC 7.57 9.61  --   --  11.13*   HEMOGLOBIN 8.2* 8.3*  --   --  9.0*   HEMOGLOBIN, POC  --   --   --  10.2*  --    HEMATOCRIT 31.1* 28.6*  --   --  31.2*   HEMATOCRIT POC  --   --   --  30*  --    PLATELETS 128* 150  --   --  176   NEUTROS ABS  --   --   --   --  9.07*   IMMATURE GRANS (ABS)  --   --   --   --  0.32*   LYMPHS ABS  --   --   --   --  1.22   MONOS ABS  --   --   --   --  0.50   EOS ABS  --   --   --   --  0.00   MCV 96.6 86.9  --   --  87.4   PROTIME  --   --   --   --  14.8   HSTROP T  --   --   45*  --  46*         Lab 07/19/25  0130 07/18/25  1034 07/17/25  1152 07/16/25  1507 07/15/25  1243 07/15/25  1239   SODIUM  --  148*  --  142  --  142   POTASSIUM 3.4* 3.0* 3.7 2.7*  --  2.5*   CHLORIDE  --  103  --  98  --  97*   CO2  --  31.6*  --  33.0*  --  32.6*   ANION GAP  --  13.4  --  11.0  --  12.4   BUN  --  22.0  --  20.9  --  22.2   CREATININE  --  0.89  --  0.84 0.90 0.92   EGFR  --  86.6  --  88.2 86.3 84.1   GLUCOSE  --  119*  --  227*  --  252*   CALCIUM  --  8.8  --  8.7  --  8.9   MAGNESIUM  --  2.3  --   --   --  2.2   HEMOGLOBIN A1C  --   --   --   --   --  6.34*   TSH  --   --   --   --   --  0.876         Lab 07/15/25  1239   TOTAL PROTEIN 5.6*   ALBUMIN 3.7   GLOBULIN 1.9   ALT (SGPT) 58*   AST (SGOT) 75*   BILIRUBIN 0.4   ALK PHOS 85         Lab 07/17/25  1152 07/15/25  1425 07/15/25  1239   PROBNP 2,423.0*  --  3,433.0*   HSTROP T  --  45* 46*   PROTIME  --   --  14.8   INR  --   --  1.09         Lab 07/16/25  1507   CHOLESTEROL 157   LDL CHOL 77   HDL CHOL 40   TRIGLYCERIDES 240*         Lab 07/16/25  1605   IRON 52*   IRON SATURATION (TSAT) 14*   TIBC 383   TRANSFERRIN 257         Lab 07/15/25  1316   PH, ARTERIAL 7.489*   PCO2, ARTERIAL 50.2*   PO2 ART 52.2*   FIO2 21   HCO3 ART 38.2*   BASE EXCESS ART 13.3*   CARBOXYHEMOGLOBIN 1.8     Brief Urine Lab Results  (Last result in the past 365 days)        Color   Clarity   Blood   Leuk Est   Nitrite   Protein   CREAT   Urine HCG        07/15/25 1319 Yellow   Clear   Moderate (2+)   Negative   Negative   30 mg/dL (1+)                   Microbiology Results Abnormal       None            Cardioversion External in Cardiology Department  Result Date: 7/18/2025    Post cardioversion the patient displayed a sinus rhythm.   The cardioversion was successful.       Results for orders placed during the hospital encounter of 07/15/25    Adult Transesophageal Echo (SALVADOR) W/ Cont if Necessary Per Protocol 07/19/2025  8:29 AM    Interpretation Summary     Left ventricular systolic function is low normal. Left ventricular ejection fraction appears to be 56 - 60%.    Left ventricular wall thickness is consistent with mild concentric hypertrophy.    No evidence of a left atrial appendage thrombus    Mild to moderate mitral valve regurgitation      Current medications:  Scheduled Meds:amiodarone, 150 mg, Intravenous, Once   Followed by  [START ON 7/20/2025] amiodarone, 200 mg, Oral, Once   Followed by  [START ON 7/20/2025] amiodarone, 200 mg, Oral, Q8H   Followed by  [START ON 7/27/2025] amiodarone, 200 mg, Oral, Q12H   Followed by  [START ON 8/10/2025] amiodarone, 200 mg, Oral, Daily  amLODIPine, 5 mg, Oral, Q24H  apixaban, 5 mg, Oral, Q12H  atorvastatin, 40 mg, Oral, Nightly  bumetanide, 1 mg, Oral, BID Diuretics  empagliflozin, 10 mg, Oral, Daily  etomidate, 0.1 mg/kg, Intravenous, Once  ferrous sulfate, 325 mg, Oral, Daily With Breakfast  insulin lispro, 2-9 Units, Subcutaneous, 4x Daily AC & at Bedtime  levothyroxine, 50 mcg, Oral, Q AM  losartan, 100 mg, Oral, Q24H  metoprolol tartrate, 25 mg, Oral, Q12H  pantoprazole, 40 mg, Oral, Q AM  primidone, 100 mg, Oral, Daily  sertraline, 200 mg, Oral, Daily  sodium chloride, 10 mL, Intravenous, Q12H  sodium chloride, 10 mL, Intravenous, Q12H  spironolactone, 25 mg, Oral, Daily  terazosin, 1 mg, Oral, Nightly  tiZANidine, 4 mg, Oral, Nightly  valACYclovir, 500 mg, Oral, Daily      Continuous Infusions:amiodarone, 1 mg/min   Followed by  amiodarone, 0.5 mg/min      PRN Meds:.  senna-docusate sodium **AND** polyethylene glycol **AND** bisacodyl **AND** bisacodyl    cloNIDine    dextrose    dextrose    fentaNYL citrate (PF)    flumazenil    glucagon (human recombinant)    hydrALAZINE    LORazepam    Magnesium Cardiology Dose Replacement - Follow Nurse / BPA Driven Protocol    metoprolol tartrate    midazolam    naloxone    nitroglycerin    Potassium Replacement - Follow Nurse / BPA Driven Protocol    sodium chloride    sodium  chloride    sodium chloride    sodium chloride    sodium chloride    Assessment & Plan   Assessment & Plan     Active Hospital Problems    Diagnosis  POA    **CVA (cerebral vascular accident) [I63.9]  Yes    Mediastinal mass [J98.59]  Unknown      Resolved Hospital Problems   No resolved problems to display.        Brief Hospital Course to date:  Ibrahima Nava is a 80 y.o. male w history of HTN, anxiety on chronic benzos, CVA in 2012 (no residual deficit), essential tremor. Endorses dysarthria, dysphagia to solids and liquids, confusion.     This patient's problems and plans were partially entered by my partner and updated as appropriate by me 07/19/25.     Dysarthria  Expressive aphasia  Confusion   Prior stroke   -MRI brain negative for acute stroke. Noted chronic appearing infarct of L pre and post central gyri   -Stroke team followed, feel likely recrudescence of old stroke  -EEG negative  -Echo with normal EF  -PT/OT recommend home   -UA ok   -Follow-up stroke clinic 1 month    Dysphagia  -noted to solids and liquids PTA  -endorsed fullness R neck  -SLP follows, s/p MBS w/limited upper GI series, no evidence of obstruction and mild reflux   -CTA neck : Evaluation of the neck soft tissues demonstrates no evidence of aerodigestive tract mass or pathologic cervical lymphadenopathy.   -soft to chew textures, chopped, thin liquids     New Atrial Fibrillation  New LE edema, CHF  Pulmonary Edema  Hypoxia, new   -Echo with normal EF   -Cardiology following, assistance appreciated   -TSH ok  -Jardiance, Aldactone, BB, Eliquis,  -Bumex increased to BID  -S/P SALVADOR ECV 7/18  -Amiodarone load today     New Mediastinal mass, JANNA pulmonary mass  -CT chest has been reviewed with patient and family  -prior smoking Hx, quit 30 years ago  -endorses chronic cough x 1 year  -No weight loss or hemoptysis   -Interested in biopsy, was started on Eliquis for A fib this admit so will need to be held pending timing   -discussed with   [facility]. 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         AM-PAC 6 Clicks Score (PT): 16 (07/19/25 0074)    CODE STATUS:   Code Status and Medical Interventions: CPR (Attempt to Resuscitate); Full Support   Ordered at: 07/15/25 5170     Code Status (Patient has no pulse and is not breathing):    CPR (Attempt to Resuscitate)     Medical Interventions (Patient has pulse or is breathing):    Full Support     Level Of Support Discussed With:    Patient       Peggy AIDA Walsh DO  07/19/25

## 2025-07-20 LAB
ANION GAP SERPL CALCULATED.3IONS-SCNC: 12 MMOL/L (ref 5–15)
BUN SERPL-MCNC: 18.7 MG/DL (ref 8–23)
BUN/CREAT SERPL: 20.1 (ref 7–25)
CALCIUM SPEC-SCNC: 8.7 MG/DL (ref 8.6–10.5)
CHLORIDE SERPL-SCNC: 100 MMOL/L (ref 98–107)
CO2 SERPL-SCNC: 31 MMOL/L (ref 22–29)
CREAT SERPL-MCNC: 0.93 MG/DL (ref 0.76–1.27)
DEPRECATED RDW RBC AUTO: 54.7 FL (ref 37–54)
EGFRCR SERPLBLD CKD-EPI 2021: 83 ML/MIN/1.73
ERYTHROCYTE [DISTWIDTH] IN BLOOD BY AUTOMATED COUNT: 17.8 % (ref 12.3–15.4)
GLUCOSE BLDC GLUCOMTR-MCNC: 138 MG/DL (ref 70–130)
GLUCOSE BLDC GLUCOMTR-MCNC: 169 MG/DL (ref 70–130)
GLUCOSE BLDC GLUCOMTR-MCNC: 210 MG/DL (ref 70–130)
GLUCOSE BLDC GLUCOMTR-MCNC: 229 MG/DL (ref 70–130)
GLUCOSE SERPL-MCNC: 175 MG/DL (ref 65–99)
HCT VFR BLD AUTO: 27.7 % (ref 37.5–51)
HGB BLD-MCNC: 8.2 G/DL (ref 13–17.7)
MCH RBC QN AUTO: 25.9 PG (ref 26.6–33)
MCHC RBC AUTO-ENTMCNC: 29.6 G/DL (ref 31.5–35.7)
MCV RBC AUTO: 87.7 FL (ref 79–97)
PLATELET # BLD AUTO: 157 10*3/MM3 (ref 140–450)
PMV BLD AUTO: 10.7 FL (ref 6–12)
POTASSIUM SERPL-SCNC: 3 MMOL/L (ref 3.5–5.2)
QT INTERVAL: 340 MS
QT INTERVAL: 352 MS
QT INTERVAL: 352 MS
QT INTERVAL: 380 MS
QTC INTERVAL: 449 MS
QTC INTERVAL: 462 MS
QTC INTERVAL: 462 MS
QTC INTERVAL: 467 MS
RBC # BLD AUTO: 3.16 10*6/MM3 (ref 4.14–5.8)
SODIUM SERPL-SCNC: 143 MMOL/L (ref 136–145)
WBC NRBC COR # BLD AUTO: 10.06 10*3/MM3 (ref 3.4–10.8)

## 2025-07-20 PROCEDURE — 63710000001 INSULIN GLARGINE PER 5 UNITS: Performed by: FAMILY MEDICINE

## 2025-07-20 PROCEDURE — 93010 ELECTROCARDIOGRAM REPORT: CPT | Performed by: INTERNAL MEDICINE

## 2025-07-20 PROCEDURE — 25010000002 AMIODARONE IN DEXTROSE 5% 360-4.14 MG/200ML-% SOLUTION: Performed by: INTERNAL MEDICINE

## 2025-07-20 PROCEDURE — 82948 REAGENT STRIP/BLOOD GLUCOSE: CPT

## 2025-07-20 PROCEDURE — 93005 ELECTROCARDIOGRAM TRACING: CPT | Performed by: FAMILY MEDICINE

## 2025-07-20 PROCEDURE — 85027 COMPLETE CBC AUTOMATED: CPT | Performed by: FAMILY MEDICINE

## 2025-07-20 PROCEDURE — 99232 SBSQ HOSP IP/OBS MODERATE 35: CPT | Performed by: FAMILY MEDICINE

## 2025-07-20 PROCEDURE — 63710000001 INSULIN LISPRO (HUMAN) PER 5 UNITS: Performed by: FAMILY MEDICINE

## 2025-07-20 PROCEDURE — 99232 SBSQ HOSP IP/OBS MODERATE 35: CPT | Performed by: INTERNAL MEDICINE

## 2025-07-20 PROCEDURE — 80048 BASIC METABOLIC PNL TOTAL CA: CPT | Performed by: FAMILY MEDICINE

## 2025-07-20 PROCEDURE — 93005 ELECTROCARDIOGRAM TRACING: CPT | Performed by: INTERNAL MEDICINE

## 2025-07-20 RX ORDER — POTASSIUM CHLORIDE 1500 MG/1
40 TABLET, EXTENDED RELEASE ORAL EVERY 4 HOURS
Status: COMPLETED | OUTPATIENT
Start: 2025-07-20 | End: 2025-07-21

## 2025-07-20 RX ORDER — TERAZOSIN 2 MG/1
2 CAPSULE ORAL NIGHTLY
Status: DISCONTINUED | OUTPATIENT
Start: 2025-07-20 | End: 2025-07-29 | Stop reason: HOSPADM

## 2025-07-20 RX ORDER — HYDROCHLOROTHIAZIDE 25 MG/1
25 TABLET ORAL DAILY
Status: DISCONTINUED | OUTPATIENT
Start: 2025-07-20 | End: 2025-07-23

## 2025-07-20 RX ADMIN — ATORVASTATIN CALCIUM 40 MG: 40 TABLET, FILM COATED ORAL at 20:20

## 2025-07-20 RX ADMIN — AMIODARONE HYDROCHLORIDE 0.5 MG/MIN: 1.8 INJECTION, SOLUTION INTRAVENOUS at 04:37

## 2025-07-20 RX ADMIN — BUMETANIDE 1 MG: 1 TABLET ORAL at 17:50

## 2025-07-20 RX ADMIN — APIXABAN 5 MG: 5 TABLET, FILM COATED ORAL at 08:00

## 2025-07-20 RX ADMIN — INSULIN LISPRO 2 UNITS: 100 INJECTION, SOLUTION INTRAVENOUS; SUBCUTANEOUS at 12:40

## 2025-07-20 RX ADMIN — AMIODARONE HYDROCHLORIDE 200 MG: 200 TABLET ORAL at 20:21

## 2025-07-20 RX ADMIN — LEVOTHYROXINE SODIUM 50 MCG: 50 TABLET ORAL at 05:31

## 2025-07-20 RX ADMIN — METOPROLOL TARTRATE 25 MG: 25 TABLET, FILM COATED ORAL at 20:20

## 2025-07-20 RX ADMIN — Medication 10 ML: at 20:34

## 2025-07-20 RX ADMIN — POTASSIUM CHLORIDE 40 MEQ: 1500 TABLET, EXTENDED RELEASE ORAL at 20:20

## 2025-07-20 RX ADMIN — METOPROLOL TARTRATE 25 MG: 25 TABLET, FILM COATED ORAL at 08:00

## 2025-07-20 RX ADMIN — SERTRALINE HYDROCHLORIDE 200 MG: 100 TABLET, FILM COATED ORAL at 08:00

## 2025-07-20 RX ADMIN — APIXABAN 5 MG: 5 TABLET, FILM COATED ORAL at 20:20

## 2025-07-20 RX ADMIN — SPIRONOLACTONE 25 MG: 25 TABLET ORAL at 08:00

## 2025-07-20 RX ADMIN — AMLODIPINE BESYLATE 5 MG: 5 TABLET ORAL at 08:00

## 2025-07-20 RX ADMIN — PRIMIDONE 100 MG: 50 TABLET ORAL at 08:00

## 2025-07-20 RX ADMIN — LOSARTAN POTASSIUM 100 MG: 50 TABLET, FILM COATED ORAL at 08:00

## 2025-07-20 RX ADMIN — Medication 10 ML: at 08:01

## 2025-07-20 RX ADMIN — TERAZOSIN HYDROCHLORIDE 2 MG: 2 CAPSULE ORAL at 20:20

## 2025-07-20 RX ADMIN — PANTOPRAZOLE SODIUM 40 MG: 40 TABLET, DELAYED RELEASE ORAL at 05:31

## 2025-07-20 RX ADMIN — BUMETANIDE 1 MG: 1 TABLET ORAL at 08:00

## 2025-07-20 RX ADMIN — INSULIN GLARGINE 5 UNITS: 100 INJECTION, SOLUTION SUBCUTANEOUS at 20:29

## 2025-07-20 RX ADMIN — LORAZEPAM 0.5 MG: 0.5 TABLET ORAL at 20:29

## 2025-07-20 RX ADMIN — EMPAGLIFLOZIN 10 MG: 10 TABLET, FILM COATED ORAL at 08:00

## 2025-07-20 RX ADMIN — INSULIN LISPRO 4 UNITS: 100 INJECTION, SOLUTION INTRAVENOUS; SUBCUTANEOUS at 20:29

## 2025-07-20 RX ADMIN — AMIODARONE HYDROCHLORIDE 200 MG: 200 TABLET ORAL at 12:40

## 2025-07-20 RX ADMIN — HYDROCHLOROTHIAZIDE 25 MG: 25 TABLET ORAL at 17:49

## 2025-07-20 RX ADMIN — VALACYCLOVIR HYDROCHLORIDE 500 MG: 500 TABLET, FILM COATED ORAL at 08:00

## 2025-07-20 RX ADMIN — INSULIN LISPRO 4 UNITS: 100 INJECTION, SOLUTION INTRAVENOUS; SUBCUTANEOUS at 07:59

## 2025-07-20 RX ADMIN — FERROUS SULFATE TAB 325 MG (65 MG ELEMENTAL FE) 325 MG: 325 (65 FE) TAB at 07:59

## 2025-07-20 RX ADMIN — TIZANIDINE 4 MG: 4 TABLET ORAL at 20:20

## 2025-07-20 NOTE — PLAN OF CARE
Problem: Adult Inpatient Plan of Care  Goal: Plan of Care Review  Outcome: Progressing  Goal: Patient-Specific Goal (Individualized)  Outcome: Progressing  Goal: Absence of Hospital-Acquired Illness or Injury  Outcome: Progressing  Intervention: Identify and Manage Fall Risk  Description: Perform standard risk assessment on admission using a validated tool or comprehensive approach appropriate to the patient; reassess fall risk frequently, with change in status or transfer to another level of care.Communicate risk to interprofessional healthcare team; ensure fall risk visible cue.Determine need for increased observation, equipment and environmental modification, as well as use of supportive, nonskid footwear.Adjust safety measures to individual needs and identified risk factors.Reinforce the importance of active participation with fall risk prevention, safety, and physical activity with the patient and family.Perform regular intentional rounding to assess need for position change, pain assessment and personal needs, including assistance with toileting.  Recent Flowsheet Documentation  Taken 7/20/2025 1600 by Rossy Carmichael RN  Safety Promotion/Fall Prevention:   safety round/check completed   room organization consistent  Taken 7/20/2025 1400 by Rossy Carmichael RN  Safety Promotion/Fall Prevention:   safety round/check completed   room organization consistent  Taken 7/20/2025 1000 by Rossy Carmichael RN  Safety Promotion/Fall Prevention:   room organization consistent   safety round/check completed  Taken 7/20/2025 0800 by Rossy Carmichael RN  Safety Promotion/Fall Prevention:   safety round/check completed   room organization consistent  Intervention: Prevent Skin Injury  Description: Perform a screening for skin injury risk, such as pressure or moisture-associated skin damage on admission and at regular intervals throughout hospital stay.Keep all areas of skin (especially folds) clean and dry.Maintain  adequate skin hydration.Relieve and redistribute pressure and protect bony prominences and skin at risk for injury; implement measures based on patient-specific risk factors.Match turning and repositioning schedule to clinical condition.Encourage weight shift frequently; assist with reposition if unable to complete independently.Float heels off bed; avoid pressure on the Achilles tendon.Keep skin free from extended contact with medical devices.Optimize nutrition and hydration.Encourage functional activity and mobility, as early as tolerated.Use aids (e.g., slide boards, mechanical lift) during transfer.  Recent Flowsheet Documentation  Taken 7/20/2025 1600 by Rossy Carmichael, RN  Skin Protection: incontinence pads utilized  Taken 7/20/2025 1200 by Rossy Carmichael RN  Skin Protection: incontinence pads utilized  Taken 7/20/2025 0800 by Rossy Carmichael RN  Skin Protection: incontinence pads utilized  Intervention: Prevent and Manage VTE (Venous Thromboembolism) Risk  Description: Assess for VTE (venous thromboembolism) risk.Promote early mobilization; encourage both active and passive leg exercises, if unable to ambulate.Initiate and maintain compression or other therapy, as indicated, based on identified risk in accordance with organizational protocol and provider order.Recognize the patient's individual risk for bleeding before initiating pharmacologic thromboprophylaxis.  Recent Flowsheet Documentation  Taken 7/20/2025 0800 by Rossy Carmichael RN  VTE Prevention/Management:   bilateral   SCDs (sequential compression devices) on  Intervention: Prevent Infection  Description: Maintain skin and mucous membrane integrity; promote hand, oral and pulmonary hygiene.Optimize fluid balance, nutrition, sleep and glycemic control to maximize infection resistance.Identify potential sources of infection early to prevent or mitigate progression of infection (e.g., wound, lines, devices).Evaluate ongoing need for invasive  devices; remove promptly when no longer indicated.Review vaccination status.  Recent Flowsheet Documentation  Taken 7/20/2025 1600 by Rossy Carmichael RN  Infection Prevention: environmental surveillance performed  Taken 7/20/2025 1400 by Rossy Carmichael RN  Infection Prevention: environmental surveillance performed  Taken 7/20/2025 1200 by Rossy Carmichael RN  Infection Prevention: environmental surveillance performed  Taken 7/20/2025 1000 by Rossy Carmichael RN  Infection Prevention: environmental surveillance performed  Taken 7/20/2025 0800 by Rossy Carmichael RN  Infection Prevention: environmental surveillance performed  Goal: Optimal Comfort and Wellbeing  Outcome: Progressing  Intervention: Provide Person-Centered Care  Description: Use a family-focused approach to care; encourage support system presence and participation.Develop trust and rapport by proactively providing information, encouraging questions, addressing concerns and offering reassurance.Acknowledge emotional response to hospitalization.Recognize and utilize personal coping strategies and strengths; develop goals via shared decision-making.Honor spiritual and cultural preferences.  Recent Flowsheet Documentation  Taken 7/20/2025 1600 by Rossy Carmichael RN  Trust Relationship/Rapport:   care explained   choices provided   emotional support provided   empathic listening provided   questions answered   questions encouraged   reassurance provided   thoughts/feelings acknowledged  Taken 7/20/2025 1400 by Rossy Carmichael RN  Trust Relationship/Rapport:   care explained   choices provided   emotional support provided   empathic listening provided   questions answered   questions encouraged   reassurance provided   thoughts/feelings acknowledged  Taken 7/20/2025 1200 by Rossy Carmichael RN  Trust Relationship/Rapport:   care explained   choices provided   emotional support provided   empathic listening provided   questions answered    questions encouraged   reassurance provided   thoughts/feelings acknowledged  Taken 7/20/2025 0800 by Rossy Carmichael, RN  Trust Relationship/Rapport:   care explained   choices provided   emotional support provided   empathic listening provided   questions answered   questions encouraged   reassurance provided   thoughts/feelings acknowledged  Goal: Readiness for Transition of Care  Outcome: Progressing     Problem: Stroke, Ischemic (Includes Transient Ischemic Attack)  Goal: Optimal Coping  Outcome: Progressing  Intervention: Support Psychosocial Response to Stroke  Description: Acknowledge patient and family response to the sudden impact of stroke, resulting impairments and uncertainty in recovery.Encourage verbalization of feelings regarding current situation; provide active and empathic listening; be sensitive to nonverbal communication.Engage in early, proactive and ongoing discussion about goals of care and what matters most to them; facilitate shared decision-making.Assess and monitor perspective on quality of life, personal and family wellbeing; consider palliative care approach to enhance symptom relief and quality of life.Empower patient and support system to ask questions and be actively involved in decision-making.Acknowledge and validate significance of lifestyle changes and expectations (e.g., roles and identity, rehabilitation, medication regimen, diet, exercise).Recognize current coping strategies and assist in developing new strategies, such as mindfulness, relaxation and music.Assess and monitor for signs and symptoms of poststroke depression and anxiety; consider brief psychological intervention, such as motivational interviewing or problem solving. Refer for comprehensive evaluation and treatment if symptoms persist.  Recent Flowsheet Documentation  Taken 7/20/2025 1200 by Rossy Carmichael, RN  Family/Support System Care: support provided  Goal: Optimal Cerebral Tissue Perfusion  Outcome:  Progressing  Intervention: Protect and Optimize Cerebral Perfusion  Description: Closely monitor neurologic status to prevent or minimize further neurologic damage; evaluate using a validated tool.Implement hemodynamic, neurologic and cardiac monitoring to guide care, based on individual targeted parameters.Reassess blood pressure frequently; manage hypertension or hypotension to attain recommended goal based on stroke treatment.Anticipate fluid and pharmacologic therapy to improve cerebral perfusion and intracranial pressure goals; titrate to target threshold and patient response; avoid fluid overload.Manage fever; maintain normothermia to preserve cerebral metabolism.Position head of bed to maintain cerebral perfusion; place head in midline position, as tolerated, to enhance venous flow.Minimize activity that increases intrathoracic or intra-abdominal pressure resulting in decreased venous outflow (e.g., hip flexion, Valsalva maneuver, coughing, vomiting).Avoid hypoglycemia; maintain glycemic control.Decrease environmental stimulation; promote low lighting, calm environment, clustered care.Provide seizure surveillance; if seizure occurs, maintain safety and anticipate antiseizure medication administration and postseizure care.Maintain external ventricular drain when present (e.g., zeroed, closed/intermittent drainage, open/continuous drainage); monitor cerebrospinal fluid characteristics.Anticipate procedure or surgery to restore blood flow and decrease cerebral pressure, such as intravenous or intra-arterial thrombolytic, mechanical embolectomy, craniectomy, hemicraniectomy, carotid endarterectomy, angioplasty, stenting or ventricul  Recent Flowsheet Documentation  Taken 7/20/2025 0800 by Rossy Carmichael, RN  Sensory Stimulation Regulation: care clustered  Cerebral Perfusion Promotion: blood pressure monitored  Goal: Optimal Cognitive Function  Outcome: Progressing  Intervention: Optimize Cognitive  Function  Description: Assess cognitive abilities, such as attention, memory, problem solving or reasoning, direction following and executive function.Provide supervision, assistance and instructions that match the patient's ability. Simplify directions, providing demonstrations and cues as needed.Facilitate safe surroundings; keep needed items within reach, such as call light and personal belongings.Establish familiarity using a structured routine congruent with home schedule when possible, such as consistent caregiver, sleep and meal schedule, as well as toileting schedule.Promote use of personal vision and auditory aids.Provide familiar items and orientation cues, such as calendar, clock or pictures.Consider enrichment of the environment that supports functional activity, mobility and interactions with others.Implement compensatory strategy training, such as use of memory book or smartphone apps to compensate for memory impairment.Implement cognitive remediation or restorative techniques, such as training in direct attention, virtual reality, occupation or activity-based interventions and functional communication strategies.  Recent Flowsheet Documentation  Taken 7/20/2025 0800 by Rossy Carmichael RN  Sensory Stimulation Regulation: care clustered  Reorientation Measures: clock in view  Goal: Optimal Functional Ability  Outcome: Progressing  Intervention: Optimize Functional Ability  Description: Initiate sitting and standing when able; evaluate and address balance, postural control and fall risk.Assess functional ability, such as ADLs (activities of daily living), mobility safety and independence; involve patient and caregiver/family in goal-setting.Encourage physical activity and optimal functional performance using a multimodal approach.Facilitate functional mobility, such as bed mobility, transfers and ambulation; progress and retrain as tolerated.Encourage ADLs (activities of daily living), such as  self-feeding, hygiene and dressing; provide setup, adaptations, assistance and extra time as needed.Provide repetitive, mobility-task training for gait disturbances; consider ankle foot orthosis or functional electrical stimulation if foot drop is present.Promote a safe and accessible environment and effective use of assistive devices and equipment.Pace and cluster activity to balance with rest periods and conserve energy; promote adequate nutrition, sleep and rest.Identify and address body system and performance deficits affecting function, such as cognitive, balance, sensorimotor, activity tolerance and visual perception impairments.Consider multimodal therapeutic interventions, such as virtual reality, graded motor imagery, robotic or treadmill training and FES (functional electrical stimulation).  Recent Flowsheet Documentation  Taken 7/20/2025 1654 by Rossy Carmichael RN  Activity Management: ambulated in room  Taken 7/20/2025 1600 by Rossy Carmichael RN  Activity Management: activity encouraged  Taken 7/20/2025 1400 by Rossy Carmichael RN  Activity Management: activity encouraged  Taken 7/20/2025 1200 by Rossy Carmichael RN  Activity Management: activity encouraged  Taken 7/20/2025 1145 by Rossy Carmichael RN  Activity Management: ambulated in room  Taken 7/20/2025 1000 by Rossy Carmichael RN  Activity Management: up in chair  Taken 7/20/2025 0843 by Rossy Carmichael RN  Activity Management: up in chair  Taken 7/20/2025 0800 by Rossy Carmichael RN  Activity Management: up in chair  Goal: Effective Oxygenation and Ventilation  Outcome: Progressing  Intervention: Optimize Oxygenation and Ventilation  Description: Assess and monitor airway, breathing and circulation; maintain close surveillance for deterioration.Maintain normal PaCO2 (partial pressure of carbon dioxide) to minimize risk of increased intracranial pressure and cerebral ischemia.Maintain patent airway; position to minimize risk of  obstruction, aspiration and ventilation-perfusion mismatch.Provide oxygen therapy judiciously to avoid hyperoxemia; adjust to achieve oxygenation goal.Encourage airway-clearance techniques and lung expansion therapy, such as deep breathing, suction and ambulation, to minimize respiratory complication risk.Consider RMT (respiratory muscle training) to decrease the risk of pulmonary complications.Recognize risk for obstructive sleep apnea; anticipate the need for continuous pulse oximetry and positive airway pressure.Anticipate the need for intubation and mechanical ventilation for airway protection and respiratory support.  Recent Flowsheet Documentation  Taken 7/20/2025 1200 by Rossy Carmichael, RN  Head of Bed (HOB) Positioning: HOB elevated  Taken 7/20/2025 0800 by Rossy Carmichael RN  Head of Bed (HOB) Positioning: HOB elevated  Airway/Ventilation Management: oxygen therapy provided  Goal: Safe and Effective Swallow  Outcome: Progressing     Problem: Comorbidity Management  Goal: Blood Pressure in Desired Range  Outcome: Progressing  Intervention: Maintain Blood Pressure Management  Description: Evaluate adherence to home antihypertensive regimen (e.g., exercise and activity, diet modification, medication).Provide scheduled antihypertensive medication; consider administration time and effects (e.g., avoid giving diuretic prior to bedtime).Monitor response to antihypertensive medication therapy (e.g., blood pressure, electrolyte levels, medication effects).Minimize risk of orthostatic hypotension; encourage caution with position changes, particularly if elderly.  Recent Flowsheet Documentation  Taken 7/20/2025 1000 by Rossy Carmichael, RN  Medication Review/Management: medications reviewed  Taken 7/20/2025 0800 by Rossy Carmichael RN  Medication Review/Management: medications reviewed     Problem: Fall Injury Risk  Goal: Absence of Fall and Fall-Related Injury  Outcome: Progressing  Intervention: Identify and  Manage Contributors  Description: Develop a fall prevention plan, considering patient-centered interventions and family/caregiver involvement; identify and address patient's facilitators and barriers.Provide reorientation, appropriate sensory stimulation and routines with changes in mental status to decrease risk of fall.Promote use of personal vision and auditory aids.Assess assistance level required for safe and effective self-care; provide support as needed, such as toileting and mobilization. For age 65 and older, implement timed toileting with assistance.Encourage physical activity, such as performance of mobility and self-care at highest level of patient ability, multicomponent exercise program and provision of appropriate assistive devices.If fall occurs, assess the severity of injury; implement fall injury protocol. Determine the cause and revise fall injury prevention plan.Regularly review and advocate for medication adjustment to decrease fall risk; consider administration times, polypharmacy and age.Balance adequate pain management with potential for oversedation.  Recent Flowsheet Documentation  Taken 7/20/2025 1000 by Rossy Carmichael RN  Medication Review/Management: medications reviewed  Taken 7/20/2025 0800 by Rossy Carmichael RN  Medication Review/Management: medications reviewed  Intervention: Promote Injury-Free Environment  Description: Provide a safe, barrier-free environment that encourages independent activity.Keep care area uncluttered and well-lighted.Determine need for increased observation or monitoring.Avoid use of devices that minimize mobility, such as restraints or indwelling urinary catheter.  Recent Flowsheet Documentation  Taken 7/20/2025 1600 by Rossy Carmichael, RN  Safety Promotion/Fall Prevention:   safety round/check completed   room organization consistent  Taken 7/20/2025 1400 by Rossy Carmichael RN  Safety Promotion/Fall Prevention:   safety round/check completed   room  organization consistent  Taken 7/20/2025 1000 by Rossy Carmichael, RN  Safety Promotion/Fall Prevention:   room organization consistent   safety round/check completed  Taken 7/20/2025 0800 by Rossy Carmichael RN  Safety Promotion/Fall Prevention:   safety round/check completed   room organization consistent     Problem: Skin Injury Risk Increased  Goal: Skin Health and Integrity  Outcome: Progressing  Intervention: Optimize Skin Protection  Description: Perform a full pressure injury risk assessment, as indicated by screening, upon admission to care unit.Reassess skin (full inspection and injury risk, including skin temperature, consistency and color) frequently (e.g., scheduled interval, with change in condition) to provide optimal early detection and prevention.Maintain adequate tissue perfusion (e.g., encourage fluid balance; avoid crossing legs, constrictive clothing or devices) to promote tissue oxygenation.Maintain head of bed at lowest degree of elevation tolerated, considering medical condition and other restrictions. Use positioning supports to prevent sliding and friction. Consider low friction textiles.Avoid positioning onto an area that remains reddened or on bony prominences.Minimize incontinence and moisture (e.g., toileting schedule; moisture-wicking pad, diaper or incontinence collection device; skin moisture barrier).Cleanse skin promptly and gently, when soiled, utilizing a pH-balanced cleanser.Relieve and redistribute pressure (e.g., scheduled position changes, weight shifts, use of support surface, medical device repositioning, protective dressing application, use of positioning device, microclimate control, use of pressure-injury-monitorEncourage increased activity, such as sitting in a chair at the bedside or early mobilization, when able to tolerate. Avoid prolonged sitting.  Recent Flowsheet Documentation  Taken 7/20/2025 1654 by Rossy Carmichael, RN  Activity Management: ambulated in  room  Taken 7/20/2025 1600 by Rossy Carmichael RN  Activity Management: activity encouraged  Pressure Reduction Techniques: frequent weight shift encouraged  Pressure Reduction Devices: pressure-redistributing mattress utilized  Skin Protection: incontinence pads utilized  Taken 7/20/2025 1400 by Rossy Carmichael RN  Activity Management: activity encouraged  Taken 7/20/2025 1200 by Rossy Carmichael RN  Activity Management: activity encouraged  Pressure Reduction Techniques: frequent weight shift encouraged  Head of Bed (HOB) Positioning: [facility] elevated  Pressure Reduction Devices: pressure-redistributing mattress utilized  Skin Protection: incontinence pads utilized  Taken 7/20/2025 1145 by Rossy Carmichael RN  Activity Management: ambulated in room  Taken 7/20/2025 1000 by Rossy Carmichael RN  Activity Management: up in chair  Taken 7/20/2025 0843 by Rossy Carmichael RN  Activity Management: up in chair  Taken 7/20/2025 0800 by Rossy Carmichael RN  Activity Management: up in chair  Pressure Reduction Techniques: frequent weight shift encouraged  Head of Bed (HOB) Positioning: [facility] elevated  Pressure Reduction Devices: pressure-redistributing mattress utilized  Skin Protection: incontinence pads utilized   Goal Outcome Evaluation:

## 2025-07-20 NOTE — PROGRESS NOTES
[facility] Medicine Services  PROGRESS NOTE    Patient Name: Ibrahima Nava  : 1945  MRN: [id number]    Date of Admission: 7/15/2025  Primary Care Physician: Sebastien Lowe MD    Subjective   Subjective     CC:  F/U new a fib, lung mass     HPI:  Patient seen and examined. No new complaints. Lying in bed. Says he is getting ready to get up to chair to eat breakfast. Remains on Amiodarone drip. Denies palpitations, chest pain, or dyspnea. LE edema improving.     Objective   Objective     Vital Signs:   Temp:  [97 °F (36.1 °C)-98.4 °F (36.9 °C)] 97 °F (36.1 °C)  Heart Rate:  [] 85  Resp:  [16-20] 16  BP: (140-188)/() 164/103  Flow (L/min) (Oxygen Therapy):  [2] 2     Physical Exam:  Constitutional: No acute distress, awake, alert, up in bed, pleasant   HENT: NCAT, mucous membranes moist, [place]  Respiratory: decreased in bases, respiratory effort normal 2L NC  Cardiovascular: Sinus with PACs/PVCs, no murmurs, rubs, or gallops  Gastrointestinal: Positive bowel sounds, soft, non-tender, no R/R/G, non-distended  Musculoskeletal:  2-3+ pitting B/L LE edema  Psychiatric: Appropriate affect, cooperative  Neurologic: Oriented x 3, GOVEA, speech difficult to understand at times/mumbles  Skin: venous stasis skin changes     Results Reviewed:  LAB RESULTS:      Lab 25  1153 25  0719 25  1517 07/15/25  1425 07/15/25  1243 07/15/25  1239   WBC 9.82 7.57 9.61  --   --  11.13*   HEMOGLOBIN 7.6* 8.2* 8.3*  --   --  9.0*   HEMOGLOBIN, POC  --   --   --   --  10.2*  --    HEMATOCRIT 26.3* 31.1* 28.6*  --   --  31.2*   HEMATOCRIT POC  --   --   --   --  30*  --    PLATELETS 148 128* 150  --   --  176   NEUTROS ABS  --   --   --   --   --  9.07*   IMMATURE GRANS (ABS)  --   --   --   --   --  0.32*   LYMPHS ABS  --   --   --   --   --  1.22   MONOS ABS  --   --   --   --   --  0.50   EOS ABS  --   --   --   --   --  0.00   MCV 88.0 96.6 86.9  --   --  87.4   PROTIME  --   --    --   --   --  14.8   HSTROP T  --   --   --  45*  --  46*         Lab 07/19/25  1153 07/19/25  0130 07/18/25  1034 07/17/25  1152 07/16/25  1507 07/15/25  1243 07/15/25  1239   SODIUM 148*  --  148*  --  142  --  142   POTASSIUM 3.7 3.4* 3.0* 3.7 2.7*  --  2.5*   CHLORIDE 105  --  103  --  98  --  97*   CO2 29.3*  --  31.6*  --  33.0*  --  32.6*   ANION GAP 13.7  --  13.4  --  11.0  --  12.4   BUN 24.0*  --  22.0  --  20.9  --  22.2   CREATININE 0.88  --  0.89  --  0.84 0.90 0.92   EGFR 86.9  --  86.6  --  88.2 86.3 84.1   GLUCOSE 101*  --  119*  --  227*  --  252*   CALCIUM 8.7  --  8.8  --  8.7  --  8.9   MAGNESIUM 2.3  --  2.3  --   --   --  2.2   HEMOGLOBIN A1C  --   --   --   --   --   --  6.34*   TSH  --   --   --   --   --   --  0.876         Lab 07/15/25  1239   TOTAL PROTEIN 5.6*   ALBUMIN 3.7   GLOBULIN 1.9   ALT (SGPT) 58*   AST (SGOT) 75*   BILIRUBIN 0.4   ALK PHOS 85         Lab 07/17/25  1152 07/15/25  1425 07/15/25  1239   PROBNP 2,423.0*  --  3,433.0*   HSTROP T  --  45* 46*   PROTIME  --   --  14.8   INR  --   --  1.09         Lab 07/16/25  1507   CHOLESTEROL 157   LDL CHOL 77   HDL CHOL 40   TRIGLYCERIDES 240*         Lab 07/16/25  1605   IRON 52*   IRON SATURATION (TSAT) 14*   TIBC 383   TRANSFERRIN 257         Lab 07/15/25  1316   PH, ARTERIAL 7.489*   PCO2, ARTERIAL 50.2*   PO2 ART 52.2*   FIO2 21   HCO3 ART 38.2*   BASE EXCESS ART 13.3*   CARBOXYHEMOGLOBIN 1.8     Brief Urine Lab Results  (Last result in the past 365 days)        Color   Clarity   Blood   Leuk Est   Nitrite   Protein   CREAT   Urine HCG        07/15/25 1319 Yellow   Clear   Moderate (2+)   Negative   Negative   30 mg/dL (1+)                   Microbiology Results Abnormal       None            Cardioversion External in Cardiology Department  Result Date: 7/18/2025    Post cardioversion the patient displayed a sinus rhythm.   The cardioversion was successful.       Results for orders placed during the hospital encounter of  07/15/25    Adult Transesophageal Echo (SALVADOR) W/ Cont if Necessary Per Protocol 07/19/2025  8:29 AM    Interpretation Summary    Left ventricular systolic function is low normal. Left ventricular ejection fraction appears to be 56 - 60%.    Left ventricular wall thickness is consistent with mild concentric hypertrophy.    No evidence of a left atrial appendage thrombus    Mild to moderate mitral valve regurgitation      Current medications:  Scheduled Meds:amiodarone, 200 mg, Oral, Once   Followed by  amiodarone, 200 mg, Oral, Q8H   Followed by  [START ON 7/27/2025] amiodarone, 200 mg, Oral, Q12H   Followed by  [START ON 8/10/2025] amiodarone, 200 mg, Oral, Daily  amLODIPine, 5 mg, Oral, Q24H  apixaban, 5 mg, Oral, Q12H  atorvastatin, 40 mg, Oral, Nightly  bumetanide, 1 mg, Oral, BID Diuretics  empagliflozin, 10 mg, Oral, Daily  etomidate, 0.1 mg/kg, Intravenous, Once  ferrous sulfate, 325 mg, Oral, Daily With Breakfast  insulin lispro, 2-9 Units, Subcutaneous, 4x Daily AC & at Bedtime  levothyroxine, 50 mcg, Oral, Q AM  losartan, 100 mg, Oral, Q24H  metoprolol tartrate, 25 mg, Oral, Q12H  pantoprazole, 40 mg, Oral, Q AM  primidone, 100 mg, Oral, Daily  sertraline, 200 mg, Oral, Daily  sodium chloride, 10 mL, Intravenous, Q12H  sodium chloride, 10 mL, Intravenous, Q12H  spironolactone, 25 mg, Oral, Daily  terazosin, 1 mg, Oral, Nightly  tiZANidine, 4 mg, Oral, Nightly  valACYclovir, 500 mg, Oral, Daily      Continuous Infusions:amiodarone, 0.5 mg/min, Last Rate: 0.5 mg/min (07/20/25 0437)      PRN Meds:.  acetaminophen    senna-docusate sodium **AND** polyethylene glycol **AND** bisacodyl **AND** bisacodyl    cloNIDine    dextrose    dextrose    fentaNYL citrate (PF)    flumazenil    glucagon (human recombinant)    hydrALAZINE    LORazepam    Magnesium Cardiology Dose Replacement - Follow Nurse / BPA Driven Protocol    metoprolol tartrate    midazolam    naloxone    nitroglycerin    Potassium Replacement - Follow  Nurse / BPA Driven Protocol    sodium chloride    sodium chloride    sodium chloride    sodium chloride    sodium chloride    Assessment & Plan   Assessment & Plan     Active Hospital Problems    Diagnosis  POA    **CVA (cerebral vascular accident) [I63.9]  Yes    Mediastinal mass [J98.59]  Unknown      Resolved Hospital Problems   No resolved problems to display.        Brief Hospital Course to date:  Ibrahima Nava is a 80 y.o. male w history of HTN, anxiety on chronic benzos, CVA in 2012 (no residual deficit), essential tremor. Endorses dysarthria, dysphagia to solids and liquids, confusion.     This patient's problems and plans were partially entered by my partner and updated as appropriate by me 07/20/25.     Dysarthria  Expressive aphasia  Confusion   Prior stroke   -MRI brain negative for acute stroke. Noted chronic appearing infarct of L pre and post central gyri   -Stroke team followed, feel likely recrudescence of old stroke  -EEG negative  -Echo with normal EF  -PT/OT recommend home   -UA ok   -Follow-up stroke clinic 1 month    Dysphagia  -noted to solids and liquids PTA  -endorsed fullness R neck  -SLP follows, s/p MBS w/limited upper GI series, no evidence of obstruction and mild reflux   -CTA neck : Evaluation of the neck soft tissues demonstrates no evidence of aerodigestive tract mass or pathologic cervical lymphadenopathy.   -soft to chew textures, chopped, thin liquids     New Atrial Fibrillation  New LE edema, CHF  Pulmonary Edema  Hypoxia, new   -Echo with normal EF   -Cardiology following, assistance appreciated   -TSH ok  -Jardiance, Aldactone, BB, Eliquis,  -Bumex 1mg PO BID  -S/P SALVADOR ECV 7/18  -Continue IV to PO Amiodarone per protocol     New Mediastinal mass, JANNA pulmonary mass  -CT chest has been reviewed with patient and family  -prior smoking Hx, quit 30 years ago  -endorsed chronic cough x 1 year  -No weight loss or hemoptysis   -Interested in biopsy, was started on Eliquis for A fib  this admit so will need to be held pending timing   -discussed with Dr Sams. Plan for outpatient [facility] with Dr King, see his note for timing     Anemia, normocytic  -Iron/T sat low, started on oral iron per Cardiology  -consider IV iron pending H&H trend   -Started Eliquis this admit. If H&H continues to trend down, will check stool for occult blood. He denies melena.     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 Can DC once cleared by Cardiology with outpatient Pulmonary follow-up for above.   Expected Discharge   Expected Discharge Date: 7/21/2025; Expected Discharge Time:      VTE Prophylaxis:  Pharmacologic & mechanical VTE prophylaxis orders are present.         AM-PAC 6 Clicks Score (PT): 16 (07/20/25 0800)    CODE STATUS:   Code Status and Medical Interventions: CPR (Attempt to Resuscitate); Full Support   Ordered at: 07/15/25 1515     Code Status (Patient has no pulse and is not breathing):    CPR (Attempt to Resuscitate)     Medical Interventions (Patient has pulse or is breathing):    Full Support     Level Of Support Discussed With:    Patient       Peggy PARRA Walsh,   07/20/25

## 2025-07-20 NOTE — PROGRESS NOTES
"[facility] Cardiology  [facility] Progress Note     LOS: 5 days   Patient Care Team:  Sebastien Lowe MD as PCP - General  Sebastien Lowe MD as PCP - Family Medicine  Bacilio Winn MD as Consulting Physician (Cardiology)  PCP:  Sebastien Lowe MD    Chief Complaint: Persistent atrial fibrillation.    SUBJECTIVE: Resting comfortably in bedside chair.  Denies chest pain or palpitations.  Shortness of breath improving.      Review of Systems:   All systems have been reviewed and are negative with the exception of those mentioned above.      OBJECTIVE:    Vital Sign Min/Max for last 24 hours  Temp  Min: 97 °F (36.1 °C)  Max: 98.4 °F (36.9 °C)   BP  Min: 140/91  Max: 188/101   Pulse  Min: 79  Max: 124   Resp  Min: 16  Max: 18   SpO2  Min: 91 %  Max: 97 %   No data recorded   No data recorded     Flowsheet Rows      Flowsheet Row First Filed Value   Admission Height 172.7 cm (68\") Documented at 07/15/2025 1227   Admission Weight 132 kg (291 lb) Documented at 07/15/2025 1227            Telemetry: Sinus rhythm with frequent and consecutive premature atrial contractions.      Intake/Output Summary (Last 24 hours) at 7/20/2025 1437  Last data filed at 7/20/2025 0600  Gross per 24 hour   Intake 1050 ml   Output 450 ml   Net 600 ml     Intake & Output (last 3 days)         07/17 0701  07/18 0700 07/18 0701  07/19 0700 07/19 0701  07/20 0700 07/20 0701  07/21 0700    P.O. 200  1450     Total Intake(mL/kg) 200 (1.5)  1450 (10.9)     Urine (mL/kg/hr) 600 (0.2) 975 (0.3) 1000 (0.3)     Stool   0     Total Output      Net -400 -975 +450             Urine Unmeasured Occurrence   2 x     Stool Unmeasured Occurrence   1 x 2 x             Physical Exam:    General Appearance:    Alert, cooperative, no distress, appears stated age   Neck:   Supple, symmetrical, trachea midline.   Lungs:     Clear to auscultation bilaterally, respirations unlabored   Chest Wall:    No tenderness or deformity    " Heart:  Irregular irregular, S1 and S2 normal, no murmur, rub   or gallop, normal carotid impulse bilaterally without bruit.   Extremities:   Extremities normal, atraumatic, no cyanosis trivial bilateral lower extremity edema.     Pulses:   2+ and symmetric all extremities   Skin:   Skin color, texture, turgor normal, no rashes or lesions      LABS/DIAGNOSTIC DATA:  Results from last 7 days   Lab Units 07/20/25  1317 07/19/25  1153 07/18/25  0719   WBC 10*3/mm3 10.06 9.82 7.57   HEMOGLOBIN g/dL 8.2* 7.6* 8.2*   HEMATOCRIT % 27.7* 26.3* 31.1*   PLATELETS 10*3/mm3 157 148 128*     Lab Results   Lab Value Date/Time    TROPONINT 45 (H) 07/15/2025 1425    TROPONINT 46 (H) 07/15/2025 1239     Results from last 7 days   Lab Units 07/15/25  1239   INR  1.09     Results from last 7 days   Lab Units 07/19/25  1153 07/19/25  0130 07/18/25  1034 07/17/25  1152 07/16/25  1507 07/15/25  1243 07/15/25  1239   SODIUM mmol/L 148*  --  148*  --  142  --  142   POTASSIUM mmol/L 3.7 3.4* 3.0*   < > 2.7*  --  2.5*   CHLORIDE mmol/L 105  --  103  --  98  --  97*   CO2 mmol/L 29.3*  --  31.6*  --  33.0*  --  32.6*   BUN mg/dL 24.0*  --  22.0  --  20.9  --  22.2   CREATININE mg/dL 0.88  --  0.89  --  0.84   < > 0.92   CALCIUM mg/dL 8.7  --  8.8  --  8.7  --  8.9   BILIRUBIN mg/dL  --   --   --   --   --   --  0.4   ALK PHOS U/L  --   --   --   --   --   --  85   ALT (SGPT) U/L  --   --   --   --   --   --  58*   AST (SGOT) U/L  --   --   --   --   --   --  75*   GLUCOSE mg/dL 101*  --  119*  --  227*  --  252*    < > = values in this interval not displayed.     Results from last 7 days   Lab Units 07/15/25  1239   HEMOGLOBIN A1C % 6.34*     Results from last 7 days   Lab Units 07/16/25  1507   CHOLESTEROL mg/dL 157   TRIGLYCERIDES mg/dL 240*   HDL CHOL mg/dL 40   LDL CHOL mg/dL 77     Results from last 7 days   Lab Units 07/15/25  1239   TSH uIU/mL 0.876           Medication Review:   amiodarone, 200 mg, Oral, Q8H   Followed by  [START ON  7/27/2025] amiodarone, 200 mg, Oral, Q12H   Followed by  [START ON 8/10/2025] amiodarone, 200 mg, Oral, Daily  amLODIPine, 5 mg, Oral, Q24H  apixaban, 5 mg, Oral, Q12H  atorvastatin, 40 mg, Oral, Nightly  bumetanide, 1 mg, Oral, BID Diuretics  empagliflozin, 10 mg, Oral, Daily  ferrous sulfate, 325 mg, Oral, Daily With Breakfast  hydroCHLOROthiazide Oral, 25 mg, Oral, Daily  insulin glargine, 5 Units, Subcutaneous, Nightly  insulin lispro, 2-9 Units, Subcutaneous, 4x Daily AC & at Bedtime  levothyroxine, 50 mcg, Oral, Q AM  losartan, 100 mg, Oral, Q24H  metoprolol tartrate, 25 mg, Oral, Q12H  pantoprazole, 40 mg, Oral, Q AM  primidone, 100 mg, Oral, Daily  sertraline, 200 mg, Oral, Daily  sodium chloride, 10 mL, Intravenous, Q12H  sodium chloride, 10 mL, Intravenous, Q12H  spironolactone, 25 mg, Oral, Daily  terazosin, 2 mg, Oral, Nightly  tiZANidine, 4 mg, Oral, Nightly  valACYclovir, 500 mg, Oral, Daily               ASSESSMENT/PLAN:    CVA (cerebral vascular accident)    Mediastinal mass        Persistent atrial fibrillation: Status post SALVADOR guided ECV.  Telemetry today reveals sinus rhythm with frequent and consecutive premature atrial contractions.  Continue amiodarone load.  Continue Eliquis 5 mg p.o. twice daily for stroke prophylaxis.    Heart failure with preserved ejection fraction, acute on chronic: Continue gradual diuresis and rhythm control.      Hypertension: Titrating Hytrin, adding thiazide diuretic.          Jani Cohen III, MD   07/20/25  14:37 EDT

## 2025-07-21 LAB
ANION GAP SERPL CALCULATED.3IONS-SCNC: 11 MMOL/L (ref 5–15)
BUN SERPL-MCNC: 17.5 MG/DL (ref 8–23)
BUN/CREAT SERPL: 20.6 (ref 7–25)
CALCIUM SPEC-SCNC: 8.4 MG/DL (ref 8.6–10.5)
CHLORIDE SERPL-SCNC: 98 MMOL/L (ref 98–107)
CO2 SERPL-SCNC: 32 MMOL/L (ref 22–29)
CREAT SERPL-MCNC: 0.85 MG/DL (ref 0.76–1.27)
DEPRECATED RDW RBC AUTO: 52.7 FL (ref 37–54)
EGFRCR SERPLBLD CKD-EPI 2021: 87.8 ML/MIN/1.73
ERYTHROCYTE [DISTWIDTH] IN BLOOD BY AUTOMATED COUNT: 17.5 % (ref 12.3–15.4)
GLUCOSE BLDC GLUCOMTR-MCNC: 141 MG/DL (ref 70–130)
GLUCOSE BLDC GLUCOMTR-MCNC: 148 MG/DL (ref 70–130)
GLUCOSE BLDC GLUCOMTR-MCNC: 156 MG/DL (ref 70–130)
GLUCOSE BLDC GLUCOMTR-MCNC: 156 MG/DL (ref 70–130)
GLUCOSE SERPL-MCNC: 138 MG/DL (ref 65–99)
HCT VFR BLD AUTO: 25.3 % (ref 37.5–51)
HGB BLD-MCNC: 7.5 G/DL (ref 13–17.7)
MCH RBC QN AUTO: 25.8 PG (ref 26.6–33)
MCHC RBC AUTO-ENTMCNC: 29.6 G/DL (ref 31.5–35.7)
MCV RBC AUTO: 86.9 FL (ref 79–97)
PLATELET # BLD AUTO: 153 10*3/MM3 (ref 140–450)
PMV BLD AUTO: 11 FL (ref 6–12)
POTASSIUM SERPL-SCNC: 3 MMOL/L (ref 3.5–5.2)
QT INTERVAL: 306 MS
QTC INTERVAL: 406 MS
RBC # BLD AUTO: 2.91 10*6/MM3 (ref 4.14–5.8)
SODIUM SERPL-SCNC: 141 MMOL/L (ref 136–145)
WBC NRBC COR # BLD AUTO: 7.98 10*3/MM3 (ref 3.4–10.8)

## 2025-07-21 PROCEDURE — 99232 SBSQ HOSP IP/OBS MODERATE 35: CPT | Performed by: NURSE PRACTITIONER

## 2025-07-21 PROCEDURE — 92523 SPEECH SOUND LANG COMPREHEN: CPT

## 2025-07-21 PROCEDURE — 85027 COMPLETE CBC AUTOMATED: CPT | Performed by: FAMILY MEDICINE

## 2025-07-21 PROCEDURE — 63710000001 INSULIN GLARGINE PER 5 UNITS: Performed by: FAMILY MEDICINE

## 2025-07-21 PROCEDURE — 97116 GAIT TRAINING THERAPY: CPT

## 2025-07-21 PROCEDURE — 97110 THERAPEUTIC EXERCISES: CPT

## 2025-07-21 PROCEDURE — 93005 ELECTROCARDIOGRAM TRACING: CPT | Performed by: INTERNAL MEDICINE

## 2025-07-21 PROCEDURE — 25010000002 METOPROLOL TARTRATE 5 MG/5ML SOLUTION: Performed by: FAMILY MEDICINE

## 2025-07-21 PROCEDURE — 97530 THERAPEUTIC ACTIVITIES: CPT

## 2025-07-21 PROCEDURE — 99232 SBSQ HOSP IP/OBS MODERATE 35: CPT

## 2025-07-21 PROCEDURE — 82948 REAGENT STRIP/BLOOD GLUCOSE: CPT

## 2025-07-21 PROCEDURE — 80048 BASIC METABOLIC PNL TOTAL CA: CPT | Performed by: FAMILY MEDICINE

## 2025-07-21 PROCEDURE — 93010 ELECTROCARDIOGRAM REPORT: CPT | Performed by: INTERNAL MEDICINE

## 2025-07-21 PROCEDURE — 63710000001 INSULIN LISPRO (HUMAN) PER 5 UNITS: Performed by: FAMILY MEDICINE

## 2025-07-21 RX ORDER — POTASSIUM CHLORIDE 1500 MG/1
40 TABLET, EXTENDED RELEASE ORAL EVERY 4 HOURS
Status: COMPLETED | OUTPATIENT
Start: 2025-07-21 | End: 2025-07-21

## 2025-07-21 RX ORDER — METOPROLOL TARTRATE 50 MG
50 TABLET ORAL EVERY 12 HOURS SCHEDULED
Status: DISCONTINUED | OUTPATIENT
Start: 2025-07-21 | End: 2025-07-22

## 2025-07-21 RX ADMIN — ATORVASTATIN CALCIUM 40 MG: 40 TABLET, FILM COATED ORAL at 22:13

## 2025-07-21 RX ADMIN — METOPROLOL TARTRATE 50 MG: 50 TABLET, FILM COATED ORAL at 09:16

## 2025-07-21 RX ADMIN — EMPAGLIFLOZIN 10 MG: 10 TABLET, FILM COATED ORAL at 09:16

## 2025-07-21 RX ADMIN — TERAZOSIN HYDROCHLORIDE 2 MG: 2 CAPSULE ORAL at 22:13

## 2025-07-21 RX ADMIN — HYDROCHLOROTHIAZIDE 25 MG: 25 TABLET ORAL at 09:15

## 2025-07-21 RX ADMIN — Medication 10 ML: at 09:16

## 2025-07-21 RX ADMIN — Medication 10 ML: at 22:16

## 2025-07-21 RX ADMIN — APIXABAN 5 MG: 5 TABLET, FILM COATED ORAL at 09:15

## 2025-07-21 RX ADMIN — AMIODARONE HYDROCHLORIDE 200 MG: 200 TABLET ORAL at 13:18

## 2025-07-21 RX ADMIN — BUMETANIDE 1 MG: 1 TABLET ORAL at 17:41

## 2025-07-21 RX ADMIN — SERTRALINE HYDROCHLORIDE 200 MG: 100 TABLET, FILM COATED ORAL at 09:15

## 2025-07-21 RX ADMIN — Medication 10 MG: at 22:13

## 2025-07-21 RX ADMIN — POTASSIUM CHLORIDE 40 MEQ: 1500 TABLET, EXTENDED RELEASE ORAL at 17:41

## 2025-07-21 RX ADMIN — POTASSIUM CHLORIDE 40 MEQ: 1500 TABLET, EXTENDED RELEASE ORAL at 03:02

## 2025-07-21 RX ADMIN — FERROUS SULFATE TAB 325 MG (65 MG ELEMENTAL FE) 325 MG: 325 (65 FE) TAB at 09:15

## 2025-07-21 RX ADMIN — TIZANIDINE 4 MG: 4 TABLET ORAL at 22:13

## 2025-07-21 RX ADMIN — AMIODARONE HYDROCHLORIDE 200 MG: 200 TABLET ORAL at 22:13

## 2025-07-21 RX ADMIN — POTASSIUM CHLORIDE 40 MEQ: 1500 TABLET, EXTENDED RELEASE ORAL at 13:18

## 2025-07-21 RX ADMIN — PANTOPRAZOLE SODIUM 40 MG: 40 TABLET, DELAYED RELEASE ORAL at 06:10

## 2025-07-21 RX ADMIN — VALACYCLOVIR HYDROCHLORIDE 500 MG: 500 TABLET, FILM COATED ORAL at 09:15

## 2025-07-21 RX ADMIN — PRIMIDONE 100 MG: 50 TABLET ORAL at 09:15

## 2025-07-21 RX ADMIN — INSULIN LISPRO 2 UNITS: 100 INJECTION, SOLUTION INTRAVENOUS; SUBCUTANEOUS at 17:40

## 2025-07-21 RX ADMIN — AMIODARONE HYDROCHLORIDE 200 MG: 200 TABLET ORAL at 03:02

## 2025-07-21 RX ADMIN — LORAZEPAM 0.5 MG: 0.5 TABLET ORAL at 22:13

## 2025-07-21 RX ADMIN — AMLODIPINE BESYLATE 5 MG: 5 TABLET ORAL at 09:15

## 2025-07-21 RX ADMIN — METOPROLOL TARTRATE 5 MG: 5 INJECTION INTRAVENOUS at 19:25

## 2025-07-21 RX ADMIN — BUMETANIDE 1 MG: 1 TABLET ORAL at 09:16

## 2025-07-21 RX ADMIN — LEVOTHYROXINE SODIUM 50 MCG: 50 TABLET ORAL at 06:10

## 2025-07-21 RX ADMIN — INSULIN GLARGINE 5 UNITS: 100 INJECTION, SOLUTION SUBCUTANEOUS at 22:14

## 2025-07-21 RX ADMIN — LOSARTAN POTASSIUM 100 MG: 50 TABLET, FILM COATED ORAL at 09:15

## 2025-07-21 RX ADMIN — APIXABAN 5 MG: 5 TABLET, FILM COATED ORAL at 22:13

## 2025-07-21 RX ADMIN — INSULIN LISPRO 2 UNITS: 100 INJECTION, SOLUTION INTRAVENOUS; SUBCUTANEOUS at 22:14

## 2025-07-21 RX ADMIN — SPIRONOLACTONE 25 MG: 25 TABLET ORAL at 09:15

## 2025-07-21 RX ADMIN — POTASSIUM CHLORIDE 40 MEQ: 1500 TABLET, EXTENDED RELEASE ORAL at 06:10

## 2025-07-21 RX ADMIN — POTASSIUM CHLORIDE 40 MEQ: 1500 TABLET, EXTENDED RELEASE ORAL at 22:13

## 2025-07-21 NOTE — DISCHARGE PLACEMENT REQUEST
"Radha Nava (80 y.o. Male)     Codie Parekh, RN  [phone]        Date of Birth   1945    Social Security Number       Address   [address] [place] [address]    Home Phone   [phone]    MRN   [id number]       Mu-ism   Cheondoism    Marital Status                               Admission Date   7/15/2025    Admission Type   Emergency    Admitting Provider   Maya Garcia MD    Attending Provider   Maya Garcia MD    Department, Room/Bed   [facility] 3E, S335/1       Discharge Date       Discharge Disposition       Discharge Destination                                 Attending Provider: Maya Garcia MD    Allergies: No Known Allergies    Isolation: None   Infection: None   Code Status: CPR    Ht: 172.7 cm (67.99\")   Wt: 133 kg (293 lb 3.4 oz)    Admission Cmt: None   Principal Problem: CVA (cerebral vascular accident) [I63.9]                   Active Insurance as of 7/15/2025       Primary Coverage       Payor Plan Insurance Group Employer/Plan Group    MEDICARE MEDICARE A & B        Payor Plan Address Payor Plan Phone Number Payor Plan Fax Number Effective Dates    [address] [phone]  2010 - None Entered    [place] [address]         Subscriber Name Subscriber Birth Date Member ID       RADHA NAVA 1945 [id number]                     Emergency Contacts        (Rel.) Home Phone Work Phone Mobile Phone    Oralia Nava (Spouse) -- -- [phone]    Arron Alba (Daughter) -- -- [phone]    Freda Laughlin (Daughter) [phone] -- --                 History & Physical        Keily, MD Ruth at 07/15/25 1502              [facility]  HISTORY AND PHYSICAL    Patient Name: Radha Nava  : 1945  MRN: [id number]  Primary Care Physician: Sebastien Lowe MD  Date of admission: 7/15/2025      Subjective  Subjective     Chief Complaint:  speech changes, reported " dysarthria and dysphagia     HPI:  Ibrahima Nava is a 80 y.o. male w history of HTN, anxiety on chronic benzos, CVA in 2012 (no residual deficit), essential tremor.   His daughter, an RN, saw him yesterday after 2-3 days away and noted slurred speech, trouble swallowing liquid, and apparent confusion/slow mentation.  Pt also had had urinary incontinence which is very unusual for him.   No focal numbnesss/weakness.  Patient himself thinks he is having trouble making words come out, also notes some trouble walking but cannot explain it well.  Veers into tangential conversation about a gastric sleeve and his diastasis recti.        Personal History     Past Medical History:   Diagnosis Date    Anxiety     Arthritis     Hypertension     CONTROLLED WITH MEDS PER PT     Shoulder pain, left     Sleep apnea     did not tolerate CPAP    Stroke     2012-- NO RESIDUAL PROBLEMS    Tremor of right hand     Wears dentures     UPPER AND LOWER PLATE            Past Surgical History:   Procedure Laterality Date    CARDIAC CATHETERIZATION  2016    NO CORONARY STENTS     COLONOSCOPY  2014    MI ARTHROPLASTY GLENOHUMERAL JOINT TOTAL SHOULDER Left 06/12/2017    Procedure: LEFT TOTAL SHOULDER ARTHROPLASTY ;  Surgeon: Michael Xavier MD;  Location:  [facility];  Service: Orthopedics    TOTAL KNEE ARTHROPLASTY Bilateral     TOTAL SHOULDER REPLACEMENT Right     TOTAL SHOULDER REVISION Left 5/20/2024    Procedure: REVISION TOTAL SHOULDER ARTHROPLASTY TO REVERSE TOTAL SHOULDER ATHROPLASTY - LEFT;  Surgeon: Michael Xavier MD;  Location:  [facility];  Service: Orthopedics;  Laterality: Left;       Family History: family history is not on file.     Social History:  reports that he has quit smoking. His smoking use included cigarettes. He has never used smokeless tobacco. He reports that he does not drink alcohol and does not use drugs.  Social History     Social History Narrative    Not on file       Medications:  Available home  medication information reviewed.  LORazepam, NON FORMULARY, allopurinol, aspirin, docusate sodium, ferrous sulfate, levothyroxine, pantoprazole, pravastatin, primidone, sertraline, telmisartan, tiZANidine, and valACYclovir    No Known Allergies    Objective  Objective     Vital Signs:   Temp:  [98 °F (36.7 °C)] 98 °F (36.7 °C)  Heart Rate:  [] 89  Resp:  [18] 18  BP: (143-187)/() 166/105  Flow (L/min) (Oxygen Therapy):  [2] 2  Total (NIH Stroke Scale): 1    Physical Exam   Gen:  WD/WN man in bed, awake and conversant, speech slow and somewhat halting, seems to have trouble expressing his ideas.    Neuro: alert and oriented, clear speech but slow/halting, follows commands, grossly nonfocal, tremor   HEENT:  NC/AT   Neck:  Supple, no LAD  Heart RRR   Lungs CTA   Abd:  Soft, nontender  Extrem:  No c/c/e      Result Review:  I have personally reviewed the results from the time of this admission to 7/15/2025 15:02 EDT and agree with these findings:  [x]  Laboratory list / accordion  []  Microbiology  [x]  Radiology  [x]  EKG/Telemetry   []  Cardiology/Vascular   []  Pathology  []  Old records  []  Other:  Most notable findings include: head CT, CTAs, noted.  MRI pending.        LAB RESULTS:      Lab 07/15/25  1243 07/15/25  1239   WBC  --  11.13*   HEMOGLOBIN  --  9.0*   HEMOGLOBIN, POC 10.2*  --    HEMATOCRIT  --  31.2*   HEMATOCRIT POC 30*  --    PLATELETS  --  176   NEUTROS ABS  --  9.07*   IMMATURE GRANS (ABS)  --  0.32*   LYMPHS ABS  --  1.22   MONOS ABS  --  0.50   EOS ABS  --  0.00   MCV  --  87.4   PROTIME  --  14.8   INR  --  1.09         Lab 07/15/25  1243 07/15/25  1239   SODIUM  --  142   POTASSIUM  --  2.5*   CHLORIDE  --  97*   CO2  --  32.6*   ANION GAP  --  12.4   BUN  --  22.2   CREATININE 0.90 0.92   EGFR 86.3 84.1   GLUCOSE  --  252*   CALCIUM  --  8.9   MAGNESIUM  --  2.2   HEMOGLOBIN A1C  --  6.34*   TSH  --  0.876         Lab 07/15/25  1239   TOTAL PROTEIN 5.6*   ALBUMIN 3.7   GLOBULIN  1.9   ALT (SGPT) 58*   AST (SGOT) 75*   BILIRUBIN 0.4   ALK PHOS 85         Lab 07/15/25  1239   PROBNP 3,433.0*   HSTROP T 46*                 Lab 07/15/25  1316   PH, ARTERIAL 7.489*   PCO2, ARTERIAL 50.2*   PO2 ART 52.2*   FIO2 21   HCO3 ART 38.2*   BASE EXCESS ART 13.3*   CARBOXYHEMOGLOBIN 1.8     UA          7/15/2025    13:19   Urinalysis   Squamous Epithelial Cells, UA 0-2    Specific Gravity, UA >1.030    Ketones, UA Trace    Blood, UA Moderate (2+)    Leukocytes, UA Negative    Nitrite, UA Negative    RBC, UA 11-20    WBC, UA 0-2    Bacteria, UA None Seen        Microbiology Results (last 10 days)       ** No results found for the last 240 hours. **            XR Chest 1 View  Result Date: 7/15/2025  XR CHEST 1 VW Date of Exam: 7/15/2025 2:23 PM EDT Indication: Acute Stroke Protocol (onset < 12 hrs) Comparison: 5/7/2024 Findings: Cardiac size is enlarged. There is an increase in the pulmonary vascular markings with interval development of diffuse interstitial edema. No focal consolidation is seen. There is no obvious pleural fluid.     Impression: Impression: Cardiomegaly with interval development of pulmonary edema. Electronically Signed: Jani Jung MD  7/15/2025 2:44 PM EDT  Workstation ID: [id number]    CT Angiogram Head w AI Analysis of LVO  Result Date: 7/15/2025  CT ANGIOGRAM HEAD W AI ANALYSIS OF LVO, CT ANGIOGRAM NECK, CT ABDOMEN PELVIS W CONTRAST Date of Exam: 7/15/2025 12:38 PM EDT Indication: Neuro Deficit, acute, Stroke suspected Neuro deficit, acute stroke suspected. Comparison: None available. Technique: CTA of the head and neck was performed after the uneventful intravenous administration of 90 mL Isovue-370. Axial IV contrast-enhanced CT of the abdomen and pelvis was performed. Reconstructed coronal and sagittal images were also obtained. In  addition, a 3-D volume rendered image was created for interpretation. Automated exposure control and iterative reconstruction methods were used.  Findings: CTA head and neck: The lung apices demonstrate a partially imaged 3.8 cm nodular soft tissue finding within the upper mediastinum on the left, either an enlarged lymph node or mass. Evaluation of the neck soft tissues demonstrates no evidence of aerodigestive tract mass or pathologic cervical lymphadenopathy. Multilevel cervical spondylosis is present, without evidence of acute fracture or aggressive osseous lesion. Patent aortic arch with three-vessel branching. The subclavian arteries are patent bilaterally. On the right, there is calcific atherosclerotic change present at the ICA origin with mild, less than 50% narrowing by NASCET criteria. Similar mild narrowing is present on the left. The vertebral arteries are normal in course and caliber bilaterally. Intracranially, the carotid siphons demonstrate calcific atherosclerotic changes with some mild narrowing of both ophthalmic segments. The anterior cerebral arteries are normal in course and caliber bilaterally. The right middle cerebral artery is normal. The left middle cerebral artery demonstrates no evidence of high-grade stenosis, occlusion or aneurysm. The vertebrobasilar system is patent. The posterior cerebral arteries are normal in course and caliber bilaterally. CT abdomen pelvis: The lung bases are clear. The body wall soft tissues demonstrate no acute or suspicious findings, with 2 adjacent presumed sebaceous cyst seen in the right gluteal region. The liver demonstrates low-attenuation findings most consistent  with cysts. The spleen, pancreas and bilateral adrenal glands demonstrate no acute findings. A homogeneous cystic appearing finding within the pancreatic head measures 22 x 21 mm. Normal gallbladder. Small and large bowel loops are nondilated. Colonic diverticular changes are present, without definite acute inflammatory change to suggest diverticulitis. The appendix is normal. There is no free fluid or pneumoperitoneum.  Atherosclerotic, nonaneurysmal abdominal aorta. The pelvic viscera demonstrate no acute findings.     Impression: Impression: 1.Mild cervical and intracranial atherosclerotic changes are present as above, without evidence of flow-limiting stenosis, large vessel occlusion or aneurysm. 2.No acute findings in the abdomen and pelvis. 3.Incidentally noted 22 x 21 mm cystic finding within the pancreatic head. Correlation with any available prior imaging could be of use to document expected stability of this likely benign finding, otherwise as indicated, MRI could be useful to further evaluate. 4.Incompletely characterized 3.8 cm nodular mass within the upper mediastinum, possibly an enlarged lymph node. Consider dedicated CT chest. Electronically Signed: Chase Birmingham MD  7/15/2025 1:27 PM EDT  Workstation ID: [id number]    CT Angiogram Neck  Result Date: 7/15/2025  CT ANGIOGRAM HEAD W AI ANALYSIS OF LVO, CT ANGIOGRAM NECK, CT ABDOMEN PELVIS W CONTRAST Date of Exam: 7/15/2025 12:38 PM EDT Indication: Neuro Deficit, acute, Stroke suspected Neuro deficit, acute stroke suspected. Comparison: None available. Technique: CTA of the head and neck was performed after the uneventful intravenous administration of 90 mL Isovue-370. Axial IV contrast-enhanced CT of the abdomen and pelvis was performed. Reconstructed coronal and sagittal images were also obtained. In  addition, a 3-D volume rendered image was created for interpretation. Automated exposure control and iterative reconstruction methods were used. Findings: CTA head and neck: The lung apices demonstrate a partially imaged 3.8 cm nodular soft tissue finding within the upper mediastinum on the left, either an enlarged lymph node or mass. Evaluation of the neck soft tissues demonstrates no evidence of aerodigestive tract mass or pathologic cervical lymphadenopathy. Multilevel cervical spondylosis is present, without evidence of acute fracture or aggressive osseous lesion.  Patent aortic arch with three-vessel branching. The subclavian arteries are patent bilaterally. On the right, there is calcific atherosclerotic change present at the ICA origin with mild, less than 50% narrowing by NASCET criteria. Similar mild narrowing is present on the left. The vertebral arteries are normal in course and caliber bilaterally. Intracranially, the carotid siphons demonstrate calcific atherosclerotic changes with some mild narrowing of both ophthalmic segments. The anterior cerebral arteries are normal in course and caliber bilaterally. The right middle cerebral artery is normal. The left middle cerebral artery demonstrates no evidence of high-grade stenosis, occlusion or aneurysm. The vertebrobasilar system is patent. The posterior cerebral arteries are normal in course and caliber bilaterally. CT abdomen pelvis: The lung bases are clear. The body wall soft tissues demonstrate no acute or suspicious findings, with 2 adjacent presumed sebaceous cyst seen in the right gluteal region. The liver demonstrates low-attenuation findings most consistent  with cysts. The spleen, pancreas and bilateral adrenal glands demonstrate no acute findings. A homogeneous cystic appearing finding within the pancreatic head measures 22 x 21 mm. Normal gallbladder. Small and large bowel loops are nondilated. Colonic diverticular changes are present, without definite acute inflammatory change to suggest diverticulitis. The appendix is normal. There is no free fluid or pneumoperitoneum. Atherosclerotic, nonaneurysmal abdominal aorta. The pelvic viscera demonstrate no acute findings.     Impression: Impression: 1.Mild cervical and intracranial atherosclerotic changes are present as above, without evidence of flow-limiting stenosis, large vessel occlusion or aneurysm. 2.No acute findings in the abdomen and pelvis. 3.Incidentally noted 22 x 21 mm cystic finding within the pancreatic head. Correlation with any available prior  imaging could be of use to document expected stability of this likely benign finding, otherwise as indicated, MRI could be useful to further evaluate. 4.Incompletely characterized 3.8 cm nodular mass within the upper mediastinum, possibly an enlarged lymph node. Consider dedicated CT chest. Electronically Signed: Chase Birmingham MD  7/15/2025 1:27 PM EDT  Workstation ID: [id number]    CT Abdomen Pelvis With Contrast  Result Date: 7/15/2025  CT ANGIOGRAM HEAD W AI ANALYSIS OF LVO, CT ANGIOGRAM NECK, CT ABDOMEN PELVIS W CONTRAST Date of Exam: 7/15/2025 12:38 PM EDT Indication: Neuro Deficit, acute, Stroke suspected Neuro deficit, acute stroke suspected. Comparison: None available. Technique: CTA of the head and neck was performed after the uneventful intravenous administration of 90 mL Isovue-370. Axial IV contrast-enhanced CT of the abdomen and pelvis was performed. Reconstructed coronal and sagittal images were also obtained. In  addition, a 3-D volume rendered image was created for interpretation. Automated exposure control and iterative reconstruction methods were used. Findings: CTA head and neck: The lung apices demonstrate a partially imaged 3.8 cm nodular soft tissue finding within the upper mediastinum on the left, either an enlarged lymph node or mass. Evaluation of the neck soft tissues demonstrates no evidence of aerodigestive tract mass or pathologic cervical lymphadenopathy. Multilevel cervical spondylosis is present, without evidence of acute fracture or aggressive osseous lesion. Patent aortic arch with three-vessel branching. The subclavian arteries are patent bilaterally. On the right, there is calcific atherosclerotic change present at the ICA origin with mild, less than 50% narrowing by NASCET criteria. Similar mild narrowing is present on the left. The vertebral arteries are normal in course and caliber bilaterally. Intracranially, the carotid siphons demonstrate calcific atherosclerotic changes with  some mild narrowing of both ophthalmic segments. The anterior cerebral arteries are normal in course and caliber bilaterally. The right middle cerebral artery is normal. The left middle cerebral artery demonstrates no evidence of high-grade stenosis, occlusion or aneurysm. The vertebrobasilar system is patent. The posterior cerebral arteries are normal in course and caliber bilaterally. CT abdomen pelvis: The lung bases are clear. The body wall soft tissues demonstrate no acute or suspicious findings, with 2 adjacent presumed sebaceous cyst seen in the right gluteal region. The liver demonstrates low-attenuation findings most consistent  with cysts. The spleen, pancreas and bilateral adrenal glands demonstrate no acute findings. A homogeneous cystic appearing finding within the pancreatic head measures 22 x 21 mm. Normal gallbladder. Small and large bowel loops are nondilated. Colonic diverticular changes are present, without definite acute inflammatory change to suggest diverticulitis. The appendix is normal. There is no free fluid or pneumoperitoneum. Atherosclerotic, nonaneurysmal abdominal aorta. The pelvic viscera demonstrate no acute findings.     Impression: Impression: 1.Mild cervical and intracranial atherosclerotic changes are present as above, without evidence of flow-limiting stenosis, large vessel occlusion or aneurysm. 2.No acute findings in the abdomen and pelvis. 3.Incidentally noted 22 x 21 mm cystic finding within the pancreatic head. Correlation with any available prior imaging could be of use to document expected stability of this likely benign finding, otherwise as indicated, MRI could be useful to further evaluate. 4.Incompletely characterized 3.8 cm nodular mass within the upper mediastinum, possibly an enlarged lymph node. Consider dedicated CT chest. Electronically Signed: Chase Birmingham MD  7/15/2025 1:27 PM EDT  Workstation ID: [id number]    CT CEREBRAL PERFUSION WITH & WITHOUT  CONTRAST  Result Date: 7/15/2025  CT CEREBRAL PERFUSION W WO CONTRAST Date of Exam: 7/15/2025 12:38 PM EDT Indication: Neuro Deficit, acute, Stroke suspected Neuro deficit, acute stroke suspected.  Comparison: CT head from earlier today Technique: Axial CT images of the brain were obtained prior to and after the administration of 50 mL Isovue-370. Core blood volume, core blood flow, mean transit time, and Tmax images were obtained utilizing the Rapid software protocol. A limited CT angiogram of the head was also performed to measure the blood vessel density. The radiation dose reduction device was turned on for each scan per the ALARA (As Low as Reasonably Achievable) protocol. Findings: The intracranial cerebral perfusion appears normal.     Impression: Impression: Examination appears within normal limits. Electronically Signed: Ibrahima Ribeiro MD  7/15/2025 1:15 PM EDT  Workstation ID: [id number]    CT Head Without Contrast Stroke Protocol  Result Date: 7/15/2025  CT HEAD WO CONTRAST STROKE PROTOCOL Date of Exam: 7/15/2025 12:36 PM EDT Indication: Neuro deficit, acute, stroke suspected Neuro Deficit, acute, Stroke suspected. Comparison: None available Technique: Axial CT images were obtained of the head without contrast administration.  Reconstructed coronal images were also obtained. Automated exposure control and iterative construction methods were used. Scan Time: 1234 Results discussed with ALAN Myers at 1240, 7/15/2025. Findings: The calvarium appears intact. 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 Left maxillary sinus mucous cyst is noted. Paranasal sinuses and mastoids otherwise appear clear. Orbits appear unremarkable except for ocular lens replacements. There is moderately advanced generalized cerebral atrophy. Focal left parietal encephalomalacia presumably represents old infarct. Intermediate density of the medial portion of the area of encephalomalacia could at least  potentially represent late subacute infarct, although this is not favored. Small focal low-attenuation area is seen in the left external capsule, nonspecific. There is an old lacunar-type infarct of the head of the caudate nucleus. No clearly acute infarct/edema is identified. No hemorrhage, hydrocephalus, mass or mass effect, or abnormal extra-axial collection is seen.     Impression: Impression: 1. Old, less likely late subacute infarct of the left parietal lobe. 2. Small chronic ischemic focus of the area of the left external capsule. 3. 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 4. No clearly acute intracranial abnormality is identified. Electronically Signed: Derek Garcia MD  7/15/2025 12:55 PM EDT  Workstation ID: [id number]          Assessment & Plan  Assessment & Plan       CVA (cerebral vascular accident)    80 yr old man w hisotry of CVA 2021, HTN, anxiety, found at home with confusion, aphasia, dysarthria.        Suspected CVA   Vs encephalopathy or seizure disorder   - change in speech, mentation noted by dtr without focal deficit   - stroke team consulted  - head CT shows old CVAs, possible late subacute CVA in left parietal   - ASA, Plavix, statin.  Await MRI.  Checking EEG.  Loaded w Keppra     Chronic benzo use  - ativan 1mg BID prescribed longterm     Pancreatic head cystic mass  - seen on abd CT.  2x2cm.  Consider dedicated imaging at some point .  Need to ask pt/family if a recent CT abd has been done for comparison     Upper mediastinal nodule/mass vs LAD  - seen on abd CT       VTE Prophylaxis:  Mechanical VTE prophylaxis orders are signed & held.            CODE STATUS:    There are no questions and answers to display.       Expected Discharge   Expected discharge date/ time has not been documented.     Ruth Michaud MD  07/15/25      Electronically signed by Ruth Michaud MD at 07/15/25 1719       Current Facility-Administered Medications   Medication Dose Route Frequency Provider Last  Rate Last Admin    acetaminophen (TYLENOL) tablet 650 mg  650 mg Oral Q6H PRN Claudia Olivera APRN   650 mg at 07/19/25 2007    amiodarone (PACERONE) tablet 200 mg  200 mg Oral Q8H Jani Cohen III, MD   200 mg at 07/21/25 0302    Followed by    [START ON 7/27/2025] amiodarone (PACERONE) tablet 200 mg  200 mg Oral Q12H Jani Cohen III, MD        Followed by    [START ON 8/10/2025] amiodarone (PACERONE) tablet 200 mg  200 mg Oral Daily Jani Cohen III, MD        amLODIPine (NORVASC) tablet 5 mg  5 mg Oral Q24H Carlos Mendez MD   5 mg at 07/21/25 0915    apixaban (ELIQUIS) tablet 5 mg  5 mg Oral Q12H Igor Nath MD   5 mg at 07/21/25 0915    atorvastatin (LIPITOR) tablet 40 mg  40 mg Oral Nightly Carlos Mendez MD   40 mg at 07/20/25 2020    sennosides-docusate (PERICOLACE) 8.6-50 MG per tablet 2 tablet  2 tablet Oral BID PRN Ruth Michaud MD        And    polyethylene glycol (MIRALAX) packet 17 g  17 g Oral Daily PRN Ruth Michaud MD        And    bisacodyl (DULCOLAX) EC tablet 5 mg  5 mg Oral Daily PRN Ruth Michaud MD        And    bisacodyl (DULCOLAX) suppository 10 mg  10 mg Rectal Daily PRN Ruth Michaud MD        bumetanide (BUMEX) tablet 1 mg  1 mg Oral BID Diuretics Jani Cohen III, MD   1 mg at 07/21/25 0916    cloNIDine (CATAPRES) tablet 0.1 mg  0.1 mg Oral Q1H PRN Carlos Mendez MD   0.1 mg at 07/17/25 1241    dextrose (D50W) (25 g/50 mL) IV injection 25 g  25 g Intravenous Q15 Min PRN Peggy Walsh DO        dextrose (GLUTOSE) oral gel 15 g  15 g Oral Q15 Min PRN Peggy Walsh DO        empagliflozin (JARDIANCE) tablet 10 mg  10 mg Oral Daily Carlos Mendez MD   10 mg at 07/21/25 0916    ferrous sulfate tablet 325 mg  325 mg Oral Daily With Breakfast Carlos Mendez MD   325 mg at 07/21/25 0915    glucagon (GLUCAGEN) injection 1 mg  1 mg Intramuscular Q15 Min PRN Peggy Walsh DO        hydrALAZINE (APRESOLINE) injection 10 mg  10  mg Intravenous Q6H PRN Peggy Walsh, DO   10 mg at 07/18/25 1733    hydroCHLOROthiazide tablet 25 mg  25 mg Oral Daily Jani Cohen III, MD   25 mg at 07/21/25 0915    insulin glargine (LANTUS, SEMGLEE) injection 5 Units  5 Units Subcutaneous Nightly Peggy Walsh, DO   5 Units at 07/20/25 2029    Insulin Lispro (humaLOG) injection 2-9 Units  2-9 Units Subcutaneous 4x Daily AC & at Bedtime Peggy Walsh, DO   4 Units at 07/20/25 2029    levothyroxine (SYNTHROID, LEVOTHROID) tablet 50 mcg  50 mcg Oral Q AM Peggy Walsh, DO   50 mcg at 07/21/25 0610    LORazepam (ATIVAN) tablet 0.5 mg  0.5 mg Oral Daily PRN Peggy Walsh, DO   0.5 mg at 07/20/25 2029    losartan (COZAAR) tablet 100 mg  100 mg Oral Q24H Carlos Mendez MD   100 mg at 07/21/25 0915    Magnesium Cardiology Dose Replacement - Follow Nurse / BPA Driven Protocol   Not Applicable PRN Juan Manuel Sandoval MD        metoprolol tartrate (LOPRESSOR) injection 5 mg  5 mg Intravenous Q6H PRN Peggy Walsh, DO   5 mg at 07/18/25 1709    metoprolol tartrate (LOPRESSOR) tablet 50 mg  50 mg Oral Q12H Yessi Sanchez APRN   50 mg at 07/21/25 0916    nitroglycerin (NITROSTAT) SL tablet 0.4 mg  0.4 mg Sublingual Q5 Min PRN Ruth Michaud MD        pantoprazole (PROTONIX) EC tablet 40 mg  40 mg Oral Q AM Peggy Walsh, DO   40 mg at 07/21/25 0610    potassium chloride (KLOR-CON M20) CR tablet 40 mEq  40 mEq Oral Q4H Maya Garcia MD        Potassium Replacement - Follow Nurse / BPA Driven Protocol   Not Applicable PRJuan Manuel Eller MD        primidone (MYSOLINE) tablet 100 mg  100 mg Oral Daily Peggy Walsh, DO   100 mg at 07/21/25 0915    sertraline (ZOLOFT) tablet 200 mg  200 mg Oral Daily Peggy Walsh, DO   200 mg at 07/21/25 0915    sodium chloride 0.9 % flush 10 mL  10 mL Intravenous PRN Juan Manuel Sandoval MD        sodium chloride 0.9 % flush 10 mL  10 mL Intravenous Q12H Lyric Dan, APRN   10  mL at 07/21/25 0916    sodium chloride 0.9 % flush 10 mL  10 mL Intravenous PRN Lyric Dan APRN        sodium chloride 0.9 % flush 10 mL  10 mL Intravenous Q12H Ruth Michaud MD   10 mL at 07/21/25 0916    sodium chloride 0.9 % flush 10 mL  10 mL Intravenous PRN Ruth Michaud MD        sodium chloride 0.9 % infusion 40 mL  40 mL Intravenous PRN Lyric Dan APRN        sodium chloride 0.9 % infusion 40 mL  40 mL Intravenous PRN Ruth Michaud MD        spironolactone (ALDACTONE) tablet 25 mg  25 mg Oral Daily Carlos Mendez MD   25 mg at 07/21/25 0915    terazosin (HYTRIN) capsule 2 mg  2 mg Oral Nightly Jani Cohen III, MD   2 mg at 07/20/25 2020    tiZANidine (ZANAFLEX) tablet 4 mg  4 mg Oral Nightly Peggy Walsh DO   4 mg at 07/20/25 2020    valACYclovir (VALTREX) tablet 500 mg  500 mg Oral Daily Peggy Walsh DO   500 mg at 07/21/25 0915        Physician Progress Notes (most recent note)        Yessi Sanchez APRN at 07/21/25 0813          Ibrahima Nava  [id number]  1945   LOS: 6 days   Patient Care Team:  Sebastien Lowe MD as PCP - General  Sebastien Lowe MD as PCP - Family Medicine  Bacilio Winn MD as Consulting Physician (Cardiology)    Chief Complaint:  Follow up on persistent atrial fibrillation     Subjective  Patient denies CP, SOB, palpitations. He is resting comfortably in bed eating breakfast.     Objective     Vital Sign Min/Max for last 24 hours  Temp  Min: 97.8 °F (36.6 °C)  Max: 98.8 °F (37.1 °C)   BP  Min: 154/87  Max: 175/96   Pulse  Min: 84  Max: 124   Resp  Min: 17  Max: 18   SpO2  Min: 88 %  Max: 96 %   No data recorded   No data recorded         07/17/25  0935 07/18/25  1608   Weight: 133 kg (293 lb 3.4 oz) 133 kg (293 lb 3.4 oz)         Intake/Output Summary (Last 24 hours) at 7/21/2025 0813  Last data filed at 7/21/2025 0302  Gross per 24 hour   Intake 550 ml   Output 800 ml   Net -250 ml       Physical Exam:     General Appearance:     Alert, cooperative, in no acute distress   Lungs:     Clear to auscultation,respirations regular, even and                unlabored    Heart:    Irregular and  rates 90s-100s, normal S1 and S2, no            murmur, no gallop, no rub, no click   Abdomen:  Extremities:   Soft, nontender, bowel sounds audible x4    No edema, normal range of motion   Pulses:   Pulses palpable and equal bilaterally      Results Review:   Results from last 7 days   Lab Units 07/20/25  1317 07/19/25  1153 07/19/25  0130 07/18/25  1034   SODIUM mmol/L 143 148*  --  148*   POTASSIUM mmol/L 3.0* 3.7 3.4* 3.0*   CHLORIDE mmol/L 100 105  --  103   CO2 mmol/L 31.0* 29.3*  --  31.6*   BUN mg/dL 18.7 24.0*  --  22.0   CREATININE mg/dL 0.93 0.88  --  0.89   GLUCOSE mg/dL 175* 101*  --  119*   CALCIUM mg/dL 8.7 8.7  --  8.8     Results from last 7 days   Lab Units 07/20/25  1317 07/19/25  1153 07/18/25  0719   WBC 10*3/mm3 10.06 9.82 7.57   HEMOGLOBIN g/dL 8.2* 7.6* 8.2*   HEMATOCRIT % 27.7* 26.3* 31.1*   PLATELETS 10*3/mm3 157 148 128*     Results from last 7 days   Lab Units 07/16/25  1507   CHOLESTEROL mg/dL 157   TRIGLYCERIDES mg/dL 240*   HDL CHOL mg/dL 40   LDL CHOL mg/dL 77     Results from last 7 days   Lab Units 07/15/25  1239   HEMOGLOBIN A1C % 6.34*     Results from last 7 days   Lab Units 07/15/25  1425 07/15/25  1239   HSTROP T ng/L 45* 46*     proBNP 2423 on 7/17/2025    ECG 7/21/2025:  Atrial fibrillation with premature ventricular or aberrantly conducted  complexes  Nonspecific ST and T wave abnormality  Abnormal ECG  When compared with ECG of 20-Jul-2025 12:47, (Unconfirmed)  Minimal criteria for Inferior infarct are no longer present    No new chest x ray to review    Medication Review: Reviewed, no new labs to review this morning    Assessment & Plan   Atrial fibrillation  S/P SALVADOR/ECV with restoration to NSR with frequent PACs, EF 56-60%, mild to moderate MR  Continue Eliquis 5mg bid, amiodarone per protocol  May need to consider  alternative for primidone due to drug-drug interaction with Eliquis  Increasing metoprolol to 50mg bid for better rate control 2025, acceptable QTc on ECG on amiodarone  HFpEF  Normal EF on SALVADOR 2025  proBNP 2423 on 2025  Continue GDMT: Jardiance, bumex, losartan, metoprolol, spironolactone  Hypertension, continue amlodipine 5mg daily, bumex 1mg bid, terazosin 2mg nightly, HCTZ 25mg daily, metoprolol 50mg bid, losartan 100mg daily        CVA (cerebral vascular accident)    Mediastinal mass    Electronically signed by ALAN Santillan, 25, 8:38 AM EDT.     25  08:13 EDT     Electronically signed by Yessi Sanchez APRN at 25 0838          Physical Therapy Notes (most recent note)        Marian Howard PT at 25 1119  Version 1 of 1         Patient Name: Ibrahima Nava  : 1945    MRN: [id number]                              Today's Date: 2025       Admit Date: 7/15/2025    Visit Dx:     ICD-10-CM ICD-9-CM   1. Aphasia  R47.01 784.3   2. Esophageal dysphagia  R13.19 787.29   3. Altered mental status, unspecified altered mental status type  R41.82 780.97   4. Hypokalemia  E87.6 276.8   5. History of stroke  Z86.73 V12.54     Patient Active Problem List   Diagnosis    Primary localized osteoarthrosis of shoulder region    Hypertension    Impaired physical mobility    Acute pain of left shoulder    Status post reverse arthroplasty of shoulder, left    CVA (cerebral vascular accident)     Past Medical History:   Diagnosis Date    Anxiety     Arthritis     Hypertension     CONTROLLED WITH MEDS PER PT     Shoulder pain, left     Sleep apnea     did not tolerate CPAP    Stroke     -- NO RESIDUAL PROBLEMS    Tremor of right hand     Wears dentures     UPPER AND LOWER PLATE      Past Surgical History:   Procedure Laterality Date    CARDIAC CATHETERIZATION      NO CORONARY STENTS     COLONOSCOPY      MI ARTHROPLASTY GLENOHUMERAL JOINT TOTAL SHOULDER Left  06/12/2017    Procedure: LEFT TOTAL SHOULDER ARTHROPLASTY ;  Surgeon: Michael Xavier MD;  Location:  [facility];  Service: Orthopedics    TOTAL KNEE ARTHROPLASTY Bilateral     TOTAL SHOULDER REPLACEMENT Right     TOTAL SHOULDER REVISION Left 5/20/2024    Procedure: REVISION TOTAL SHOULDER ARTHROPLASTY TO REVERSE TOTAL SHOULDER ATHROPLASTY - LEFT;  Surgeon: Michael Xavier MD;  Location:  [facility];  Service: Orthopedics;  Laterality: Left;      General Information       [facility] Name 07/17/25 1147          Physical Therapy Time and Intention    Document Type therapy note (daily note)  -SC     Mode of Treatment physical therapy  -[facility] Name 07/17/25 1147          General Information    Patient Profile Reviewed yes  -SC     Existing Precautions/Restrictions fall;other (see comments)  afib with tachycardia  -[facility] Name 07/17/25 1147          Cognition    Orientation Status (Cognition) oriented x 4  -[facility] Name 07/17/25 1147          Safety Issues/Impairments Affecting Functional Mobility    Impairments Affecting Function (Mobility) balance;endurance/activity tolerance  -SC     Comment, Safety Issues/Impairments (Mobility) alert, following commands  -SC               User Key  (r) = Recorded By, (t) = Taken By, (c) = Cosigned By      Initials Name Provider Type    SC Marian Howard, PT Physical Therapist                   Mobility       Row Name 07/17/25 1147          Bed Mobility    Bed Mobility supine-sit;scooting/bridging;sit-supine  -SC     Scooting/Bridging [place] (Bed Mobility) independent  -SC     Supine-Sit [place] (Bed Mobility) independent  -SC     Sit-Supine [place] (Bed Mobility) independent  -[facility] Name 07/17/25 1147          Sit-Stand Transfer    Sit-Stand [place] (Transfers) standby assist  -[facility] Name 07/17/25 1147          Gait/Stairs (Locomotion)    [place] Level (Gait) standby assist  -SC     Assistive Device (Gait) walker,  front-wheeled  -SC     Distance in Feet (Gait) 100  -SC     Deviations/Abnormal Patterns (Gait) dennise decreased;gait speed decreased;stride length decreased;weight shifting decreased  -SC     Bilateral Gait Deviations forward flexed posture  -SC     Comment, (Gait/Stairs) Encouraged upright posture and staying close to walker. One standing rest break needed.  -SC               User Key  (r) = Recorded By, (t) = Taken By, (c) = Cosigned By      Initials Name Provider Type    SC Marian Howard PT Physical Therapist                   Obj/Interventions       [facility] Name 07/17/25 1148          Motor Skills    Therapeutic Exercise hip;knee;ankle;shoulder  -[facility] Name 07/17/25 1148          Shoulder (Therapeutic Exercise)    Shoulder (Therapeutic Exercise) AROM (active range of motion)  -SC     Shoulder AROM (Therapeutic Exercise) bilateral;other (see comments);10 repetitions  seated rowing  -[facility] Name 07/17/25 1148          Hip (Therapeutic Exercise)    Hip (Therapeutic Exercise) AROM (active range of motion)  -SC     Hip AROM (Therapeutic Exercise) bilateral;flexion;15 repititions;sitting  [facility] 07/17/25 [facility]          Knee (Therapeutic Exercise)    Knee (Therapeutic Exercise) AROM (active range of motion)  -SC     Knee AROM (Therapeutic Exercise) right;bilateral;flexion;LAQ (long arc quad);15 repititions;sitting  [facility] Name 07/17/25 [facility]          Ankle (Therapeutic Exercise)    Ankle (Therapeutic Exercise) AROM (active range of motion)  -SC     Ankle AROM (Therapeutic Exercise) bilateral;dorsiflexion;10 repetitions;plantarflexion;sitting  -[facility] Name 07/17/25 1148          Balance    Balance Assessment standing dynamic balance  -SC     Dynamic Standing Balance standby assist  -SC     Position/Device Used, Standing Balance supported;walker, rolling  -SC     Comment, Balance no LOB  -SC               User Key  (r) = Recorded By, (t) = Taken By, (c) = Cosigned By      Initials  Name Provider Type    SC Marian Howard, PT Physical Therapist                   Goals/Plan    No documentation.                  Clinical Impression       Row Name 07/17/25 1150          Pain    Pretreatment Pain Rating 0/10 - no pain  -SC     Posttreatment Pain Rating 0/10 - no pain  -SC       Row Name 07/17/25 1150          Plan of Care Review    Plan of Care Reviewed With patient  -SC     Progress no change  -SC     Outcome Evaluation Patient willing to participate in therapeutic activity. His mobility is slow, requiring rest breaks due to Afib and elevated rate.  -SC       Row Name 07/17/25 1150          Therapy Assessment/Plan (PT)    Patient/Family Therapy Goals Statement (PT) go home  -SC     Rehab Potential (PT) good  -SC     Criteria for Skilled Interventions Met (PT) yes;meets criteria;skilled treatment is necessary  -SC     Therapy Frequency (PT) daily  -SC       Row Name 07/17/25 1150          Vital Signs    Pretreatment Heart Rate (beats/min) 100  -SC     Intratreatment Heart Rate (beats/min) 144  -SC     Posttreatment Heart Rate (beats/min) 116  -SC     Pre SpO2 (%) 96  -SC     O2 Delivery Pre Treatment supplemental O2  -SC     Post SpO2 (%) 93  -SC     O2 Delivery Post Treatment supplemental O2  -SC       Row Name 07/17/25 1150          Positioning and Restraints    Pre-Treatment Position in bed  -SC     Post Treatment Position bed  -SC     In Bed supine;encouraged to call for assist;exit alarm on  -SC               User Key  (r) = Recorded By, (t) = Taken By, (c) = Cosigned By      Initials Name Provider Type    SC Marian Howard, PT Physical Therapist                   Outcome Measures       Row Name 07/17/25 1152 07/17/25 0838       How much help from another person do you currently need...    Turning from your back to your side while in flat bed without using bedrails? 4  -SC 4  -DF    Moving from lying on back to sitting on the side of a flat bed without bedrails? 4  -SC 3  -DF    Moving to  and from a bed to a chair (including a wheelchair)? 3  -SC 3  -DF    Standing up from a chair using your arms (e.g., wheelchair, bedside chair)? 4  -SC 3  -DF    Climbing 3-5 steps with a railing? 3  -SC 2  -DF    To walk in hospital room? 3  -SC 3  -DF    AM-PAC 6 Clicks Score (PT) 21  -SC 18  -DF    Highest Level of Mobility Goal Walk 10 Steps or More-6  -SC Walk 10 Steps or More-6  -DF      Row Name 07/17/25 1152          Functional Assessment    Outcome Measure Options Modified Melodie;AM-PAC 6 Clicks Basic Mobility (PT)  -SC               User Key  (r) = Recorded By, (t) = Taken By, (c) = Cosigned By      Initials Name Provider Type    SC Marian Howard, PT Physical Therapist    Zeina Burnett, RN Registered Nurse                                 Physical Therapy Education       Title: PT OT SLP Therapies (In Progress)       Topic: Physical Therapy (Done)       Point: Mobility training (Done)       Learning Progress Summary            Patient Eager, E, VU by SC at 7/17/2025 1155    Comment: reviewed benefits of activity    Eager, E, VU,DU by SC at 7/17/2025 1152    Comment: reviewed HEP    Acceptance, E, VU by KR at 7/16/2025 1410   Family Acceptance, E, VU by KR at 7/16/2025 1410                      Point: Home exercise program (Done)       Learning Progress Summary            Patient Eager, E, VU by SC at 7/17/2025 1155    Comment: reviewed benefits of activity    Eager, E, VU,DU by SC at 7/17/2025 1152    Comment: reviewed HEP                      Point: Body mechanics (Done)       Learning Progress Summary            Patient Eager, E, VU by SC at 7/17/2025 1155    Comment: reviewed benefits of activity    Eager, E, VU,DU by SC at 7/17/2025 1152    Comment: reviewed HEP    Acceptance, E, VU by KR at 7/16/2025 1410   Family Acceptance, E, VU by KR at 7/16/2025 1410                      Point: Precautions (Done)       Learning Progress Summary            Patient Eager, E, VU by SC at 7/17/2025 1155     Comment: reviewed benefits of activity    CRISTIN Kamara VU, DU by SC at 7/17/2025 1152    Comment: reviewed HEP    AcceptanceCRISTIN VU by KR at 7/16/2025 1410   Family AcceptanceCRISTIN VU by KR at 7/16/2025 1410                                      User Key       Initials Effective Dates Name Provider Type Discipline    SC 02/03/23 -  Marian Howard PT Physical Therapist PT    KR 12/30/22 -  Coco Casarez PT Physical Therapist PT                  PT Recommendation and Plan     Progress: no change  Outcome Evaluation: Patient willing to participate in therapeutic activity. His mobility is slow, requiring rest breaks due to Afib and elevated rate.     Time Calculation:         PT Charges       Row Name 07/17/25 1119             Time Calculation    Start Time 1119  -SC      PT Received On 07/17/25  -SC      PT Goal Re-Cert Due Date 07/26/25  -SC         Timed Charges    44573 - PT Therapeutic Exercise Minutes 8  -SC      88766 - Gait Training Minutes  10  -SC      36029 - PT Therapeutic Activity Minutes 5  -SC         Total Minutes    Timed Charges Total Minutes 23  -SC       Total Minutes 23  -SC                User Key  (r) = Recorded By, (t) = Taken By, (c) = Cosigned By      Initials Name Provider Type    SC Marian Howard, PT Physical Therapist                  Therapy Charges for Today       Code Description Service Date Service Provider Modifiers Qty    67907406090 HC PT THER PROC EA 15 MIN 7/17/2025 Marian Howard, PT GP 1    89026735893 HC GAIT TRAINING EA 15 MIN 7/17/2025 Marian Howard, PT GP 1            PT G-Codes  Outcome Measure Options: Modified Melodie, AM-PAC 6 Clicks Basic Mobility (PT)  AM-PAC 6 Clicks Score (PT): 21  AM-PAC 6 Clicks Score (OT): 17  Modified [place] Scale: 2 - Slight disability.  Unable to carry out all previous activities but able to look after own affairs without assistance.  PT Discharge Summary  Anticipated Discharge Disposition (PT): home with assist, home with outpatient therapy  services    Marian Howard, PT  2025      Electronically signed by Marian Howard PT at 25 1156          Occupational Therapy Notes (most recent note)        Ailyn Aldrich, OT at 25 1318          Patient Name: Ibrahima Nava  : 1945    MRN: [id number]                              Today's Date: 2025       Admit Date: 7/15/2025    Visit Dx:     ICD-10-CM ICD-9-CM   1. Aphasia  R47.01 784.3   2. Esophageal dysphagia  R13.19 787.29   3. Altered mental status, unspecified altered mental status type  R41.82 780.97   4. Hypokalemia  E87.6 276.8     Patient Active Problem List   Diagnosis    Primary localized osteoarthrosis of shoulder region    Hypertension    Impaired physical mobility    Acute pain of left shoulder    Status post reverse arthroplasty of shoulder, left    CVA (cerebral vascular accident)     Past Medical History:   Diagnosis Date    Anxiety     Arthritis     Hypertension     CONTROLLED WITH MEDS PER PT     Shoulder pain, left     Sleep apnea     did not tolerate CPAP    Stroke     -- NO RESIDUAL PROBLEMS    Tremor of right hand     Wears dentures     UPPER AND LOWER PLATE      Past Surgical History:   Procedure Laterality Date    CARDIAC CATHETERIZATION      NO CORONARY STENTS     COLONOSCOPY      DE ARTHROPLASTY GLENOHUMERAL JOINT TOTAL SHOULDER Left 2017    Procedure: LEFT TOTAL SHOULDER ARTHROPLASTY ;  Surgeon: Michael Xavier MD;  Location:  [facility];  Service: Orthopedics    TOTAL KNEE ARTHROPLASTY Bilateral     TOTAL SHOULDER REPLACEMENT Right     TOTAL SHOULDER REVISION Left 2024    Procedure: REVISION TOTAL SHOULDER ARTHROPLASTY TO REVERSE TOTAL SHOULDER ATHROPLASTY - LEFT;  Surgeon: Michael Xavier MD;  Location:  [facility];  Service: Orthopedics;  Laterality: Left;      General Information       Row Name 25 1357          OT Time and Intention    Document Type evaluation  -MR     Mode of Treatment occupational  therapy;co-treatment  -MR     Patient Effort good  -MR       Row Name 07/16/25 4975          General Information    Patient Profile Reviewed yes  -MR     Prior Level of Function independent:;all household mobility;community mobility;gait;transfer;bed mobility;ADL's;work;driving;using stairs  PTA pt was I w/ ADLs. Denies use of AD but occas will use SPC if needed. Pt also owns a rollator. Has a standard tub w/ grabbars and tub bench. Reports 1 recent falls.  -MR     Existing Precautions/Restrictions fall;other (see comments)  BLE edema;  -MR     Barriers to Rehab medically complex  -MR       Row Name 07/16/25 135          Living Environment    Current Living Arrangements home  -MR     People in Home spouse  -MR       Row Name 07/16/25 1358          Home Main Entrance    Number of Stairs, Main Entrance one  -MR     Stair Railings, Main Entrance none  -MR       Row Name 07/16/25 1352          Stairs Within Home, Primary    Number of Stairs, Within Home, Primary none  -MR       Row Name 07/16/25 1355          Cognition    Orientation Status (Cognition) oriented x 4  -MR       Row Name 07/16/25 1354          Safety Issues/Impairments Affecting Functional Mobility    Safety Issues Affecting Function (Mobility) awareness of need for assistance;insight into deficits/self-awareness;safety precautions follow-through/compliance;safety precaution awareness  -MR     Impairments Affecting Function (Mobility) balance;endurance/activity tolerance;strength  -MR               User Key  (r) = Recorded By, (t) = Taken By, (c) = Cosigned By      Initials Name Provider Type    MR Ailyn Aldrich, OT Occupational Therapist                     Mobility/ADL's       Row Name 07/16/25 0655          Bed Mobility    Comment, (Bed Mobility) Pt received UIC and left UIC at end of session.  -MR       Row Name 07/16/25 1350          Transfers    Transfers sit-stand transfer  -MR       Row Name 07/16/25 135          Sit-Stand Transfer    Sit-Stand  [place] (Transfers) contact guard;verbal cues  -MR     Assistive Device (Sit-Stand Transfers) other (see comments)  B HHA  -MR       Row Name 07/16/25 1358          Functional Mobility    Functional Mobility- Ind. Level contact guard assist;2 person assist required  -MR     Functional Mobility- Device other (see comments)  B HHA  -MR     Functional Mobility-Distance (Feet) --  HH distances  -MR       Row Name 07/16/25 1358          Activities of Daily Living    BADL Assessment/Intervention lower body dressing  -MR       Row Name 07/16/25 1358          Lower Body Dressing Assessment/Training    [place] Level (Lower Body Dressing) don;shoes/slippers;set up  -MR     Position (Lower Body Dressing) supported sitting  -MR               User Key  (r) = Recorded By, (t) = Taken By, (c) = Cosigned By      Initials Name Provider Type    Ailyn Rivera, OT Occupational Therapist                   Obj/Interventions       Row Name 07/16/25 1352          Sensory Assessment (Somatosensory)    Sensory Assessment (Somatosensory) UE sensation intact  -MR       Row Name 07/16/25 1359          Vision Assessment/Intervention    Visual Impairment/Limitations WFL  -MR       Row Name 07/16/25 1359          Range of Motion Comprehensive    General Range of Motion bilateral upper extremity ROM WFL  -MR       Row Name 07/16/25 1359          Strength Comprehensive (MMT)    Comment, General Manual Muscle Testing (MMT) Assessment BUE grossly 4/5 in all functional planes.  -MR       Row Name 07/16/25 1350          Balance    Balance Assessment sitting static balance;sitting dynamic balance;standing static balance;standing dynamic balance  -MR     Static Sitting Balance standby assist  -MR     Dynamic Sitting Balance contact guard  -MR     Position, Sitting Balance unsupported;sitting in chair  -MR     Static Standing Balance contact guard  -MR     Dynamic Standing Balance contact guard;2-person assist  -MR     Position/Device Used,  "Standing Balance supported;other (see comments)  B HHA  -MR     Balance Interventions sitting;standing;sit to stand;supported;static;dynamic;minimal challenge;occupation based/functional task  -MR     Comment, Balance No overt LOB noted w/ transfers, balance or mobility. Pt endorsed some \"wooziness\" BP stable.  -MR               User Key  (r) = Recorded By, (t) = Taken By, (c) = Cosigned By      Initials Name Provider Type    MR Ailyn Aldrich, OT Occupational Therapist                   Goals/Plan       Row Name 07/16/25 1405          Bed Mobility Goal 1 (OT)    Activity/Assistive Device (Bed Mobility Goal 1, OT) sit to supine;supine to sit  -MR     [place] Level/Cues Needed (Bed Mobility Goal 1, OT) minimum assist (75% or more patient effort)  -MR     Time Frame (Bed Mobility Goal 1, OT) short term goal (STG);3 days  -MR     Progress/Outcomes (Bed Mobility Goal 1, OT) new goal  -MR       Row Name 07/16/25 1405          Transfer Goal 1 (OT)    Activity/Assistive Device (Transfer Goal 1, OT) sit-to-stand/stand-to-sit;toilet  -MR     [place] Level/Cues Needed (Transfer Goal 1, OT) contact guard required  -MR     Time Frame (Transfer Goal 1, OT) long term goal (LTG);5 days  -MR     Progress/Outcome (Transfer Goal 1, OT) new goal  -MR       Row Name 07/16/25 1405          Dressing Goal 1 (OT)    Activity/Device (Dressing Goal 1, OT) lower body dressing  -MR     [place]/Cues Needed (Dressing Goal 1, OT) moderate assist (50-74% patient effort)  -MR     Time Frame (Dressing Goal 1, OT) long term goal (LTG);5 days  -MR     Progress/Outcome (Dressing Goal 1, OT) new goal  -MR       Row Name 07/16/25 1405          Therapy Assessment/Plan (OT)    Planned Therapy Interventions (OT) activity tolerance training;adaptive equipment training;BADL retraining;functional balance retraining;transfer/mobility retraining;strengthening exercise;ROM/therapeutic exercise;patient/caregiver education/training;passive " ROM/stretching;IADL retraining;neuromuscular control/coordination retraining;occupation/activity based interventions  -MR               User Key  (r) = Recorded By, (t) = Taken By, (c) = Cosigned By      Initials Name Provider Type     Ailyn Aldrich, OT Occupational Therapist                   Clinical Impression       Row Name 07/16/25 1401          Pain Assessment    Pretreatment Pain Rating 0/10 - no pain  -MR     Posttreatment Pain Rating 0/10 - no pain  -MR       Row Name 07/16/25 1401          Plan of Care Review    Plan of Care Reviewed With patient  -MR     Progress no change  Initial Eval  -MR     Outcome Evaluation Patient presenting below his functional baseline w/ mobility, balance and endurance. Pt requiring assist w/ mobility, LB dressing and transfers. Deficits warrant skilled OT services. Recommend home w/ assist and OP PT when medically appropriate for d/c.  -       Row Name 07/16/25 1401          Therapy Assessment/Plan (OT)    Patient/Family Therapy Goal Statement (OT) Return to PLOF  -MR     Rehab Potential (OT) good  -MR     Criteria for Skilled Therapeutic Interventions Met (OT) yes;meets criteria;skilled treatment is necessary  -MR     Therapy Frequency (OT) daily  -MR     Predicted Duration of Therapy Intervention (OT) 5 days  -MR       Row Name 07/16/25 1401          Therapy Plan Review/Discharge Plan (OT)    Anticipated Discharge Disposition (OT) home with assist;home with outpatient therapy services  -MR       Row Name 07/16/25 1401          Vital Signs    Pre Systolic BP Rehab 159  -MR     Pre Treatment Diastolic BP 93  -MR     Post Systolic BP Rehab 169  -MR     Post Treatment Diastolic BP 87  -MR     Pretreatment Heart Rate (beats/min) 90  -MR     Intratreatment Heart Rate (beats/min) 144   -MR     Posttreatment Heart Rate (beats/min) 101  -MR     Pre SpO2 (%) 91  -MR     O2 Delivery Pre Treatment room air  -MR     O2 Delivery Intra Treatment room air  -MR     Post SpO2 (%) 92  -MR      O2 Delivery Post Treatment room air  -MR     Pre Patient Position Supine  -MR     Intra Patient Position Standing  -MR     Post Patient Position Sitting  -MR       Row Name 07/16/25 1401          Positioning and Restraints    Pre-Treatment Position in bed  -MR     Post Treatment Position chair  -MR     In Chair notified nsg;reclined;sitting;call light within reach;encouraged to call for assist;waffle cushion;legs elevated;exit alarm on;with family/caregiver  -MR               User Key  (r) = Recorded By, (t) = Taken By, (c) = Cosigned By      Initials Name Provider Type     ValdemarAilyn, OT Occupational Therapist                   Outcome Measures       Row Name 07/16/25 1407          How much help from another is currently needed...    Putting on and taking off regular lower body clothing? 2  -MR     Bathing (including washing, rinsing, and drying) 2  -MR     Toileting (which includes using toilet bed pan or urinal) 3  -MR     Putting on and taking off regular upper body clothing 3  -MR     Taking care of personal grooming (such as brushing teeth) 3  -MR     Eating meals 4  -MR     AM-PAC 6 Clicks Score (OT) 17  -MR       Row Name 07/16/25 0800          How much help from another person do you currently need...    Turning from your back to your side while in flat bed without using bedrails? 4  -JM     Moving from lying on back to sitting on the side of a flat bed without bedrails? 3  -JM     Moving to and from a bed to a chair (including a wheelchair)? 3  -JM     Standing up from a chair using your arms (e.g., wheelchair, bedside chair)? 3  -JM     Climbing 3-5 steps with a railing? 2  -JM     To walk in hospital room? 2  -JM     AM-PAC 6 Clicks Score (PT) 17  -JM     Highest Level of Mobility Goal Stand (1 or More Minutes)-5  -JM       Row Name 07/16/25 1407          Modified Melodie Scale    Pre-Stroke Modified Melodie Scale 6 - Unable to determine (UTD) from the medical record documentation  -      Modified [place] Scale 2 - Slight disability.  Unable to carry out all previous activities but able to look after own affairs without assistance.  -MR       Row Name 07/16/25 1408 07/16/25 1407       Functional Assessment    Outcome Measure Options Modified Melodie;AM-PAC 6 Clicks Basic Mobility (PT)  -KR AM-PAC 6 Clicks Daily Activity (OT);Modified Melodie  -MR              User Key  (r) = Recorded By, (t) = Taken By, (c) = Cosigned By      Initials Name Provider Type    MR Ailyn Aldrich, OT Occupational Therapist    Coco Bonner, PT Physical Therapist    Francesca Baer, RN Registered Nurse                    Occupational Therapy Education       Title: PT OT SLP Therapies (In Progress)       Topic: Occupational Therapy (In Progress)       Point: ADL training (Done)       Learning Progress Summary            Patient Acceptance, E, VU by MR at 7/16/2025 1408                      Point: Precautions (Done)       Learning Progress Summary            Patient Acceptance, E, VU by MR at 7/16/2025 1408                      Point: Body mechanics (Done)       Learning Progress Summary            Patient Acceptance, E, VU by MR at 7/16/2025 1408                                      User Key       Initials Effective Dates Name Provider Type Discipline    MR 09/22/22 -  Valdemar Ailyn, OT Occupational Therapist OT                  OT Recommendation and Plan  Planned Therapy Interventions (OT): activity tolerance training, adaptive equipment training, BADL retraining, functional balance retraining, transfer/mobility retraining, strengthening exercise, ROM/therapeutic exercise, patient/caregiver education/training, passive ROM/stretching, IADL retraining, neuromuscular control/coordination retraining, occupation/activity based interventions  Therapy Frequency (OT): daily  Plan of Care Review  Plan of Care Reviewed With: patient  Progress: no change (Initial Eval)  Outcome Evaluation: Patient presenting below his  functional baseline w/ mobility, balance and endurance. Pt requiring assist w/ mobility, LB dressing and transfers. Deficits warrant skilled OT services. Recommend home w/ assist and OP PT when medically appropriate for d/c.     Time Calculation:   Evaluation Complexity (OT)  Review Occupational Profile/Medical/Therapy History Complexity: brief/low complexity  Assessment, Occupational Performance/Identification of Deficit Complexity: 1-3 performance deficits  Clinical Decision Making Complexity (OT): problem focused assessment/low complexity  Overall Complexity of Evaluation (OT): low complexity     Time Calculation- OT       Row Name 25 1409             Time Calculation- OT    OT Start Time 1318  -MR      OT Received On 25  -MR      OT Goal Re-Cert Due Date 25  -MR         Untimed Charges    OT Eval/Re-eval Minutes 61  -MR         Total Minutes    Untimed Charges Total Minutes 61  -MR       Total Minutes 61  -MR                User Key  (r) = Recorded By, (t) = Taken By, (c) = Cosigned By      Initials Name Provider Type    MR Ailyn Aldrich OT Occupational Therapist                  Therapy Charges for Today       Code Description Service Date Service Provider Modifiers Qty    [id number] -OT EVAL LOW COMPLEXITY 5 2025 Ailyn Aldrich OT  1                 Ailyn Aldrich OT  2025    Electronically signed by Ailyn Aldrich OT at 25 1410          Speech Language Pathology Notes (most recent note)        Elle Carballo MS CF-SLP at 25 1309          Acute Care - Speech Language Pathology   Swallow Initial Evaluation  [place]  Modified Barium Swallow Study (MBS)      Patient Name: Ibrahima Nava  : 1945  MRN: [id number]  Today's Date: 2025               Admit Date: 7/15/2025    Visit Dx:     ICD-10-CM ICD-9-CM   1. Aphasia  R47.01 784.3   2. Esophageal dysphagia  R13.19 787.29   3. Altered mental status, unspecified altered mental status type  R41.82  780.97   4. Hypokalemia  E87.6 276.8     Patient Active Problem List   Diagnosis    Primary localized osteoarthrosis of shoulder region    Hypertension    Impaired physical mobility    Acute pain of left shoulder    Status post reverse arthroplasty of shoulder, left    CVA (cerebral vascular accident)     Past Medical History:   Diagnosis Date    Anxiety     Arthritis     Hypertension     CONTROLLED WITH MEDS PER PT     Shoulder pain, left     Sleep apnea     did not tolerate CPAP    Stroke     2012-- NO RESIDUAL PROBLEMS    Tremor of right hand     Wears dentures     UPPER AND LOWER PLATE      Past Surgical History:   Procedure Laterality Date    CARDIAC CATHETERIZATION  2016    NO CORONARY STENTS     COLONOSCOPY  2014    GA ARTHROPLASTY GLENOHUMERAL JOINT TOTAL SHOULDER Left 06/12/2017    Procedure: LEFT TOTAL SHOULDER ARTHROPLASTY ;  Surgeon: Michael Xavier MD;  Location:  [facility];  Service: Orthopedics    TOTAL KNEE ARTHROPLASTY Bilateral     TOTAL SHOULDER REPLACEMENT Right     TOTAL SHOULDER REVISION Left 5/20/2024    Procedure: REVISION TOTAL SHOULDER ARTHROPLASTY TO REVERSE TOTAL SHOULDER ATHROPLASTY - LEFT;  Surgeon: Michael Xavier MD;  Location:  [facility];  Service: Orthopedics;  Laterality: Left;       SLP Recommendation and Plan  SLP Swallowing Diagnosis: functional oral phase, functional pharyngeal phase, esophageal dysphagia (07/16/25 1015)  SLP Diet Recommendation: soft to chew textures, chopped, thin liquids, other (see comments) (Pt may advance to regular per preference) (07/16/25 1015)  Recommended Precautions and Strategies: upright posture during/after eating, general aspiration precautions, reflux precautions (07/16/25 1015)  SLP Rec. for Method of Medication Administration: meds whole, with thin liquids, with puree, as tolerated (07/16/25 1015)     Monitor for Signs of Aspiration: notify SLP if any concerns (07/16/25 1015)     Swallow Criteria for Skilled Therapeutic Interventions  "Met: no problems identified which require skilled intervention (07/16/25 1015)  Anticipated Discharge Disposition (SLP): home with assist (07/16/25 1015)     Therapy Frequency (Swallow): evaluation only (07/16/25 1015)     Oral Care Recommendations: Oral Care BID/PRN, Toothbrush (07/16/25 1015)  Demonstrates Need for Referral to Another Service: gastroenterology, dedicated esophageal assessment (07/16/25 1015)                                            SWALLOW EVALUATION (Last 72 Hours)       SLP Adult Swallow Evaluation       Row Name 07/16/25 1015 07/16/25 0816                Rehab Evaluation    Document Type evaluation  -SM --       Subjective Information no complaints  -SM no complaints  -MM       Patient Observations alert;cooperative  -SM alert;cooperative  -MM       Patient/Family/Caregiver Comments/Observations -- no family present  -MM       Patient Effort good  -SM good  -MM       Comment In collaboration with AC  -SM --       Symptoms Noted During/After Treatment none  -SM none  -MM       Oral Care -- dental appliance cleaned  -MM          General Information    Patient Profile Reviewed yes  -SM yes  -MM       Pertinent History Of Current Problem See previous eval  -SM Pt is an 80 year old male who presented to the facility with c/o speech changes, dysarthria, and dysphagia. MRI Brain revealed the following: \"Moderate typical chronic and senescent findings are present as above, including a chronic appearing infarct of the left pre and postcentral gyri. There is no evidence of recent infarct, hemorrhage, mass or mass effect.\"  -MM       Current Method of Nutrition soft to chew textures;chopped;thin liquids  -SM NPO  -MM       Precautions/Limitations, Vision WFL;for purposes of eval  -SM WFL;for purposes of eval  -MM       Precautions/Limitations, Hearing hearing impairment, bilaterally  -SM hearing impairment, bilaterally  -MM       Prior Level of Function-Communication unknown  -SM unknown  -MM       " Prior Level of Function-Swallowing soft to chew;thin liquids;esophageal concerns;other (see comments)  pt reported over the last 6 months developing difficulty with globus sensation in UES  -SM soft to chew;thin liquids;esophageal concerns;other (see comments)  pt reported over the last 6 months developing diffficulty with globus sensation in UES  -MM       Plans/Goals Discussed with patient;agreed upon  -SM patient;agreed upon  -MM       Barriers to Rehab none identified  -SM --       Patient's Goals for Discharge patient did not state  -SM return to PO diet  -MM          Pain    Pretreatment Pain Rating 0/10 - no pain  -SM 0/10 - no pain  -MM       Posttreatment Pain Rating 0/10 - no pain  -SM 0/10 - no pain  -MM          Oral Motor Structure and Function    Secretion Management -- WNL/WFL  -MM       Mucosal Quality -- moist, healthy  -MM          General Eating/Swallowing Observations    Eating/Swallowing Skills -- self-fed;fed by SLP  -MM       Positioning During Eating -- upright 90 degree;upright in chair  -MM       Utensils Used -- spoon;straw;cup  -MM       Consistencies Trialed -- regular textures;pureed;thin liquids  -MM          Clinical Swallow Eval    Oral Prep Phase -- impaired  -MM       Oral Transit -- WFL  -MM       Oral Residue -- WFL  -MM       Pharyngeal Phase -- WFL  -MM       Esophageal Phase -- suspected esophageal impairment  -MM          Oral Prep Concerns    Oral Prep Concerns -- prolonged mastication  -MM       Prolonged Mastication -- regular consistencies  -MM       Oral Prep Concerns, Comment -- Pt reports he can't chew beef at baseline and that he prefers softer foods.  -MM          Esophageal Phase Concerns    Esophageal Phase Concerns -- globus;belching  -MM       Globus -- regular consistencies  -MM       Belching -- regular consistencies  -MM       Esophageal Phase Concerns, Comment -- Pt reports developing concerns over the last 6 months with globus sensation indicating UES and  "reporting he has to \"move it around.\" Eructation noted after PO trials. SLP recs MBS +limited UGI to further evaluate swallow function.  -MM          MBS/VFSS    Utensils Used spoon;cup;straw  -SM --       Consistencies Trialed thin liquids;pureed;barium pill;regular textures  -SM --          MBS/VFSS Interpretation    Oral Prep Phase WFL  -SM --       Oral Transit Phase WFL  -SM --       Oral Residue WFL  -SM --          Initiation of Pharyngeal Swallow    Initiation of Pharyngeal Swallow bolus in valleculae  -SM --       Pharyngeal Phase functional pharyngeal phase of swallowing  -SM --       Pharyngeal Phase, Comment initial difficulty orally transiting barium pill with liquid, but after a few drinks, barium tablet passed through oral cavity, pharynx, and visible esophagus without difficulty  -SM --          Esophageal Phase    Esophageal Phase see radiology report for further details;esophageal retention with retrograde flow below PES;other (see comments)  Esophageal reflux, retention at level of thoracic spine. per radiologist  -SM --          SLP Evaluation Clinical Impression    SLP Swallowing Diagnosis functional oral phase;functional pharyngeal phase;esophageal dysphagia  - R/O pharyngeal dysphagia;suspected esophageal dysphagia;mild;oral dysphagia  -MM       Functional Impact risk of aspiration/pneumonia  - risk of aspiration/pneumonia  -MM       Swallow Criteria for Skilled Therapeutic Interventions Met no problems identified which require skilled intervention  - --          Recommendations    Therapy Frequency (Swallow) evaluation only  - --       Predicted Duration Therapy Intervention (Days) -- 1 week  -MM       SLP Diet Recommendation soft to chew textures;chopped;thin liquids;other (see comments)  Pt may advance to regular per preference  - soft to chew textures;chopped;thin liquids;other (see comments)  pending MBS  -MM       Recommended Diagnostics -- VFSS (MBS);with esophageal screen  " -MM       Recommended Precautions and Strategies upright posture during/after eating;general aspiration precautions;reflux precautions  - upright posture during/after eating;general aspiration precautions  -MM       Oral Care Recommendations Oral Care BID/PRN;Toothbrush  - Oral Care BID/PRN;Toothbrush  -MM       SLP Rec. for Method of Medication Administration meds whole;with thin liquids;with puree;as tolerated  - meds whole;with thin liquids;with puree;as tolerated  -       Monitor for Signs of Aspiration notify SLP if any concerns  - notify SLP if any concerns  -MM       Anticipated Discharge Disposition (SLP) home with assist  - other (see comments)  pending further w/u  -MM       Demonstrates Need for Referral to Another Service gastroenterology;dedicated esophageal assessment  - otolaryngology (ENT)  -                 User Key  (r) = Recorded By, (t) = Taken By, (c) = Cosigned By      Initials Name Effective Dates    Polina Martínez MS CCC-SLP 08/30/24 -     Elle Mabry MS CF-SLP 06/05/25 -                     EDUCATION  The patient has been educated in the following areas:   Dysphagia (Swallowing Impairment).        SLP GOALS       Row Name 07/16/25 0816             SLP Diagnostic Treatment     Patient will participate in further assessment in the following areas cognitive-linguistic  -      Time Frame (Diagnostic) 1 week  -      Progress/Outcomes (Additional Goal 1, SLP) new goal  -MM                User Key  (r) = Recorded By, (t) = Taken By, (c) = Cosigned By      Initials Name Provider Type    Polina Martínez, MS CCC-SLP Speech and Language Pathologist                         Time Calculation:    Time Calculation- SLP       Row Name 07/16/25 1309 07/16/25 0922          Time Calculation- SLP    SLP Start Time 1015  - 0816  -MM     SLP Received On 07/16/25  - 07/16/25  -        Untimed Charges    10988-FV Eval Speech and Production w/ Language Minutes -- 38  -MM      [id number] Eval Oral Pharyng Swallow Minutes -- 39  -MM     40382-RQ Motion Fluoro Eval Swallow Minutes 60  -SM --        Total Minutes    Untimed Charges Total Minutes 60  -SM 77  -MM      Total Minutes 60  -SM 77  -MM               User Key  (r) = Recorded By, (t) = Taken By, (c) = Cosigned By      Initials Name Provider Type    Polina Martínez, MS CCC-SLP Speech and Language Pathologist    Elle Mabry, MS CF-SLP Speech and Language Pathologist                    Therapy Charges for Today       Code Description Service Date Service Provider Modifiers Qty    [id number]  ST MOTION FLUORO EVAL SWALLOW 4 7/16/2025 Elle Carballo, MS CF-SLP GN 1                 Elle Carballo MS CF-SLP  7/16/2025    Electronically signed by Elle Carballo MS CF-SLP at 07/16/25 1310

## 2025-07-21 NOTE — CASE MANAGEMENT/SOCIAL WORK
Discharge Planning Assessment  [facility]     Patient Name: Ibrahima Nava  MRN: [id number]  Today's Date: 7/21/2025    Admit Date: 7/15/2025    Plan: Home w/Patient PT   Discharge Needs Assessment    No documentation.                  Discharge Plan       Row Name 07/21/25 1124       Plan    Plan Home w/Patient PT    Patient/Family in Agreement with Plan yes    Plan Comments Patient's plan is still to return home with outpatient PT at discharge.  Patient currently wearing O2@2L, not on at home.  CM will continue to follow.    Final Discharge Disposition Code 01 - home or self-care                  Continued Care and Services - Admitted Since 7/15/2025       Therapy       Service Provider Request Status Services Address Phone Fax Patient Preferred    [facility]  Selected Outpatient Physical Therapy [id number] [address] [phone] --                  Expected Discharge Date and Time       Expected Discharge Date Expected Discharge Time    Jul 21, 2025            Demographic Summary    No documentation.                  Functional Status    No documentation.                  Psychosocial    No documentation.                  Abuse/Neglect    No documentation.                  Legal    No documentation.                  Substance Abuse    No documentation.                  Patient Forms    No documentation.                     Codie Parehk, RN

## 2025-07-21 NOTE — CASE MANAGEMENT/SOCIAL WORK
Continued Stay Note  [facility]     Patient Name: Ibrahima Nava  MRN: [id number]  Today's Date: 7/21/2025    Admit Date: 7/15/2025    Plan: SNF   Discharge Plan       Row Name 07/21/25 1232       Plan    Plan SNF    Plan Comments Spoke with patient and family in room.  Patient's plan is now SNF at discharge.  They would like a referral to [facility] unit.  Referral faxed.  CM will continue to follow.      Row Name 07/21/25 1124                                              Discharge Codes    No documentation.                 Expected Discharge Date and Time       Expected Discharge Date Expected Discharge Time    Jul 21, 2025               Codie Parekh RN

## 2025-07-21 NOTE — THERAPY EVALUATION
Acute Care - Speech Language Pathology Initial Evaluation   [place]     Patient Name: Ibrahima Nava  : 1945  MRN: [id number]  Today's Date: 2025               Admit Date: 7/15/2025     Visit Dx:    ICD-10-CM ICD-9-CM   1. Cognitive communication deficit  R41.841 799.52   2. Aphasia  R47.01 784.3   3. Esophageal dysphagia  R13.19 787.29   4. Altered mental status, unspecified altered mental status type  R41.82 780.97   5. Hypokalemia  E87.6 276.8   6. History of stroke  Z86.73 V12.54     Patient Active Problem List   Diagnosis    Primary localized osteoarthrosis of shoulder region    Hypertension    Impaired physical mobility    Acute pain of left shoulder    Status post reverse arthroplasty of shoulder, left    CVA (cerebral vascular accident)    Mediastinal mass     Past Medical History:   Diagnosis Date    Anxiety     Arthritis     Hypertension     CONTROLLED WITH MEDS PER PT     Shoulder pain, left     Sleep apnea     did not tolerate CPAP    Stroke     -- NO RESIDUAL PROBLEMS    Tremor of right hand     Wears dentures     UPPER AND LOWER PLATE      Past Surgical History:   Procedure Laterality Date    CARDIAC CATHETERIZATION      NO CORONARY STENTS     COLONOSCOPY      PA ARTHROPLASTY GLENOHUMERAL JOINT TOTAL SHOULDER Left 2017    Procedure: LEFT TOTAL SHOULDER ARTHROPLASTY ;  Surgeon: Michael Xavier MD;  Location: [facility];  Service: Orthopedics    TOTAL KNEE ARTHROPLASTY Bilateral     TOTAL SHOULDER REPLACEMENT Right     TOTAL SHOULDER REVISION Left 2024    Procedure: REVISION TOTAL SHOULDER ARTHROPLASTY TO REVERSE TOTAL SHOULDER ATHROPLASTY - LEFT;  Surgeon: Michael Xavier MD;  [facility];  Service: Orthopedics;  Laterality: Left;       SLP Recommendation and Plan  SLP Diagnosis: mild, cognitive-linguistic disorder (25 1300)              [facility] [place] [facility] Criteria for Skilled Therapy Interventions Met: yes (25 1300)  Anticipated Discharge  Disposition (SLP): home with assist (07/21/25 1300)        Therapy Frequency (SLP SLC): 5 days per week (07/21/25 1300)  Predicted Duration Therapy Intervention (Days): 1 week (07/21/25 1300)                             Progress: improving (07/21/25 1622)      SLP EVALUATION (Last 72 Hours)       SLP SLC Evaluation       Row Name 07/21/25 1300                   Communication Assessment/Intervention    Document Type evaluation  -HG        Subjective Information no complaints  -HG        Patient Observations alert;cooperative;agree to therapy  -HG        Patient/Family/Caregiver Comments/Observations Daughter and wife present  -HG        Patient Effort excellent  -HG        Symptoms Noted During/After Treatment none  -HG           General Information    Patient Profile Reviewed yes  -HG        Pertinent History Of Current Problem See previous eval.  -HG        Precautions/Limitations, Vision corrective lenses needed for reading  -HG        Precautions/Limitations, Hearing hearing impairment, bilaterally;other (see comments)  Pt without amplification. Fxnl for eval with increased volume needed.  -HG        Patient Level of Education 8th grade  -HG        Prior Level of Function-Communication WFL  -HG        Plans/Goals Discussed with patient;spouse/S.O.;family  -        Barriers to Rehab none identified  -HG        Patient's Goals for Discharge patient did not state  -        Family Goals for Discharge functional cognition  -HG           Pain    Pretreatment Pain Rating 0/10 - no pain  -HG           Auditory Comprehension Assessment/Intervention    Auditory Comprehension (Communication) WFL  -HG           Reading Comprehension Assessment/Intervention    Reading Comprehension (Communication) WFL  -HG           Verbal Expression Assessment/Intervention    Verbal Expression WFL  -HG           Graphic Expression Assessment/Intervention    Graphic Expression moderate impairment  -HG        Biographical Information  address;moderate impairment  -HG           Oral Motor Structure and Function    Oral Motor Structure and Function WFL  -HG        Dentition Assessment edentulous;upper dentures/partial in place;lower dentures/partial in place  -        Mucosal Quality moist, healthy  -           Oral Musculature and Cranial Nerve Assessment    Oral Motor General Assessment WFL  -HG           Motor Speech Assessment/Intervention    Motor Speech Function WFL  -           Cursory Voice Assessment/Intervention    Quality and Resonance (Voice) WFL  -           Cognitive Assessment Intervention- SLP    Cognitive Function (Cognition) mild impairment  -HG        Orientation Status (Cognition) WFL  -        Memory (Cognitive) immediate;mild impairment;delayed;related;WFL  -HG        Attention (Cognitive) sustained;WFL;alternating;mild impairment;moderate impairment  -HG        Thought Organization (Cognitive) concrete divergent;mild impairment  -HG        Reasoning (Cognitive) WFL  -        Problem Solving (Cognitive) WFL  -        Functional Math (Cognitive) WFL  -        Executive Function (Cognition) WFL  -        Pragmatics (Communication) WFL  -HG           SLP Evaluation Clinical Impressions    SLP Diagnosis mild;cognitive-linguistic disorder  -        Rehab Potential/Prognosis excellent  -        SLC Criteria for Skilled Therapy Interventions Met yes  -HG           SLP Treatment Clinical Impressions    Care Plan Review evaluation/treatment results reviewed  -           Recommendations    Therapy Frequency (SLP SLC) 5 days per week  -HG        Predicted Duration Therapy Intervention (Days) 1 week  -HG        Anticipated Discharge Disposition (SLP) home with assist  -           Patient will demonstrate functional cognitive-linguistic skills for return to discharge environment    [place] Independently;with minimal cues  -        Time frame 1 week  -           Graphic Expression Treatment Objectives     Graphic Expression of Single Words Selection graphic expression of single words, SLP goal 1  -HG           Graphic Expression of Words Goal 1 (SLP)    Graphic Expression of Single Words Goal 1 (SLP) copy word;writing dictated word;completing written phrase and sentence;writing word to dictation;80%;with minimal cues (75-90%)  -HG        Time Frame (Graphic Expression of Single Words Goal 1, SLP) short term goal (STG)  -HG           Attention Goal 1 (SLP)    Improve Attention by Goal 1 (SLP) complete sustained attention task;complete divided attention task;80%;with minimal cues (75-90%)  -HG        Time Frame (Attention Goal 1, SLP) short term goal (STG)  -HG           Memory Skills Goal 1 (SLP)    Improve Memory Skills Through Goal 1 (SLP) recalling related word lists with an imposed delay;recalling unrelated word lists with an imposed delay;listen to a paragraph and answer questions;read a paragraph and answer questions;80%;with minimal cues (75-90%)  -HG        Time Frame (Memory Skills Goal 1, SLP) short term goal (STG)  -HG           Organizational Skills Goal 1 (SLP)    Improve Thought Organization Through Goal 1 (SLP) completing a divergent naming task;completing a convergent naming task;naming similarities and differences;completing a verbal sequencing task;completing mental manipulation task;concrete;80%;with minimal cues (75-90%)  -HG        Time Frame (Thought Organization Skills Goal 1, SLP) short term goal (STG)  -HG                  User Key  (r) = Recorded By, (t) = Taken By, (c) = Cosigned By      Initials Name Effective Dates     Chloe Cash MS CCC-[facility] 06/16/21 -                        EDUCATION  The patient has been educated in the following areas:     Cognitive Impairment Communication Impairment.           SLP GOALS       Row Name 07/21/25 1300             Patient will demonstrate functional cognitive-linguistic skills for return to discharge environment    [place] Independently;with  minimal cues  -HG      Time frame 1 week  -HG         Graphic Expression of Words Goal 1 (SLP)    Graphic Expression of Single Words Goal 1 (SLP) copy word;writing dictated word;completing written phrase and sentence;writing word to dictation;80%;with minimal cues (75-90%)  -HG      Time Frame (Graphic Expression of Single Words Goal 1, SLP) short term goal (STG)  -HG         Attention Goal 1 (SLP)    Improve Attention by Goal 1 (SLP) complete sustained attention task;complete divided attention task;80%;with minimal cues (75-90%)  -HG      Time Frame (Attention Goal 1, SLP) short term goal (STG)  -HG         Memory Skills Goal 1 (SLP)    Improve Memory Skills Through Goal 1 (SLP) recalling related word lists with an imposed delay;recalling unrelated word lists with an imposed delay;listen to a paragraph and answer questions;read a paragraph and answer questions;80%;with minimal cues (75-90%)  -HG      Time Frame (Memory Skills Goal 1, SLP) short term goal (STG)  -HG         Organizational Skills Goal 1 (SLP)    Improve Thought Organization Through Goal 1 (SLP) completing a divergent naming task;completing a convergent naming task;naming similarities and differences;completing a verbal sequencing task;completing mental manipulation task;concrete;80%;with minimal cues (75-90%)  -HG      Time Frame (Thought Organization Skills Goal 1, SLP) short term goal (STG)  -HG                User Key  (r) = Recorded By, (t) = Taken By, (c) = Cosigned By      Initials Name Provider Type     Chloe Cash MS CCC-SLP Speech and Language Pathologist                              Time Calculation:      Time Calculation- SLP       Row Name 07/21/25 1622             Time Calculation- SLP    SLP Start Time 1300  -HG      SLP Received On 07/21/25  -HG         Untimed Charges    54465-WN Eval Speech and Production w/ Language Minutes 60  -HG         Total Minutes    Untimed Charges Total Minutes 60  -HG       Total Minutes 60  -HG                 User Key  (r) = Recorded By, (t) = Taken By, (c) = Cosigned By      Initials Name Provider Type     Chloe Cash, MS CCC-SLP Speech and Language Pathologist                                   Chloe Cash, MS CCC-SLP  7/21/2025

## 2025-07-21 NOTE — THERAPY TREATMENT NOTE
Patient Name: Ibrahima Nava  : 1945    MRN: [id number]                              Today's Date: 2025       Admit Date: 7/15/2025    Visit Dx:     ICD-10-CM ICD-9-CM   1. Aphasia  R47.01 784.3   2. Esophageal dysphagia  R13.19 787.29   3. Altered mental status, unspecified altered mental status type  R41.82 780.97   4. Hypokalemia  E87.6 276.8   5. History of stroke  Z86.73 V12.54     Patient Active Problem List   Diagnosis    Primary localized osteoarthrosis of shoulder region    Hypertension    Impaired physical mobility    Acute pain of left shoulder    Status post reverse arthroplasty of shoulder, left    CVA (cerebral vascular accident)    Mediastinal mass     Past Medical History:   Diagnosis Date    Anxiety     Arthritis     Hypertension     CONTROLLED WITH MEDS PER PT     Shoulder pain, left     Sleep apnea     did not tolerate CPAP    Stroke     -- NO RESIDUAL PROBLEMS    Tremor of right hand     Wears dentures     UPPER AND LOWER PLATE      Past Surgical History:   Procedure Laterality Date    CARDIAC CATHETERIZATION      NO CORONARY STENTS     COLONOSCOPY      [place] ARTHROPLASTY GLENOHUMERAL JOINT TOTAL SHOULDER Left 2017    Procedure: LEFT TOTAL SHOULDER ARTHROPLASTY ;  Surgeon: Michael Xavier MD;  Location:  [facility];  Service: Orthopedics    TOTAL KNEE ARTHROPLASTY Bilateral     TOTAL SHOULDER REPLACEMENT Right     TOTAL SHOULDER REVISION Left 2024    Procedure: REVISION TOTAL SHOULDER ARTHROPLASTY TO REVERSE TOTAL SHOULDER ATHROPLASTY - LEFT;  Surgeon: Michael Xavier MD;  Location:  [facility];  Service: Orthopedics;  Laterality: Left;      General Information       Row Name 25 1507          Physical Therapy Time and Intention    Document Type therapy note (daily note)  -CD     Mode of Treatment physical therapy  -CD       Row Name 25 1507          General Information    Patient Profile Reviewed yes  -CD     Existing Precautions/Restrictions  fall;oxygen therapy device and L/min  AFIB WITH TACHYCARDIA.  -CD     Barriers to Rehab medically complex  -CD       Row Name 07/21/25 1507          Cognition    Orientation Status (Cognition) oriented x 4  -CD       Row Name 07/21/25 1507          Safety Issues/Impairments Affecting Functional Mobility    Safety Issues Affecting Function (Mobility) insight into deficits/self-awareness;positioning of assistive device;safety precautions follow-through/compliance;sequencing abilities  -CD     Impairments Affecting Function (Mobility) balance;endurance/activity tolerance;strength;shortness of breath  TACHYCARDIA.  -CD     Comment, Safety Issues/Impairments (Mobility) PT ALERT AND FOLLOWING ALL COMMANDS. LIMITED BY GENERALIZED FATIGUE AND WEAKNESS. PT SOA WITH ACTIVITY. HR ELEVATED  WITH GAIT X 180 FEET  -CD               User Key  (r) = Recorded By, (t) = Taken By, (c) = Cosigned By      Initials Name Provider Type    CD Marla Hernández, PT Physical Therapist                   Mobility       Row Name 07/21/25 1518          Bed Mobility    Supine-Sit [place] (Bed Mobility) independent  -CD     Assistive Device (Bed Mobility) bed rails;head of bed elevated  -CD       Row Name 07/21/25 1518          Transfers    Comment, (Transfers) CUES FOR HAND PLACEMENT. STS FROM EOB TO WALKER. DONNED BRIEF PRIOR TO STANDING.  -CD       Row Name 07/21/25 1518          Sit-Stand Transfer    Sit-Stand [place] (Transfers) standby assist  -CD     Assistive Device (Sit-Stand Transfers) walker, front-wheeled  -CD       Row Name 07/21/25 1518          Gait/Stairs (Locomotion)    [place] Level (Gait) standby assist  -CD     Assistive Device (Gait) walker, front-wheeled  -CD     Distance in Feet (Gait) 180  -CD     Deviations/Abnormal Patterns (Gait) gait speed decreased;dennise decreased;weight shifting decreased;stride length decreased  -CD     Bilateral Gait Deviations forward flexed posture;heel strike decreased  -CD      Comment, (Gait/Stairs) MAX CUES FOR UPRIGHT POSTURE AND PROPER WALKER PLACEMENT. REQUIRED 3 BRIEF STANDING REST BREAKS AND CUES FOR PLB. AMBULATED ON 2L O2 WITH STABLE O2 SATS.  -CD               User Key  (r) = Recorded By, (t) = Taken By, (c) = Cosigned By      Initials Name Provider Type     Marla Hernández PT Physical Therapist                   Obj/Interventions       Row Name 07/21/25 1524          Motor Skills    Therapeutic Exercise --  COMPLETED SEATED B LE THER EX: AP'S, LAQ X 10 REPS. NOTED B LE PITTING EDEMA.  -CD       Row Name 07/21/25 1524          Balance    Static Sitting Balance independent  -CD     Dynamic Sitting Balance independent  PT ABLE TO RECIPROCAL SCOOT IN RECLINER.  -CD     Position, Sitting Balance unsupported;sitting in chair  -CD     Static Standing Balance standby assist  -CD     Dynamic Standing Balance contact guard  -CD     Position/Device Used, Standing Balance walker, rolling  -CD     Balance Interventions sitting;standing;sit to stand;supported;static;dynamic  -CD     Comment, Balance NO OVERT LOB WITH GAIT IN [place]Z BUT RELIES ON B UE SUPPORT WITH R WALKER FOR BALANCE.  -CD               User Key  (r) = Recorded By, (t) = Taken By, (c) = Cosigned By      Initials Name Provider Type     Marla Hernández PT Physical Therapist                   Goals/Plan    No documentation.                  Clinical Impression       Row Name 07/21/25 1529          Pain    Pretreatment Pain Rating 0/10 - no pain  -CD     Posttreatment Pain Rating 0/10 - no pain  -CD       Row Name 07/21/25 1529          Plan of Care Review    Plan of Care Reviewed With patient;child  -CD     Progress improving  -CD     Outcome Evaluation INCREASED DISTANCE AMBULATED  FEET WITH R WALKER AND CGA. MAX CUES FOR UPRIGHT POSTURE AND PROPER WALKER PLACEMENT. REQUIRED 3 BRIEF STANDING REST BREAKS AND CUES FOR PLB. AMBULATED ON 2L O2 WITH STABLE O2 SATS.NOTED PITTING EDEMA B LE'S. RECOMMEND SNF AT D/C FOR BEST  OUTCOME.  -CD       Row Name 07/21/25 1529          Therapy Assessment/Plan (PT)    Patient/Family Therapy Goals Statement (PT) TO GO HOME.  -CD     Rehab Potential (PT) good  -CD     Criteria for Skilled Interventions Met (PT) yes;meets criteria;skilled treatment is necessary  -CD     Therapy Frequency (PT) daily  -CD       Row Name 07/21/25 1529          Vital Signs    Pre Systolic BP Rehab 157  -CD     Pre Treatment Diastolic BP 66  -CD     Intra Systolic BP Rehab 135  -CD     Intra Treatment Diastolic BP 90  -CD     Post Systolic BP Rehab 137  -CD     Post Treatment Diastolic BP 72  -CD     Pretreatment Heart Rate (beats/min) 107  -CD     Intratreatment Heart Rate (beats/min) 144  -CD     Posttreatment Heart Rate (beats/min) 100  -CD     Pre SpO2 (%) 93  -CD     O2 Delivery Pre Treatment room air  -CD     O2 Delivery Intra Treatment room air  -CD     Post SpO2 (%) 93  -CD     O2 Delivery Post Treatment room air  -CD     Pre Patient Position Supine  -CD     Intra Patient Position Standing  -CD     Post Patient Position Sitting  -CD       Row Name 07/21/25 1529          Positioning and Restraints    Pre-Treatment Position in bed  -CD     Post Treatment Position chair  -CD     In Chair reclined;call light within reach;encouraged to call for assist;exit alarm on;legs elevated;LUE elevated;RUE elevated;heels elevated;notified nsg;waffle cushion  -CD               User Key  (r) = Recorded By, (t) = Taken By, (c) = Cosigned By      Initials Name Provider Type    CD Marla Hernández, PT Physical Therapist                   Outcome Measures       Row Name 07/21/25 1538          How much help from another person do you currently need...    Turning from your back to your side while in flat bed without using bedrails? 3  -CD     Moving from lying on back to sitting on the side of a flat bed without bedrails? 3  -CD     Moving to and from a bed to a chair (including a wheelchair)? 3  -CD     Standing up from a chair using your  arms (e.g., wheelchair, bedside chair)? 3  -CD     Climbing 3-5 steps with a railing? 2  -CD     To walk in hospital room? 3  -CD     AM-PAC 6 Clicks Score (PT) 17  -CD     Highest Level of Mobility Goal Stand (1 or More Minutes)-5  -CD       Row Name 07/21/25 1538          Modified [place] Scale    Modified [place] Scale 2 - Slight disability.  Unable to carry out all previous activities but able to look after own affairs without assistance.  -CD       Row Name 07/21/25 1538          Functional Assessment    Outcome Measure Options AM-PAC 6 Clicks Basic Mobility (PT)  -CD               User Key  (r) = Recorded By, (t) = Taken By, (c) = Cosigned By      Initials Name Provider Type    Marla Kirkland PT Physical Therapist                                 Physical Therapy Education       Title: PT OT SLP Therapies (In Progress)       Topic: Physical Therapy (Done)       Point: Mobility training (Done)       Learning Progress Summary            Patient Acceptance, E, VU,NR by CD at 7/21/2025 1539    Comment: SEE ZAINAB    Eager, E, VU by SC at 7/17/2025 1155    Comment: reviewed benefits of activity    Eager, E, VU,DU by SC at 7/17/2025 1152    Comment: reviewed HEP    Acceptance, E, VU by KR at 7/16/2025 1410   Family Acceptance, E, VU by KR at 7/16/2025 1410                      Point: Home exercise program (Done)       Learning Progress Summary            Patient Acceptance, E, VU,NR by CD at 7/21/2025 1539    Comment: SEE ZAINAB    Eager, E, VU by SC at 7/17/2025 1155    Comment: reviewed benefits of activity    Eager, E, VU,DU by SC at 7/17/2025 1152    Comment: reviewed HEP                      Point: Body mechanics (Done)       Learning Progress Summary            Patient Acceptance, E, VU,NR by CD at 7/21/2025 1539    Comment: SEE ZAINAB    Eager, E, VU by SC at 7/17/2025 1155    Comment: reviewed benefits of activity    Eager, E, VU,DU by SC at 7/17/2025 1152    Comment: reviewed HEP    Acceptance, E,  VU by KR at 7/16/2025 1410   Family Acceptance, E, VU by KR at 7/16/2025 1410                      Point: Precautions (Done)       Learning Progress Summary            Patient Acceptance, E, VU,NR by CD at 7/21/2025 1539    Comment: SEE FLOWSHEET    Eager, E, VU by SC at 7/17/2025 1155    Comment: reviewed benefits of activity    Eager, E, VU,DU by SC at 7/17/2025 1152    Comment: reviewed HEP    Acceptance, E, VU by KR at 7/16/2025 1410   Family Acceptance, E, VU by KR at 7/16/2025 1410                                      User Key       Initials Effective Dates Name Provider Type Discipline    SC 02/03/23 -  Marian Howard, PT Physical Therapist PT     02/03/23 -  Marla Hernández, PT Physical Therapist PT     12/30/22 -  Coco Casarez, PT Physical Therapist PT                  PT Recommendation and Plan     Progress: improving  Outcome Evaluation: INCREASED DISTANCE AMBULATED  FEET WITH R WALKER AND CGA. MAX CUES FOR UPRIGHT POSTURE AND PROPER WALKER PLACEMENT. REQUIRED 3 BRIEF STANDING REST BREAKS AND CUES FOR PLB. AMBULATED ON 2L O2 WITH STABLE O2 SATS.NOTED PITTING EDEMA B LE'S. RECOMMEND SNF AT D/C FOR BEST OUTCOME.     Time Calculation:         PT Charges       Row Name 07/21/25 1539             Time Calculation    Start Time 1345  -CD      PT Received On 07/21/25  -CD         Time Calculation- PT    Total Timed Code Minutes- PT 45 minute(s)  -CD         Timed Charges    65745 - PT Therapeutic Exercise Minutes 10  -CD      94376 - Gait Training Minutes  15  -CD      90123 - PT Therapeutic Activity Minutes 20  -CD         Total Minutes    Timed Charges Total Minutes 45  -CD       Total Minutes 45  -CD                User Key  (r) = Recorded By, (t) = Taken By, (c) = Cosigned By      Initials Name Provider Type    CD Marla Hernández, PT Physical Therapist                  Therapy Charges for Today       Code Description Service Date Service Provider Modifiers Qty    25259422044  PT THER PROC EA 15  MIN 7/21/2025 Marla eHrnández, PT GP 1    68874820404 HC GAIT TRAINING EA 15 MIN 7/21/2025 Marla Hernández, PT GP 1    85459350209 HC PT THERAPEUTIC ACT EA 15 MIN 7/21/2025 Marla Hernández, PT GP 1            PT G-Codes  Outcome Measure Options: AM-PAC 6 Clicks Basic Mobility (PT)  AM-PAC 6 Clicks Score (PT): 17  AM-PAC 6 Clicks Score (OT): 17  Modified [place] Scale: 2 - Slight disability.  Unable to carry out all previous activities but able to look after own affairs without assistance.  PT Discharge Summary  Anticipated Discharge Disposition (PT): skilled nursing facility    Marla Hernández, PT  7/21/2025

## 2025-07-21 NOTE — PLAN OF CARE
Goal Outcome Evaluation:  Plan of Care Reviewed With: patient, spouse, child        Progress: improving       Anticipated Discharge Disposition (SLP): home with assist    SLP Diagnosis: mild, cognitive-linguistic disorder (07/21/25 1300)

## 2025-07-21 NOTE — PLAN OF CARE
Goal Outcome Evaluation:  Plan of Care Reviewed With: patient, child        Progress: improving  Outcome Evaluation: INCREASED DISTANCE AMBULATED  FEET WITH R WALKER AND CGA. MAX CUES FOR UPRIGHT POSTURE AND PROPER WALKER PLACEMENT. REQUIRED 3 BRIEF STANDING REST BREAKS AND CUES FOR PLB. AMBULATED ON 2L O2 WITH STABLE O2 SATS.NOTED PITTING EDEMA B LE'S. RECOMMEND SNF AT D/C FOR BEST OUTCOME.     Anticipated Discharge Disposition (PT): home with assist, home with outpatient therapy services

## 2025-07-21 NOTE — PROGRESS NOTES
[facility]  PROGRESS NOTE    Patient Name: Ibrahima Nava  : 1945  MRN: [id number]    Date of Admission: 7/15/2025  Primary Care Physician: Sebastien Lowe MD    Subjective   Subjective     CC:  F/U new a fib, lung mass     HPI:  No significant overnight events, patient doing very well this a.m., remains on amiodarone p.o.  No complaints    Objective   Objective     Vital Signs:   Temp:  [97.8 °F (36.6 °C)-98.8 °F (37.1 °C)] 98.4 °F (36.9 °C)  Heart Rate:  [] 122  Resp:  [17-18] 18  BP: (140-175)/(74-96) 140/74  Flow (L/min) (Oxygen Therapy):  [2] 2     Physical Exam:  Constitutional: No acute distress, awake, alert, up in bed, pleasant   HENT: NCAT, mucous membranes moist, [place]  Respiratory: decreased in bases, respiratory effort normal 2L NC  Cardiovascular: Sinus with PACs/PVCs, no murmurs, rubs, or gallops  Gastrointestinal: Positive bowel sounds, soft, non-tender, no R/R/G, non-distended  Musculoskeletal:  2-3+ pitting B/L LE edema  Psychiatric: Appropriate affect, cooperative  Neurologic: Oriented x 3, GOVEA, speech difficult to understand at times/mumbles  Skin: venous stasis skin changes     Results Reviewed:  LAB RESULTS:      Lab 25  0846 25  1317 25  1153 25  0719 25  1517 07/15/25  1425 07/15/25  1243 07/15/25  1239   WBC 7.98 10.06 9.82 7.57 9.61  --   --  11.13*   HEMOGLOBIN 7.5* 8.2* 7.6* 8.2* 8.3*  --   --  9.0*   HEMOGLOBIN, POC  --   --   --   --   --   --    < >  --    HEMATOCRIT 25.3* 27.7* 26.3* 31.1* 28.6*  --   --  31.2*   HEMATOCRIT POC  --   --   --   --   --   --    < >  --    PLATELETS 153 157 148 128* 150  --   --  176   NEUTROS ABS  --   --   --   --   --   --   --  9.07*   IMMATURE GRANS (ABS)  --   --   --   --   --   --   --  0.32*   LYMPHS ABS  --   --   --   --   --   --   --  1.22   MONOS ABS  --   --   --   --   --   --   --  0.50   EOS ABS  --   --   --   --   --   --   --  0.00   MCV 86.9 87.7  88.0 96.6 86.9  --   --  87.4   PROTIME  --   --   --   --   --   --   --  14.8   HSTROP T  --   --   --   --   --  45*  --  46*    < > = values in this interval not displayed.         Lab 07/21/25  0846 07/20/25  1317 07/19/25  1153 07/19/25  0130 07/18/25  1034 07/17/25  1152 07/16/25  1507 07/15/25  1243 07/15/25  1239   SODIUM 141 143 148*  --  148*  --  142  --  142   POTASSIUM 3.0* 3.0* 3.7 3.4* 3.0*   < > 2.7*  --  2.5*   CHLORIDE 98 100 105  --  103  --  98  --  97*   CO2 32.0* 31.0* 29.3*  --  31.6*  --  33.0*  --  32.6*   ANION GAP 11.0 12.0 13.7  --  13.4  --  11.0  --  12.4   BUN 17.5 18.7 24.0*  --  22.0  --  20.9  --  22.2   CREATININE 0.85 0.93 0.88  --  0.89  --  0.84   < > 0.92   EGFR 87.8 83.0 86.9  --  86.6  --  88.2   < > 84.1   GLUCOSE 138* 175* 101*  --  119*  --  227*  --  252*   CALCIUM 8.4* 8.7 8.7  --  8.8  --  8.7  --  8.9   MAGNESIUM  --   --  2.3  --  2.3  --   --   --  2.2   HEMOGLOBIN A1C  --   --   --   --   --   --   --   --  6.34*   TSH  --   --   --   --   --   --   --   --  0.876    < > = values in this interval not displayed.         Lab 07/15/25  1239   TOTAL PROTEIN 5.6*   ALBUMIN 3.7   GLOBULIN 1.9   ALT (SGPT) 58*   AST (SGOT) 75*   BILIRUBIN 0.4   ALK PHOS 85         Lab 07/17/25  1152 07/15/25  1425 07/15/25  1239   PROBNP 2,423.0*  --  3,433.0*   HSTROP T  --  45* 46*   PROTIME  --   --  14.8   INR  --   --  1.09         Lab 07/16/25  1507   CHOLESTEROL 157   LDL CHOL 77   HDL CHOL 40   TRIGLYCERIDES 240*         Lab 07/16/25  1605   IRON 52*   IRON SATURATION (TSAT) 14*   TIBC 383   TRANSFERRIN 257         Lab 07/15/25  1316   PH, ARTERIAL 7.489*   PCO2, ARTERIAL 50.2*   PO2 ART 52.2*   FIO2 21   HCO3 ART 38.2*   BASE EXCESS ART 13.3*   CARBOXYHEMOGLOBIN 1.8     Brief Urine Lab Results  (Last result in the past 365 days)        Color   Clarity   Blood   Leuk Est   Nitrite   Protein   CREAT   Urine HCG        07/15/25 1319 Yellow   Clear   Moderate (2+)   Negative    Negative   30 mg/dL (1+)                   Microbiology Results Abnormal       None            No radiology results from the last 24 hrs      Results for orders placed during the hospital encounter of 07/15/25    Adult Transesophageal Echo (SALVADOR) W/ Cont if Necessary Per Protocol 07/19/2025  8:29 AM    Interpretation Summary    Left ventricular systolic function is low normal. Left ventricular ejection fraction appears to be 56 - 60%.    Left ventricular wall thickness is consistent with mild concentric hypertrophy.    No evidence of a left atrial appendage thrombus    Mild to moderate mitral valve regurgitation      Current medications:  Scheduled Meds:amiodarone, 200 mg, Oral, Q8H   Followed by  [START ON 7/27/2025] amiodarone, 200 mg, Oral, Q12H   Followed by  [START ON 8/10/2025] amiodarone, 200 mg, Oral, Daily  amLODIPine, 5 mg, Oral, Q24H  apixaban, 5 mg, Oral, Q12H  atorvastatin, 40 mg, Oral, Nightly  bumetanide, 1 mg, Oral, BID Diuretics  empagliflozin, 10 mg, Oral, Daily  ferrous sulfate, 325 mg, Oral, Daily With Breakfast  hydroCHLOROthiazide Oral, 25 mg, Oral, Daily  insulin glargine, 5 Units, Subcutaneous, Nightly  insulin lispro, 2-9 Units, Subcutaneous, 4x Daily AC & at Bedtime  levothyroxine, 50 mcg, Oral, Q AM  losartan, 100 mg, Oral, Q24H  metoprolol tartrate, 50 mg, Oral, Q12H  pantoprazole, 40 mg, Oral, Q AM  primidone, 100 mg, Oral, Daily  sertraline, 200 mg, Oral, Daily  sodium chloride, 10 mL, Intravenous, Q12H  sodium chloride, 10 mL, Intravenous, Q12H  spironolactone, 25 mg, Oral, Daily  terazosin, 2 mg, Oral, Nightly  tiZANidine, 4 mg, Oral, Nightly  valACYclovir, 500 mg, Oral, Daily      Continuous Infusions:     PRN Meds:.  acetaminophen    senna-docusate sodium **AND** polyethylene glycol **AND** bisacodyl **AND** bisacodyl    cloNIDine    dextrose    dextrose    glucagon (human recombinant)    hydrALAZINE    LORazepam    Magnesium Cardiology Dose Replacement - Follow Nurse / BPA Driven  Protocol    metoprolol tartrate    nitroglycerin    Potassium Replacement - Follow Nurse / BPA Driven Protocol    sodium chloride    sodium chloride    sodium chloride    sodium chloride    sodium chloride    Assessment & Plan   Assessment & Plan     Active Hospital Problems    Diagnosis  POA    **CVA (cerebral vascular accident) [I63.9]  Yes    Mediastinal mass [J98.59]  Unknown      Resolved Hospital Problems   No resolved problems to display.        Brief Hospital Course to date:  Ibrahima Nava is a 80 y.o. male w history of HTN, anxiety on chronic benzos, CVA in 2012 (no residual deficit), essential tremor. Endorses dysarthria, dysphagia to solids and liquids, confusion.     This patient's problems and plans were partially entered by my partner and updated as appropriate by me 07/21/25.     Dysarthria  Expressive aphasia  Confusion   Prior stroke   MRI brain negative for acute stroke. Noted chronic appearing infarct of L pre and post central gyri   Stroke team followed, feel likely recrudescence of old stroke  EEG negative  Echo with normal EF  PT/OT recommend home   UA ok   Follow-up stroke clinic 1 month    Dysphagia  noted to solids and liquids PTA  endorsed fullness R neck  SLP follows, s/p MBS w/limited upper GI series, no evidence of obstruction and mild reflux   CTA neck : Evaluation of the neck soft tissues demonstrates no evidence of aerodigestive tract mass or pathologic cervical lymphadenopathy.   soft to chew textures, chopped, thin liquids     New Atrial Fibrillation  New LE edema, CHF  Pulmonary Edema  Hypoxia, new   Echo with normal EF   Cardiology following, assistance appreciated   TSH ok  Jardiance, Aldactone, BB, Eliquis,  Bumex 1mg PO BID  S/P SALVADOR ECV 7/18  Continue IV to PO Amiodarone per protocol     New Mediastinal mass, JANNA pulmonary mass  CT chest has been reviewed with patient and family  prior smoking Hx, quit 30 years ago  endorsed chronic cough x 1 year  No weight loss or hemoptysis    Interested in biopsy, was started on Eliquis for A fib this admit so will need to be held pending timing   discussed with Dr Sams. Plan for outpatient [facility] with Dr King, see his note for timing     Anemia, normocytic  Iron/T sat low, started on oral iron per Cardiology  consider IV iron pending H&H trend   Started Eliquis this admit. If H&H continues to trend down, will check stool for occult blood. He denies melena.     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 Can DC once cleared by Cardiology with outpatient Pulmonary follow-up for above.   Expected Discharge   Expected Discharge Date: 7/21/2025; Expected Discharge Time:      VTE Prophylaxis:  Pharmacologic & mechanical VTE prophylaxis orders are present.     Total time spent: Time Spent: Time Spent: 40 minutes  Time spent includes time reviewing chart, face-to-face time, counseling patient/family/caregiver, ordering medications/tests/procedures, communicating with other health care professionals, documenting clinical information in the electronic health record, and coordination of care.      AM-PAC 6 Clicks Score (PT): 16 (07/20/25 2000)    CODE STATUS:   Code Status and Medical Interventions: CPR (Attempt to Resuscitate); Full Support   Ordered at: 07/15/25 1515     Code Status (Patient has no pulse and is not breathing):    CPR (Attempt to Resuscitate)     Medical Interventions (Patient has pulse or is breathing):    Full Support     Level Of Support Discussed With:    Patient       Maya Garcia MD  07/21/25

## 2025-07-22 LAB
ALBUMIN SERPL-MCNC: 3.3 G/DL (ref 3.5–5.2)
ALP SERPL-CCNC: 83 U/L (ref 39–117)
ALT SERPL W P-5'-P-CCNC: 40 U/L (ref 1–41)
ANION GAP SERPL CALCULATED.3IONS-SCNC: 13.9 MMOL/L (ref 5–15)
AST SERPL-CCNC: 77 U/L (ref 1–40)
BILIRUB CONJ SERPL-MCNC: 0.3 MG/DL (ref 0–0.3)
BILIRUB INDIRECT SERPL-MCNC: 0.3 MG/DL
BILIRUB SERPL-MCNC: 0.6 MG/DL (ref 0–1.2)
BUN SERPL-MCNC: 21.5 MG/DL (ref 8–23)
BUN/CREAT SERPL: 18.9 (ref 7–25)
CALCIUM SPEC-SCNC: 8.4 MG/DL (ref 8.6–10.5)
CHLORIDE SERPL-SCNC: 98 MMOL/L (ref 98–107)
CO2 SERPL-SCNC: 30.1 MMOL/L (ref 22–29)
CREAT SERPL-MCNC: 1.14 MG/DL (ref 0.76–1.27)
DEPRECATED RDW RBC AUTO: 53.7 FL (ref 37–54)
EGFRCR SERPLBLD CKD-EPI 2021: 65 ML/MIN/1.73
ERYTHROCYTE [DISTWIDTH] IN BLOOD BY AUTOMATED COUNT: 17.4 % (ref 12.3–15.4)
GLUCOSE BLDC GLUCOMTR-MCNC: 139 MG/DL (ref 70–130)
GLUCOSE BLDC GLUCOMTR-MCNC: 158 MG/DL (ref 70–130)
GLUCOSE BLDC GLUCOMTR-MCNC: 162 MG/DL (ref 70–130)
GLUCOSE BLDC GLUCOMTR-MCNC: 184 MG/DL (ref 70–130)
GLUCOSE SERPL-MCNC: 201 MG/DL (ref 65–99)
HCT VFR BLD AUTO: 25.5 % (ref 37.5–51)
HGB BLD-MCNC: 7.4 G/DL (ref 13–17.7)
MAGNESIUM SERPL-MCNC: 2.1 MG/DL (ref 1.6–2.4)
MCH RBC QN AUTO: 25.5 PG (ref 26.6–33)
MCHC RBC AUTO-ENTMCNC: 29 G/DL (ref 31.5–35.7)
MCV RBC AUTO: 87.9 FL (ref 79–97)
PLATELET # BLD AUTO: 141 10*3/MM3 (ref 140–450)
PMV BLD AUTO: 10 FL (ref 6–12)
POTASSIUM SERPL-SCNC: 2.4 MMOL/L (ref 3.5–5.2)
POTASSIUM SERPL-SCNC: 2.7 MMOL/L (ref 3.5–5.2)
PROT SERPL-MCNC: 5 G/DL (ref 6–8.5)
RBC # BLD AUTO: 2.9 10*6/MM3 (ref 4.14–5.8)
SODIUM SERPL-SCNC: 142 MMOL/L (ref 136–145)
WBC NRBC COR # BLD AUTO: 7.25 10*3/MM3 (ref 3.4–10.8)

## 2025-07-22 PROCEDURE — 25010000002 POTASSIUM CHLORIDE 10 MEQ/100ML SOLUTION

## 2025-07-22 PROCEDURE — 25010000002 NA FERRIC GLUC CPLX PER 12.5 MG: Performed by: PHYSICIAN ASSISTANT

## 2025-07-22 PROCEDURE — 93005 ELECTROCARDIOGRAM TRACING: CPT | Performed by: INTERNAL MEDICINE

## 2025-07-22 PROCEDURE — 99232 SBSQ HOSP IP/OBS MODERATE 35: CPT | Performed by: PHYSICIAN ASSISTANT

## 2025-07-22 PROCEDURE — 63710000001 INSULIN GLARGINE PER 5 UNITS: Performed by: FAMILY MEDICINE

## 2025-07-22 PROCEDURE — 84132 ASSAY OF SERUM POTASSIUM: CPT | Performed by: PHYSICIAN ASSISTANT

## 2025-07-22 PROCEDURE — 84132 ASSAY OF SERUM POTASSIUM: CPT

## 2025-07-22 PROCEDURE — 97535 SELF CARE MNGMENT TRAINING: CPT

## 2025-07-22 PROCEDURE — 63710000001 INSULIN LISPRO (HUMAN) PER 5 UNITS: Performed by: FAMILY MEDICINE

## 2025-07-22 PROCEDURE — 93010 ELECTROCARDIOGRAM REPORT: CPT | Performed by: INTERNAL MEDICINE

## 2025-07-22 PROCEDURE — 82948 REAGENT STRIP/BLOOD GLUCOSE: CPT

## 2025-07-22 PROCEDURE — 99232 SBSQ HOSP IP/OBS MODERATE 35: CPT | Performed by: NURSE PRACTITIONER

## 2025-07-22 PROCEDURE — 97530 THERAPEUTIC ACTIVITIES: CPT

## 2025-07-22 PROCEDURE — 83735 ASSAY OF MAGNESIUM: CPT | Performed by: PHYSICIAN ASSISTANT

## 2025-07-22 PROCEDURE — 85045 AUTOMATED RETICULOCYTE COUNT: CPT | Performed by: PHYSICIAN ASSISTANT

## 2025-07-22 PROCEDURE — 80076 HEPATIC FUNCTION PANEL: CPT | Performed by: PHYSICIAN ASSISTANT

## 2025-07-22 PROCEDURE — 80048 BASIC METABOLIC PNL TOTAL CA: CPT | Performed by: PHYSICIAN ASSISTANT

## 2025-07-22 PROCEDURE — 85027 COMPLETE CBC AUTOMATED: CPT | Performed by: PHYSICIAN ASSISTANT

## 2025-07-22 RX ORDER — POTASSIUM CHLORIDE 1500 MG/1
40 TABLET, EXTENDED RELEASE ORAL EVERY 4 HOURS
Status: DISCONTINUED | OUTPATIENT
Start: 2025-07-22 | End: 2025-07-22

## 2025-07-22 RX ORDER — POTASSIUM CHLORIDE 7.45 MG/ML
10 INJECTION INTRAVENOUS
Status: DISPENSED | OUTPATIENT
Start: 2025-07-22 | End: 2025-07-22

## 2025-07-22 RX ORDER — POTASSIUM CHLORIDE 7.45 MG/ML
10 INJECTION INTRAVENOUS
Status: COMPLETED | OUTPATIENT
Start: 2025-07-23 | End: 2025-07-23

## 2025-07-22 RX ADMIN — METOPROLOL TARTRATE 75 MG: 25 TABLET, FILM COATED ORAL at 22:59

## 2025-07-22 RX ADMIN — INSULIN GLARGINE 5 UNITS: 100 INJECTION, SOLUTION SUBCUTANEOUS at 23:04

## 2025-07-22 RX ADMIN — VALACYCLOVIR HYDROCHLORIDE 500 MG: 500 TABLET, FILM COATED ORAL at 08:50

## 2025-07-22 RX ADMIN — HYDROCHLOROTHIAZIDE 25 MG: 25 TABLET ORAL at 08:50

## 2025-07-22 RX ADMIN — ATORVASTATIN CALCIUM 40 MG: 40 TABLET, FILM COATED ORAL at 23:03

## 2025-07-22 RX ADMIN — TERAZOSIN HYDROCHLORIDE 2 MG: 2 CAPSULE ORAL at 23:03

## 2025-07-22 RX ADMIN — LOSARTAN POTASSIUM 100 MG: 50 TABLET, FILM COATED ORAL at 08:50

## 2025-07-22 RX ADMIN — POTASSIUM CHLORIDE 40 MEQ: 1500 TABLET, EXTENDED RELEASE ORAL at 08:50

## 2025-07-22 RX ADMIN — POTASSIUM CHLORIDE 10 MEQ: 7.46 INJECTION, SOLUTION INTRAVENOUS at 17:52

## 2025-07-22 RX ADMIN — PANTOPRAZOLE SODIUM 40 MG: 40 TABLET, DELAYED RELEASE ORAL at 06:48

## 2025-07-22 RX ADMIN — APIXABAN 5 MG: 5 TABLET, FILM COATED ORAL at 23:03

## 2025-07-22 RX ADMIN — BUMETANIDE 1 MG: 1 TABLET ORAL at 08:50

## 2025-07-22 RX ADMIN — SODIUM CHLORIDE 125 MG: 9 INJECTION, SOLUTION INTRAVENOUS at 08:50

## 2025-07-22 RX ADMIN — Medication 10 ML: at 23:25

## 2025-07-22 RX ADMIN — TIZANIDINE 4 MG: 4 TABLET ORAL at 23:03

## 2025-07-22 RX ADMIN — AMIODARONE HYDROCHLORIDE 200 MG: 200 TABLET ORAL at 12:08

## 2025-07-22 RX ADMIN — SPIRONOLACTONE 25 MG: 25 TABLET ORAL at 08:50

## 2025-07-22 RX ADMIN — AMIODARONE HYDROCHLORIDE 200 MG: 200 TABLET ORAL at 23:03

## 2025-07-22 RX ADMIN — INSULIN LISPRO 2 UNITS: 100 INJECTION, SOLUTION INTRAVENOUS; SUBCUTANEOUS at 23:04

## 2025-07-22 RX ADMIN — METOPROLOL TARTRATE 75 MG: 25 TABLET, FILM COATED ORAL at 08:50

## 2025-07-22 RX ADMIN — APIXABAN 5 MG: 5 TABLET, FILM COATED ORAL at 08:50

## 2025-07-22 RX ADMIN — POTASSIUM CHLORIDE 10 MEQ: 7.46 INJECTION, SOLUTION INTRAVENOUS at 16:15

## 2025-07-22 RX ADMIN — Medication 10 MG: at 22:59

## 2025-07-22 RX ADMIN — AMIODARONE HYDROCHLORIDE 200 MG: 200 TABLET ORAL at 06:47

## 2025-07-22 RX ADMIN — SERTRALINE HYDROCHLORIDE 200 MG: 100 TABLET, FILM COATED ORAL at 08:50

## 2025-07-22 RX ADMIN — LEVOTHYROXINE SODIUM 50 MCG: 50 TABLET ORAL at 06:48

## 2025-07-22 RX ADMIN — POTASSIUM CHLORIDE 10 MEQ: 7.46 INJECTION, SOLUTION INTRAVENOUS at 23:21

## 2025-07-22 RX ADMIN — EMPAGLIFLOZIN 10 MG: 10 TABLET, FILM COATED ORAL at 08:50

## 2025-07-22 RX ADMIN — INSULIN LISPRO 2 UNITS: 100 INJECTION, SOLUTION INTRAVENOUS; SUBCUTANEOUS at 12:08

## 2025-07-22 RX ADMIN — INSULIN LISPRO 2 UNITS: 100 INJECTION, SOLUTION INTRAVENOUS; SUBCUTANEOUS at 17:19

## 2025-07-22 RX ADMIN — AMLODIPINE BESYLATE 5 MG: 5 TABLET ORAL at 08:50

## 2025-07-22 RX ADMIN — POTASSIUM CHLORIDE 40 MEQ: 1500 TABLET, EXTENDED RELEASE ORAL at 14:22

## 2025-07-22 NOTE — THERAPY EVALUATION
Patient Name: Ibrahima Naav  : 1945    MRN: [id number]                              Today's Date: 2025       Admit Date: 7/15/2025    Visit Dx:     ICD-10-CM ICD-9-CM   1. Cognitive communication deficit  R41.841 799.52   2. Aphasia  R47.01 784.3   3. Esophageal dysphagia  R13.19 787.29   4. Altered mental status, unspecified altered mental status type  R41.82 780.97   5. Hypokalemia  E87.6 276.8   6. History of stroke  Z86.73 V12.54   7. Primary sleep apnea of , unspecified type  P28.30 770.81   8. Sleep apnea, unspecified type  G47.30 780.57   9. 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 obesity)  E66.813 278.01     Patient Active Problem List   Diagnosis    Primary localized osteoarthrosis of shoulder region    Hypertension    Impaired physical mobility    Acute pain of left shoulder    Status post reverse arthroplasty of shoulder, left    CVA (cerebral vascular accident)    Mediastinal mass     Past Medical History:   Diagnosis Date    Anxiety     Arthritis     Hypertension     CONTROLLED WITH MEDS PER PT     Shoulder pain, left     Sleep apnea     did not tolerate CPAP    Stroke     -- NO RESIDUAL PROBLEMS    Tremor of right hand     Wears dentures     UPPER AND LOWER PLATE      Past Surgical History:   Procedure Laterality Date    CARDIAC CATHETERIZATION      NO CORONARY STENTS     COLONOSCOPY      DC ARTHROPLASTY GLENOHUMERAL JOINT TOTAL SHOULDER Left 2017    Procedure: LEFT TOTAL SHOULDER ARTHROPLASTY ;  Surgeon: Michael Xavier MD;  Location:  [facility];  Service: Orthopedics    TOTAL KNEE ARTHROPLASTY Bilateral     TOTAL SHOULDER REPLACEMENT Right     TOTAL SHOULDER REVISION Left 2024    Procedure: REVISION TOTAL SHOULDER ARTHROPLASTY TO REVERSE TOTAL SHOULDER ATHROPLASTY - LEFT;  Surgeon: Michael Xavier MD;  Location:  [facility];  Service: Orthopedics;  Laterality: Left;      General Information       Row Name 25 1143          OT Time and Intention     Document Type therapy note (daily note)  -     Mode of Treatment occupational therapy  -       Row Name 07/22/25 1143          General Information    Patient Profile Reviewed yes  -     Prior Level of Function --  See IE  -     Existing Precautions/Restrictions fall;oxygen therapy device and L/min;other (see comments)  Brief with mobility  -     Barriers to Rehab medically complex;hearing deficit  -       Row Name 07/22/25 1143          Cognition    Orientation Status (Cognition) oriented x 3  -       Row Name 07/22/25 1143          Safety Issues/Impairments Affecting Functional Mobility    Safety Issues Affecting Function (Mobility) awareness of need for assistance;insight into deficits/self-awareness;safety precaution awareness;safety precautions follow-through/compliance;sequencing abilities  -     Impairments Affecting Function (Mobility) balance;endurance/activity tolerance;postural/trunk control;strength;shortness of breath  -               User Key  (r) = Recorded By, (t) = Taken By, (c) = Cosigned By      Initials Name Provider Type     Jyoti Spivey OT Occupational Therapist                     Mobility/ADL's       Row Name 07/22/25 1144          Bed Mobility    Bed Mobility scooting/bridging;supine-sit  -     Scooting/Bridging [place] (Bed Mobility) modified independence  -     Supine-Sit [place] (Bed Mobility) modified independence  -     Assistive Device (Bed Mobility) head of bed elevated;bed rails  -     Comment, (Bed Mobility) Demonstrates good sequencing, Incontinent upon sitting EOB, requiring increased time for clean up.  -       Row Name 07/22/25 1144          Transfers    Transfers sit-stand transfer;toilet transfer  -     Comment, (Transfers) VC's for hand placement and sequencing.  -       Row Name 07/22/25 1144          Sit-Stand Transfer    Sit-Stand [place] (Transfers) contact guard;verbal cues  -     Assistive Device (Sit-Stand  Transfers) walker, front-wheeled  -       Row Name 07/22/25 1144          Toilet Transfer    Type (Toilet Transfer) sit-stand;stand-sit  -     [place] Level (Toilet Transfer) contact guard;verbal cues  -     Assistive Device (Toilet Transfer) commode, bedside without drop arms;walker, front-wheeled  -LC       Row Name 07/22/25 1144          Activities of Daily Living    BADL Assessment/Intervention toileting;grooming;upper body dressing;bathing  -       Row Name 07/22/25 1144          Toileting Assessment/Training    [place] Level (Toileting) adjust/manage clothing;perform perineal hygiene;dependent (less than 25% patient effort)  -     Assistive Devices (Toileting) commode, bedside without drop arms  -     Position (Toileting) unsupported standing;unsupported sitting  -     Comment, (Toileting) Increased time to perform clean up. Pt. with incontinence upon sitting EOB and T/F's to [facility] to complete task  -       Row Name 07/22/25 1144          Grooming Assessment/Training    [place] Level (Grooming) wash face, hands;set up  -     Position (Grooming) unsupported sitting  -       Row Name 07/22/25 1144          Upper Body Dressing Assessment/Training    [place] Level (Upper Body Dressing) don;doff;front opening garment;minimum assist (75% patient effort)  -     Position (Upper Body Dressing) unsupported sitting  -       Row Name 07/22/25 1144          Bathing Assessment/Intervention    [place] Level (Bathing) lower body;proximal lower extremities;perineal area;dependent (less than 25% patient effort);verbal cues  -     Position (Bathing) supported standing  -     Comment, (Bathing) Increased time  -               User Key  (r) = Recorded By, (t) = Taken By, (c) = Cosigned By      Initials Name Provider Type    Jyoti Turner OT Occupational Therapist                   Obj/Interventions       Row Name 07/22/25 1148          Balance    Balance Assessment sitting  static balance;sitting dynamic balance;standing static balance;standing dynamic balance  -     Static Sitting Balance supervision  -     Dynamic Sitting Balance standby assist  -     Position, Sitting Balance unsupported;sitting edge of bed  -     Static Standing Balance standby assist  -     Dynamic Standing Balance contact guard  -     Position/Device Used, Standing Balance unsupported;walker, front-wheeled  -     Balance Interventions sitting;standing;sit to stand;supported;occupation based/functional task;weight shifting activity  -     Comment, Balance VC's to sequence transitioning from supine to sit EOB.  -               User Key  (r) = Recorded By, (t) = Taken By, (c) = Cosigned By      Initials Name Provider Type     Jyoti Spivey OT Occupational Therapist                   Goals/Plan    No documentation.                  Clinical Impression       Row Name 07/22/25 1150          Pain Assessment    Pretreatment Pain Rating 0/10 - no pain  -     Posttreatment Pain Rating 0/10 - no pain  -       Row Name 07/22/25 1150          Plan of Care Review    Plan of Care Reviewed With patient  -     Progress no change  -     Outcome Evaluation Pt. completed bed mobility with Mod A and T/Fs with CGA. Demonstrates grooming with SUA, DEP for toileting and LBB, and Min A for UBD. Continues to be limited by decreased activity tolerance, generalized weakness, and balance. Will continue to progress as able.  -       Row Name 07/22/25 1150          Positioning and Restraints    Pre-Treatment Position in bed  -     Post Treatment Position chair  -     In Chair notified nsg;reclined;call light within reach;encouraged to call for assist;exit alarm on;with nsg;waffle cushion;legs elevated  -               User Key  (r) = Recorded By, (t) = Taken By, (c) = Cosigned By      Initials Name Provider Type    Jyoti Turner, LORIE Occupational Therapist                   Outcome Measures       Row  Name 07/22/25 1154          How much help from another is currently needed...    Putting on and taking off regular lower body clothing? 2  -LC     Bathing (including washing, rinsing, and drying) 2  -LC     Toileting (which includes using toilet bed pan or urinal) 2  -LC     Putting on and taking off regular upper body clothing 3  -LC     Taking care of personal grooming (such as brushing teeth) 3  -LC     Eating meals 4  -     AM-PAC 6 Clicks Score (OT) 16  -LC       Row Name 07/22/25 0800          How much help from another person do you currently need...    Turning from your back to your side while in flat bed without using bedrails? 3  -EW     Moving from lying on back to sitting on the side of a flat bed without bedrails? 3  -EW     Moving to and from a bed to a chair (including a wheelchair)? 3  -EW     Standing up from a chair using your arms (e.g., wheelchair, bedside chair)? 3  -EW     Climbing 3-5 steps with a railing? 2  -EW     To walk in hospital room? 3  -EW     AM-PAC 6 Clicks Score (PT) 17  -EW     Highest Level of Mobility Goal Stand (1 or More Minutes)-5  -EW       Row Name 07/22/25 1154          Functional Assessment    Outcome Measure Options AM-PAC 6 Clicks Daily Activity (OT)  -               User Key  (r) = Recorded By, (t) = Taken By, (c) = Cosigned By      Initials Name Provider Type    Jyoti Turner OT Occupational Therapist    Rubi Arenas, RN Registered Nurse                    Occupational Therapy Education       Title: PT OT SLP Therapies (In Progress)       Topic: Occupational Therapy (In Progress)       Point: ADL training (In Progress)       Learning Progress Summary            Patient Acceptance, E, NR by  at 7/22/2025 1055                      Point: Precautions (In Progress)       Learning Progress Summary            Patient Acceptance, E, NR by  at 7/22/2025 1055                      Point: Body mechanics (In Progress)       Learning Progress Summary             Patient Acceptance, E, NR by  at 7/22/2025 1055                                      User Key       Initials Effective Dates Name Provider Type Discipline     06/16/21 -  Jyoti Spivey OT Occupational Therapist OT                  OT Recommendation and Plan     Plan of Care Review  Plan of Care Reviewed With: patient  Progress: no change  Outcome Evaluation: Pt. completed bed mobility with Mod A and T/Fs with CGA. Demonstrates grooming with SUA, DEP for toileting and LBB, and Min A for UBD. Continues to be limited by decreased activity tolerance, generalized weakness, and balance. Will continue to progress as able.     Time Calculation:   Evaluation Complexity (OT)  Review Occupational Profile/Medical/Therapy History Complexity: brief/low complexity  Assessment, Occupational Performance/Identification of Deficit Complexity: 1-3 performance deficits  Clinical Decision Making Complexity (OT): problem focused assessment/low complexity  Overall Complexity of Evaluation (OT): low complexity     Time Calculation- OT       Row Name 07/22/25 1154             Time Calculation- OT    OT Start Time 1055  -      OT Received On 07/22/25  -      OT Goal Re-Cert Due Date 07/26/25  -         Timed Charges    69683 - OT Self Care/Mgmt Minutes 29  -         Total Minutes    Timed Charges Total Minutes 29  -       Total Minutes 29  -LC                User Key  (r) = Recorded By, (t) = Taken By, (c) = Cosigned By      Initials Name Provider Type     Jyoti Spivey OT Occupational Therapist                  Therapy Charges for Today       Code Description Service Date Service Provider Modifiers Qty    [id number] HC OT SELF CARE/MGMT/TRAIN EA 15 MIN 7/22/2025 Jyoti Spivey OT GO 2                 Jyoti Spivey OT  7/22/2025

## 2025-07-22 NOTE — PROGRESS NOTES
Ibrahima Nava  [id number]  1945   LOS: 7 days   Patient Care Team:  Sebastien Lowe MD as PCP - General  Sebastien Lowe MD as PCP - Family Medicine  Bacilio Winn MD as Consulting Physician (Cardiology)    Chief Complaint:  Follow up on persistent atrial fibrillation     Subjective  Patient denies chest pain, SOB, or palpitations. He has good appetite and says he has been ambulating without difficulties.     Objective     Vital Sign Min/Max for last 24 hours  Temp  Min: 97.9 °F (36.6 °C)  Max: 99.1 °F (37.3 °C)   BP  Min: 126/87  Max: 162/85   Pulse  Min: 85  Max: 136   Resp  Min: 18  Max: 18   SpO2  Min: 83 %  Max: 99 %   No data recorded   No data recorded         07/17/25  0935 07/18/25  1608   Weight: 133 kg (293 lb 3.4 oz) 133 kg (293 lb 3.4 oz)         Intake/Output Summary (Last 24 hours) at 7/22/2025 0709  Last data filed at 7/21/2025 0832  Gross per 24 hour   Intake 120 ml   Output --   Net 120 ml       Physical Exam:     General Appearance:    Alert, cooperative, in no acute distress   Lungs:     Clear to auscultation,respirations regular, even and                unlabored, 2 L O2 NC    Heart:    Irregular and normal rate, normal S1 and S2, no            murmur, no gallop, no rub, no click   Abdomen:  Extremities:   Soft, nontender, bowel sounds audible x4    No edema, normal range of motion   Pulses:   Pulses palpable and equal bilaterally      Results Review:   Results from last 7 days   Lab Units 07/22/25  0030 07/21/25  0846 07/20/25  1317 07/19/25  1153   SODIUM mmol/L  --  141 143 148*   POTASSIUM mmol/L 2.7* 3.0* 3.0* 3.7   CHLORIDE mmol/L  --  98 100 105   CO2 mmol/L  --  32.0* 31.0* 29.3*   BUN mg/dL  --  17.5 18.7 24.0*   CREATININE mg/dL  --  0.85 0.93 0.88   GLUCOSE mg/dL  --  138* 175* 101*   CALCIUM mg/dL  --  8.4* 8.7 8.7     Results from last 7 days   Lab Units 07/21/25  0846 07/20/25  1317 07/19/25  1153   WBC 10*3/mm3 7.98 10.06 9.82   HEMOGLOBIN g/dL 7.5* 8.2* 7.6*    HEMATOCRIT % 25.3* 27.7* 26.3*   PLATELETS 10*3/mm3 153 157 148     Results from last 7 days   Lab Units 07/16/25  1507   CHOLESTEROL mg/dL 157   TRIGLYCERIDES mg/dL 240*   HDL CHOL mg/dL 40   LDL CHOL mg/dL 77     Results from last 7 days   Lab Units 07/15/25  1239   HEMOGLOBIN A1C % 6.34*     Results from last 7 days   Lab Units 07/15/25  1425 07/15/25  1239   HSTROP T ng/L 45* 46*     No new chest x ray or ECG to review    Medication Review: Reviewed    Assessment & Plan   Atrial fibrillation  S/P SALVADOR/ECV with restoration to NSR with frequent PACs, EF 56-60%, mild to moderate MR  Continue Eliquis 5mg bid, amiodarone per protocol  May need to consider alternative for primidone due to drug-drug interaction with Eliquis  Increasing metoprolol to 75mg bid for better rate control 7/22/2025, will get new ECG to assess QTc  HFpEF  Normal EF on SALVADOR 7/18/2025  proBNP 2423 on 7/17/2025  Continue GDMT: Jardiance, bumex, losartan, metoprolol, spironolactone  Hypertension, continue amlodipine 5mg daily, bumex 1mg bid, terazosin 2mg nightly, HCTZ 25mg daily, metoprolol 50mg bid, losartan 100mg daily  Hypokalemia, needs replacement today  Anemia, per hospitalist, continue ferrous sulfate    Plans for SNF at discharge. He will need to follow up in 4 weeks at [facility] office after discharge. Will continue to follow along while inpatient.         CVA (cerebral vascular accident)    Mediastinal mass    Electronically signed by ALAN Santillan, 07/22/25, 7:28 AM EDT.     07/22/25  07:09 EDT

## 2025-07-22 NOTE — PLAN OF CARE
Goal Outcome Evaluation:  Plan of Care Reviewed With: patient        Progress: no change  Outcome Evaluation: Patient able to increase his total ambulation distance to 100'+100' CGA with FWW while on 2L O2. He remains limited primarily by elevated HR with mobility as well as ongoing deficits in strength, endurance, and balance. IPPT remains indicated to address current deficits. He could benefit from D/C to SNF to promote independence with his mobility prior to return home.    Anticipated Discharge Disposition (PT): skilled nursing facility

## 2025-07-22 NOTE — PROGRESS NOTES
[facility] Medicine Services  PROGRESS NOTE    Patient Name: Ibrahima Nava  : 1945  MRN: [id number]    Date of Admission: 7/15/2025  Primary Care Physician: Sebastien Lowe MD    Subjective     CC: f/u neurologic changes     HPI:  In bed. Says he rested well this morning. O2 on 4L. I weaned to RA. He denies dyspnea. Says he has diagnosis of sleep apnea - was not using CPAP prior to admission but is interested in getting set up with one again. Denies chest pain, abdominal pain, nausea or vomiting. Urinating well and last BM . Hypokalemia persists.  Awaiting SNF     Objective     Vital Signs:   Temp:  [97.9 °F (36.6 °C)-99.1 °F (37.3 °C)] 97.9 °F (36.6 °C)  Heart Rate:  [] 104  Resp:  [18] 18  BP: (126-162)/() 145/91  Flow (L/min) (Oxygen Therapy):  [2] 2     Physical Exam:  Constitutional: No acute distress, awake, alert and conversant. Sitting in bed. Obese and chronically ill appearing   HENT: NCAT, mucous membranes moist  Respiratory: Clear to auscultation bilaterally, normal respiratory effort on room air   Cardiovascular: RRR  Gastrointestinal: Positive bowel sounds, soft, nontender, nondistended  Musculoskeletal: 1+ pitting  bilateral ankle edema  Psychiatric: Appropriate affect, cooperative with exam  Neurologic: Oriented x 3, moves all extremities spontaneously without focal deficits, speech clear    Results Reviewed:  LAB RESULTS:      Lab 25  0846 25  1317 25  1153 25  0719 25  1517 07/15/25  1425 07/15/25  1243 07/15/25  1239   WBC 7.98 10.06 9.82 7.57 9.61  --   --  11.13*   HEMOGLOBIN 7.5* 8.2* 7.6* 8.2* 8.3*  --   --  9.0*   HEMOGLOBIN, POC  --   --   --   --   --   --    < >  --    HEMATOCRIT 25.3* 27.7* 26.3* 31.1* 28.6*  --   --  31.2*   HEMATOCRIT POC  --   --   --   --   --   --    < >  --    PLATELETS 153 157 148 128* 150  --   --  176   NEUTROS ABS  --   --   --   --   --   --   --  9.07*   IMMATURE GRANS (ABS)  --    --   --   --   --   --   --  0.32*   LYMPHS ABS  --   --   --   --   --   --   --  1.22   MONOS ABS  --   --   --   --   --   --   --  0.50   EOS ABS  --   --   --   --   --   --   --  0.00   MCV 86.9 87.7 88.0 96.6 86.9  --   --  87.4   PROTIME  --   --   --   --   --   --   --  14.8   HSTROP T  --   --   --   --   --  45*  --  46*    < > = values in this interval not displayed.         Lab 07/22/25  0030 07/21/25  0846 07/20/25  1317 07/19/25  1153 07/19/25  0130 07/18/25  1034 07/17/25  1152 07/16/25  1507   SODIUM  --  141 143 148*  --  148*  --  142   POTASSIUM 2.7* 3.0* 3.0* 3.7 3.4* 3.0*   < > 2.7*   CHLORIDE  --  98 100 105  --  103  --  98   CO2  --  32.0* 31.0* 29.3*  --  31.6*  --  33.0*   ANION GAP  --  11.0 12.0 13.7  --  13.4  --  11.0   BUN  --  17.5 18.7 24.0*  --  22.0  --  20.9   CREATININE  --  0.85 0.93 0.88  --  0.89  --  0.84   EGFR  --  87.8 83.0 86.9  --  86.6  --  88.2   GLUCOSE  --  138* 175* 101*  --  119*  --  227*   CALCIUM  --  8.4* 8.7 8.7  --  8.8  --  8.7   MAGNESIUM  --   --   --  2.3  --  2.3  --   --     < > = values in this interval not displayed.         Lab 07/15/25  1239   TOTAL PROTEIN 5.6*   ALBUMIN 3.7   GLOBULIN 1.9   ALT (SGPT) 58*   AST (SGOT) 75*   BILIRUBIN 0.4   ALK PHOS 85         Lab 07/17/25  1152 07/15/25  1425 07/15/25  1239   PROBNP 2,423.0*  --  3,433.0*   HSTROP T  --  45* 46*   PROTIME  --   --  14.8   INR  --   --  1.09         Lab 07/16/25  1507   CHOLESTEROL 157   LDL CHOL 77   HDL CHOL 40   TRIGLYCERIDES 240*         Lab 07/16/25  1605   IRON 52*   IRON SATURATION (TSAT) 14*   TIBC 383   TRANSFERRIN 257         Lab 07/15/25  1316   PH, ARTERIAL 7.489*   PCO2, ARTERIAL 50.2*   PO2 ART 52.2*   FIO2 21   HCO3 ART 38.2*   BASE EXCESS ART 13.3*   CARBOXYHEMOGLOBIN 1.8     Brief Urine Lab Results  (Last result in the past 365 days)        Color   Clarity   Blood   Leuk Est   Nitrite   Protein   CREAT   Urine HCG        07/15/25 1319 Yellow   Clear   Moderate  (2+)   Negative   Negative   30 mg/dL (1+)                 Microbiology Results Abnormal       None          No radiology results from the last 24 hrs    Results for orders placed during the hospital encounter of 07/15/25    Adult Transesophageal Echo (SALVADOR) W/ Cont if Necessary Per Protocol 07/19/2025  8:29 AM    Interpretation Summary    Left ventricular systolic function is low normal. Left ventricular ejection fraction appears to be 56 - 60%.    Left ventricular wall thickness is consistent with mild concentric hypertrophy.    No evidence of a left atrial appendage thrombus    Mild to moderate mitral valve regurgitation    Current medications:  Scheduled Meds:amiodarone, 200 mg, Oral, Q8H   Followed by  [START ON 7/27/2025] amiodarone, 200 mg, Oral, Q12H   Followed by  [START ON 8/10/2025] amiodarone, 200 mg, Oral, Daily  amLODIPine, 5 mg, Oral, Q24H  apixaban, 5 mg, Oral, Q12H  atorvastatin, 40 mg, Oral, Nightly  bumetanide, 1 mg, Oral, BID Diuretics  empagliflozin, 10 mg, Oral, Daily  ferric gluconate, 125 mg, Intravenous, Daily  [Held by provider] ferrous sulfate, 325 mg, Oral, Daily With Breakfast  hydroCHLOROthiazide Oral, 25 mg, Oral, Daily  insulin glargine, 5 Units, Subcutaneous, Nightly  insulin lispro, 2-9 Units, Subcutaneous, 4x Daily AC & at Bedtime  levothyroxine, 50 mcg, Oral, Q AM  losartan, 100 mg, Oral, Q24H  melatonin, 10 mg, Oral, Nightly  metoprolol tartrate, 75 mg, Oral, Q12H  pantoprazole, 40 mg, Oral, Q AM  potassium chloride ER, 40 mEq, Oral, Q4H  [Held by provider] primidone, 100 mg, Oral, Daily  sertraline, 200 mg, Oral, Daily  sodium chloride, 10 mL, Intravenous, Q12H  sodium chloride, 10 mL, Intravenous, Q12H  spironolactone, 25 mg, Oral, Daily  terazosin, 2 mg, Oral, Nightly  tiZANidine, 4 mg, Oral, Nightly  valACYclovir, 500 mg, Oral, Daily      Continuous Infusions:   PRN Meds:.  acetaminophen    senna-docusate sodium **AND** polyethylene glycol **AND** bisacodyl **AND** bisacodyl    " cloNIDine    dextrose    dextrose    glucagon (human recombinant)    hydrALAZINE    LORazepam    Magnesium Cardiology Dose Replacement - Follow Nurse / BPA Driven Protocol    metoprolol tartrate    nitroglycerin    Potassium Replacement - Follow Nurse / BPA Driven Protocol    sodium chloride    sodium chloride    sodium chloride    sodium chloride    sodium chloride    Assessment & Plan   Assessment & Plan     Active Hospital Problems    Diagnosis  POA    **CVA (cerebral vascular accident) [I63.9]  Yes    Mediastinal mass [J98.59]  Unknown      Resolved Hospital Problems   No resolved problems to display.     Brief Hospital Course to date:  Ibrahima Nava is a 80 y.o. male with PMH significant for HTN, anxiety (on chronic benzodiazepine therapy), prior CVA (2012 with no residual deficits), essential tremor, obesity (BMI 44) and DARCY (unable to tolerate CPAP).     Ibrahima Nava is a 80 y.o. male w history of HTN, anxiety on chronic benzos, CVA in 2012 (no residual deficit), essential tremor. Endorses dysarthria, dysphagia to solids and liquids, confusion.      Stroke recrudescence   History of CVA   - Presented with speech dysarthria / expressive aphasia and confusion   - Stroke neurology team has followed  - CTA head/neck with mild cervical/intracranial atherosclerotic changes, no LVO or flow-limiting stenosis  - MRI brain negative for acute stroke  - EEG reassuring  - SALVADOR with EF 56-60%, mild concentric LV hypertrophy, mild to moderate MR, no atrial appendage thrombus  - \"Cannot exclude the presence of PFO\" on TTE  - High-intensity statin   - On Eliquis due to new AF  - Follow up with stroke clinic     Dysphagia  - Patient reported dysphagia to solids/liquids with R neck fullness  - CTA neck reassuring  - SLP evaluated. MBS with limited upper GI series with no evidence of obstruction, noted mild reflux  - Started PPI  - Soft texture / chopped meats with thin liquids    New onset atrial fibrillation   Acute " HFpEF  Essential hypertension  - Appreciate cardiology assistance  - TSH OK   - Started on Amiodarone, Metoprolol and Eliquis for AF  - For HFpEF, started on Jardiance, Bumex BID, Spironolactone, Losartan   - For HTN, started on Amlodipine 5mg, HCTZ 25mg and Terazosin     Hypokalemia  - Related to significant diuretic use (Bumex, HCTZ - also on Spironolactone though this is potassium sparing)  - Difficulty repleting K+ despite replacement protocol  - Check mag     New mediastinal mass / JANNA pulmonary mass  - CT chest proximately 4.5 cm mediastinal mass and a contiguous 5.5 x 3 cm pleural-based JANNA pulmonary mass concerning for primary neoplasm also noted a few shotty mediastinal lymph nodes with rounded morphology, reactive versus metastatic adenopathy  - Partner discussed CT chest findings with patient and family  - Patient reports history of smoking, quit 30 years ago.  Chronic cough x 1 year.  No weight loss or hemoptysis  - Patient/family interested in biopsy however was started on Eliquis for A-fib this admission.  - Pulmonology evaluated this admission. He is scheduled to see Dr. King in 7/28/25 to discuss procedure and arrange [facility].     Pancreatic head mass  - CT abdomen/pelvis showed incidental [id number] cystic finding in pancreatic head  - Will need continued monitoring on an outpatient basis     Normocytic anemia  - Iron studies show iron deficiency - started on PO iron however Hgb slowly trending down so will start IV iron today. Continue to monitor  - Started on Eliquis this admission - consider FOBT / GI evaluation if downtrend continues     PreDM  - A1c 6.3%. Not on home meds. Now on Jardiance.   - Continue Lantus 5 units QHS + SSI     Essential tremor, Primidone on hold due to interaction with Eliquis  Anxiety / chronic benzo use, continue routine Lorazepam     Expected Discharge Location and Transportation: [facility]  Expected Discharge Expected Discharge Date: 7/24/2025; Expected Discharge Time:       VTE Prophylaxis:Pharmacologic & mechanical VTE prophylaxis orders are present.    AM-PAC 6 Clicks Score (PT): 17 (07/21/25 2000)    CODE STATUS:   Code Status and Medical Interventions: CPR (Attempt to Resuscitate); Full Support   Ordered at: 07/15/25 1515     Code Status (Patient has no pulse and is not breathing):    CPR (Attempt to Resuscitate)     Medical Interventions (Patient has pulse or is breathing):    Full Support     Level Of Support Discussed With:    Patient     Debbie Davenport PA-C  07/22/25

## 2025-07-22 NOTE — PROGRESS NOTES
Patient has been initiated on Apixaban (Eliquis) during admission. Education provided on 7/22/2025 verbally and in writing. Information provided includes effects of medication, drug-drug and drug-food interactions, and signs/symptoms of bleeding and clotting. Patient/family verbalized understanding through teach back. All pertinent questions were answered by pharmacist.

## 2025-07-22 NOTE — PLAN OF CARE
Problem: Adult Inpatient Plan of Care  Goal: Plan of Care Review  Outcome: Progressing  Goal: Patient-Specific Goal (Individualized)  Outcome: Progressing  Goal: Absence of Hospital-Acquired Illness or Injury  Outcome: Progressing  Intervention: Identify and Manage Fall Risk  Recent Flowsheet Documentation  Taken 7/22/2025 1600 by Weathers, Rubi, RN  Safety Promotion/Fall Prevention:   activity supervised   assistive device/personal items within reach   clutter free environment maintained   lighting adjusted   room organization consistent  Taken 7/22/2025 1400 by Weathers, Rubi, RN  Safety Promotion/Fall Prevention:   activity supervised   assistive device/personal items within reach   clutter free environment maintained   lighting adjusted   nonskid shoes/slippers when out of bed   room organization consistent  Taken 7/22/2025 1200 by Weathers, Rubi, RN  Safety Promotion/Fall Prevention:   activity supervised   assistive device/personal items within reach   clutter free environment maintained   lighting adjusted   room organization consistent  Taken 7/22/2025 1000 by Weathers, Rubi, RN  Safety Promotion/Fall Prevention:   activity supervised   assistive device/personal items within reach   clutter free environment maintained   lighting adjusted   room organization consistent  Taken 7/22/2025 0800 by Weathers, Rubi, RN  Safety Promotion/Fall Prevention:   activity supervised   assistive device/personal items within reach   clutter free environment maintained   lighting adjusted   room organization consistent  Intervention: Prevent Skin Injury  Recent Flowsheet Documentation  Taken 7/22/2025 1600 by Rubi Oneill RN  Body Position:   turned   right  Skin Protection:   incontinence pads utilized   silicone foam dressing in place   transparent dressing maintained  Taken 7/22/2025 1400 by Rubi Oneill RN  Body Position:   turned   left  Skin Protection:   incontinence pads  utilized   silicone foam dressing in place   transparent dressing maintained  Taken 7/22/2025 1200 by Rubi Oneill RN  Body Position: supine  Skin Protection:   incontinence pads utilized   silicone foam dressing in place   transparent dressing maintained  Taken 7/22/2025 1000 by Rubi Oneill RN  Body Position:   turned   right  Skin Protection:   incontinence pads utilized   silicone foam dressing in place   transparent dressing maintained  Taken 7/22/2025 0800 by Rubi Oneill RN  Body Position:   turned   left  Skin Protection:   incontinence pads utilized   silicone foam dressing in place   transparent dressing maintained  Intervention: Prevent and Manage VTE (Venous Thromboembolism) Risk  Recent Flowsheet Documentation  Taken 7/22/2025 0800 by Rubi Oneill RN  VTE Prevention/Management:   bilateral   SCDs (sequential compression devices) off  Intervention: Prevent Infection  Recent Flowsheet Documentation  Taken 7/22/2025 1600 by Rubi Oneill RN  Infection Prevention: environmental surveillance performed  Taken 7/22/2025 1400 by Rubi Oneill RN  Infection Prevention: environmental surveillance performed  Taken 7/22/2025 1200 by uRbi Oneill RN  Infection Prevention: environmental surveillance performed  Taken 7/22/2025 1000 by Rubi Oneill RN  Infection Prevention: environmental surveillance performed  Taken 7/22/2025 0800 by Rubi Oneill RN  Infection Prevention: environmental surveillance performed  Goal: Optimal Comfort and Wellbeing  Outcome: Progressing  Intervention: Provide Person-Centered Care  Recent Flowsheet Documentation  Taken 7/22/2025 0800 by Rubi Oneill RN  Trust Relationship/Rapport:   care explained   choices provided   thoughts/feelings acknowledged  Goal: Readiness for Transition of Care  Outcome: Progressing     Problem: Stroke, Ischemic (Includes Transient Ischemic Attack)  Goal: Optimal Coping  Outcome:  Progressing  Intervention: Support Psychosocial Response to Stroke  Recent Flowsheet Documentation  Taken 7/22/2025 0800 by Rubi Oneill RN  Supportive Measures: active listening utilized  Family/Support System Care: self-care encouraged  Goal: Optimal Cerebral Tissue Perfusion  Outcome: Progressing  Intervention: Protect and Optimize Cerebral Perfusion  Recent Flowsheet Documentation  Taken 7/22/2025 1600 by Rubi Oneill RN  Sensory Stimulation Regulation: care clustered  Taken 7/22/2025 1200 by Rubi Oneill RN  Sensory Stimulation Regulation: care clustered  Taken 7/22/2025 0800 by Rubi Oneill RN  Sensory Stimulation Regulation: care clustered  Goal: Optimal Cognitive Function  Outcome: Progressing  Intervention: Optimize Cognitive Function  Recent Flowsheet Documentation  Taken 7/22/2025 1600 by Rubi Oneill RN  Sensory Stimulation Regulation: care clustered  Taken 7/22/2025 1200 by Rubi Oneill RN  Sensory Stimulation Regulation: care clustered  Taken 7/22/2025 0800 by Rubi Oneill RN  Sensory Stimulation Regulation: care clustered  Goal: Optimal Functional Ability  Outcome: Progressing  Intervention: Optimize Functional Ability  Recent Flowsheet Documentation  Taken 7/22/2025 0800 by Rubi Oneill RN  Activity Management: activity encouraged  Goal: Effective Oxygenation and Ventilation  Outcome: Progressing  Intervention: Optimize Oxygenation and Ventilation  Recent Flowsheet Documentation  Taken 7/22/2025 1600 by Rubi Oneill RN  Head of Bed (HOB) Positioning: HOB elevated  Taken 7/22/2025 1400 by Rubi Oneill RN  Head of Bed (HOB) Positioning: HOB elevated  Taken 7/22/2025 1200 by Rubi Oneill RN  Head of Bed (HOB) Positioning: HOB elevated  Taken 7/22/2025 1000 by Rubi Oneill RN  Head of Bed (HOB) Positioning: HOB elevated  Taken 7/22/2025 0800 by Rubi Oneill RN  Head of Bed (HOB) Positioning: HOB  elevated  Goal: Safe and Effective Swallow  Outcome: Progressing     Problem: Comorbidity Management  Goal: Blood Pressure in Desired Range  Outcome: Progressing  Intervention: Maintain Blood Pressure Management  Recent Flowsheet Documentation  Taken 7/22/2025 1600 by Rubi Oneill RN  Medication Review/Management: medications reviewed  Taken 7/22/2025 1400 by Rubi Oneill RN  Medication Review/Management: medications reviewed  Taken 7/22/2025 1200 by Rubi Oneill RN  Medication Review/Management: medications reviewed  Taken 7/22/2025 1000 by Rubi Oneill RN  Medication Review/Management: medications reviewed  Taken 7/22/2025 0800 by Rubi Oneill RN  Medication Review/Management: medications reviewed     Problem: Fall Injury Risk  Goal: Absence of Fall and Fall-Related Injury  Outcome: Progressing  Intervention: Identify and Manage Contributors  Recent Flowsheet Documentation  Taken 7/22/2025 1600 by Rubi Oneill RN  Medication Review/Management: medications reviewed  Taken 7/22/2025 1400 by Rubi Oneill RN  Medication Review/Management: medications reviewed  Taken 7/22/2025 1200 by Rubi Oneill RN  Medication Review/Management: medications reviewed  Taken 7/22/2025 1000 by Rubi Oneill RN  Medication Review/Management: medications reviewed  Taken 7/22/2025 0800 by Rubi Oneill RN  Medication Review/Management: medications reviewed  Intervention: Promote Injury-Free Environment  Recent Flowsheet Documentation  Taken 7/22/2025 1600 by Weathers, Rubi, RN  Safety Promotion/Fall Prevention:   activity supervised   assistive device/personal items within reach   clutter free environment maintained   lighting adjusted   room organization consistent  Taken 7/22/2025 1400 by Weathers, Rubi, RN  Safety Promotion/Fall Prevention:   activity supervised   assistive device/personal items within reach   clutter free environment maintained    lighting adjusted   nonskid shoes/slippers when out of bed   room organization consistent  Taken 7/22/2025 1200 by Weathers, Rubi, RN  Safety Promotion/Fall Prevention:   activity supervised   assistive device/personal items within reach   clutter free environment maintained   lighting adjusted   room organization consistent  Taken 7/22/2025 1000 by Weathers, Rubi, RN  Safety Promotion/Fall Prevention:   activity supervised   assistive device/personal items within reach   clutter free environment maintained   lighting adjusted   room organization consistent  Taken 7/22/2025 0800 by Weathers, Rubi, RN  Safety Promotion/Fall Prevention:   activity supervised   assistive device/personal items within reach   clutter free environment maintained   lighting adjusted   room organization consistent     Problem: Skin Injury Risk Increased  Goal: Skin Health and Integrity  Outcome: Progressing  Intervention: Optimize Skin Protection  Recent Flowsheet Documentation  Taken 7/22/2025 1600 by Rubi Oneill RN  Pressure Reduction Techniques:   weight shift assistance provided   heels elevated off bed   pressure points protected  Head of Bed (HOB) Positioning: [facility] elevated  Pressure Reduction Devices:   pressure-redistributing mattress utilized   positioning supports utilized   heel offloading device utilized  Skin Protection:   incontinence pads utilized   silicone foam dressing in place   transparent dressing maintained  Taken 7/22/2025 1400 by Rubi Oneill RN  Pressure Reduction Techniques:   weight shift assistance provided   heels elevated off bed   pressure points protected  Head of Bed (HOB) Positioning: HOB elevated  Pressure Reduction Devices:   pressure-redistributing mattress utilized   positioning supports utilized   heel offloading device utilized  Skin Protection:   incontinence pads utilized   silicone foam dressing in place   transparent dressing maintained  Taken 7/22/2025 1200 by  Rubi Oneill RN  Pressure Reduction Techniques:   weight shift assistance provided   heels elevated off bed   pressure points protected  Head of Bed (HOB) Positioning: [facility] elevated  Pressure Reduction Devices:   pressure-redistributing mattress utilized   positioning supports utilized   heel offloading device utilized  Skin Protection:   incontinence pads utilized   silicone foam dressing in place   transparent dressing maintained  Taken 7/22/2025 1000 by Rubi Oneill RN  Pressure Reduction Techniques:   weight shift assistance provided   heels elevated off bed   pressure points protected  Head of Bed (HOB) Positioning: [facility] elevated  Pressure Reduction Devices:   pressure-redistributing mattress utilized   positioning supports utilized   heel offloading device utilized  Skin Protection:   incontinence pads utilized   silicone foam dressing in place   transparent dressing maintained  Taken 7/22/2025 0800 by Rubi Oneill RN  Activity Management: activity encouraged  Pressure Reduction Techniques:   weight shift assistance provided   heels elevated off bed   pressure points protected  Head of Bed (HOB) Positioning: [facility] elevated  Pressure Reduction Devices:   pressure-redistributing mattress utilized   heel offloading device utilized   positioning supports utilized  Skin Protection:   incontinence pads utilized   silicone foam dressing in place   transparent dressing maintained   Goal Outcome Evaluation:

## 2025-07-22 NOTE — THERAPY TREATMENT NOTE
Patient Name: Ibrahima Nava  : 1945    MRN: [id number]                              Today's Date: 2025       Admit Date: 7/15/2025    Visit Dx:     ICD-10-CM ICD-9-CM   1. Cognitive communication deficit  R41.841 799.52   2. Aphasia  R47.01 784.3   3. Esophageal dysphagia  R13.19 787.29   4. Altered mental status, unspecified altered mental status type  R41.82 780.97   5. Hypokalemia  E87.6 276.8   6. History of stroke  Z86.73 V12.54   7. Primary sleep apnea of , unspecified type  P28.30 770.81   8. Sleep apnea, unspecified type  G47.30 780.57   9. 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 obesity)  E66.813 278.01     Patient Active Problem List   Diagnosis    Primary localized osteoarthrosis of shoulder region    Hypertension    Impaired physical mobility    Acute pain of left shoulder    Status post reverse arthroplasty of shoulder, left    CVA (cerebral vascular accident)    Mediastinal mass     Past Medical History:   Diagnosis Date    Anxiety     Arthritis     Hypertension     CONTROLLED WITH MEDS PER PT     Shoulder pain, left     Sleep apnea     did not tolerate CPAP    Stroke     -- NO RESIDUAL PROBLEMS    Tremor of right hand     Wears dentures     UPPER AND LOWER PLATE      Past Surgical History:   Procedure Laterality Date    CARDIAC CATHETERIZATION      NO CORONARY STENTS     COLONOSCOPY      [place] ARTHROPLASTY GLENOHUMERAL JOINT TOTAL SHOULDER Left 2017    Procedure: LEFT TOTAL SHOULDER ARTHROPLASTY ;  Surgeon: Michael Xavier MD;  Location:  [facility];  Service: Orthopedics    TOTAL KNEE ARTHROPLASTY Bilateral     TOTAL SHOULDER REPLACEMENT Right     TOTAL SHOULDER REVISION Left 2024    Procedure: REVISION TOTAL SHOULDER ARTHROPLASTY TO REVERSE TOTAL SHOULDER ATHROPLASTY - LEFT;  Surgeon: Michael Xavier MD;  Location:  [facility];  Service: Orthopedics;  Laterality: Left;      General Information       Row Name 25 [id number]          Physical Therapy Time  and Intention    Document Type therapy note (daily note)  -CK     Mode of Treatment physical therapy;individual therapy  -CK       Row Name 07/22/25 1448          General Information    Patient Profile Reviewed yes  -CK     Existing Precautions/Restrictions fall;oxygen therapy device and L/min;other (see comments)  brief with mobility  -CK     Barriers to Rehab medically complex;hearing deficit  -CK       Row Name 07/22/25 1448          Cognition    Orientation Status (Cognition) oriented x 3  -CK       Row Name 07/22/25 1448          Safety Issues/Impairments Affecting Functional Mobility    Safety Issues Affecting Function (Mobility) awareness of need for assistance;insight into deficits/self-awareness;safety precaution awareness;safety precautions follow-through/compliance;sequencing abilities;positioning of assistive device  -CK     Impairments Affecting Function (Mobility) balance;endurance/activity tolerance;postural/trunk control;strength;shortness of breath  -CK               User Key  (r) = Recorded By, (t) = Taken By, (c) = Cosigned By      Initials Name Provider Type    CK Zehra Allan, ALLYSSA Physical Therapist                   Mobility       Row Name 07/22/25 1449          Bed Mobility    Comment, (Bed Mobility) [facility] pre/post treatment  -CK       Row Name 07/22/25 1449          Sit-Stand Transfer    Sit-Stand [place] (Transfers) contact guard;verbal cues  -CK     Assistive Device (Sit-Stand Transfers) walker, front-wheeled  -CK     Comment, (Sit-Stand Transfer) stood multiple times from chair and tolerated prolonged standing for pericare  -CK       Row Name 07/22/25 1449          Gait/Stairs (Locomotion)    [place] Level (Gait) contact guard;verbal cues;1 person assist  -CK     Assistive Device (Gait) walker, front-wheeled  -CK     Patient was able to Ambulate yes  -CK     Distance in Feet (Gait) 100  +100  -CK     Deviations/Abnormal Patterns (Gait) gait speed decreased;dennise  decreased;weight shifting decreased;stride length decreased;bilateral deviations;base of support, wide  -CK     Bilateral Gait Deviations forward flexed posture;heel strike decreased  -CK     Comment, (Gait/Stairs) Patient ambulated in [place] on 2L O2 with step through gait pattern. He took a standing rest break due to elevated HR to 144 and was able to recover to 120s. While ambulating back to room, patient demonstrated progressively flexed posture progressing to forearms on walker. Cues provided for upright posture and PLB. HR up to 160 upon return to room, RN notified. SpO2 at this point was 97% on 2L O2.  -CK               User Key  (r) = Recorded By, (t) = Taken By, (c) = Cosigned By      Initials Name Provider Type    CK Zehra Allan, ALLYSSA Physical Therapist                   Obj/Interventions       Row Name 07/22/25 1503          Balance    Balance Assessment sitting static balance;standing static balance;standing dynamic balance  -CK     Static Sitting Balance supervision  -CK     Position, Sitting Balance unsupported;sitting in chair  -CK     Static Standing Balance standby assist  -CK     Dynamic Standing Balance contact guard  -CK     Position/Device Used, Standing Balance supported;walker, front-wheeled  -CK               User Key  (r) = Recorded By, (t) = Taken By, (c) = Cosigned By      Initials Name Provider Type    CK Zehra Allan, PT Physical Therapist                   Goals/Plan    No documentation.                  Clinical Impression       Row Name 07/22/25 1504          Pain    Pretreatment Pain Rating 0/10 - no pain  -CK     Posttreatment Pain Rating 0/10 - no pain  -CK       Row Name 07/22/25 1506          Plan of Care Review    Plan of Care Reviewed With patient  -CK     Progress no change  -CK     Outcome Evaluation Patient able to increase his total ambulation distance to 100'+100' CGA with FWW while on 2L O2. He remains limited primarily by elevated HR with mobility as well as  ongoing deficits in strength, endurance, and balance. IPPT remains indicated to address current deficits. He could benefit from D/C to SNF to promote independence with his mobility prior to return home.  -CK       Row Name 07/22/25 1504          Vital Signs    Pre Systolic BP Rehab 107  -CK     Pre Treatment Diastolic BP 73  -CK     Post Systolic BP Rehab 125  -CK     Post Treatment Diastolic BP 68  -CK     Pretreatment Heart Rate (beats/min) 109  -CK     Intratreatment Heart Rate (beats/min) 160   -CK     Posttreatment Heart Rate (beats/min) 113  -CK     Pre SpO2 (%) 95  -CK     O2 Delivery Pre Treatment nasal cannula  -CK     Intra SpO2 (%) 97  -CK     O2 Delivery Intra Treatment nasal cannula  -CK     Post SpO2 (%) 98  -CK     O2 Delivery Post Treatment nasal cannula  -CK     Pre Patient Position Sitting  -CK     Intra Patient Position Standing  -CK     Post Patient Position Sitting  -CK       Row Name 07/22/25 1504          Positioning and Restraints    Pre-Treatment Position sitting in chair/recliner  -CK     Post Treatment Position chair  -CK     In Chair reclined;call light within reach;encouraged to call for assist;exit alarm on;waffle cushion;with nsg;notified nsg  -CK               User Key  (r) = Recorded By, (t) = Taken By, (c) = Cosigned By      Initials Name Provider Type    CK Zehra Allan, PT Physical Therapist                   Outcome Measures       Row Name 07/22/25 1518 07/22/25 0800       How much help from another person do you currently need...    Turning from your back to your side while in flat bed without using bedrails? 3  -CK 3  -EW    Moving from lying on back to sitting on the side of a flat bed without bedrails? 2  -CK 3  -EW    Moving to and from a bed to a chair (including a wheelchair)? 3  -CK 3  -EW    Standing up from a chair using your arms (e.g., wheelchair, bedside chair)? 3  -CK 3  -EW    Climbing 3-5 steps with a railing? 2  -CK 2  -EW    To walk in hospital room? 3   -CK 3  -EW    AM-PAC 6 Clicks Score (PT) 16  -CK 17  -EW    Highest Level of Mobility Goal Stand (1 or More Minutes)-5  -CK Stand (1 or More Minutes)-5  -EW      Row Name 07/22/25 1518 07/22/25 1154       Functional Assessment    Outcome Measure Options AM-PAC 6 Clicks Basic Mobility (PT)  -CK AM-PAC 6 Clicks Daily Activity (OT)  -              User Key  (r) = Recorded By, (t) = Taken By, (c) = Cosigned By      Initials Name Provider Type    Jyoti Turner, OT Occupational Therapist    CK Zehra Allan, PT Physical Therapist    EW Rubi Oneill, RN Registered Nurse                                 Physical Therapy Education       Title: PT OT SLP Therapies (In Progress)       Topic: Physical Therapy (Done)       Point: Mobility training (Done)       Learning Progress Summary            Patient Acceptance, E, VU by  at 7/22/2025 1518    Acceptance, E, VU,NR by CD at 7/21/2025 1539    Comment: SEE FLOWSHEET    Eager, E, VU by SC at 7/17/2025 1155    Comment: reviewed benefits of activity    Eager, E, VU,DU by SC at 7/17/2025 1152    Comment: reviewed HEP    Acceptance, E, VU by KR at 7/16/2025 1410   Family Acceptance, E, VU by KR at 7/16/2025 1410                      Point: Home exercise program (Done)       Learning Progress Summary            Patient Acceptance, E, VU,NR by CD at 7/21/2025 1539    Comment: SEE FLOWSHEET    Eager, E, VU by SC at 7/17/2025 1155    Comment: reviewed benefits of activity    Eager, E, VU,DU by SC at 7/17/2025 1152    Comment: reviewed HEP                      Point: Body mechanics (Done)       Learning Progress Summary            Patient Acceptance, E, VU by CK at 7/22/2025 1518    Acceptance, E, VU,NR by CD at 7/21/2025 1539    Comment: SEE FLOWSHEET    Eager, E, VU by SC at 7/17/2025 1155    Comment: reviewed benefits of activity    Eager, E, VU,DU by SC at 7/17/2025 1152    Comment: reviewed HEP    Acceptance, E, VU by KR at 7/16/2025 1410   Family Acceptance, E, VU  by KR at 7/16/2025 1410                      Point: Precautions (Done)       Learning Progress Summary            Patient Acceptance, E, VU by  at 7/22/2025 1518    Acceptance, E, VU,NR by CD at 7/21/2025 1539    Comment: SEE FLOWSHEET    Eager, E, VU by SC at 7/17/2025 1155    Comment: reviewed benefits of activity    Eager, E, VU,DU by SC at 7/17/2025 1152    Comment: reviewed HEP    Acceptance, E, VU by KR at 7/16/2025 1410   Family Acceptance, E, VU by KR at 7/16/2025 1410                                      User Key       Initials Effective Dates Name Provider Type Discipline    SC 02/03/23 -  Marian Howard, PT Physical Therapist PT    CD 02/03/23 -  Marla Hernández PT Physical Therapist PT     02/06/24 -  Zehra Allan, PT Physical Therapist PT     12/30/22 -  Coco Casarez, PT Physical Therapist PT                  PT Recommendation and Plan     Progress: no change  Outcome Evaluation: Patient able to increase his total ambulation distance to 100'+100' CGA with FWW while on 2L O2. He remains limited primarily by elevated HR with mobility as well as ongoing deficits in strength, endurance, and balance. IPPT remains indicated to address current deficits. He could benefit from D/C to SNF to promote independence with his mobility prior to return home.     Time Calculation:         PT Charges       Row Name 07/22/25 1518             Time Calculation    Start Time 1355  -CK      PT Received On 07/22/25  -CK         Timed Charges    46334 - PT Therapeutic Activity Minutes 30  -CK         Total Minutes    Timed Charges Total Minutes 30  -CK       Total Minutes 30  -CK                User Key  (r) = Recorded By, (t) = Taken By, (c) = Cosigned By      Initials Name Provider Type    CK Zehra Allan, PT Physical Therapist                  Therapy Charges for Today       Code Description Service Date Service Provider Modifiers Qty    63794691541 HC PT THERAPEUTIC ACT EA 15 MIN 7/22/2025 Jerrell  Zehra, PT GP 2            PT G-Codes  Outcome Measure Options: AM-PAC 6 Clicks Basic Mobility (PT)  AM-PAC 6 Clicks Score (PT): 16  AM-PAC 6 Clicks Score (OT): 16  Modified [place] Scale: 2 - Slight disability.  Unable to carry out all previous activities but able to look after own affairs without assistance.  PT Discharge Summary  Anticipated Discharge Disposition (PT): skilled nursing facility    Zehra Allan, ALLYSSA  7/22/2025

## 2025-07-22 NOTE — PLAN OF CARE
Goal Outcome Evaluation:  Plan of Care Reviewed With: patient        Progress: no change  Outcome Evaluation: Pt. completed bed mobility with Mod A and T/Fs with CGA. Demonstrates grooming with SUA, DEP for toileting and LBB, and Min A for UBD. Continues to be limited by decreased activity tolerance, generalized weakness, and balance. Will continue to progress as able.

## 2025-07-22 NOTE — THERAPY TREATMENT NOTE
Patient Name: Ibrahima Nava  : 1945    MRN: [id number]                              Today's Date: 2025       Admit Date: 7/15/2025    Visit Dx:     ICD-10-CM ICD-9-CM   1. Cognitive communication deficit  R41.841 799.52   2. Aphasia  R47.01 784.3   3. Esophageal dysphagia  R13.19 787.29   4. Altered mental status, unspecified altered mental status type  R41.82 780.97   5. Hypokalemia  E87.6 276.8   6. History of stroke  Z86.73 V12.54   7. Primary sleep apnea of , unspecified type  P28.30 770.81   8. Sleep apnea, unspecified type  G47.30 780.57   9. 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 obesity)  E66.813 278.01     Patient Active Problem List   Diagnosis    Primary localized osteoarthrosis of shoulder region    Hypertension    Impaired physical mobility    Acute pain of left shoulder    Status post reverse arthroplasty of shoulder, left    CVA (cerebral vascular accident)    Mediastinal mass     Past Medical History:   Diagnosis Date    Anxiety     Arthritis     Hypertension     CONTROLLED WITH MEDS PER PT     Shoulder pain, left     Sleep apnea     did not tolerate CPAP    Stroke     -- NO RESIDUAL PROBLEMS    Tremor of right hand     Wears dentures     UPPER AND LOWER PLATE      Past Surgical History:   Procedure Laterality Date    CARDIAC CATHETERIZATION      NO CORONARY STENTS     COLONOSCOPY      WY ARTHROPLASTY GLENOHUMERAL JOINT TOTAL SHOULDER Left 2017    Procedure: LEFT TOTAL SHOULDER ARTHROPLASTY ;  Surgeon: Michael Xavier MD;  Location:  [facility];  Service: Orthopedics    TOTAL KNEE ARTHROPLASTY Bilateral     TOTAL SHOULDER REPLACEMENT Right     TOTAL SHOULDER REVISION Left 2024    Procedure: REVISION TOTAL SHOULDER ARTHROPLASTY TO REVERSE TOTAL SHOULDER ATHROPLASTY - LEFT;  Surgeon: Michael Xavier MD;  Location:  [facility];  Service: Orthopedics;  Laterality: Left;      General Information       Row Name 25 1143          OT Time and Intention     Document Type therapy note (daily note)  -     Mode of Treatment occupational therapy  -       Row Name 07/22/25 1143          General Information    Patient Profile Reviewed yes  -     Prior Level of Function --  See IE  -     Existing Precautions/Restrictions fall;oxygen therapy device and L/min;other (see comments)  Brief with mobility  -     Barriers to Rehab medically complex;hearing deficit  -       Row Name 07/22/25 1143          Cognition    Orientation Status (Cognition) oriented x 3  -       Row Name 07/22/25 1143          Safety Issues/Impairments Affecting Functional Mobility    Safety Issues Affecting Function (Mobility) awareness of need for assistance;insight into deficits/self-awareness;safety precaution awareness;safety precautions follow-through/compliance;sequencing abilities  -     Impairments Affecting Function (Mobility) balance;endurance/activity tolerance;postural/trunk control;strength;shortness of breath  -               User Key  (r) = Recorded By, (t) = Taken By, (c) = Cosigned By      Initials Name Provider Type     Jyoti Spivey OT Occupational Therapist                     Mobility/ADL's       Row Name 07/22/25 1144          Bed Mobility    Bed Mobility scooting/bridging;supine-sit  -     Scooting/Bridging [place] (Bed Mobility) modified independence  -     Supine-Sit [place] (Bed Mobility) modified independence  -     Assistive Device (Bed Mobility) head of bed elevated;bed rails  -     Comment, (Bed Mobility) Demonstrates good sequencing, Incontinent upon sitting EOB, requiring increased time for clean up.  -       Row Name 07/22/25 1144          Transfers    Transfers sit-stand transfer;toilet transfer  -     Comment, (Transfers) VC's for hand placement and sequencing.  -       Row Name 07/22/25 1144          Sit-Stand Transfer    Sit-Stand [place] (Transfers) contact guard;verbal cues  -     Assistive Device (Sit-Stand  Transfers) walker, front-wheeled  -       Row Name 07/22/25 1144          Toilet Transfer    Type (Toilet Transfer) sit-stand;stand-sit  -     [place] (Toilet Transfer) contact guard;verbal cues  -     Assistive Device (Toilet Transfer) commode, bedside without drop arms;walker, front-wheeled  -LC       Row Name 07/22/25 1144          Activities of Daily Living    BADL Assessment/Intervention toileting;grooming;upper body dressing;bathing  -       Row Name 07/22/25 1144          Toileting Assessment/Training    [place] (Toileting) adjust/manage clothing;perform perineal hygiene;dependent (less than 25% patient effort)  -     Assistive Devices (Toileting) commode, bedside without drop arms  -     Position (Toileting) unsupported standing;unsupported sitting  -     Comment, (Toileting) Increased time to perform clean up. Pt. with incontinence upon sitting EOB and T/F's to [facility] to complete task  -       Row Name 07/22/25 1144          Grooming Assessment/Training    [place] (Grooming) wash face, hands;set up  -     Position (Grooming) unsupported sitting  -       Row Name 07/22/25 1144          Upper Body Dressing Assessment/Training    [place] Level (Upper Body Dressing) don;doff;front opening garment;minimum assist (75% patient effort)  -     Position (Upper Body Dressing) unsupported sitting  -       Row Name 07/22/25 1144          Bathing Assessment/Intervention    [place] (Bathing) lower body;proximal lower extremities;perineal area;dependent (less than 25% patient effort);verbal cues  -     Position (Bathing) supported standing  -     Comment, (Bathing) Increased time  -               User Key  (r) = Recorded By, (t) = Taken By, (c) = Cosigned By      Initials Name Provider Type    Jyoti Turner OT Occupational Therapist                   Obj/Interventions       Row Name 07/22/25 1148          Balance    Balance Assessment sitting  static balance;sitting dynamic balance;standing static balance;standing dynamic balance  -     Static Sitting Balance supervision  -     Dynamic Sitting Balance standby assist  -     Position, Sitting Balance unsupported;sitting edge of bed  -     Static Standing Balance standby assist  -     Dynamic Standing Balance contact guard  -     Position/Device Used, Standing Balance unsupported;walker, front-wheeled  -     Balance Interventions sitting;standing;sit to stand;supported;occupation based/functional task;weight shifting activity  -     Comment, Balance VC's to sequence transitioning from supine to sit EOB.  -               User Key  (r) = Recorded By, (t) = Taken By, (c) = Cosigned By      Initials Name Provider Type     Jyoti Spivey OT Occupational Therapist                   Goals/Plan    No documentation.                  Clinical Impression       Row Name 07/22/25 1150          Pain Assessment    Pretreatment Pain Rating 0/10 - no pain  -     Posttreatment Pain Rating 0/10 - no pain  -       Row Name 07/22/25 1150          Plan of Care Review    Plan of Care Reviewed With patient  -     Progress no change  -     Outcome Evaluation Pt. completed bed mobility with Mod A and T/Fs with CGA. Demonstrates grooming with SUA, DEP for toileting and LBB, and Min A for UBD. Continues to be limited by decreased activity tolerance, generalized weakness, and balance. Will continue to progress as able.  -       Row Name 07/22/25 1150          Positioning and Restraints    Pre-Treatment Position in bed  -     Post Treatment Position chair  -     In Chair notified nsg;reclined;call light within reach;encouraged to call for assist;exit alarm on;with nsg;waffle cushion;legs elevated  -               User Key  (r) = Recorded By, (t) = Taken By, (c) = Cosigned By      Initials Name Provider Type    Jyoti Turner, LORIE Occupational Therapist                   Outcome Measures       Row  Name 07/22/25 1154          How much help from another is currently needed...    Putting on and taking off regular lower body clothing? 2  -LC     Bathing (including washing, rinsing, and drying) 2  -LC     Toileting (which includes using toilet bed pan or urinal) 2  -LC     Putting on and taking off regular upper body clothing 3  -LC     Taking care of personal grooming (such as brushing teeth) 3  -LC     Eating meals 4  -     AM-PAC 6 Clicks Score (OT) 16  -LC       Row Name 07/22/25 0800          How much help from another person do you currently need...    Turning from your back to your side while in flat bed without using bedrails? 3  -EW     Moving from lying on back to sitting on the side of a flat bed without bedrails? 3  -EW     Moving to and from a bed to a chair (including a wheelchair)? 3  -EW     Standing up from a chair using your arms (e.g., wheelchair, bedside chair)? 3  -EW     Climbing 3-5 steps with a railing? 2  -EW     To walk in hospital room? 3  -EW     AM-PAC 6 Clicks Score (PT) 17  -EW     Highest Level of Mobility Goal Stand (1 or More Minutes)-5  -EW       Row Name 07/22/25 1154          Functional Assessment    Outcome Measure Options AM-PAC 6 Clicks Daily Activity (OT)  -               User Key  (r) = Recorded By, (t) = Taken By, (c) = Cosigned By      Initials Name Provider Type    Jyoti Turner OT Occupational Therapist    Rubi Arenas, RN Registered Nurse                    Occupational Therapy Education       Title: PT OT SLP Therapies (In Progress)       Topic: Occupational Therapy (In Progress)       Point: ADL training (In Progress)       Learning Progress Summary            Patient Acceptance, E, NR by  at 7/22/2025 1055                      Point: Precautions (In Progress)       Learning Progress Summary            Patient Acceptance, E, NR by  at 7/22/2025 1055                      Point: Body mechanics (In Progress)       Learning Progress Summary             Patient Acceptance, E, NR by  at 7/22/2025 1055                                      User Key       Initials Effective Dates Name Provider Type Discipline     06/16/21 -  Jyoti Spivey OT Occupational Therapist OT                  OT Recommendation and Plan     Plan of Care Review  Plan of Care Reviewed With: patient  Progress: no change  Outcome Evaluation: Pt. completed bed mobility with Mod A and T/Fs with CGA. Demonstrates grooming with SUA, DEP for toileting and LBB, and Min A for UBD. Continues to be limited by decreased activity tolerance, generalized weakness, and balance. Will continue to progress as able.     Time Calculation:   Evaluation Complexity (OT)  Review Occupational Profile/Medical/Therapy History Complexity: brief/low complexity  Assessment, Occupational Performance/Identification of Deficit Complexity: 1-3 performance deficits  Clinical Decision Making Complexity (OT): problem focused assessment/low complexity  Overall Complexity of Evaluation (OT): low complexity     Time Calculation- OT       Row Name 07/22/25 1154             Time Calculation- OT    OT Start Time 1055  -      OT Received On 07/22/25  -      OT Goal Re-Cert Due Date 07/26/25  -         Timed Charges    60594 - OT Self Care/Mgmt Minutes 29  -         Total Minutes    Timed Charges Total Minutes 29  -       Total Minutes 29  -LC                User Key  (r) = Recorded By, (t) = Taken By, (c) = Cosigned By      Initials Name Provider Type     Jyoti Spivey OT Occupational Therapist                  Therapy Charges for Today       Code Description Service Date Service Provider Modifiers Qty    [id number] HC OT SELF CARE/MGMT/TRAIN EA 15 MIN 7/22/2025 Jyoti Spivey OT GO 2                 Jyoti Spivey OT  7/22/2025

## 2025-07-23 LAB
ABO GROUP BLD: NORMAL
ABO GROUP BLD: NORMAL
ANION GAP SERPL CALCULATED.3IONS-SCNC: 9 MMOL/L (ref 5–15)
BLD GP AB SCN SERPL QL: NEGATIVE
BUN SERPL-MCNC: 20.1 MG/DL (ref 8–23)
BUN/CREAT SERPL: 24.2 (ref 7–25)
CALCIUM SPEC-SCNC: 8.6 MG/DL (ref 8.6–10.5)
CHLORIDE SERPL-SCNC: 100 MMOL/L (ref 98–107)
CO2 SERPL-SCNC: 35 MMOL/L (ref 22–29)
CREAT SERPL-MCNC: 0.83 MG/DL (ref 0.76–1.27)
DEPRECATED RDW RBC AUTO: 53.1 FL (ref 37–54)
EGFRCR SERPLBLD CKD-EPI 2021: 88.5 ML/MIN/1.73
ERYTHROCYTE [DISTWIDTH] IN BLOOD BY AUTOMATED COUNT: 17.4 % (ref 12.3–15.4)
FOLATE SERPL-MCNC: 14.2 NG/ML (ref 4.78–24.2)
GLUCOSE BLDC GLUCOMTR-MCNC: 114 MG/DL (ref 70–130)
GLUCOSE BLDC GLUCOMTR-MCNC: 139 MG/DL (ref 70–130)
GLUCOSE BLDC GLUCOMTR-MCNC: 155 MG/DL (ref 70–130)
GLUCOSE BLDC GLUCOMTR-MCNC: 210 MG/DL (ref 70–130)
GLUCOSE SERPL-MCNC: 108 MG/DL (ref 65–99)
HCT VFR BLD AUTO: 24.6 % (ref 37.5–51)
HEMOCCULT STL QL: POSITIVE
HGB BLD-MCNC: 7.1 G/DL (ref 13–17.7)
MCH RBC QN AUTO: 25.4 PG (ref 26.6–33)
MCHC RBC AUTO-ENTMCNC: 28.9 G/DL (ref 31.5–35.7)
MCV RBC AUTO: 87.9 FL (ref 79–97)
NT-PROBNP SERPL-MCNC: 1175 PG/ML (ref 0–1800)
PLATELET # BLD AUTO: 132 10*3/MM3 (ref 140–450)
PMV BLD AUTO: 10.2 FL (ref 6–12)
POTASSIUM SERPL-SCNC: 2.8 MMOL/L (ref 3.5–5.2)
POTASSIUM SERPL-SCNC: 2.9 MMOL/L (ref 3.5–5.2)
POTASSIUM SERPL-SCNC: 3.2 MMOL/L (ref 3.5–5.2)
QT INTERVAL: 388 MS
QTC INTERVAL: 469 MS
RBC # BLD AUTO: 2.8 10*6/MM3 (ref 4.14–5.8)
RETICS # AUTO: 0.12 10*6/MM3 (ref 0.02–0.13)
RETICS/RBC NFR AUTO: 4.14 % (ref 0.7–1.9)
RH BLD: POSITIVE
RH BLD: POSITIVE
SODIUM SERPL-SCNC: 144 MMOL/L (ref 136–145)
T&S EXPIRATION DATE: NORMAL
VIT B12 BLD-MCNC: 1195 PG/ML (ref 211–946)
WBC NRBC COR # BLD AUTO: 7.19 10*3/MM3 (ref 3.4–10.8)

## 2025-07-23 PROCEDURE — 80048 BASIC METABOLIC PNL TOTAL CA: CPT | Performed by: PHYSICIAN ASSISTANT

## 2025-07-23 PROCEDURE — 86923 COMPATIBILITY TEST ELECTRIC: CPT

## 2025-07-23 PROCEDURE — 86901 BLOOD TYPING SEROLOGIC RH(D): CPT | Performed by: PHYSICIAN ASSISTANT

## 2025-07-23 PROCEDURE — 25010000002 POTASSIUM CHLORIDE 10 MEQ/100ML SOLUTION

## 2025-07-23 PROCEDURE — 86850 RBC ANTIBODY SCREEN: CPT | Performed by: PHYSICIAN ASSISTANT

## 2025-07-23 PROCEDURE — 63710000001 INSULIN LISPRO (HUMAN) PER 5 UNITS: Performed by: FAMILY MEDICINE

## 2025-07-23 PROCEDURE — 82607 VITAMIN B-12: CPT | Performed by: PHYSICIAN ASSISTANT

## 2025-07-23 PROCEDURE — 99232 SBSQ HOSP IP/OBS MODERATE 35: CPT | Performed by: INTERNAL MEDICINE

## 2025-07-23 PROCEDURE — 82272 OCCULT BLD FECES 1-3 TESTS: CPT | Performed by: PHYSICIAN ASSISTANT

## 2025-07-23 PROCEDURE — P9016 RBC LEUKOCYTES REDUCED: HCPCS

## 2025-07-23 PROCEDURE — 25010000002 NA FERRIC GLUC CPLX PER 12.5 MG: Performed by: PHYSICIAN ASSISTANT

## 2025-07-23 PROCEDURE — 82746 ASSAY OF FOLIC ACID SERUM: CPT | Performed by: PHYSICIAN ASSISTANT

## 2025-07-23 PROCEDURE — 83880 ASSAY OF NATRIURETIC PEPTIDE: CPT | Performed by: INTERNAL MEDICINE

## 2025-07-23 PROCEDURE — 82948 REAGENT STRIP/BLOOD GLUCOSE: CPT

## 2025-07-23 PROCEDURE — 99222 1ST HOSP IP/OBS MODERATE 55: CPT | Performed by: PHYSICIAN ASSISTANT

## 2025-07-23 PROCEDURE — 99232 SBSQ HOSP IP/OBS MODERATE 35: CPT | Performed by: PHYSICIAN ASSISTANT

## 2025-07-23 PROCEDURE — 86901 BLOOD TYPING SEROLOGIC RH(D): CPT

## 2025-07-23 PROCEDURE — 63710000001 INSULIN GLARGINE PER 5 UNITS: Performed by: FAMILY MEDICINE

## 2025-07-23 PROCEDURE — 86900 BLOOD TYPING SEROLOGIC ABO: CPT

## 2025-07-23 PROCEDURE — 97535 SELF CARE MNGMENT TRAINING: CPT

## 2025-07-23 PROCEDURE — 86900 BLOOD TYPING SEROLOGIC ABO: CPT | Performed by: PHYSICIAN ASSISTANT

## 2025-07-23 PROCEDURE — 84132 ASSAY OF SERUM POTASSIUM: CPT | Performed by: STUDENT IN AN ORGANIZED HEALTH CARE EDUCATION/TRAINING PROGRAM

## 2025-07-23 PROCEDURE — 97530 THERAPEUTIC ACTIVITIES: CPT

## 2025-07-23 PROCEDURE — 36430 TRANSFUSION BLD/BLD COMPNT: CPT

## 2025-07-23 PROCEDURE — 93005 ELECTROCARDIOGRAM TRACING: CPT | Performed by: STUDENT IN AN ORGANIZED HEALTH CARE EDUCATION/TRAINING PROGRAM

## 2025-07-23 PROCEDURE — 93010 ELECTROCARDIOGRAM REPORT: CPT | Performed by: STUDENT IN AN ORGANIZED HEALTH CARE EDUCATION/TRAINING PROGRAM

## 2025-07-23 PROCEDURE — 85027 COMPLETE CBC AUTOMATED: CPT | Performed by: PHYSICIAN ASSISTANT

## 2025-07-23 RX ORDER — BISACODYL 5 MG/1
20 TABLET, DELAYED RELEASE ORAL ONCE
Status: COMPLETED | OUTPATIENT
Start: 2025-07-23 | End: 2025-07-23

## 2025-07-23 RX ORDER — PANTOPRAZOLE SODIUM 40 MG/10ML
40 INJECTION, POWDER, LYOPHILIZED, FOR SOLUTION INTRAVENOUS
Status: DISCONTINUED | OUTPATIENT
Start: 2025-07-23 | End: 2025-07-29 | Stop reason: HOSPADM

## 2025-07-23 RX ORDER — METOPROLOL TARTRATE 100 MG/1
100 TABLET ORAL EVERY 12 HOURS SCHEDULED
Status: DISCONTINUED | OUTPATIENT
Start: 2025-07-23 | End: 2025-07-25

## 2025-07-23 RX ORDER — ISOSORBIDE MONONITRATE 30 MG/1
30 TABLET, EXTENDED RELEASE ORAL
Status: DISCONTINUED | OUTPATIENT
Start: 2025-07-23 | End: 2025-07-29 | Stop reason: HOSPADM

## 2025-07-23 RX ORDER — SPIRONOLACTONE 25 MG/1
50 TABLET ORAL DAILY
Status: DISCONTINUED | OUTPATIENT
Start: 2025-07-23 | End: 2025-07-25

## 2025-07-23 RX ORDER — POTASSIUM CHLORIDE 750 MG/1
30 CAPSULE, EXTENDED RELEASE ORAL DAILY
Status: DISCONTINUED | OUTPATIENT
Start: 2025-07-23 | End: 2025-07-25

## 2025-07-23 RX ORDER — POTASSIUM CHLORIDE 1500 MG/1
40 TABLET, EXTENDED RELEASE ORAL EVERY 4 HOURS
Status: COMPLETED | OUTPATIENT
Start: 2025-07-23 | End: 2025-07-23

## 2025-07-23 RX ADMIN — POTASSIUM CHLORIDE 40 MEQ: 1500 TABLET, EXTENDED RELEASE ORAL at 22:13

## 2025-07-23 RX ADMIN — LEVOTHYROXINE SODIUM 50 MCG: 50 TABLET ORAL at 05:45

## 2025-07-23 RX ADMIN — POTASSIUM CHLORIDE 10 MEQ: 7.46 INJECTION, SOLUTION INTRAVENOUS at 03:52

## 2025-07-23 RX ADMIN — SERTRALINE HYDROCHLORIDE 200 MG: 100 TABLET, FILM COATED ORAL at 09:47

## 2025-07-23 RX ADMIN — AMLODIPINE BESYLATE 5 MG: 5 TABLET ORAL at 09:46

## 2025-07-23 RX ADMIN — METOPROLOL TARTRATE 100 MG: 100 TABLET, FILM COATED ORAL at 22:14

## 2025-07-23 RX ADMIN — BISACODYL 20 MG: 5 TABLET, COATED ORAL at 15:18

## 2025-07-23 RX ADMIN — EMPAGLIFLOZIN 10 MG: 10 TABLET, FILM COATED ORAL at 09:47

## 2025-07-23 RX ADMIN — INSULIN LISPRO 2 UNITS: 100 INJECTION, SOLUTION INTRAVENOUS; SUBCUTANEOUS at 13:39

## 2025-07-23 RX ADMIN — BUMETANIDE 1 MG: 1 TABLET ORAL at 09:46

## 2025-07-23 RX ADMIN — AMIODARONE HYDROCHLORIDE 200 MG: 200 TABLET ORAL at 13:39

## 2025-07-23 RX ADMIN — SPIRONOLACTONE 50 MG: 25 TABLET ORAL at 09:47

## 2025-07-23 RX ADMIN — Medication 10 ML: at 09:47

## 2025-07-23 RX ADMIN — INSULIN LISPRO 4 UNITS: 100 INJECTION, SOLUTION INTRAVENOUS; SUBCUTANEOUS at 22:11

## 2025-07-23 RX ADMIN — POTASSIUM CHLORIDE 10 MEQ: 7.46 INJECTION, SOLUTION INTRAVENOUS at 02:15

## 2025-07-23 RX ADMIN — AMIODARONE HYDROCHLORIDE 200 MG: 200 TABLET ORAL at 22:14

## 2025-07-23 RX ADMIN — BUMETANIDE 1 MG: 1 TABLET ORAL at 17:38

## 2025-07-23 RX ADMIN — ACETAMINOPHEN 650 MG: 325 TABLET ORAL at 16:48

## 2025-07-23 RX ADMIN — AMIODARONE HYDROCHLORIDE 200 MG: 200 TABLET ORAL at 04:01

## 2025-07-23 RX ADMIN — POTASSIUM CHLORIDE 40 MEQ: 1500 TABLET, EXTENDED RELEASE ORAL at 19:34

## 2025-07-23 RX ADMIN — Medication 10 MG: at 22:14

## 2025-07-23 RX ADMIN — TIZANIDINE 4 MG: 4 TABLET ORAL at 22:34

## 2025-07-23 RX ADMIN — POTASSIUM CHLORIDE 30 MEQ: 750 CAPSULE, EXTENDED RELEASE ORAL at 09:47

## 2025-07-23 RX ADMIN — PANTOPRAZOLE SODIUM 40 MG: 40 INJECTION, POWDER, FOR SOLUTION INTRAVENOUS at 16:49

## 2025-07-23 RX ADMIN — POTASSIUM CHLORIDE 40 MEQ: 1500 TABLET, EXTENDED RELEASE ORAL at 15:18

## 2025-07-23 RX ADMIN — METOPROLOL TARTRATE 100 MG: 100 TABLET, FILM COATED ORAL at 09:47

## 2025-07-23 RX ADMIN — TERAZOSIN HYDROCHLORIDE 2 MG: 2 CAPSULE ORAL at 22:13

## 2025-07-23 RX ADMIN — PANTOPRAZOLE SODIUM 40 MG: 40 TABLET, DELAYED RELEASE ORAL at 05:45

## 2025-07-23 RX ADMIN — ATORVASTATIN CALCIUM 40 MG: 40 TABLET, FILM COATED ORAL at 22:13

## 2025-07-23 RX ADMIN — LOSARTAN POTASSIUM 100 MG: 50 TABLET, FILM COATED ORAL at 09:45

## 2025-07-23 RX ADMIN — SODIUM CHLORIDE 125 MG: 9 INJECTION, SOLUTION INTRAVENOUS at 09:48

## 2025-07-23 RX ADMIN — VALACYCLOVIR HYDROCHLORIDE 500 MG: 500 TABLET, FILM COATED ORAL at 09:47

## 2025-07-23 RX ADMIN — INSULIN GLARGINE 5 UNITS: 100 INJECTION, SOLUTION SUBCUTANEOUS at 22:34

## 2025-07-23 RX ADMIN — POTASSIUM CHLORIDE 10 MEQ: 7.46 INJECTION, SOLUTION INTRAVENOUS at 01:20

## 2025-07-23 RX ADMIN — APIXABAN 5 MG: 5 TABLET, FILM COATED ORAL at 09:47

## 2025-07-23 RX ADMIN — ISOSORBIDE MONONITRATE 30 MG: 30 TABLET, EXTENDED RELEASE ORAL at 09:45

## 2025-07-23 NOTE — PROGRESS NOTES
[facility] Medicine Services  PROGRESS NOTE    Patient Name: Ibrahima Nava  : 1945  MRN: [id number]    Date of Admission: 7/15/2025  Primary Care Physician: Sebastien Lowe MD    Subjective     CC: f/u neurologic changes     HPI:  In bed. Vomited once this AM. Says he has had intermittent nausea. Denies abdominal pain. No chest pain or dyspnea. Hgb trending down.     Objective     Vital Signs:   Temp:  [97.2 °F (36.2 °C)-98.5 °F (36.9 °C)] 97.2 °F (36.2 °C)  Heart Rate:  [] 87  Resp:  [16-18] 16  BP: (103-160)/(54-97) 158/82  Flow (L/min) (Oxygen Therapy):  [2] 2     Physical Exam:  Constitutional: No acute distress, awake, alert and conversant. Sitting in bed. Obese and chronically ill appearing   HENT: NCAT, mucous membranes moist  Respiratory: Clear to auscultation bilaterally, normal respiratory effort on 2L NC  Cardiovascular: RRR  Gastrointestinal: Positive bowel sounds, soft, nontender, nondistended. Emesis in basin looked like recently ingested chocolate Boost - no bright red-blood or coffee ground-appearing   Musculoskeletal: 1-2+ pitting  bilateral ankle edema  Psychiatric: Appropriate affect, cooperative with exam  Neurologic: Oriented x 4, moves all extremities spontaneously without focal deficits, speech clear    Results Reviewed:  LAB RESULTS:      Lab 25  0625 25  1458 25  0846 25  1317 25  1153   WBC 7.19 7.25 7.98 10.06 9.82   HEMOGLOBIN 7.1* 7.4* 7.5* 8.2* 7.6*   HEMATOCRIT 24.6* 25.5* 25.3* 27.7* 26.3*   PLATELETS 132* 141 153 157 148   MCV 87.9 87.9 86.9 87.7 88.0         Lab 25  0625 25  2334 25  1458 25  0030 25  0846 25  1317 25  1153 25  0130 25  1034   SODIUM 144  --  142  --  141 143 148*  --  148*   POTASSIUM 3.2* 2.9* 2.4* 2.7* 3.0* 3.0* 3.7   < > 3.0*   CHLORIDE 100  --  98  --  98 100 105  --  103   CO2 35.0*  --  30.1*  --  32.0* 31.0* 29.3*  --  31.6*   ANION  GAP 9.0  --  13.9  --  11.0 12.0 13.7  --  13.4   BUN 20.1  --  21.5  --  17.5 18.7 24.0*  --  22.0   CREATININE 0.83  --  1.14  --  0.85 0.93 0.88  --  0.89   EGFR 88.5  --  65.0  --  87.8 83.0 86.9  --  86.6   GLUCOSE 108*  --  201*  --  138* 175* 101*  --  119*   CALCIUM 8.6  --  8.4*  --  8.4* 8.7 8.7  --  8.8   MAGNESIUM  --   --  2.1  --   --   --  2.3  --  2.3    < > = values in this interval not displayed.         Lab 07/22/25  1458   TOTAL PROTEIN 5.0*   ALBUMIN 3.3*   ALT (SGPT) 40   AST (SGOT) 77*   BILIRUBIN 0.6   INDIRECT BILIRUBIN 0.3   BILIRUBIN DIRECT 0.3   ALK PHOS 83         Lab 07/23/25  0625 07/17/25  1152   PROBNP 1,175.0 2,423.0*         Lab 07/16/25  1507   CHOLESTEROL 157   LDL CHOL 77   HDL CHOL 40   TRIGLYCERIDES 240*         Lab 07/23/25  0933 07/23/25  0625 07/16/25  1605   IRON  --   --  52*   IRON SATURATION (TSAT)  --   --  14*   TIBC  --   --  383   TRANSFERRIN  --   --  257   ABO TYPING O   < >  --    RH TYPING Positive   < >  --    ANTIBODY SCREEN Negative  --   --     < > = values in this interval not displayed.     Brief Urine Lab Results  (Last result in the past 365 days)        Color   Clarity   Blood   Leuk Est   Nitrite   Protein   CREAT   Urine HCG        07/15/25 1319 Yellow   Clear   Moderate (2+)   Negative   Negative   30 mg/dL (1+)                 Microbiology Results Abnormal       None          No radiology results from the last 24 hrs    Results for orders placed during the hospital encounter of 07/15/25    Adult Transesophageal Echo (SALVADOR) W/ Cont if Necessary Per Protocol 07/19/2025  8:29 AM    Interpretation Summary    Left ventricular systolic function is low normal. Left ventricular ejection fraction appears to be 56 - 60%.    Left ventricular wall thickness is consistent with mild concentric hypertrophy.    No evidence of a left atrial appendage thrombus    Mild to moderate mitral valve regurgitation    Current medications:  Scheduled Meds:amiodarone, 200 mg,  Oral, Q8H   Followed by  [START ON 7/27/2025] amiodarone, 200 mg, Oral, Q12H   Followed by  [START ON 8/10/2025] amiodarone, 200 mg, Oral, Daily  amLODIPine, 5 mg, Oral, Q24H  apixaban, 5 mg, Oral, Q12H  atorvastatin, 40 mg, Oral, Nightly  bumetanide, 1 mg, Oral, BID Diuretics  empagliflozin, 10 mg, Oral, Daily  ferric gluconate, 125 mg, Intravenous, Daily  [Held by provider] ferrous sulfate, 325 mg, Oral, Daily With Breakfast  insulin glargine, 5 Units, Subcutaneous, Nightly  insulin lispro, 2-9 Units, Subcutaneous, 4x Daily AC & at Bedtime  isosorbide mononitrate, 30 mg, Oral, Q24H  levothyroxine, 50 mcg, Oral, Q AM  losartan, 100 mg, Oral, Q24H  melatonin, 10 mg, Oral, Nightly  metoprolol tartrate, 100 mg, Oral, Q12H  pantoprazole, 40 mg, Oral, Q AM  potassium chloride, 30 mEq, Oral, Daily  [Held by provider] primidone, 100 mg, Oral, Daily  sertraline, 200 mg, Oral, Daily  sodium chloride, 10 mL, Intravenous, Q12H  spironolactone, 50 mg, Oral, Daily  terazosin, 2 mg, Oral, Nightly  tiZANidine, 4 mg, Oral, Nightly  valACYclovir, 500 mg, Oral, Daily      Continuous Infusions:   PRN Meds:.  acetaminophen    senna-docusate sodium **AND** polyethylene glycol **AND** bisacodyl **AND** bisacodyl    cloNIDine    dextrose    dextrose    glucagon (human recombinant)    hydrALAZINE    LORazepam    Magnesium Cardiology Dose Replacement - Follow Nurse / BPA Driven Protocol    metoprolol tartrate    nitroglycerin    Potassium Replacement - Follow Nurse / BPA Driven Protocol    sodium chloride    sodium chloride    Assessment & Plan     Active Hospital Problems    Diagnosis  POA    **CVA (cerebral vascular accident) [I63.9]  Yes    Mediastinal mass [J98.59]  Unknown      Resolved Hospital Problems   No resolved problems to display.     Brief Hospital Course to date:  Ibrahima Nava is a 80 y.o. male with PMH significant for HTN, anxiety (on chronic benzodiazepine therapy), prior CVA (2012 with no residual deficits), essential  "tremor, obesity (BMI 44) and DARCY (unable to tolerate CPAP). Admitted to [facility] 7/15/25 due to speech dysarthria / expressive aphasia and confusion  .      Stroke recrudescence   History of CVA   - Presented with speech dysarthria / expressive aphasia and confusion   - Stroke neurology team followed  - CTA head/neck with mild cervical/intracranial atherosclerotic changes, no LVO or flow-limiting stenosis  - MRI brain negative for acute stroke  - EEG reassuring  - SALVADOR with EF 56-60%, mild concentric LV hypertrophy, mild to moderate MR, no atrial appendage thrombus  - \"Cannot exclude the presence of PFO\" on TTE  - High-intensity statin   - On Eliquis due to new AF  - Follow up with stroke clinic     Dysphagia  - Patient reported dysphagia to solids/liquids with R neck fullness  - CTA neck reassuring  - SLP evaluated. MBS with limited upper GI series with no evidence of obstruction, noted mild reflux  - Started PPI  - Soft texture / chopped meats with thin liquids    New onset atrial fibrillation   Acute HFpEF  Essential hypertension  - Appreciate cardiology assistance  - TSH OK   - Started on Amiodarone, Metoprolol and Eliquis for AF  - For HFpEF, started on Jardiance, Bumex BID, Spironolactone, Losartan   - For HTN, started on Amlodipine 5mg, HCTZ 25mg and Terazosin   - Spoke to cardiology re: persistent hypoK today. HCTZ stopped, Spironolactone increased     Hypokalemia  - Related to significant diuretic use (Bumex, HCTZ - also on Spironolactone though this is potassium sparing)  - Difficulty repleting K+ despite replacement protocol - appreciate cardiology assistance with diuretic adjustment   - Mag ok    New mediastinal mass / JANNA pulmonary mass  - CT chest proximately 4.5 cm mediastinal mass and a contiguous 5.5 x 3 cm pleural-based JANNA pulmonary mass concerning for primary neoplasm also noted a few shotty mediastinal lymph nodes with rounded morphology, reactive versus metastatic adenopathy  - Partner discussed CT " chest findings with patient and family  - Patient reports history of smoking, quit 30 years ago.  Chronic cough x 1 year.  No weight loss or hemoptysis  - Patient/family interested in biopsy however was started on Eliquis for A-fib this admission.  - Pulmonology evaluated this admission. He is currently scheduled to see Dr. King on 7/28/25 to discuss procedure and arrange [facility] Bronch.     Pancreatic head mass  - CT abdomen/pelvis showed incidental [id number] cystic finding in pancreatic head  - Will need continued monitoring on an outpatient basis   - GI evaluating now due to anemia - can continue to follow an outpatient basis     Normocytic anemia  - Started on Eliquis this admission   - Iron studies show iron deficiency - started on PO iron however Hgb slowly trending down so will started IV iron on 7/22  - Check FOBT  - Hgb now 7.1 - transfuse 2 unit PRBCs  - Consult GI     PreDM  - A1c 6.3%. Not on home meds. Now on Jardiance.   - Continue Lantus 5 units QHS + SSI     Essential tremor, Primidone on hold due to interaction with Eliquis  Anxiety / chronic benzo use, continue routine Lorazepam     Expected Discharge Location and Transportation: SNF  Expected Discharge Expected Discharge Date: 7/25/2025; Expected Discharge Time:      VTE Prophylaxis:Pharmacologic & mechanical VTE prophylaxis orders are present.    AM-PAC 6 Clicks Score (PT): 17 (07/22/25 2000)    CODE STATUS:   Code Status and Medical Interventions: CPR (Attempt to Resuscitate); Full Support   Ordered at: 07/15/25 1515     Code Status (Patient has no pulse and is not breathing):    CPR (Attempt to Resuscitate)     Medical Interventions (Patient has pulse or is breathing):    Full Support     Level Of Support Discussed With:    Patient     Debbie Davenport PA-C  07/23/25

## 2025-07-23 NOTE — PLAN OF CARE
Problem: Adult Inpatient Plan of Care  Goal: Plan of Care Review  Outcome: Progressing  Goal: Patient-Specific Goal (Individualized)  Outcome: Progressing  Goal: Absence of Hospital-Acquired Illness or Injury  Outcome: Progressing  Intervention: Identify and Manage Fall Risk  Recent Flowsheet Documentation  Taken 7/23/2025 0600 by Bee Tellez RN  Safety Promotion/Fall Prevention:   activity supervised   assistive device/personal items within reach   clutter free environment maintained   room organization consistent   safety round/check completed  Taken 7/23/2025 0400 by Bee Tellez RN  Safety Promotion/Fall Prevention:   activity supervised   assistive device/personal items within reach   clutter free environment maintained   room organization consistent   safety round/check completed  Taken 7/23/2025 0200 by Bee Tellez RN  Safety Promotion/Fall Prevention:   activity supervised   assistive device/personal items within reach   clutter free environment maintained   room organization consistent   safety round/check completed  Taken 7/23/2025 0000 by Bee Tellez RN  Safety Promotion/Fall Prevention:   activity supervised   assistive device/personal items within reach   clutter free environment maintained   room organization consistent   safety round/check completed  Taken 7/22/2025 2200 by Bee Tellez RN  Safety Promotion/Fall Prevention:   activity supervised   assistive device/personal items within reach   clutter free environment maintained   room organization consistent   safety round/check completed  Taken 7/22/2025 2000 by Bee Tellez RN  Safety Promotion/Fall Prevention:   activity supervised   assistive device/personal items within reach   clutter free environment maintained   nonskid shoes/slippers when out of bed   room organization consistent   safety round/check completed  Intervention: Prevent Skin Injury  Recent Flowsheet Documentation  Taken 7/23/2025 0600 by Bee Tellez RN  Body Position: supine  Skin  Protection:   incontinence pads utilized   transparent dressing maintained   silicone foam dressing in place   drying agents applied  Taken 7/23/2025 0400 by Bee Tellez RN  Body Position:   right   position maintained  Skin Protection:   incontinence pads utilized   transparent dressing maintained   drying agents applied  Taken 7/23/2025 0200 by Bee Tellez RN  Body Position:   left   position maintained  Skin Protection:   incontinence pads utilized   transparent dressing maintained   drying agents applied  Taken 7/23/2025 0000 by Bee Tellez RN  Body Position: supine  Skin Protection:   transparent dressing maintained   incontinence pads utilized   drying agents applied  Taken 7/22/2025 2200 by Bee Tellez RN  Body Position:   right   position maintained  Skin Protection:   transparent dressing maintained   incontinence pads utilized   drying agents applied  Taken 7/22/2025 2000 by Bee Tellez RN  Body Position:   left   position maintained  Skin Protection:   transparent dressing maintained   silicone foam dressing in place   drying agents applied   incontinence pads utilized  Intervention: Prevent and Manage VTE (Venous Thromboembolism) Risk  Recent Flowsheet Documentation  Taken 7/23/2025 0400 by Bee Tellez RN  VTE Prevention/Management: SCDs (sequential compression devices) on  Taken 7/23/2025 0000 by Bee Tellez RN  VTE Prevention/Management: SCDs (sequential compression devices) off  Taken 7/22/2025 2000 by Bee Tellez RN  VTE Prevention/Management: SCDs (sequential compression devices) off  Intervention: Prevent Infection  Recent Flowsheet Documentation  Taken 7/23/2025 0600 by Bee Tellez RN  Infection Prevention:   hand hygiene promoted   rest/sleep promoted  Taken 7/23/2025 0400 by Bee Tellez RN  Infection Prevention:   rest/sleep promoted   hand hygiene promoted   environmental surveillance performed  Taken 7/23/2025 0200 by Bee Tellez RN  Infection Prevention:   hand hygiene promoted   rest/sleep promoted    environmental surveillance performed  Taken 7/23/2025 0000 by Bee Tellez RN  Infection Prevention:   rest/sleep promoted   hand hygiene promoted   environmental surveillance performed  Taken 7/22/2025 2200 by Bee Tellez RN  Infection Prevention:   rest/sleep promoted   hand hygiene promoted   environmental surveillance performed  Taken 7/22/2025 2000 by Bee Tellez RN  Infection Prevention:   hand hygiene promoted   rest/sleep promoted   environmental surveillance performed  Goal: Optimal Comfort and Wellbeing  Outcome: Progressing  Intervention: Provide Person-Centered Care  Recent Flowsheet Documentation  Taken 7/23/2025 0400 by Bee Tellez RN  Trust Relationship/Rapport:   care explained   choices provided   questions answered   questions encouraged  Taken 7/23/2025 0000 by Bee Tellez RN  Trust Relationship/Rapport:   care explained   choices provided   thoughts/feelings acknowledged   questions answered   questions encouraged  Taken 7/22/2025 2000 by Bee Tellez RN  Trust Relationship/Rapport:   care explained   choices provided   questions answered   questions encouraged   thoughts/feelings acknowledged  Goal: Readiness for Transition of Care  Outcome: Progressing     Problem: Stroke, Ischemic (Includes Transient Ischemic Attack)  Goal: Optimal Coping  Outcome: Progressing  Intervention: Support Psychosocial Response to Stroke  Recent Flowsheet Documentation  Taken 7/23/2025 0000 by Bee Tellez RN  Supportive Measures:   active listening utilized   relaxation techniques promoted  Family/Support System Care:   self-care encouraged   support provided  Taken 7/22/2025 2000 by Bee Tellez RN  Supportive Measures:   active listening utilized   relaxation techniques promoted  Family/Support System Care:   support provided   self-care encouraged  Goal: Optimal Cerebral Tissue Perfusion  Outcome: Progressing  Intervention: Protect and Optimize Cerebral Perfusion  Recent Flowsheet Documentation  Taken 7/23/2025 0400 by Rosalinda  ERICA Gamboa  Sensory Stimulation Regulation:   care clustered   lighting decreased   quiet environment promoted  Taken 7/23/2025 0000 by Bee Tellez RN  Sensory Stimulation Regulation:   care clustered   lighting decreased   quiet environment promoted  Taken 7/22/2025 2000 by Bee Tellez RN  Sensory Stimulation Regulation:   care clustered   lighting decreased   quiet environment promoted  Cerebral Perfusion Promotion: blood pressure monitored  Goal: Optimal Cognitive Function  Outcome: Progressing  Intervention: Optimize Cognitive Function  Recent Flowsheet Documentation  Taken 7/23/2025 0400 by Bee Tellez RN  Sensory Stimulation Regulation:   care clustered   lighting decreased   quiet environment promoted  Taken 7/23/2025 0000 by Bee Tellez RN  Sensory Stimulation Regulation:   care clustered   lighting decreased   quiet environment promoted  Reorientation Measures:   clock in view   calendar in view  Environment Familiarity/Consistency: daily routine followed  Taken 7/22/2025 2000 by Bee Tellez RN  Sensory Stimulation Regulation:   care clustered   lighting decreased   quiet environment promoted  Environment Familiarity/Consistency: daily routine followed  Goal: Optimal Functional Ability  Outcome: Progressing  Intervention: Optimize Functional Ability  Recent Flowsheet Documentation  Taken 7/23/2025 0600 by Bee Tellez RN  Activity Management: activity encouraged  Taken 7/23/2025 0400 by Bee Tellez RN  Activity Management: activity encouraged  Taken 7/23/2025 0200 by Bee Tellez RN  Activity Management: activity encouraged  Taken 7/23/2025 0000 by Bee Tellez RN  Activity Management: activity encouraged  Taken 7/22/2025 2200 by Bee Tellez RN  Activity Management: activity encouraged  Taken 7/22/2025 2000 by Bee Tellez RN  Activity Management: activity encouraged  Goal: Effective Oxygenation and Ventilation  Outcome: Progressing  Intervention: Optimize Oxygenation and Ventilation  Recent Flowsheet Documentation  Taken  7/23/2025 0600 by Bee Tellez RN  Head of Bed (HOB) Positioning: HOB elevated  Taken 7/23/2025 0400 by Bee Tellez RN  Head of Bed (HOB) Positioning: HOB elevated  Taken 7/23/2025 0200 by Bee Tellez RN  Head of Bed (HOB) Positioning: HOB elevated  Taken 7/23/2025 0000 by Bee Tellez RN  Head of Bed (HOB) Positioning: HOB elevated  Taken 7/22/2025 2200 by Bee Tellez RN  Head of Bed (HOB) Positioning: HOB elevated  Taken 7/22/2025 2000 by Bee Tellez RN  Head of Bed ([facility]) Positioning: HOB elevated  Airway/Ventilation Management: oxygen therapy provided  Goal: Safe and Effective Swallow  Outcome: Progressing  Intervention: Optimize Eating and Swallowing  Recent Flowsheet Documentation  Taken 7/22/2025 2000 by Bee Tellez RN  Aspiration Precautions: awake/alert before oral intake     Problem: Comorbidity Management  Goal: Blood Pressure in Desired Range  Outcome: Progressing  Intervention: Maintain Blood Pressure Management  Recent Flowsheet Documentation  Taken 7/23/2025 0600 by Bee Tellez RN  Medication Review/Management: medications reviewed  Taken 7/23/2025 0400 by Bee Tellez RN  Medication Review/Management: medications reviewed  Taken 7/23/2025 0200 by Bee Tellez RN  Medication Review/Management: medications reviewed  Taken 7/23/2025 0000 by Bee Tellez RN  Medication Review/Management: medications reviewed  Taken 7/22/2025 2000 by Bee Tellez RN  Medication Review/Management: medications reviewed     Problem: Fall Injury Risk  Goal: Absence of Fall and Fall-Related Injury  Outcome: Progressing  Intervention: Identify and Manage Contributors  Recent Flowsheet Documentation  Taken 7/23/2025 0600 by Bee Tellez RN  Medication Review/Management: medications reviewed  Taken 7/23/2025 0400 by Bee Tellez RN  Medication Review/Management: medications reviewed  Taken 7/23/2025 0200 by Bee Tellez RN  Medication Review/Management: medications reviewed  Taken 7/23/2025 0000 by Bee Tellez RN  Medication Review/Management: medications  reviewed  Taken 7/22/2025 2000 by Bee Tellez RN  Medication Review/Management: medications reviewed  Intervention: Promote Injury-Free Environment  Recent Flowsheet Documentation  Taken 7/23/2025 0600 by Bee Tellez RN  Safety Promotion/Fall Prevention:   activity supervised   assistive device/personal items within reach   clutter free environment maintained   room organization consistent   safety round/check completed  Taken 7/23/2025 0400 by Bee Tellez RN  Safety Promotion/Fall Prevention:   activity supervised   assistive device/personal items within reach   clutter free environment maintained   room organization consistent   safety round/check completed  Taken 7/23/2025 0200 by Bee Tellez RN  Safety Promotion/Fall Prevention:   activity supervised   assistive device/personal items within reach   clutter free environment maintained   room organization consistent   safety round/check completed  Taken 7/23/2025 0000 by Bee Tellez RN  Safety Promotion/Fall Prevention:   activity supervised   assistive device/personal items within reach   clutter free environment maintained   room organization consistent   safety round/check completed  Taken 7/22/2025 2200 by Bee Tellez RN  Safety Promotion/Fall Prevention:   activity supervised   assistive device/personal items within reach   clutter free environment maintained   room organization consistent   safety round/check completed  Taken 7/22/2025 2000 by Bee Tellez RN  Safety Promotion/Fall Prevention:   activity supervised   assistive device/personal items within reach   clutter free environment maintained   nonskid shoes/slippers when out of bed   room organization consistent   safety round/check completed     Problem: Skin Injury Risk Increased  Goal: Skin Health and Integrity  Outcome: Progressing  Intervention: Optimize Skin Protection  Recent Flowsheet Documentation  Taken 7/23/2025 0600 by Bee Tellez RN  Activity Management: activity encouraged  Pressure Reduction  Techniques:   frequent weight shift encouraged   heels elevated off bed   pressure points protected   weight shift assistance provided  Head of Bed (HOB) Positioning: HOB elevated  Pressure Reduction Devices:   pressure-redistributing mattress utilized   positioning supports utilized   heel offloading device utilized  Skin Protection:   incontinence pads utilized   transparent dressing maintained   silicone foam dressing in place   drying agents applied  Taken 7/23/2025 0400 by Bee Tellez RN  Activity Management: activity encouraged  Pressure Reduction Techniques:   frequent weight shift encouraged   heels elevated off bed   pressure points protected   weight shift assistance provided  Head of Bed (\[facility]\"") Positioning: HOB elevated  Pressure Reduction Devices:   pressure-redistributing mattress utilized   positioning supports utilized   heel offloading device utilized  Skin Protection:   incontinence pads utilized   transparent dressing maintained   drying agents applied  Taken 7/23/2025 0200 by Bee Tellez RN  Activity Management: activity encouraged  Pressure Reduction Techniques:   frequent weight shift encouraged   heels elevated off bed   pressure points protected   weight shift assistance provided  Head of Bed (\[facility]\"") Positioning: \[facility]\"" elevated  Pressure Reduction Devices:   pressure-redistributing mattress utilized   positioning supports utilized   heel offloading device utilized  Skin Protection:   incontinence pads utilized   transparent dressing maintained   drying agents applied  Taken 7/23/2025 0000 by Bee Tellez RN  Activity Management: activity encouraged  Pressure Reduction Techniques:   frequent weight shift encouraged   heels elevated off bed   pressure points protected   weight shift assistance provided  Head of Bed (\[facility]\"") Positioning: HOB elevated  Pressure Reduction Devices:   pressure-redistributing mattress utilized   positioning supports utilized   heel offloading device utilized  Skin Protection:    transparent dressing maintained   incontinence pads utilized   drying agents applied  Taken 7/22/2025 2200 by Bee Tellez RN  Activity Management: activity encouraged  Pressure Reduction Techniques:   frequent weight shift encouraged   heels elevated off bed   pressure points protected   weight shift assistance provided  Head of Bed (HOB) Positioning: [facility] elevated  Pressure Reduction Devices:   pressure-redistributing mattress utilized   positioning supports utilized   heel offloading device utilized  Skin Protection:   transparent dressing maintained   incontinence pads utilized   drying agents applied  Taken 7/22/2025 2000 by Bee Tellez RN  Activity Management: activity encouraged  Pressure Reduction Techniques:   frequent weight shift encouraged   heels elevated off bed   pressure points protected   weight shift assistance provided  Head of Bed (HOB) Positioning: [facility] elevated  Pressure Reduction Devices:   pressure-redistributing mattress utilized   positioning supports utilized   heel offloading device utilized  Skin Protection:   transparent dressing maintained   silicone foam dressing in place   drying agents applied   incontinence pads utilized   Goal Outcome Evaluation:

## 2025-07-23 NOTE — CONSULTS
[facility]    Referring Provider: Debbie Davenport PA-C     PCP: Sebastien Lowe MD    Reason for Consultation: Anemia     Chief complaint: Altered mental status     History of present illness:    Ibrahima Nava is a 80 y.o. male who is admitted with altered mental status and aphasia.   Work up was negative for acute stroke but symptoms attributed to recrudescence of old stroke. He was also found to have a new mediastinal and left upper lobe mass.    He is scheduled for outpatient pulmonology evaluation for consideration of bronchoscopy.    GI is consulted on hospital day 8 for concerns of anemia.   He has history of chronic anemia with baseline Hgb of 9-10 since 2017.    H&H has however decreased recently to 7.1.    He does note a change in bowel habits with black tarry stools for three months.    He denies abdominal pain, nausea nor vomiting.   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       He is on anticoagulation with Eliquis.   He thinks his last colonoscopy was greater than 10 years ago.         Allergies:  Patient has no known allergies.    Scheduled Meds:  amiodarone, 200 mg, Oral, Q8H   Followed by  [START ON 7/27/2025] amiodarone, 200 mg, Oral, Q12H   Followed by  [START ON 8/10/2025] amiodarone, 200 mg, Oral, Daily  amLODIPine, 5 mg, Oral, Q24H  [Held by provider] apixaban, 5 mg, Oral, Q12H  atorvastatin, 40 mg, Oral, Nightly  bisacodyl, 20 mg, Oral, Once  bumetanide, 1 mg, Oral, BID Diuretics  empagliflozin, 10 mg, Oral, Daily  ferric gluconate, 125 mg, Intravenous, Daily  [Held by provider] ferrous sulfate, 325 mg, Oral, Daily With Breakfast  insulin glargine, 5 Units, Subcutaneous, Nightly  insulin lispro, 2-9 Units, Subcutaneous, 4x Daily AC & at Bedtime  isosorbide mononitrate, 30 mg, Oral, Q24H  levothyroxine, 50 mcg, Oral, Q AM  losartan, 100 mg, Oral, Q24H  melatonin, 10 mg, Oral, Nightly  metoprolol tartrate, 100 mg, Oral,  Q12H  pantoprazole, 40 mg, Oral, Q AM  potassium chloride, 30 mEq, Oral, Daily  [Held by provider] primidone, 100 mg, Oral, Daily  sertraline, 200 mg, Oral, Daily  sodium chloride, 10 mL, Intravenous, Q12H  spironolactone, 50 mg, Oral, Daily  terazosin, 2 mg, Oral, Nightly  tiZANidine, 4 mg, Oral, Nightly  valACYclovir, 500 mg, Oral, Daily         Infusions:       PRN Meds:    acetaminophen    senna-docusate sodium **AND** polyethylene glycol **AND** bisacodyl **AND** bisacodyl    cloNIDine    dextrose    dextrose    glucagon (human recombinant)    hydrALAZINE    LORazepam    Magnesium Cardiology Dose Replacement - Follow Nurse / BPA Driven Protocol    metoprolol tartrate    nitroglycerin    Potassium Replacement - Follow Nurse / BPA Driven Protocol    sodium chloride    sodium chloride    Home Meds:  Medications Prior to Admission   Medication Sig Dispense Refill Last Dose/Taking    aspirin 325 MG tablet Take 1 tablet by mouth Daily.   7/15/2025 Morning    celecoxib (CeleBREX) 100 MG capsule Take 1 capsule by mouth Daily.   7/15/2025    doxazosin (CARDURA) 1 MG tablet Take 1 tablet by mouth Every Night.   7/14/2025 Bedtime    esomeprazole (nexIUM) 20 MG capsule Take 2 capsules by mouth Every Morning Before Breakfast.   7/15/2025    levothyroxine (SYNTHROID, LEVOTHROID) 50 MCG tablet Take 1 tablet by mouth Every Morning.   7/15/2025 Morning    LORazepam (ATIVAN) 0.5 MG tablet Take 1 tablet by mouth Daily As Needed for Anxiety.   Past Week    NON FORMULARY Take 1 tablet by mouth Daily. OTC- Bariatric Advantage Vitamin- with Iron   7/15/2025    pravastatin (PRAVACHOL) 80 MG tablet Take 1 tablet by mouth Every Night.   7/14/2025 Bedtime    primidone (MYSOLINE) 50 MG tablet Take 2 tablets by mouth Daily.   7/15/2025    sertraline (ZOLOFT) 100 MG tablet Take 2 tablets by mouth Daily.   7/15/2025    telmisartan (MICARDIS) 80 MG tablet Take 1 tablet by mouth Daily.   7/15/2025    tiZANidine (ZANAFLEX) 4 MG tablet Take 1  "tablet by mouth Every Night.   7/14/2025 Bedtime    valACYclovir (VALTREX) 500 MG tablet Take 1 tablet by mouth Daily.   7/15/2025       ROS: Review of Systems   Constitutional:  Positive for fatigue.   Respiratory: Negative.     Gastrointestinal:  Positive for blood in stool. Negative for abdominal pain, nausea and vomiting.   Neurological:  Positive for weakness.       PAST MED HX:  Past Medical History:   Diagnosis Date    Anxiety     Arthritis     Hypertension     CONTROLLED WITH MEDS PER PT     Shoulder pain, left     Sleep apnea     did not tolerate CPAP    Stroke     2012-- NO RESIDUAL PROBLEMS    Tremor of right hand     Wears dentures     UPPER AND LOWER PLATE        PAST SURG HX:  Past Surgical History:   Procedure Laterality Date    CARDIAC CATHETERIZATION  2016    NO CORONARY STENTS     COLONOSCOPY  2014    CO ARTHROPLASTY GLENOHUMERAL JOINT TOTAL SHOULDER Left 06/12/2017    Procedure: LEFT TOTAL SHOULDER ARTHROPLASTY ;  Surgeon: Michael Xavier MD;  Location:  [facility];  Service: Orthopedics    TOTAL KNEE ARTHROPLASTY Bilateral     TOTAL SHOULDER REPLACEMENT Right     TOTAL SHOULDER REVISION Left 5/20/2024    Procedure: REVISION TOTAL SHOULDER ARTHROPLASTY TO REVERSE TOTAL SHOULDER ATHROPLASTY - LEFT;  Surgeon: Michael Xavier MD;  Location:  [facility];  Service: Orthopedics;  Laterality: Left;       FAM HX:  History reviewed. No pertinent family history.    SOC HX:  Social History     Socioeconomic History    Marital status:    Tobacco Use    Smoking status: Former     Types: Cigarettes    Smokeless tobacco: Never    Tobacco comments:     QUIT 20 YEARS AGO, SMOKED X 40 YEARS    Vaping Use    Vaping status: Never Used   Substance and Sexual Activity    Alcohol use: No    Drug use: No    Sexual activity: Defer       PHYSICAL EXAM  /67 (BP Location: Right arm, Patient Position: Sitting)   Pulse 75   Temp 98 °F (36.7 °C) (Oral)   Resp 16   Ht 172.7 cm (67.99\")   Wt 133 kg (293 " lb 3.4 oz)   SpO2 97%   BMI 44.59 kg/m²   Wt Readings from Last 3 Encounters:   07/18/25 133 kg (293 lb 3.4 oz)   05/20/24 129 kg (284 lb 15.1 oz)   05/07/24 129 kg (284 lb 15.1 oz)   ,body mass index is 44.59 kg/m².  Physical Exam  Constitutional:       General: He is not in acute distress.     Appearance: He is not toxic-appearing.   Cardiovascular:      Rate and Rhythm: Normal rate and regular rhythm.   Pulmonary:      Effort: Pulmonary effort is normal. No respiratory distress.      Comments: On 2L O2 via nasal cannula   Abdominal:      General: Bowel sounds are normal. There is no distension.      Palpations: Abdomen is soft.      Tenderness: There is no abdominal tenderness.   Skin:     General: Skin is warm and dry.   Neurological:      Mental Status: He is alert and oriented to person, place, and time.   Psychiatric:         Behavior: Behavior normal.       Results Review:   I reviewed the patient's new clinical results.    Lab Results   Component Value Date    WBC 7.19 07/23/2025    HGB 7.1 (L) 07/23/2025    HGB 7.4 (L) 07/22/2025    HGB 7.5 (L) 07/21/2025    HCT 24.6 (L) 07/23/2025    MCV 87.9 07/23/2025     (L) 07/23/2025       Lab Results   Component Value Date    INR 1.09 07/15/2025       Lab Results   Component Value Date    GLUCOSE 108 (H) 07/23/2025    BUN 20.1 07/23/2025    CREATININE 0.83 07/23/2025    EGFRIFNONA 83 06/13/2017    BCR 24.2 07/23/2025     07/23/2025    K 2.8 (L) 07/23/2025    CO2 35.0 (H) 07/23/2025    CALCIUM 8.6 07/23/2025    ALBUMIN 3.3 (L) 07/22/2025    ALKPHOS 83 07/22/2025    BILITOT 0.6 07/22/2025    BILIDIR 0.3 07/22/2025    ALT 40 07/22/2025    AST 77 (H) 07/22/2025       ASSESSMENTS/PLANS    Gastrointestinal bleed with melena, subacute  Acute on chronic anemia, iron deficiency   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   He ate regular food today  and thus will need to be on clear liquid diet tomorrow.      >> PO Dulcolax 20 mg x 1 today  >> Split dose moviprep to begin tomorrow afternoon  >> Will check Vitamin B12, Folate and Retic for completeness  >> Empiric IV BID PPI  >> Hold Eliquis     I discussed the patient's findings and my recommendations with patient    CLAUDETTE Barrios  07/23/25  13:42 EDT

## 2025-07-23 NOTE — THERAPY TREATMENT NOTE
Patient Name: Ibrahima Nava  : 1945    MRN: [id number]                              Today's Date: 2025       Admit Date: 7/15/2025    Visit Dx:     ICD-10-CM ICD-9-CM   1. Cognitive communication deficit  R41.841 799.52   2. Aphasia  R47.01 784.3   3. Esophageal dysphagia  R13.19 787.29   4. Altered mental status, unspecified altered mental status type  R41.82 780.97   5. Hypokalemia  E87.6 276.8   6. History of stroke  Z86.73 V12.54   7. Primary sleep apnea of , unspecified type  P28.30 770.81   8. Sleep apnea, unspecified type  G47.30 780.57   9. 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 obesity)  E66.813 278.01     Patient Active Problem List   Diagnosis    Primary localized osteoarthrosis of shoulder region    Hypertension    Impaired physical mobility    Acute pain of left shoulder    Status post reverse arthroplasty of shoulder, left    CVA (cerebral vascular accident)    Mediastinal mass     Past Medical History:   Diagnosis Date    Anxiety     Arthritis     Hypertension     CONTROLLED WITH MEDS PER PT     Shoulder pain, left     Sleep apnea     did not tolerate CPAP    Stroke     -- NO RESIDUAL PROBLEMS    Tremor of right hand     Wears dentures     UPPER AND LOWER PLATE      Past Surgical History:   Procedure Laterality Date    CARDIAC CATHETERIZATION      NO CORONARY STENTS     COLONOSCOPY      MS ARTHROPLASTY GLENOHUMERAL JOINT TOTAL SHOULDER Left 2017    Procedure: LEFT TOTAL SHOULDER ARTHROPLASTY ;  Surgeon: Michael Xavier MD;  Location:  [facility];  Service: Orthopedics    TOTAL KNEE ARTHROPLASTY Bilateral     TOTAL SHOULDER REPLACEMENT Right     TOTAL SHOULDER REVISION Left 2024    Procedure: REVISION TOTAL SHOULDER ARTHROPLASTY TO REVERSE TOTAL SHOULDER ATHROPLASTY - LEFT;  Surgeon: Michael Xavier MD;  Location:  [facility];  Service: Orthopedics;  Laterality: Left;      General Information       Row Name 25 1137          OT Time and Intention     Document Type therapy note (daily note)  -JEMILIA     Mode of Treatment occupational therapy;individual therapy  -JY       Row Name 07/23/25 1137          General Information    Patient Profile Reviewed yes  -JEMILIA     Existing Precautions/Restrictions fall;oxygen therapy device and L/min;other (see comments)  brief with mobility, [place]; monitor HR, essential tremors per chart  -JEMILIA     Barriers to Rehab medically complex;hearing deficit  -JY       Row Name 07/23/25 1137          Cognition    Orientation Status (Cognition) oriented x 3  -JY       Row Name 07/23/25 1137          Safety Issues/Impairments Affecting Functional Mobility    Safety Issues Affecting Function (Mobility) awareness of need for assistance;insight into deficits/self-awareness;safety precaution awareness;safety precautions follow-through/compliance;sequencing abilities;positioning of assistive device  -JY     Impairments Affecting Function (Mobility) balance;endurance/activity tolerance;postural/trunk control;strength;shortness of breath  -JY     Comment, Safety Issues/Impairments (Mobility) pt alert and able to follow commands; few instances of conversational confusion; easily fatigued and exerted during mobility related to toileting; HR elevated to upward of 147 when toileting  -JY               User Key  (r) = Recorded By, (t) = Taken By, (c) = Cosigned By      Initials Name Provider Type    Tawana Morales, OT Occupational Therapist                     Mobility/ADL's       Row Name 07/23/25 1140          Bed Mobility    Bed Mobility supine-sit;sit-supine;scooting/bridging  -JY     Scooting/Bridging [place] (Bed Mobility) modified independence  -JY     Supine-Sit [place] (Bed Mobility) contact guard;verbal cues  -JY     Sit-Supine [place] (Bed Mobility) contact guard;verbal cues  -JY     Assistive Device (Bed Mobility) head of bed elevated;bed rails  -JY     Comment, (Bed Mobility) skilled cues for optimal hand placement and seq  specifically to advance LEs to EOB, reach across midline to grasp bedrail for aid in pulling self forward and upward, cues to advance hips forward to achieve symmetry for stability; CGA for ensured uprighting trunk into sitting as pt fatigued w/ attempt, in return to supine CGA for slight A in elevatiing LEs; reported feeling LH at EOB w/ no significant change in BP  -JEMILIA       Row Name 07/23/25 1140          Transfers    Transfers sit-stand transfer;stand-sit transfer;toilet transfer  -JY     Comment, (Transfers) cues for optimal hand placement for controlled ascend, descend specifically to push up from seated surface, to reach back prior to sitting once aligned and in close proximity to seated surface; emphasized pushing up from seated surface vs pulling at FWW and v/cs for safe mgmt of NC tubing  -JY       Row Name 07/23/25 1140          Sit-Stand Transfer    Sit-Stand [place] (Transfers) contact guard;verbal cues  -JY     Assistive Device (Sit-Stand Transfers) walker, front-wheeled  -JY       Row Name 07/23/25 1140          Stand-Sit Transfer    Stand-Sit [place] (Transfers) contact guard;verbal cues  -JY     Assistive Device (Stand-Sit Transfers) walker, front-wheeled  -JY       Row Name 07/23/25 1140          Toilet Transfer    Type (Toilet Transfer) sit-stand;stand-sit  -JY     [place] Level (Toilet Transfer) contact guard;verbal cues  -JY     Assistive Device (Toilet Transfer) commode, bedside without drop arms;walker, front-wheeled  -JY     Comment, (Toilet Transfer) multiple STS t/fs at [facility] [place] toilet d/t extended time in standing for faith care and rest between episodes of wiping; pt became exerted, SOA w/ extended standing and reaching posteriorly for hygiene  -JY       Row Name 07/23/25 1140          Functional Mobility    Functional Mobility- Ind. Level contact guard assist;verbal cues required  -JY     Functional Mobility- Device walker, front-wheeled  -JY     Functional  Mobility-Distance (Feet) --  in room ADL related mobility  -JY     Functional Mobility- Comment defer to PT for specifics however during in room ADL related mobility pt req'd gross CGA with FWW use; pt mindful of O2 nasal cannula tubing yet w/ extended length needed A for optimal mgmt to decrease fall risk; pt with increased urgency to get to bathroom for voiding and urgency to return to bed d/t fatigue  -J     Patient was able to Ambulate yes  -[employer]       Row Name 07/23/25 1140          Activities of Daily Living    BADL Assessment/Intervention upper body dressing;lower body dressing;grooming;toileting  -J       Row Name 07/23/25 1140          Lower Body Dressing Assessment/Training    [place] Level (Lower Body Dressing) doff;don;socks;dependent (less than 25% patient effort)  -JY     Position (Lower Body Dressing) edge of bed sitting  -JY     Comment, (Lower Body Dressing) pt declined attempt to d/d socks while at EOB with report of fatigue, exertion, SOA after toileting and requested A to manage socks  -[employer]       Row Name 07/23/25 1140          Toileting Assessment/Training    [place] Level (Toileting) adjust/manage clothing;maximum assist (25% patient effort);perform perineal hygiene;minimum assist (75% patient effort)  -JY     Assistive Devices (Toileting) commode;commode, bedside without drop arms  -JY     Position (Toileting) unsupported sitting;supported standing  -JY     Comment, (Toileting) pt with incontinence of urine noted upon sitting EOB, pt able to mobilize to bathroom to complete toileting at upgraded position at [facility] over toilet vs [facility] near bed in room; pt completed hygiene without A when allowed increased time and seated rest breaks between wiping and re loading w/ new wipe, min A for quality purposes  -[employer]       Row Name 07/23/25 1140          Grooming Assessment/Training    [place] Level (Grooming) wash face, hands;set up;supervision  -JY     Position (Grooming) sitting up in bed   -JY     Comment, (Grooming) spv for provision of cues for initiation and follow through  -JY       Row Name 07/23/25 1140          Upper Body Dressing Assessment/Training    [place] Level (Upper Body Dressing) doff;don;pajama/robe;moderate assist (50% patient effort);verbal cues  -JY     Position (Upper Body Dressing) edge of bed sitting  -JY     Comment, (Upper Body Dressing) mod A for proximal and posterior mgmt of gown, difficulty managing gown around lines, tubes  -JY               User Key  (r) = Recorded By, (t) = Taken By, (c) = Cosigned By      Initials Name Provider Type    Tawana Morales OT Occupational Therapist                   Obj/Interventions       Row Name 07/23/25 1151          Motor Skills    Motor Skills functional endurance  -JY     Functional Endurance decreased activity tolerance toward more dynamic demands; easily fatigued and global weakness noted; req'd multiple rest breaks between wiping reps and demonstrated urgency to get back to bed and sit and then return to supine for support d/t fatigue  -JY       Row Name 07/23/25 1151          Balance    Balance Assessment sitting static balance;sitting dynamic balance;standing static balance;standing dynamic balance  -JY     Static Sitting Balance supervision  -JY     Dynamic Sitting Balance standby assist  -JY     Position, Sitting Balance unsupported;supported  -JY     Static Standing Balance contact guard  -JY     Dynamic Standing Balance contact guard;verbal cues  -JY     Position/Device Used, Standing Balance supported;walker, front-wheeled  -JY     Balance Interventions sitting;standing;static;dynamic;sit to stand;supported;occupation based/functional task  -JY     Comment, Balance no overt LOB noted during seated or standing tasks; cues for improved FWW placement during standing  -JY               User Key  (r) = Recorded By, (t) = Taken By, (c) = Cosigned By      Initials Name Provider Type    Tawana Morales OT Occupational  Therapist                   Goals/Plan    No documentation.                  Clinical Impression       Row Name 07/23/25 1153          Pain Assessment    Pretreatment Pain Rating 0/10 - no pain  -JY     Posttreatment Pain Rating 0/10 - no pain  -JY     Response to Pain Interventions no change per patient report  -JY     Pre/Posttreatment Pain Comment denies any pain and tolerated all OT interventions  -JY       Row Name 07/23/25 1153          Plan of Care Review    Plan of Care Reviewed With patient;spouse;daughter  -JY     Progress no change  -JY     Outcome Evaluation Pt alert and participatory in OT interventions. Pt presented with progressively urgent need to complete toileting. Pt achieved t/fs and mobility to complete toileting in bathroom with BSC over toilet vs BSC near bed in room for upgrade. Pt req'd CGA throughout all t/fs and mobility with some A in safe NC tubing mgmt and cues for improved wx placement for stability. Pt demonstrated urgency to get to bathroom and back from bathroom for concern for incontinence and d/t fatigue, respectively. Pt completed d/d gown w/ mod A, d/d sock w/ dep care, toileting CM w/ max A and hygiene with min A. Pt HR increased during toileting, RN notified. Pt's ongoing deficits in muscle strength, endurance, balance impact performance still and pt would benefit from cont'd IPOT POC and IRF at d/c when medically ready.  -JY       Row Name 07/23/25 1151          Therapy Assessment/Plan (OT)    Rehab Potential (OT) good  -JY     Criteria for Skilled Therapeutic Interventions Met (OT) yes;meets criteria;skilled treatment is necessary  -JY     Therapy Frequency (OT) daily  -JY       Row Name 07/23/25 1158          Therapy Plan Review/Discharge Plan (OT)    Anticipated Discharge Disposition (OT) inpatient rehabilitation facility  -JY       Row Name 07/23/25 1154          Vital Signs    Pre Systolic BP Rehab 144  -JY     Pre Treatment Diastolic BP 90  -JY     Intra Systolic BP  Rehab 142  -JY     Intra Treatment Diastolic BP 78  -JY     Pretreatment Heart Rate (beats/min) 85  -JY     Intratreatment Heart Rate (beats/min) 147   -JY     Posttreatment Heart Rate (beats/min) 90  -JY     Pre SpO2 (%) 100  -JY     O2 Delivery Pre Treatment supplemental O2  -JY     O2 Delivery Intra Treatment supplemental O2  -JY     Post SpO2 (%) 97  -JY     O2 Delivery Post Treatment supplemental O2  -JY     Pre Patient Position Supine  -JY     Intra Patient Position Sitting  -JY     Post Patient Position Supine  -JY       Row Name 07/23/25 1153          Positioning and Restraints    Pre-Treatment Position in bed  -JY     Post Treatment Position bed  -JY     In Bed notified nsg;side lying left;fowlers;call light within reach;encouraged to call for assist;exit alarm on;with family/caregiver;side rails up x3;legs elevated;heels elevated  -JY               User Key  (r) = Recorded By, (t) = Taken By, (c) = Cosigned By      Initials Name Provider Type    Tawana Morales OT Occupational Therapist                   Outcome Measures       Row Name 07/23/25 1201          How much help from another is currently needed...    Putting on and taking off regular lower body clothing? 2  -JY     Bathing (including washing, rinsing, and drying) 2  -JY     Toileting (which includes using toilet bed pan or urinal) 2  -JY     Putting on and taking off regular upper body clothing 3  -JY     Taking care of personal grooming (such as brushing teeth) 3  -JY     Eating meals 4  -JY     AM-PAC 6 Clicks Score (OT) 16  -JY       Row Name 07/23/25 1201          Functional Assessment    Outcome Measure Options AM-PAC 6 Clicks Daily Activity (OT)  -JY               User Key  (r) = Recorded By, (t) = Taken By, (c) = Cosigned By      Initials Name Provider Type    Tawana Morales OT Occupational Therapist                    Occupational Therapy Education       Title: PT OT SLP Therapies (In Progress)       Topic: Occupational Therapy (In  Progress)       Point: ADL training (In Progress)       Learning Progress Summary            Patient Acceptance, E,D, NR by DIOGO at 7/23/2025 1003    Acceptance, E, NR by CONRADO at 7/22/2025 1055   Family Acceptance, E,D, NR by DIOGO at 7/23/2025 1003                      Point: Precautions (In Progress)       Learning Progress Summary            Patient Acceptance, E,D, NR by DIOGO at 7/23/2025 1003    Acceptance, E, NR by CONRADO at 7/22/2025 1055   Family Acceptance, E,D, NR by DIOGO at 7/23/2025 1003                      Point: Body mechanics (In Progress)       Learning Progress Summary            Patient Acceptance, E,D, NR by DIOGO at 7/23/2025 1003    Acceptance, E, NR by CONRADO at 7/22/2025 1055   Family Acceptance, E,D, NR by DIOGO at 7/23/2025 1003                                      User Key       Initials Effective Dates Name Provider Type Discipline     06/16/21 -  Jyoti Spivey, OT Occupational Therapist OT    DIOGO 06/16/21 -  Tawana Keating OT Occupational Therapist OT                  OT Recommendation and Plan  Therapy Frequency (OT): daily  Plan of Care Review  Plan of Care Reviewed With: patient, spouse, daughter  Progress: no change  Outcome Evaluation: Pt alert and participatory in OT interventions. Pt presented with progressively urgent need to complete toileting. Pt achieved t/fs and mobility to complete toileting in bathroom with BSC over toilet vs BSC near bed in room for upgrade. Pt req'd CGA throughout all t/fs and mobility with some A in safe NC tubing mgmt and cues for improved wx placement for stability. Pt demonstrated urgency to get to bathroom and back from bathroom for concern for incontinence and d/t fatigue, respectively. Pt completed d/d gown w/ mod A, d/d sock w/ dep care, toileting CM w/ max A and hygiene with min A. Pt HR increased during toileting, RN notified. Pt's ongoing deficits in muscle strength, endurance, balance impact performance still and pt would benefit from cont'd IPOT POC and IRF at d/c  when medically ready.     Time Calculation:         Time Calculation- OT       Row Name 07/23/25 1202             Time Calculation- OT    OT Start Time 1003  -JY      OT Received On 07/23/25  -JY      OT Goal Re-Cert Due Date 07/26/25  -JY         Timed Charges    36078 - OT Therapeutic Activity Minutes 23  -JY      64653 - OT Self Care/Mgmt Minutes 30  -JY         Total Minutes    Timed Charges Total Minutes 53  -JY       Total Minutes 53  -JY                User Key  (r) = Recorded By, (t) = Taken By, (c) = Cosigned By      Initials Name Provider Type    Tawana Morales OT Occupational Therapist                  Therapy Charges for Today       Code Description Service Date Service Provider Modifiers Qty    88462739376 HC OT THERAPEUTIC ACT EA 15 MIN 7/23/2025 Tawana Keating OT GO 2    72489581749 HC OT SELF CARE/MGMT/TRAIN EA 15 MIN 7/23/2025 Tawana Keating OT GO 2                 Tawana Keating OT  7/23/2025

## 2025-07-23 NOTE — PLAN OF CARE
Goal Outcome Evaluation:  Plan of Care Reviewed With: patient, spouse, daughter        Progress: no change  Outcome Evaluation: Pt alert and participatory in OT interventions. Pt presented with progressively urgent need to complete toileting. Pt achieved t/fs and mobility to complete toileting in bathroom with BSC over toilet vs BSC near bed in room for upgrade. Pt req'd CGA throughout all t/fs and mobility with some A in safe NC tubing mgmt and cues for improved wx placement for stability. Pt demonstrated urgency to get to bathroom and back from bathroom for concern for incontinence and d/t fatigue, respectively. Pt completed d/d gown w/ mod A, d/d sock w/ dep care, toileting CM w/ max A and hygiene with min A. Pt HR increased during toileting, RN notified. Pt's ongoing deficits in muscle strength, endurance, balance impact performance still and pt would benefit from cont'd IPOT POC and IRF at d/c when medically ready.    Anticipated Discharge Disposition (OT): inpatient rehabilitation facility

## 2025-07-23 NOTE — CASE MANAGEMENT/SOCIAL WORK
Continued Stay Note  [facility]     Patient Name: Ibrahima Nava  MRN: [id number]  Today's Date: 7/23/2025    Admit Date: 7/15/2025    Plan: Inpatient Rehab   Discharge Plan       Row Name 07/23/25 1040       Plan    Plan Inpatient Rehab    Plan Comments Spoke with patient's wife and daughter.  [facility] and [facility].  CM will continue to follow.                   Discharge Codes    No documentation.                 Expected Discharge Date and Time       Expected Discharge Date Expected Discharge Time    Jul 24, 2025               Codie Parekh RN

## 2025-07-23 NOTE — PROGRESS NOTES
[facility] at [facility]  IP Progress Note      Chief Complaint/Reason for service: #1 heart failure with preserved EF #2 A-fib RVR #3 exertional angina    Subjective   Subjective: Patient complains of dyspnea on exertion walking from his room to the nurses station.  Also still having exertional angina.    Past medical, surgical, social and family history reviewed in the patient's electronic medical record.    Objective     Vital Sign Min/Max for last 24 hours  Temp  Min: 97.8 °F (36.6 °C)  Max: 98.5 °F (36.9 °C)   BP  Min: 103/83  Max: 186/71   Pulse  Min: 69  Max: 131   Resp  Min: 16  Max: 18   SpO2  Min: 95 %  Max: 98 %   Flow (L/min) (Oxygen Therapy)  Min: 2  Max: 2      Intake/Output Summary (Last 24 hours) at 2025 0753  Last data filed at 2025 2230  Gross per 24 hour   Intake 240 ml   Output 200 ml   Net 40 ml             Current Facility-Administered Medications:     acetaminophen (TYLENOL) tablet 650 mg, 650 mg, Oral, Q6H PRN, Ericaz, Claudia, APRN, 650 mg at 25 2007    [COMPLETED] amiodarone 150 mg in 100 mL D5W (loading dose), 150 mg, Intravenous, Once, 150 mg at 25 1227 **FOLLOWED BY** [] amiodarone 360 mg in 200 mL D5W infusion, 1 mg/min, Intravenous, Continuous, Stopped at 25 1840 **FOLLOWED BY** [] amiodarone 360 mg in 200 mL D5W infusion, 0.5 mg/min, Intravenous, Continuous, Stopped at 25 1240 **FOLLOWED BY** [COMPLETED] amiodarone (PACERONE) tablet 200 mg, 200 mg, Oral, Once, 200 mg at 25 1240 **FOLLOWED BY** amiodarone (PACERONE) tablet 200 mg, 200 mg, Oral, Q8H, 200 mg at 25 0401 **FOLLOWED BY** [START ON 2025] amiodarone (PACERONE) tablet 200 mg, 200 mg, Oral, Q12H **FOLLOWED BY** [START ON 8/10/2025] amiodarone (PACERONE) tablet 200 mg, 200 mg, Oral, Daily, Jani Cohen III, MD    amLODIPine (NORVASC) tablet 5 mg, 5 mg, Oral, Q24H, Carlos Mendez MD, 5 mg at 25 0850    apixaban (ELIQUIS) tablet 5 mg,  5 mg, Oral, Q12H, Igor Nath MD, 5 mg at 07/22/25 2303    atorvastatin (LIPITOR) tablet 40 mg, 40 mg, Oral, Nightly, Carlos Mendez MD, 40 mg at 07/22/25 2303    sennosides-docusate (PERICOLACE) 8.6-50 MG per tablet 2 tablet, 2 tablet, Oral, BID PRN **AND** polyethylene glycol (MIRALAX) packet 17 g, 17 g, Oral, Daily PRN **AND** bisacodyl (DULCOLAX) EC tablet 5 mg, 5 mg, Oral, Daily PRN **AND** bisacodyl (DULCOLAX) suppository 10 mg, 10 mg, Rectal, Daily PRN, Ruth Michaud MD    [Held by provider] bumetanide (BUMEX) tablet 1 mg, 1 mg, Oral, BID Diuretics, Jani Cohen III, MD, 1 mg at 07/22/25 0850    cloNIDine (CATAPRES) tablet 0.1 mg, 0.1 mg, Oral, Q1H PRN, Carlos Mendez MD, 0.1 mg at 07/17/25 1241    dextrose (D50W) (25 g/50 mL) IV injection 25 g, 25 g, Intravenous, Q15 Min PRN, Peggy Walsh DO    dextrose (GLUTOSE) oral gel 15 g, 15 g, Oral, Q15 Min PRN, Peggy Walsh DO    empagliflozin (JARDIANCE) tablet 10 mg, 10 mg, Oral, Daily, Carlos Mendez MD, 10 mg at 07/22/25 0850    ferric gluconate (FERRLECIT)125 MG in sodium chloride 0.9 % 100 mL IVPB, 125 mg, Intravenous, Daily, Debbie Davenport PA-C, Last Rate: 100 mL/hr at 07/22/25 0850, 125 mg at 07/22/25 0850    [Held by provider] ferrous sulfate tablet 325 mg, 325 mg, Oral, Daily With Breakfast, Carlos Mendez MD, 325 mg at 07/21/25 0915    glucagon (GLUCAGEN) injection 1 mg, 1 mg, Intramuscular, Q15 Min PRN, Peggy Walsh DO    hydrALAZINE (APRESOLINE) injection 10 mg, 10 mg, Intravenous, Q6H PRN, Peggy Walsh DO, 10 mg at 07/18/25 1733    hydroCHLOROthiazide tablet 25 mg, 25 mg, Oral, Daily, Jani Cohen III, MD, 25 mg at 07/22/25 0850    insulin glargine (LANTUS, SEMGLEE) injection 5 Units, 5 Units, Subcutaneous, Nightly, Peggy Walsh DO, 5 Units at 07/22/25 2304    Insulin Lispro (humaLOG) injection 2-9 Units, 2-9 Units, Subcutaneous, 4x Daily AC & at Bedtime, Nico  Peggy PARRA DO, 2 Units at 07/22/25 2304    levothyroxine (SYNTHROID, LEVOTHROID) tablet 50 mcg, 50 mcg, Oral, Q AM, Peggy Walsh DO, 50 mcg at 07/23/25 0545    LORazepam (ATIVAN) tablet 0.5 mg, 0.5 mg, Oral, Daily PRN, Peggy Walsh DO, 0.5 mg at 07/21/25 2213    losartan (COZAAR) tablet 100 mg, 100 mg, Oral, Q24H, Carlos Mendez MD, 100 mg at 07/22/25 0850    Magnesium Cardiology Dose Replacement - Follow Nurse / BPA Driven Protocol, , Not Applicable, PRN, Juan Manuel Sandoval MD    melatonin tablet 10 mg, 10 mg, Oral, Nightly, Maya Garcia MD, 10 mg at 07/22/25 2259    metoprolol tartrate (LOPRESSOR) injection 5 mg, 5 mg, Intravenous, Q6H PRN, Peggy Walsh DO, 5 mg at 07/21/25 1925    metoprolol tartrate (LOPRESSOR) tablet 100 mg, 100 mg, Oral, Q12H, Carlos Mendez MD    nitroglycerin (NITROSTAT) SL tablet 0.4 mg, 0.4 mg, Sublingual, Q5 Min PRN, Ruth Michaud MD    pantoprazole (PROTONIX) EC tablet 40 mg, 40 mg, Oral, Q AM, Peggy Walsh DO, 40 mg at 07/23/25 0545    Potassium Replacement - Follow Nurse / BPA Driven Protocol, , Not Applicable, PRN, Maya Garcia MD    [Held by provider] primidone (MYSOLINE) tablet 100 mg, 100 mg, Oral, Daily, Peggy Walsh DO, 100 mg at 07/21/25 0915    sertraline (ZOLOFT) tablet 200 mg, 200 mg, Oral, Daily, Peggy Walsh DO, 200 mg at 07/22/25 0850    sodium chloride 0.9 % flush 10 mL, 10 mL, Intravenous, PRN, Juan Manuel Sandoval MD    sodium chloride 0.9 % flush 10 mL, 10 mL, Intravenous, Q12H, Lyric Dan APRN, 10 mL at 07/22/25 2325    sodium chloride 0.9 % flush 10 mL, 10 mL, Intravenous, PRN, Lyric Dan APRN    sodium chloride 0.9 % flush 10 mL, 10 mL, Intravenous, Q12H, Ruth Michaud MD, 10 mL at 07/22/25 2325    sodium chloride 0.9 % flush 10 mL, 10 mL, Intravenous, PRN, Ruth Michaud MD    sodium chloride 0.9 % infusion 40 mL, 40 mL, Intravenous, PRN, Lyric Dan APRN    sodium chloride 0.9 %  infusion 40 mL, 40 mL, Intravenous, PRN, Ruth Michaud MD    spironolactone (ALDACTONE) tablet 25 mg, 25 mg, Oral, Daily, Carlos Mendez MD, 25 mg at 07/22/25 0850    terazosin (HYTRIN) capsule 2 mg, 2 mg, Oral, Nightly, Jani Cohen III, MD, 2 mg at 07/22/25 2303    tiZANidine (ZANAFLEX) tablet 4 mg, 4 mg, Oral, Nightly, Peggy Walsh DO, 4 mg at 07/22/25 2303    valACYclovir (VALTREX) tablet 500 mg, 500 mg, Oral, Daily, Peggy Walsh DO, 500 mg at 07/22/25 0850    Physical Exam: General Pleasant elderly male in bed not dyspneic tachypneic at rest O2 sat 93%        HEENT: No obvious JVP is noted.  Nasal tube present.       Respiratory: Equal bilateral symmetrical expansion overall clear       Cardiovascular: Regular rate and rhythm with ectopic and soft murmur       GI: Flat       Lower Extremities: No obvious lesions       Neuro: Facial expressions are symmetrical moves all 4 extremities       Skin: Warm and dry       Psych: Pleasant affect    Results Review: Patient is a net -2.5 L.  GFR 88.5.  Heart rate 69-96 but did jump to the 130s.  Blood pressures 103-160.  Hemoglobin 7.1.  Platelets 137.  Potassium 3.2    Radiology Results:  Imaging Results (Last 72 Hours)       ** No results found for the last 72 hours. **            EKG: Sinus rhythm PACs poor R wave progression nonspecific ST abnormalities normal QTc    ECHO: EF 56 to 60%    Tele: Runs of A-fib RVR noted    Assessment   Assessment/Plan: Heart failure with preserved EF-patient appears relatively euvolemic.  He is on Jardiance, beta-blocker, spironolactone and Bumex.  He is complain of dyspnea on exertion so I will go ahead and repeat a BNP  2 PAF RVR-patient is having frequent PACs and short runs of A-fib.  I will increase his dose of metoprolol to 100 every 12  3 exertional angina-start Imdur 30 mg daily.  Would also recommend blood transfusion since he is significantly anemic.  I have discussed the case with hospital medicine who  will transfuse 2 units of blood.  4 hypokalemia-will increase prolactin to 50 mg daily and scheduled potassium    Carlos Mendez MD  07/23/25  07:53 EDT

## 2025-07-24 PROBLEM — D50.0 IRON DEFICIENCY ANEMIA DUE TO CHRONIC BLOOD LOSS: Status: ACTIVE | Noted: 2025-07-15

## 2025-07-24 LAB
ANION GAP SERPL CALCULATED.3IONS-SCNC: 7 MMOL/L (ref 5–15)
BH BB BLOOD EXPIRATION DATE: NORMAL
BH BB BLOOD EXPIRATION DATE: NORMAL
BH BB BLOOD TYPE BARCODE: 5100
BH BB BLOOD TYPE BARCODE: 5100
BH BB DISPENSE STATUS: NORMAL
BH BB DISPENSE STATUS: NORMAL
BH BB PRODUCT CODE: NORMAL
BH BB PRODUCT CODE: NORMAL
BH BB UNIT NUMBER: NORMAL
BH BB UNIT NUMBER: NORMAL
BUN SERPL-MCNC: 18.5 MG/DL (ref 8–23)
BUN/CREAT SERPL: 21.3 (ref 7–25)
CALCIUM SPEC-SCNC: 8.6 MG/DL (ref 8.6–10.5)
CHLORIDE SERPL-SCNC: 104 MMOL/L (ref 98–107)
CO2 SERPL-SCNC: 35 MMOL/L (ref 22–29)
CREAT SERPL-MCNC: 0.87 MG/DL (ref 0.76–1.27)
CROSSMATCH INTERPRETATION: NORMAL
CROSSMATCH INTERPRETATION: NORMAL
DEPRECATED RDW RBC AUTO: 52.9 FL (ref 37–54)
EGFRCR SERPLBLD CKD-EPI 2021: 87.2 ML/MIN/1.73
ERYTHROCYTE [DISTWIDTH] IN BLOOD BY AUTOMATED COUNT: 17.4 % (ref 12.3–15.4)
GLUCOSE BLDC GLUCOMTR-MCNC: 122 MG/DL (ref 70–130)
GLUCOSE BLDC GLUCOMTR-MCNC: 125 MG/DL (ref 70–130)
GLUCOSE BLDC GLUCOMTR-MCNC: 136 MG/DL (ref 70–130)
GLUCOSE BLDC GLUCOMTR-MCNC: 151 MG/DL (ref 70–130)
GLUCOSE SERPL-MCNC: 110 MG/DL (ref 65–99)
HCT VFR BLD AUTO: 29.6 % (ref 37.5–51)
HGB BLD-MCNC: 8.7 G/DL (ref 13–17.7)
MCH RBC QN AUTO: 25.8 PG (ref 26.6–33)
MCHC RBC AUTO-ENTMCNC: 29.4 G/DL (ref 31.5–35.7)
MCV RBC AUTO: 87.8 FL (ref 79–97)
PLATELET # BLD AUTO: 128 10*3/MM3 (ref 140–450)
PMV BLD AUTO: 10.4 FL (ref 6–12)
POTASSIUM SERPL-SCNC: 3.5 MMOL/L (ref 3.5–5.2)
POTASSIUM SERPL-SCNC: 3.5 MMOL/L (ref 3.5–5.2)
QT INTERVAL: 362 MS
QT INTERVAL: 410 MS
QTC INTERVAL: 462 MS
QTC INTERVAL: 476 MS
RBC # BLD AUTO: 3.37 10*6/MM3 (ref 4.14–5.8)
SODIUM SERPL-SCNC: 146 MMOL/L (ref 136–145)
UNIT  ABO: NORMAL
UNIT  ABO: NORMAL
UNIT  RH: NORMAL
UNIT  RH: NORMAL
WBC NRBC COR # BLD AUTO: 8.23 10*3/MM3 (ref 3.4–10.8)

## 2025-07-24 PROCEDURE — 63710000001 INSULIN LISPRO (HUMAN) PER 5 UNITS: Performed by: FAMILY MEDICINE

## 2025-07-24 PROCEDURE — 99232 SBSQ HOSP IP/OBS MODERATE 35: CPT | Performed by: INTERNAL MEDICINE

## 2025-07-24 PROCEDURE — 82948 REAGENT STRIP/BLOOD GLUCOSE: CPT

## 2025-07-24 PROCEDURE — 99232 SBSQ HOSP IP/OBS MODERATE 35: CPT | Performed by: PHYSICIAN ASSISTANT

## 2025-07-24 PROCEDURE — 84132 ASSAY OF SERUM POTASSIUM: CPT | Performed by: PHYSICIAN ASSISTANT

## 2025-07-24 PROCEDURE — 93005 ELECTROCARDIOGRAM TRACING: CPT | Performed by: PHYSICIAN ASSISTANT

## 2025-07-24 PROCEDURE — 93010 ELECTROCARDIOGRAM REPORT: CPT | Performed by: INTERNAL MEDICINE

## 2025-07-24 PROCEDURE — 25010000002 NA FERRIC GLUC CPLX PER 12.5 MG: Performed by: PHYSICIAN ASSISTANT

## 2025-07-24 PROCEDURE — 85027 COMPLETE CBC AUTOMATED: CPT | Performed by: PHYSICIAN ASSISTANT

## 2025-07-24 PROCEDURE — 63710000001 INSULIN GLARGINE PER 5 UNITS: Performed by: FAMILY MEDICINE

## 2025-07-24 PROCEDURE — 92507 TX SP LANG VOICE COMM INDIV: CPT

## 2025-07-24 PROCEDURE — 80048 BASIC METABOLIC PNL TOTAL CA: CPT | Performed by: PHYSICIAN ASSISTANT

## 2025-07-24 RX ORDER — PEG-3350, SODIUM SULFATE, SODIUM CHLORIDE, POTASSIUM CHLORIDE, SODIUM ASCORBATE AND ASCORBIC ACID 7.5-2.691G
1000 KIT ORAL
Status: COMPLETED | OUTPATIENT
Start: 2025-07-24 | End: 2025-07-24

## 2025-07-24 RX ORDER — PEG-3350, SODIUM SULFATE, SODIUM CHLORIDE, POTASSIUM CHLORIDE, SODIUM ASCORBATE AND ASCORBIC ACID 7.5-2.691G
1000 KIT ORAL
Status: COMPLETED | OUTPATIENT
Start: 2025-07-25 | End: 2025-07-25

## 2025-07-24 RX ORDER — POTASSIUM CHLORIDE 1500 MG/1
40 TABLET, EXTENDED RELEASE ORAL EVERY 4 HOURS
Status: COMPLETED | OUTPATIENT
Start: 2025-07-24 | End: 2025-07-24

## 2025-07-24 RX ADMIN — SERTRALINE HYDROCHLORIDE 200 MG: 100 TABLET, FILM COATED ORAL at 09:29

## 2025-07-24 RX ADMIN — METOPROLOL TARTRATE 100 MG: 100 TABLET, FILM COATED ORAL at 21:21

## 2025-07-24 RX ADMIN — BUMETANIDE 1 MG: 1 TABLET ORAL at 18:38

## 2025-07-24 RX ADMIN — POTASSIUM CHLORIDE 30 MEQ: 750 CAPSULE, EXTENDED RELEASE ORAL at 09:30

## 2025-07-24 RX ADMIN — Medication 10 ML: at 21:22

## 2025-07-24 RX ADMIN — PEG-3350, SODIUM SULFATE, SODIUM CHLORIDE, POTASSIUM CHLORIDE, SODIUM ASCORBATE AND ASCORBIC ACID 1000 ML: KIT at 18:39

## 2025-07-24 RX ADMIN — SPIRONOLACTONE 50 MG: 25 TABLET ORAL at 09:30

## 2025-07-24 RX ADMIN — LOSARTAN POTASSIUM 100 MG: 50 TABLET, FILM COATED ORAL at 09:30

## 2025-07-24 RX ADMIN — VALACYCLOVIR HYDROCHLORIDE 500 MG: 500 TABLET, FILM COATED ORAL at 09:30

## 2025-07-24 RX ADMIN — POTASSIUM CHLORIDE 40 MEQ: 1500 TABLET, EXTENDED RELEASE ORAL at 21:20

## 2025-07-24 RX ADMIN — INSULIN GLARGINE 5 UNITS: 100 INJECTION, SOLUTION SUBCUTANEOUS at 21:23

## 2025-07-24 RX ADMIN — ISOSORBIDE MONONITRATE 30 MG: 30 TABLET, EXTENDED RELEASE ORAL at 09:30

## 2025-07-24 RX ADMIN — AMIODARONE HYDROCHLORIDE 200 MG: 200 TABLET ORAL at 13:09

## 2025-07-24 RX ADMIN — LEVOTHYROXINE SODIUM 50 MCG: 50 TABLET ORAL at 05:04

## 2025-07-24 RX ADMIN — TERAZOSIN HYDROCHLORIDE 2 MG: 2 CAPSULE ORAL at 21:20

## 2025-07-24 RX ADMIN — AMLODIPINE BESYLATE 5 MG: 5 TABLET ORAL at 09:30

## 2025-07-24 RX ADMIN — AMIODARONE HYDROCHLORIDE 200 MG: 200 TABLET ORAL at 05:04

## 2025-07-24 RX ADMIN — AMIODARONE HYDROCHLORIDE 200 MG: 200 TABLET ORAL at 21:19

## 2025-07-24 RX ADMIN — EMPAGLIFLOZIN 10 MG: 10 TABLET, FILM COATED ORAL at 09:30

## 2025-07-24 RX ADMIN — PANTOPRAZOLE SODIUM 40 MG: 40 INJECTION, POWDER, FOR SOLUTION INTRAVENOUS at 18:38

## 2025-07-24 RX ADMIN — SODIUM CHLORIDE 125 MG: 9 INJECTION, SOLUTION INTRAVENOUS at 09:31

## 2025-07-24 RX ADMIN — METOPROLOL TARTRATE 100 MG: 100 TABLET, FILM COATED ORAL at 09:30

## 2025-07-24 RX ADMIN — INSULIN LISPRO 2 UNITS: 100 INJECTION, SOLUTION INTRAVENOUS; SUBCUTANEOUS at 21:22

## 2025-07-24 RX ADMIN — POTASSIUM CHLORIDE 40 MEQ: 1500 TABLET, EXTENDED RELEASE ORAL at 16:18

## 2025-07-24 RX ADMIN — PANTOPRAZOLE SODIUM 40 MG: 40 INJECTION, POWDER, FOR SOLUTION INTRAVENOUS at 09:29

## 2025-07-24 RX ADMIN — TIZANIDINE 4 MG: 4 TABLET ORAL at 21:21

## 2025-07-24 RX ADMIN — Medication 10 ML: at 09:29

## 2025-07-24 RX ADMIN — Medication 10 MG: at 21:19

## 2025-07-24 RX ADMIN — ATORVASTATIN CALCIUM 40 MG: 40 TABLET, FILM COATED ORAL at 21:20

## 2025-07-24 RX ADMIN — BUMETANIDE 1 MG: 1 TABLET ORAL at 09:30

## 2025-07-24 NOTE — PROGRESS NOTES
[facility] at [facility]  IP Progress Note      Chief Complaint/Reason for service: #1 A-fib RVR #2 heart failure with preserved EF #3 exertional chest pain #4 severe anemia with heme positive stool    Subjective   Subjective: Patient is awake and alert.  States he does feel better today.  Denies any anginal pain.    Past medical, surgical, social and family history reviewed in the patient's electronic medical record.    Objective     Vital Sign Min/Max for last 24 hours  Temp  Min: 97.2 °F (36.2 °C)  Max: 98.2 °F (36.8 °C)   BP  Min: 117/64  Max: 173/94   Pulse  Min: 75  Max: 101   Resp  Min: 16  Max: 18   SpO2  Min: 90 %  Max: 97 %   Flow (L/min) (Oxygen Therapy)  Min: 2  Max: 2      Intake/Output Summary (Last 24 hours) at 2025 0723  Last data filed at 2025 0452  Gross per 24 hour   Intake 980 ml   Output 1200 ml   Net -220 ml             Current Facility-Administered Medications:     acetaminophen (TYLENOL) tablet 650 mg, 650 mg, Oral, Q6H PRN, Ericaz, Claudia, APRN, 650 mg at 25 1648    [COMPLETED] amiodarone 150 mg in 100 mL D5W (loading dose), 150 mg, Intravenous, Once, 150 mg at 25 1227 **FOLLOWED BY** [] amiodarone 360 mg in 200 mL D5W infusion, 1 mg/min, Intravenous, Continuous, Stopped at 25 1840 **FOLLOWED BY** [] amiodarone 360 mg in 200 mL D5W infusion, 0.5 mg/min, Intravenous, Continuous, Stopped at 25 1240 **FOLLOWED BY** [COMPLETED] amiodarone (PACERONE) tablet 200 mg, 200 mg, Oral, Once, 200 mg at 25 1240 **FOLLOWED BY** amiodarone (PACERONE) tablet 200 mg, 200 mg, Oral, Q8H, 200 mg at 25 0504 **FOLLOWED BY** [START ON 2025] amiodarone (PACERONE) tablet 200 mg, 200 mg, Oral, Q12H **FOLLOWED BY** [START ON 8/10/2025] amiodarone (PACERONE) tablet 200 mg, 200 mg, Oral, Daily, Jani Cohen III, MD    amLODIPine (NORVASC) tablet 5 mg, 5 mg, Oral, Q24H, Carlos Mendez MD, 5 mg at 25 0946    [Held by  provider] apixaban (ELIQUIS) tablet 5 mg, 5 mg, Oral, Q12H, Igor Nath MD, 5 mg at 07/23/25 0947    atorvastatin (LIPITOR) tablet 40 mg, 40 mg, Oral, Nightly, Carlos Mendez MD, 40 mg at 07/23/25 2213    sennosides-docusate (PERICOLACE) 8.6-50 MG per tablet 2 tablet, 2 tablet, Oral, BID PRN **AND** polyethylene glycol (MIRALAX) packet 17 g, 17 g, Oral, Daily PRN **AND** bisacodyl (DULCOLAX) EC tablet 5 mg, 5 mg, Oral, Daily PRN **AND** bisacodyl (DULCOLAX) suppository 10 mg, 10 mg, Rectal, Daily PRN, Ruth Michaud MD    bumetanide (BUMEX) tablet 1 mg, 1 mg, Oral, BID Diuretics, Debbie Davenport PA-C, 1 mg at 07/23/25 1738    cloNIDine (CATAPRES) tablet 0.1 mg, 0.1 mg, Oral, Q1H PRN, Carlos Mendez MD, 0.1 mg at 07/17/25 1241    dextrose (D50W) (25 g/50 mL) IV injection 25 g, 25 g, Intravenous, Q15 Min PRN, Peggy Walsh,     dextrose (GLUTOSE) oral gel 15 g, 15 g, Oral, Q15 Min PRN, Peggy Walsh,     empagliflozin (JARDIANCE) tablet 10 mg, 10 mg, Oral, Daily, Carlos Mendez MD, 10 mg at 07/23/25 0947    ferric gluconate (FERRLECIT)125 MG in sodium chloride 0.9 % 100 mL IVPB, 125 mg, Intravenous, Daily, Debbie Davenport PA-C, Last Rate: 100 mL/hr at 07/23/25 0948, 125 mg at 07/23/25 0948    [Held by provider] ferrous sulfate tablet 325 mg, 325 mg, Oral, Daily With Breakfast, Carlos Mendez MD, 325 mg at 07/21/25 0915    glucagon (GLUCAGEN) injection 1 mg, 1 mg, Intramuscular, Q15 Min PRN, Peggy Walsh DO    hydrALAZINE (APRESOLINE) injection 10 mg, 10 mg, Intravenous, Q6H PRN, Peggy Walsh DO, 10 mg at 07/18/25 1733    insulin glargine (LANTUS, SEMGLEE) injection 5 Units, 5 Units, Subcutaneous, Nightly, Peggy Walsh DO, 5 Units at 07/23/25 2234    Insulin Lispro (humaLOG) injection 2-9 Units, 2-9 Units, Subcutaneous, 4x Daily AC & at Bedtime, Peggy Walsh DO, 4 Units at 07/23/25 2211    isosorbide mononitrate (IMDUR)  24 hr tablet 30 mg, 30 mg, Oral, Q24H, Carlos Mendez MD, 30 mg at 07/23/25 0945    levothyroxine (SYNTHROID, LEVOTHROID) tablet 50 mcg, 50 mcg, Oral, Q AM, Peggy Walsh, , 50 mcg at 07/24/25 0504    LORazepam (ATIVAN) tablet 0.5 mg, 0.5 mg, Oral, Daily PRN, Peggy Walsh, , 0.5 mg at 07/21/25 2213    losartan (COZAAR) tablet 100 mg, 100 mg, Oral, Q24H, Carlos Mendez MD, 100 mg at 07/23/25 0945    Magnesium Cardiology Dose Replacement - Follow Nurse / BPA Driven Protocol, , Not Applicable, PRN, Juan Manuel Sandoval MD    melatonin tablet 10 mg, 10 mg, Oral, Nightly, Maya Garcia MD, 10 mg at 07/23/25 2214    metoprolol tartrate (LOPRESSOR) injection 5 mg, 5 mg, Intravenous, Q6H PRN, Peggy Walsh DO, 5 mg at 07/21/25 1925    metoprolol tartrate (LOPRESSOR) tablet 100 mg, 100 mg, Oral, Q12H, Carlos Mendez MD, 100 mg at 07/23/25 2214    nitroglycerin (NITROSTAT) SL tablet 0.4 mg, 0.4 mg, Sublingual, Q5 Min PRN, Ruth Michaud MD    pantoprazole (PROTONIX) injection 40 mg, 40 mg, Intravenous, BID CARSON, Caterina Philip PA, 40 mg at 07/23/25 1649    potassium chloride (MICRO-K/KLOR-CON) CR capsule, 30 mEq, Oral, Daily, Carlos Mendez MD, 30 mEq at 07/23/25 0947    Potassium Replacement - Follow Nurse / BPA Driven Protocol, , Not Applicable, PRN, Maya Garcia MD    [Held by provider] primidone (MYSOLINE) tablet 100 mg, 100 mg, Oral, Daily, Peggy Walsh DO, 100 mg at 07/21/25 0915    sertraline (ZOLOFT) tablet 200 mg, 200 mg, Oral, Daily, Peggy Walsh DO, 200 mg at 07/23/25 0947    sodium chloride 0.9 % flush 10 mL, 10 mL, Intravenous, Q12H, Ruth Michaud MD, 10 mL at 07/23/25 0947    sodium chloride 0.9 % flush 10 mL, 10 mL, Intravenous, PRN, Ruth Michaud MD    sodium chloride 0.9 % infusion 40 mL, 40 mL, Intravenous, PRN, Ruth Michaud MD    spironolactone (ALDACTONE) tablet 50 mg, 50 mg, Oral, Daily, Carlos Mendez MD, 50 mg at 07/23/25  "0947    terazosin (HYTRIN) capsule 2 mg, 2 mg, Oral, Nightly, Jani Cohen III, MD, 2 mg at 07/23/25 2213    tiZANidine (ZANAFLEX) tablet 4 mg, 4 mg, Oral, Nightly, Peggy Walsh DO, 4 mg at 07/23/25 2234    valACYclovir (VALTREX) tablet 500 mg, 500 mg, Oral, Daily, Debbie Davenport PA-C, 500 mg at 07/23/25 0947    Physical Exam: General Pleasant well-developed elderly male in bed not dyspneic tachypneic        HEENT: No JVP.  No icterus.  Nasal O2 present       Respiratory: Equal bilateral symmetrical expansion\" bilaterally       Cardiovascular: Regular rate and rhythm with ectopics and 2+ pitting edema to palpation       GI: Obese and soft       Lower Extremities: Positive edema       Neuro: Facial expressions are symmetrical moves all 4 extremities       Skin: Warm and dry       Psych: Pleasant affect    Results Review: Stool was positive yesterday for blood.  Today's blood work is pending.  Yesterday's hemoglobin 7.1.  Heart rate 7799.  Blood pressures 120-173 output exceeds intake of 2.7 L    Radiology Results:  Imaging Results (Last 72 Hours)       ** No results found for the last 72 hours. **            EKG: Sinus rhythm with PACs    ECHO: Normal wall motion EF    Tele: Sinus    Assessment   Assessment/Plan: A-fib RVR-patient underwent SALVADOR guided cardioversion.  Currently on Eliquis and amiodarone and metoprolol.  The patient received IV amiodarone following the cardioversion.  2 heart failure with preserved EF-patient's most recent BNP was normal.  Output exceeds intake by 2.7 L  3 exertional angina.  Patient is on beta-blockers and Imdur    Carlos Mendez MD  07/24/25  07:23 EDT    "

## 2025-07-24 NOTE — PROGRESS NOTES
"GI Daily Progress Note  Subjective:    Chief Complaint:  Follow up anemia      Denies any acute changes overnight.     No overt melena.        Objective:    /68 (BP Location: Right arm, Patient Position: Sitting)   Pulse 79   Temp 98.3 °F (36.8 °C) (Oral)   Resp 18   Ht 172.7 cm (67.99\")   Wt 133 kg (293 lb 3.4 oz)   SpO2 95%   BMI 44.59 kg/m²     Physical Exam  Constitutional:       General: He is not in acute distress.  Cardiovascular:      Rate and Rhythm: Normal rate and regular rhythm.   Pulmonary:      Effort: Pulmonary effort is normal. No respiratory distress.   Abdominal:      General: There is no distension.   Neurological:      Mental Status: He is alert and oriented to person, place, and time.       Lab  Lab Results   Component Value Date    WBC 8.23 07/24/2025    HGB 8.7 (L) 07/24/2025    HGB 7.1 (L) 07/23/2025    HGB 7.4 (L) 07/22/2025    MCV 87.8 07/24/2025     (L) 07/24/2025    INR 1.09 07/15/2025     Lab Results   Component Value Date    GLUCOSE 110 (H) 07/24/2025    BUN 18.5 07/24/2025    CREATININE 0.87 07/24/2025    EGFRIFNONA 83 06/13/2017    BCR 21.3 07/24/2025     (H) 07/24/2025    K 3.5 07/24/2025    CO2 35.0 (H) 07/24/2025    CALCIUM 8.6 07/24/2025    ALBUMIN 3.3 (L) 07/22/2025    ALKPHOS 83 07/22/2025    BILITOT 0.6 07/22/2025    BILIDIR 0.3 07/22/2025    ALT 40 07/22/2025    AST 77 (H) 07/22/2025       Assessment:    Gastrointestinal bleed with melena, subacute  Acute on chronic anemia, iron deficiency   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

## 2025-07-24 NOTE — PROGRESS NOTES
"          Clinical Nutrition Assessment     Patient Name: Ibrahima Nava  YOB: 1945  MRN: [id number]  Date of Encounter: 07/24/25 13:51 EDT  Admission date: 7/15/2025  Reason for Visit: Identified at risk by screening criteria, LOSx9    Assessment   Nutrition Assessment   Admission Diagnosis:  CVA (cerebral vascular accident) [I63.9]    Problem List:    CVA (cerebral vascular accident)    Mediastinal mass      PMH:   He  has a past medical history of Anxiety, Arthritis, Hypertension, Shoulder pain, left, Sleep apnea, Stroke, Tremor of right hand, and Wears dentures.    PSH:  He  has a past surgical history that includes Total shoulder replacement (Right); Cardiac catheterization (2016); Total knee arthroplasty (Bilateral); Colonoscopy (2014); pr arthroplasty glenohumeral joint total shoulder (Left, 06/12/2017); and Revision total shoulder arthroplasty (Left, 5/20/2024).    Applicable Nutrition History:   7/16/25 [facility] S[facility] Diet Recommendation: soft to chew textures, chopped, thin liquids, other (see comments) (Pt may advance to regular per preference)    Anthropometrics     Height: Height: 172.7 cm (67.99\")  Last Filed Weight: Weight: 133 kg (293 lb 3.4 oz) (07/18/25 1608)  Method: Weight Method: Stated  BMI: BMI (Calculated): 44.6    UBW:  293 lbs per patient  Weight change: No significant changes     Weight      Weight (kg) Weight (lbs) Weight Method   5/7/2024 129.25 kg  284 lb 15.1 oz  Standing scale    5/20/2024 129.25 kg  284 lb 15.1 oz     7/15/2025 131.997 kg  291 lb  Stated     132.6 kg  292 lb 5.3 oz     7/17/2025 133 kg  293 lb 3.4 oz     7/18/2025 133 kg  293 lb 3.4 oz       Nutrition Focused Physical Exam    Date:  7/24       Pt does not meet criteria for malnutrition diagnosis, at this time.      Subjective   Reported/Observed/Food/Nutrition Related History:     Patient screened per nutrition protocol for length of stay. Presented for speech dysarthria/expressive aphasia and confusion. " Noted to have hx of CVA. Endorsed good appetite and PO intake. Denied any recent weight loss.  SLP cleared for regular textures and thin liquids. Agreeable to trial Boost with dinner. NKFA. No noted food preferences.    Current Nutrition Prescription   PO: Diet: Liquid; Clear Liquid; Fluid Consistency: Thin (IDDSI 0)  NPO Diet NPO Type: Strict NPO  Oral Nutrition Supplement: N/A  Intake: 6 days 55% x 7 meals    Assessment & Plan   Nutrition Diagnosis   Date:  7/24            Updated:    Problem No nutrition diagnosis at this time    Etiology    Signs/Symptoms    Status:      Goal:   Nutrition to support treatment and Increase intake, Maintain intake      Nutrition Intervention      Follow treatment progress, Care plan reviewed, Advise alternate selection, Advised available snacks, Interview for preferences, Encourage intake, Supplement provided    Encourage PO intakes  Will send Boost with dinner when no longer on bowel prep    Monitoring/Evaluation:   Per protocol, PO intake, Supplement intake, Pertinent labs, GI status, Symptoms, POC/GOC    Mary Ga RD  Time Spent: 35m

## 2025-07-24 NOTE — PLAN OF CARE
Problem: Adult Inpatient Plan of Care  Goal: Plan of Care Review  Outcome: Progressing  Goal: Patient-Specific Goal (Individualized)  Outcome: Progressing  Goal: Absence of Hospital-Acquired Illness or Injury  Outcome: Progressing  Intervention: Identify and Manage Fall Risk  Recent Flowsheet Documentation  Taken 7/24/2025 0200 by Bee Tellez RN  Safety Promotion/Fall Prevention:   activity supervised   assistive device/personal items within reach   clutter free environment maintained   room organization consistent   safety round/check completed   nonskid shoes/slippers when out of bed  Taken 7/24/2025 0000 by Bee Tellez RN  Safety Promotion/Fall Prevention:   activity supervised   assistive device/personal items within reach   clutter free environment maintained   room organization consistent   safety round/check completed  Taken 7/23/2025 2200 by Bee Tellez RN  Safety Promotion/Fall Prevention:   activity supervised   assistive device/personal items within reach   clutter free environment maintained   room organization consistent   safety round/check completed  Taken 7/23/2025 2000 by Bee Tellez RN  Safety Promotion/Fall Prevention:   activity supervised   assistive device/personal items within reach   clutter free environment maintained   nonskid shoes/slippers when out of bed   room organization consistent   safety round/check completed  Intervention: Prevent Skin Injury  Recent Flowsheet Documentation  Taken 7/24/2025 0200 by Bee Tellez RN  Body Position:   left   position maintained  Skin Protection:   incontinence pads utilized   drying agents applied  Taken 7/24/2025 0000 by Bee Tellez RN  Body Position: supine  Skin Protection:   incontinence pads utilized   drying agents applied  Taken 7/23/2025 2200 by Bee Tellez RN  Body Position:   left   position maintained  Skin Protection:   incontinence pads utilized   drying agents applied  Taken 7/23/2025 2000 by Bee Tellez RN  Body Position: sitting up in  bed  Skin Protection:   incontinence pads utilized   drying agents applied  Intervention: Prevent and Manage VTE (Venous Thromboembolism) Risk  Recent Flowsheet Documentation  Taken 7/23/2025 2000 by Bee Tellez RN  VTE Prevention/Management:   SCDs (sequential compression devices) off   patient refused intervention  Intervention: Prevent Infection  Recent Flowsheet Documentation  Taken 7/24/2025 0200 by Bee Tellez RN  Infection Prevention:   rest/sleep promoted   hand hygiene promoted   environmental surveillance performed  Taken 7/24/2025 0000 by Bee Tellez RN  Infection Prevention:   hand hygiene promoted   rest/sleep promoted   environmental surveillance performed  Taken 7/23/2025 2200 by Bee Tellez RN  Infection Prevention:   hand hygiene promoted   environmental surveillance performed   rest/sleep promoted  Taken 7/23/2025 2000 by Bee Tellez RN  Infection Prevention:   hand hygiene promoted   environmental surveillance performed   rest/sleep promoted  Goal: Optimal Comfort and Wellbeing  Outcome: Progressing  Intervention: Provide Person-Centered Care  Recent Flowsheet Documentation  Taken 7/24/2025 0000 by Bee Tellez RN  Trust Relationship/Rapport:   care explained   choices provided   questions answered   questions encouraged   thoughts/feelings acknowledged  Taken 7/23/2025 2000 by Bee Tellez RN  Trust Relationship/Rapport:   care explained   choices provided   thoughts/feelings acknowledged   questions answered   questions encouraged  Goal: Readiness for Transition of Care  Outcome: Progressing     Problem: Stroke, Ischemic (Includes Transient Ischemic Attack)  Goal: Optimal Coping  Outcome: Progressing  Intervention: Support Psychosocial Response to Stroke  Recent Flowsheet Documentation  Taken 7/24/2025 0000 by Bee Tellez RN  Supportive Measures:   active listening utilized   relaxation techniques promoted  Family/Support System Care: support provided  Taken 7/23/2025 2000 by Bee Tellez RN  Supportive  Measures:   active listening utilized   relaxation techniques promoted  Family/Support System Care: support provided  Goal: Optimal Cerebral Tissue Perfusion  Outcome: Progressing  Intervention: Protect and Optimize Cerebral Perfusion  Recent Flowsheet Documentation  Taken 7/24/2025 0000 by Bee Tellez RN  Cerebral Perfusion Promotion: blood pressure monitored  Taken 7/23/2025 2000 by Bee Tellez RN  Sensory Stimulation Regulation:   care clustered   lighting decreased   quiet environment promoted  Cerebral Perfusion Promotion: blood pressure monitored  Goal: Optimal Cognitive Function  Outcome: Progressing  Intervention: Optimize Cognitive Function  Recent Flowsheet Documentation  Taken 7/23/2025 2000 by Bee Tellez RN  Sensory Stimulation Regulation:   care clustered   lighting decreased   quiet environment promoted  Goal: Optimal Functional Ability  Outcome: Progressing  Intervention: Optimize Functional Ability  Recent Flowsheet Documentation  Taken 7/24/2025 0200 by Bee Tellez RN  Activity Management: activity encouraged  Taken 7/24/2025 0000 by Bee Tellez RN  Activity Management: activity encouraged  Taken 7/23/2025 2200 by Bee Tellez RN  Activity Management: activity encouraged  Taken 7/23/2025 2000 by Bee Tellez RN  Activity Management: activity encouraged  Goal: Effective Oxygenation and Ventilation  Outcome: Progressing  Intervention: Optimize Oxygenation and Ventilation  Recent Flowsheet Documentation  Taken 7/24/2025 0200 by Bee Tellez RN  Head of Bed (HOB) Positioning: HOB elevated  Taken 7/24/2025 0000 by Bee Tellez RN  Head of Bed (HOB) Positioning: HOB elevated  Taken 7/23/2025 2200 by Bee Tellez RN  Head of Bed (HOB) Positioning: HOB elevated  Taken 7/23/2025 2000 by Bee Tellez RN  Head of Bed (HOB) Positioning: HOB elevated  Airway/Ventilation Management: oxygen therapy provided  Goal: Safe and Effective Swallow  Outcome: Progressing     Problem: Comorbidity Management  Goal: Blood Pressure in Desired  Range  Outcome: Progressing  Intervention: Maintain Blood Pressure Management  Recent Flowsheet Documentation  Taken 7/24/2025 0000 by Bee Tellez RN  Medication Review/Management: medications reviewed  Taken 7/23/2025 2200 by Bee Tellez RN  Medication Review/Management: medications reviewed  Taken 7/23/2025 2000 by Bee Tellez RN  Medication Review/Management: medications reviewed     Problem: Fall Injury Risk  Goal: Absence of Fall and Fall-Related Injury  Outcome: Progressing  Intervention: Identify and Manage Contributors  Recent Flowsheet Documentation  Taken 7/24/2025 0000 by Bee Tellez RN  Medication Review/Management: medications reviewed  Taken 7/23/2025 2200 by Bee Tellez RN  Medication Review/Management: medications reviewed  Taken 7/23/2025 2000 by Bee Tellez RN  Medication Review/Management: medications reviewed  Intervention: Promote Injury-Free Environment  Recent Flowsheet Documentation  Taken 7/24/2025 0200 by Bee Tellez RN  Safety Promotion/Fall Prevention:   activity supervised   assistive device/personal items within reach   clutter free environment maintained   room organization consistent   safety round/check completed   nonskid shoes/slippers when out of bed  Taken 7/24/2025 0000 by Bee Tellez RN  Safety Promotion/Fall Prevention:   activity supervised   assistive device/personal items within reach   clutter free environment maintained   room organization consistent   safety round/check completed  Taken 7/23/2025 2200 by Bee Tellez RN  Safety Promotion/Fall Prevention:   activity supervised   assistive device/personal items within reach   clutter free environment maintained   room organization consistent   safety round/check completed  Taken 7/23/2025 2000 by Bee Tellez RN  Safety Promotion/Fall Prevention:   activity supervised   assistive device/personal items within reach   clutter free environment maintained   nonskid shoes/slippers when out of bed   room organization consistent   safety  round/check completed     Problem: Skin Injury Risk Increased  Goal: Skin Health and Integrity  Outcome: Progressing  Intervention: Optimize Skin Protection  Recent Flowsheet Documentation  Taken 7/24/2025 0200 by Bee Tellez RN  Activity Management: activity encouraged  Pressure Reduction Techniques:   frequent weight shift encouraged   heels elevated off bed   pressure points protected   weight shift assistance provided  Head of Bed (HOB) Positioning: [facility] elevated  Pressure Reduction Devices:   pressure-redistributing mattress utilized   positioning supports utilized   heel offloading device utilized  Skin Protection:   incontinence pads utilized   drying agents applied  Taken 7/24/2025 0000 by Bee Tellez RN  Activity Management: activity encouraged  Pressure Reduction Techniques:   frequent weight shift encouraged   heels elevated off bed   pressure points protected   weight shift assistance provided  Head of Bed (HOB) Positioning: [facility] elevated  Pressure Reduction Devices:   pressure-redistributing mattress utilized   positioning supports utilized   heel offloading device utilized  Skin Protection:   incontinence pads utilized   drying agents applied  Taken 7/23/2025 2200 by Bee Tellez RN  Activity Management: activity encouraged  Pressure Reduction Techniques:   frequent weight shift encouraged   heels elevated off bed   pressure points protected   weight shift assistance provided  Head of Bed (HOB) Positioning: [facility] elevated  Pressure Reduction Devices:   pressure-redistributing mattress utilized   positioning supports utilized   heel offloading device utilized  Skin Protection:   incontinence pads utilized   drying agents applied  Taken 7/23/2025 2000 by Bee Tellez RN  Activity Management: activity encouraged  Pressure Reduction Techniques:   frequent weight shift encouraged   heels elevated off bed   pressure points protected   weight shift assistance provided  Head of Bed (HOB) Positioning: [facility]  elevated  Pressure Reduction Devices:   pressure-redistributing mattress utilized   positioning supports utilized   heel offloading device utilized  Skin Protection:   incontinence pads utilized   drying agents applied   Goal Outcome Evaluation:

## 2025-07-24 NOTE — PROGRESS NOTES
[facility] Medicine Services  PROGRESS NOTE    Patient Name: Ibrahima Nava  : 1945  MRN: [id number]    Date of Admission: 7/15/2025  Primary Care Physician: Sebastien Lowe MD    Subjective     CC: f/u neurologic changes     HPI:  In bed. Didn't sleep well due to frequent interruption. GI has started colonoscopy prep with plans for bidirectional endoscopy on . Updated wife and daughter over telephone     Objective     Vital Signs:   Temp:  [97.2 °F (36.2 °C)-98.3 °F (36.8 °C)] 98.3 °F (36.8 °C)  Heart Rate:  [] 69  Resp:  [16-18] 18  BP: (117-173)/(52-94) 147/68  Flow (L/min) (Oxygen Therapy):  [2] 2     Physical Exam:  Constitutional: No acute distress, awake, alert and conversant. Sitting in bed. Obese and chronically ill appearing   HENT: NCAT, mucous membranes moist  Respiratory: Clear to auscultation bilaterally, normal respiratory effort on 2L NC  Cardiovascular: RRR  Gastrointestinal: Positive bowel sounds, soft, nontender, nondistended.   Musculoskeletal: 1-2+ pitting bilateral ankle edema  Psychiatric: Appropriate affect, cooperative with exam  Neurologic: Oriented x 4, moves all extremities spontaneously without focal deficits, speech clear    Results Reviewed:  LAB RESULTS:      Lab 25  0820 25  0625 25  1458 25  0846 25  1317   WBC 8.23 7.19 7.25 7.98 10.06   HEMOGLOBIN 8.7* 7.1* 7.4* 7.5* 8.2*   HEMATOCRIT 29.6* 24.6* 25.5* 25.3* 27.7*   PLATELETS 128* 132* 141 153 157   MCV 87.8 87.9 87.9 86.9 87.7         Lab 25  0820 25  1229 25  0625 25  2334 25  1458 25  0030 25  0846 25  1317 25  1153 25  0130 25  1034   SODIUM 146*  --  144  --  142  --  141 143 148*  --  148*   POTASSIUM 3.5 2.8* 3.2* 2.9* 2.4*   < > 3.0* 3.0* 3.7   < > 3.0*   CHLORIDE 104  --  100  --  98  --  98 100 105  --  103   CO2 35.0*  --  35.0*  --  30.1*  --  32.0* 31.0* 29.3*  --  31.6*   ANION  GAP 7.0  --  9.0  --  13.9  --  11.0 12.0 13.7  --  13.4   BUN 18.5  --  20.1  --  21.5  --  17.5 18.7 24.0*  --  22.0   CREATININE 0.87  --  0.83  --  1.14  --  0.85 0.93 0.88  --  0.89   EGFR 87.2  --  88.5  --  65.0  --  87.8 83.0 86.9  --  86.6   GLUCOSE 110*  --  108*  --  201*  --  138* 175* 101*  --  119*   CALCIUM 8.6  --  8.6  --  8.4*  --  8.4* 8.7 8.7  --  8.8   MAGNESIUM  --   --   --   --  2.1  --   --   --  2.3  --  2.3    < > = values in this interval not displayed.         Lab 07/22/25  1458   TOTAL PROTEIN 5.0*   ALBUMIN 3.3*   ALT (SGPT) 40   AST (SGOT) 77*   BILIRUBIN 0.6   INDIRECT BILIRUBIN 0.3   BILIRUBIN DIRECT 0.3   ALK PHOS 83         Lab 07/23/25  0625   PROBNP 1,175.0         Lab 07/23/25  1359 07/23/25  0933   FOLATE 14.20  --    VITAMIN B 12 1,195*  --    ABO TYPING  --  O   RH TYPING  --  Positive   ANTIBODY SCREEN  --  Negative     Brief Urine Lab Results  (Last result in the past 365 days)        Color   Clarity   Blood   Leuk Est   Nitrite   Protein   CREAT   Urine HCG        07/15/25 1319 Yellow   Clear   Moderate (2+)   Negative   Negative   30 mg/dL (1+)                 Microbiology Results Abnormal       None          No radiology results from the last 24 hrs    Results for orders placed during the hospital encounter of 07/15/25    Adult Transesophageal Echo (SALVADOR) W/ Cont if Necessary Per Protocol 07/19/2025  8:29 AM    Interpretation Summary    Left ventricular systolic function is low normal. Left ventricular ejection fraction appears to be 56 - 60%.    Left ventricular wall thickness is consistent with mild concentric hypertrophy.    No evidence of a left atrial appendage thrombus    Mild to moderate mitral valve regurgitation    Current medications:  Scheduled Meds:amiodarone, 200 mg, Oral, Q8H   Followed by  [START ON 7/27/2025] amiodarone, 200 mg, Oral, Q12H   Followed by  [START ON 8/10/2025] amiodarone, 200 mg, Oral, Daily  amLODIPine, 5 mg, Oral, Q24H  [Held by provider]  apixaban, 5 mg, Oral, Q12H  atorvastatin, 40 mg, Oral, Nightly  bumetanide, 1 mg, Oral, BID Diuretics  empagliflozin, 10 mg, Oral, Daily  [Held by provider] ferrous sulfate, 325 mg, Oral, Daily With Breakfast  insulin glargine, 5 Units, Subcutaneous, Nightly  insulin lispro, 2-9 Units, Subcutaneous, 4x Daily AC & at Bedtime  isosorbide mononitrate, 30 mg, Oral, Q24H  levothyroxine, 50 mcg, Oral, Q AM  losartan, 100 mg, Oral, Q24H  melatonin, 10 mg, Oral, Nightly  metoprolol tartrate, 100 mg, Oral, Q12H  pantoprazole, 40 mg, Intravenous, BID AC  potassium chloride, 30 mEq, Oral, Daily  [Held by provider] primidone, 100 mg, Oral, Daily  sertraline, 200 mg, Oral, Daily  sodium chloride, 10 mL, Intravenous, Q12H  spironolactone, 50 mg, Oral, Daily  terazosin, 2 mg, Oral, Nightly  tiZANidine, 4 mg, Oral, Nightly  valACYclovir, 500 mg, Oral, Daily      Continuous Infusions:   PRN Meds:.  acetaminophen    senna-docusate sodium **AND** polyethylene glycol **AND** bisacodyl **AND** bisacodyl    cloNIDine    dextrose    dextrose    glucagon (human recombinant)    hydrALAZINE    LORazepam    Magnesium Cardiology Dose Replacement - Follow Nurse / BPA Driven Protocol    metoprolol tartrate    nitroglycerin    Potassium Replacement - Follow Nurse / BPA Driven Protocol    sodium chloride    sodium chloride    Assessment & Plan     Active Hospital Problems    Diagnosis  POA    **CVA (cerebral vascular accident) [I63.9]  Yes    Mediastinal mass [J98.59]  Unknown      Resolved Hospital Problems   No resolved problems to display.     Brief Hospital Course to date:  Ibrahima Nava is a 80 y.o. male with PMH significant for HTN, anxiety (on chronic benzodiazepine therapy), prior CVA (2012 with no residual deficits), essential tremor, obesity (BMI 44) and DARCY (unable to tolerate CPAP). Admitted to [facility] 7/15/25 due to speech dysarthria / expressive aphasia and confusion    Stroke recrudescence   History of CVA   - Presented with speech  "dysarthria / expressive aphasia and confusion   - Stroke neurology team followed  - CTA head/neck with mild cervical/intracranial atherosclerotic changes, no LVO or flow-limiting stenosis  - MRI brain negative for acute stroke  - EEG reassuring  - SALVADOR with EF 56-60%, mild concentric LV hypertrophy, mild to moderate MR, no atrial appendage thrombus  - \"Cannot exclude the presence of PFO\" on TTE  - High-intensity statin   - On Eliquis due to new AF  - Follow up with stroke clinic     Dysphagia  - Patient reported dysphagia to solids/liquids with R neck fullness  - CTA neck reassuring  - SLP evaluated. MBS with limited upper GI series with no evidence of obstruction, noted mild reflux  - Started PPI  - Soft texture / chopped meats with thin liquids    New onset atrial fibrillation   Acute HFpEF  Essential hypertension  - Appreciate cardiology assistance  - TSH OK   - Started on Amiodarone, Metoprolol and Eliquis for AF  - For HFpEF, started on Jardiance, Bumex BID, Spironolactone, Losartan   - For HTN, started on Amlodipine 5mg, HCTZ 25mg and Terazosin   - Spoke to cardiology re: persistent hypoK  HCTZ stopped, Spironolactone increased   - Sodium up a little today at 146. Will monitor     Hypokalemia  - Related to significant diuretic use (Bumex, HCTZ - also on Spironolactone though this is potassium sparing)  - Difficulty repleting K+ despite replacement protocol - appreciate cardiology assistance with diuretic adjustment   - Mag ok    New mediastinal mass / JANNA pulmonary mass  - CT chest proximately 4.5 cm mediastinal mass and a contiguous 5.5 x 3 cm pleural-based JANNA pulmonary mass concerning for primary neoplasm also noted a few shotty mediastinal lymph nodes with rounded morphology, reactive versus metastatic adenopathy  - Partner discussed CT chest findings with patient and family  - Patient reports history of smoking, quit 30 years ago.  Chronic cough x 1 year.  No weight loss or hemoptysis  - Patient/family " interested in biopsy however was started on Eliquis for A-fib this admission.  - Pulmonology evaluated this admission. He is currently scheduled to see Dr. King on 7/28/25 to discuss procedure and arrange Jasper Bronch. Discussed with pulmonology - not feasible to do inpatient. If still here on 7/28, would notify pulmonology and perhaps they can see him and procedure can be scheduled at a later date. Family concerned about getting him back to the hospital for appointments & testing     Pancreatic head mass  - CT abdomen/pelvis showed incidental [id number] cystic finding in pancreatic head  - Will need continued monitoring on an outpatient basis   - GI evaluating now due to anemia - can continue to follow an outpatient basis     Normocytic anemia  - Started on Eliquis this admission   - Iron studies show iron deficiency - started on PO iron however Hgb slowly trending down so started IV iron on 7/22  - FOBT positive  - s/p 2 unit PRBCs on 7/23 for Hgb 7.1 - improved to 8.7 on 7/24  - GI has seen - planning bidirectional endoscopy on 7/25     PreDM  - A1c 6.3%. Not on home meds. Now on Jardiance.   - Continue Lantus 5 units QHS + SSI     Essential tremor, Primidone on hold due to interaction with Eliquis  Anxiety / chronic benzo use, continue routine Lorazepam     Updated wife and daughter, over telephone today     Expected Discharge Location and Transportation: [facility]  Expected Discharge Expected Discharge Date: 7/28/2025; Expected Discharge Time:      VTE Prophylaxis:Pharmacologic & mechanical VTE prophylaxis orders are present.    AM-PAC 6 Clicks Score (PT): 17 (07/23/25 2000)    CODE STATUS:   Code Status and Medical Interventions: CPR (Attempt to Resuscitate); Full Support   Ordered at: 07/15/25 1515     Code Status (Patient has no pulse and is not breathing):    CPR (Attempt to Resuscitate)     Medical Interventions (Patient has pulse or is breathing):    Full Support     Level Of Support Discussed With:    Patient      Debbie Davenport PA-C  07/24/25

## 2025-07-24 NOTE — PLAN OF CARE
Goal Outcome Evaluation:                   Anticipated Discharge Disposition (SLP): home with assist    SLP Diagnosis: mild, cognitive-linguistic disorder (07/24/25 1530)        Treatment Assessment (SLP): improved, continued, mild, cognitive-linguistic disorder (07/24/25 1530)     Plan for Continued Treatment (SLP): continue treatment per plan of care (07/24/25 1530)

## 2025-07-24 NOTE — THERAPY TREATMENT NOTE
Acute Care - Speech Language Pathology Treatment Note  [facility]     Patient Name: Ibrahima Nava  : 1945  MRN: [id number]  Today's Date: 2025               Admit Date: 7/15/2025     Visit Dx:    ICD-10-CM ICD-9-CM   1. Cognitive communication deficit  R41.841 799.52   2. Aphasia  R47.01 784.3   3. Esophageal dysphagia  R13.19 787.29   4. Altered mental status, unspecified altered mental status type  R41.82 780.97   5. Hypokalemia  E87.6 276.8   6. History of stroke  Z86.73 V12.54   7. Primary sleep apnea of , unspecified type  P28.30 770.81   8. Sleep apnea, unspecified type  G47.30 780.57   9. 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 obesity)  E66.813 278.01   10. Iron deficiency anemia due to chronic blood loss  D50.0 280.0     Patient Active Problem List   Diagnosis    Primary localized osteoarthrosis of shoulder region    Hypertension    Impaired physical mobility    Acute pain of left shoulder    Status post reverse arthroplasty of shoulder, left    CVA (cerebral vascular accident)    Mediastinal mass    Iron deficiency anemia due to chronic blood loss     Past Medical History:   Diagnosis Date    Anxiety     Arthritis     Hypertension     CONTROLLED WITH MEDS PER PT     Shoulder pain, left     Sleep apnea     did not tolerate CPAP    Stroke     -- NO RESIDUAL PROBLEMS    Tremor of right hand     Wears dentures     UPPER AND LOWER PLATE      Past Surgical History:   Procedure Laterality Date    CARDIAC CATHETERIZATION      NO CORONARY STENTS     COLONOSCOPY      GA ARTHROPLASTY GLENOHUMERAL JOINT TOTAL SHOULDER Left 2017    Procedure: LEFT TOTAL SHOULDER ARTHROPLASTY ;  Surgeon: Michael Xavier MD;  Location: [facility];  Service: Orthopedics    TOTAL KNEE ARTHROPLASTY Bilateral     TOTAL SHOULDER REPLACEMENT Right     TOTAL SHOULDER REVISION Left 2024    Procedure: REVISION TOTAL SHOULDER ARTHROPLASTY TO REVERSE TOTAL SHOULDER ATHROPLASTY - LEFT;  Surgeon:  Michael Xavier MD;  Location: [facility];  Service: Orthopedics;  Laterality: Left;       SLP Recommendation and Plan  SLP Diagnosis: mild, cognitive-linguistic disorder (07/24/25 1530)              SLC Criteria for Skilled Therapy Interventions Met: yes (07/24/25 1530)  Anticipated Discharge Disposition (SLP): home with assist (07/24/25 1530)        Therapy Frequency (SLP SLC): 5 days per week (07/24/25 1530)  Predicted Duration Therapy Intervention (Days): 1 week (07/24/25 1530)        Daily Summary of Progress (SLP): progress toward functional goals as expected (07/24/25 1530)           Treatment Assessment (SLP): improved, continued, mild, cognitive-linguistic disorder (07/24/25 1530)     Plan for Continued Treatment (SLP): continue treatment per plan of care (07/24/25 1530)         SLP EVALUATION (Last 72 Hours)       SLP SLC Evaluation       Row Name 07/24/25 1530                   Communication Assessment/Intervention    Document Type therapy note (daily note)  -CH        Subjective Information no complaints  -CH        Patient Observations alert;cooperative;agree to therapy  -CH        Patient/Family/Caregiver Comments/Observations none present  -CH        Patient Effort excellent  -CH        Symptoms Noted During/After Treatment none  -CH           General Information    Patient Profile Reviewed yes  -CH           Pain    Pretreatment Pain Rating 0/10 - no pain  -CH        Posttreatment Pain Rating 0/10 - no pain  -CH           SLP Evaluation Clinical Impressions    SLP Diagnosis mild;cognitive-linguistic disorder  -CH        Rehab Potential/Prognosis excellent  -CH        SLC Criteria for Skilled Therapy Interventions Met yes  -CH           SLP Treatment Clinical Impressions    Treatment Assessment (SLP) improved;continued;mild;cognitive-linguistic disorder  -        Daily Summary of Progress (SLP) progress toward functional goals as expected  -        Plan for Continued Treatment (SLP) continue  treatment per plan of care  -        Care Plan Review care plan/treatment goals reviewed  -           Recommendations    Therapy Frequency (SLP SLC) 5 days per week  -        Predicted Duration Therapy Intervention (Days) 1 week  -        Anticipated Discharge Disposition (SLP) home with assist  -                  User Key  (r) = Recorded By, (t) = Taken By, (c) = Cosigned By      Initials Name Effective Dates    CH Matilda Matta, MS CCC-SLP 01/20/25 -                        EDUCATION  The patient has been educated in the following areas:     Cognitive Impairment Communication Impairment.           SLP GOALS       Row Name 07/24/25 9413             Patient will demonstrate functional cognitive-linguistic skills for return to discharge environment    [place] Independently;with minimal cues  -      Time frame 1 week  -CH         SLP Diagnostic Treatment     Patient will participate in further assessment in the following areas cognitive-linguistic  -      Time Frame (Diagnostic) 1 week  -      Progress/Outcomes (Additional Goal 1, SLP) continuing progress toward goal  -CH         Graphic Expression of Words Goal 1 (SLP)    Graphic Expression of Single Words Goal 1 (SLP) copy word;writing dictated word;completing written phrase and sentence;writing word to dictation;80%;with minimal cues (75-90%)  -      Time Frame (Graphic Expression of Single Words Goal 1, SLP) short term goal (STG)  -CH      Progress/Outcomes (Graphic Expression of Single Words Goal 1, SLP) goal ongoing  -CH      Comment (Graphic Expression of Single Words Goal 1, SLP) difficulty holding pen d/t tremor  -         Attention Goal 1 (SLP)    Improve Attention by Goal 1 (SLP) complete sustained attention task;complete divided attention task;80%;with minimal cues (75-90%)  -      Time Frame (Attention Goal 1, SLP) short term goal (STG)  -CH      Progress (Attention Goal 1, SLP) 70%;with minimal cues (75-90%)  -       Progress/Outcomes (Attention Goal 1, SLP) good progress toward goal  -CH         Memory Skills Goal 1 (SLP)    Improve Memory Skills Through Goal 1 (SLP) recalling related word lists with an imposed delay;recalling unrelated word lists with an imposed delay;listen to a paragraph and answer questions;read a paragraph and answer questions;80%;with minimal cues (75-90%)  -CH      Time Frame (Memory Skills Goal 1, SLP) short term goal (STG)  -CH      Progress (Memory Skills Goal 1, SLP) 80%;with minimal cues (75-90%)  -CH      Progress/Outcomes (Memory Skills Goal 1, SLP) good progress toward goal  -CH      Comment (Memory Skills Goal 1, SLP) related recall of 4 items  -CH         Organizational Skills Goal 1 (SLP)    Improve Thought Organization Through Goal 1 (SLP) completing a divergent naming task;completing a convergent naming task;naming similarities and differences;completing a verbal sequencing task;completing mental manipulation task;concrete;80%;with minimal cues (75-90%)  -CH      Time Frame (Thought Organization Skills Goal 1, SLP) short term goal (STG)  -CH      Progress (Thought Organization Skills Goal 1, SLP) 80%;with minimal cues (75-90%)  -CH      Progress/Outcomes (Thought Organization Skills Goal 1, SLP) continuing progress toward goal  -CH      Comment (Thought Organization Skills Goal 1, SLP) divergent naming and verbal sequencing  -                User Key  (r) = Recorded By, (t) = Taken By, (c) = Cosigned By      Initials Name Provider Type    Matilda Roberts MS CCC-SLP Speech and Language Pathologist                    Time Calculation:      Time Calculation- SLP       Row Name 07/24/25 1613             Time Calculation- SLP    SLP Start Time 1530  -CH      SLP Received On 07/24/25  -         Untimed Charges    05334-IQ Treatment/ST Modification Prosth Aug Alter  42  -CH         Total Minutes    Untimed Charges Total Minutes 42  -CH       Total Minutes 42  -CH                User Key   (r) = Recorded By, (t) = Taken By, (c) = Cosigned By      Initials Name Provider Type    Matilda Roberts MS CCC-SLP Speech and Language Pathologist                    Therapy Charges for Today       Code Description Service Date Service Provider Modifiers Qty    [id number] [facility] TREATMENT SPEECH 3 7/24/2025 Matilda Matta MS CCC-SLP GN 1                       Matilda Matta MS CCC-RUTH  7/24/2025

## 2025-07-24 NOTE — CASE MANAGEMENT/SOCIAL WORK
Continued Stay Note  [facility]     Patient Name: Ibrahima Nava  MRN: [id number]  Today's Date: 7/24/2025    Admit Date: 7/15/2025    Plan: Inpatient Rehab   Discharge Plan       Row Name 07/24/25 1040       Plan    Plan Inpatient Rehab    Plan Comments Discussed patient in MDR.  Patient having EGD/colonoscopy tomorrow, 7/25.  Spoke with Hansa at [facility].  She should be able to offer patient a bed pending bed availability when patient is ready for discharge.  Unable to reach anyone in admissions at [facility].  [facility] in [place] does not have swing bed unit.  Updated patient in room.  CM will continue to follow.                   Discharge Codes    No documentation.                 Expected Discharge Date and Time       Expected Discharge Date Expected Discharge Time    Jul 28, 2025               Codie Parekh RN

## 2025-07-25 ENCOUNTER — ANESTHESIA (OUTPATIENT)
Dept: GASTROENTEROLOGY | Facility: HOSPITAL | Age: 80
DRG: 308 | End: 2025-07-25
Payer: MEDICARE

## 2025-07-25 ENCOUNTER — ANESTHESIA EVENT (OUTPATIENT)
Dept: GASTROENTEROLOGY | Facility: HOSPITAL | Age: 80
DRG: 308 | End: 2025-07-25
Payer: MEDICARE

## 2025-07-25 LAB
ANION GAP SERPL CALCULATED.3IONS-SCNC: 11 MMOL/L (ref 5–15)
ANION GAP SERPL CALCULATED.3IONS-SCNC: 11.4 MMOL/L (ref 5–15)
BUN SERPL-MCNC: 12.3 MG/DL (ref 8–23)
BUN SERPL-MCNC: 14.8 MG/DL (ref 8–23)
BUN/CREAT SERPL: 14.1 (ref 7–25)
BUN/CREAT SERPL: 18 (ref 7–25)
CALCIUM SPEC-SCNC: 8.3 MG/DL (ref 8.6–10.5)
CALCIUM SPEC-SCNC: 8.5 MG/DL (ref 8.6–10.5)
CHLORIDE SERPL-SCNC: 102 MMOL/L (ref 98–107)
CHLORIDE SERPL-SCNC: 104 MMOL/L (ref 98–107)
CO2 SERPL-SCNC: 30.6 MMOL/L (ref 22–29)
CO2 SERPL-SCNC: 35 MMOL/L (ref 22–29)
CREAT SERPL-MCNC: 0.82 MG/DL (ref 0.76–1.27)
CREAT SERPL-MCNC: 0.87 MG/DL (ref 0.76–1.27)
DEPRECATED RDW RBC AUTO: 53.1 FL (ref 37–54)
EGFRCR SERPLBLD CKD-EPI 2021: 87.2 ML/MIN/1.73
EGFRCR SERPLBLD CKD-EPI 2021: 88.8 ML/MIN/1.73
ERYTHROCYTE [DISTWIDTH] IN BLOOD BY AUTOMATED COUNT: 17.7 % (ref 12.3–15.4)
GLUCOSE BLDC GLUCOMTR-MCNC: 130 MG/DL (ref 70–130)
GLUCOSE BLDC GLUCOMTR-MCNC: 151 MG/DL (ref 70–130)
GLUCOSE BLDC GLUCOMTR-MCNC: 83 MG/DL (ref 70–130)
GLUCOSE BLDC GLUCOMTR-MCNC: 85 MG/DL (ref 70–130)
GLUCOSE SERPL-MCNC: 145 MG/DL (ref 65–99)
GLUCOSE SERPL-MCNC: 91 MG/DL (ref 65–99)
HCT VFR BLD AUTO: 29.9 % (ref 37.5–51)
HGB BLD-MCNC: 9 G/DL (ref 13–17.7)
MAGNESIUM SERPL-MCNC: 2.3 MG/DL (ref 1.6–2.4)
MCH RBC QN AUTO: 26.7 PG (ref 26.6–33)
MCHC RBC AUTO-ENTMCNC: 30.1 G/DL (ref 31.5–35.7)
MCV RBC AUTO: 88.7 FL (ref 79–97)
PLATELET # BLD AUTO: 126 10*3/MM3 (ref 140–450)
PMV BLD AUTO: 10.4 FL (ref 6–12)
POTASSIUM SERPL-SCNC: 2.4 MMOL/L (ref 3.5–5.2)
POTASSIUM SERPL-SCNC: 2.5 MMOL/L (ref 3.5–5.2)
POTASSIUM SERPL-SCNC: 2.5 MMOL/L (ref 3.5–5.2)
POTASSIUM SERPL-SCNC: 2.9 MMOL/L (ref 3.5–5.2)
QT INTERVAL: 392 MS
QTC INTERVAL: 492 MS
RBC # BLD AUTO: 3.37 10*6/MM3 (ref 4.14–5.8)
SODIUM SERPL-SCNC: 144 MMOL/L (ref 136–145)
SODIUM SERPL-SCNC: 150 MMOL/L (ref 136–145)
WBC NRBC COR # BLD AUTO: 8.02 10*3/MM3 (ref 3.4–10.8)

## 2025-07-25 PROCEDURE — 99231 SBSQ HOSP IP/OBS SF/LOW 25: CPT | Performed by: INTERNAL MEDICINE

## 2025-07-25 PROCEDURE — 80048 BASIC METABOLIC PNL TOTAL CA: CPT | Performed by: PHYSICIAN ASSISTANT

## 2025-07-25 PROCEDURE — 82948 REAGENT STRIP/BLOOD GLUCOSE: CPT

## 2025-07-25 PROCEDURE — 25810000003 LACTATED RINGERS PER 1000 ML: Performed by: STUDENT IN AN ORGANIZED HEALTH CARE EDUCATION/TRAINING PROGRAM

## 2025-07-25 PROCEDURE — 99232 SBSQ HOSP IP/OBS MODERATE 35: CPT | Performed by: INTERNAL MEDICINE

## 2025-07-25 PROCEDURE — 63710000001 INSULIN GLARGINE PER 5 UNITS: Performed by: FAMILY MEDICINE

## 2025-07-25 PROCEDURE — 25010000002 POTASSIUM CHLORIDE 10 MEQ/100ML SOLUTION: Performed by: STUDENT IN AN ORGANIZED HEALTH CARE EDUCATION/TRAINING PROGRAM

## 2025-07-25 PROCEDURE — 99232 SBSQ HOSP IP/OBS MODERATE 35: CPT | Performed by: STUDENT IN AN ORGANIZED HEALTH CARE EDUCATION/TRAINING PROGRAM

## 2025-07-25 PROCEDURE — 63710000001 INSULIN LISPRO (HUMAN) PER 5 UNITS: Performed by: FAMILY MEDICINE

## 2025-07-25 PROCEDURE — 25810000003 DEXTROSE 5% IN LACTATED RINGERS PER 1000 ML: Performed by: INTERNAL MEDICINE

## 2025-07-25 PROCEDURE — 25010000002 POTASSIUM CHLORIDE PER 2 MEQ OF POTASSIUM: Performed by: STUDENT IN AN ORGANIZED HEALTH CARE EDUCATION/TRAINING PROGRAM

## 2025-07-25 PROCEDURE — 83735 ASSAY OF MAGNESIUM: CPT | Performed by: STUDENT IN AN ORGANIZED HEALTH CARE EDUCATION/TRAINING PROGRAM

## 2025-07-25 PROCEDURE — 84132 ASSAY OF SERUM POTASSIUM: CPT | Performed by: STUDENT IN AN ORGANIZED HEALTH CARE EDUCATION/TRAINING PROGRAM

## 2025-07-25 PROCEDURE — 85027 COMPLETE CBC AUTOMATED: CPT | Performed by: PHYSICIAN ASSISTANT

## 2025-07-25 PROCEDURE — 80048 BASIC METABOLIC PNL TOTAL CA: CPT | Performed by: STUDENT IN AN ORGANIZED HEALTH CARE EDUCATION/TRAINING PROGRAM

## 2025-07-25 RX ORDER — NEBIVOLOL 10 MG/1
20 TABLET ORAL
Status: DISCONTINUED | OUTPATIENT
Start: 2025-07-25 | End: 2025-07-29 | Stop reason: HOSPADM

## 2025-07-25 RX ORDER — DEXTROSE MONOHYDRATE, SODIUM CHLORIDE, SODIUM LACTATE, POTASSIUM CHLORIDE, CALCIUM CHLORIDE 5; 600; 310; 179; 20 G/100ML; MG/100ML; MG/100ML; MG/100ML; MG/100ML
50 INJECTION, SOLUTION INTRAVENOUS CONTINUOUS
Status: DISCONTINUED | OUTPATIENT
Start: 2025-07-25 | End: 2025-07-25

## 2025-07-25 RX ORDER — POTASSIUM CHLORIDE 750 MG/1
40 CAPSULE, EXTENDED RELEASE ORAL 2 TIMES DAILY WITH MEALS
Status: DISCONTINUED | OUTPATIENT
Start: 2025-07-25 | End: 2025-07-26

## 2025-07-25 RX ORDER — POTASSIUM CHLORIDE 7.45 MG/ML
10 INJECTION INTRAVENOUS
Status: DISCONTINUED | OUTPATIENT
Start: 2025-07-25 | End: 2025-07-25 | Stop reason: SDUPTHER

## 2025-07-25 RX ORDER — DEXTROSE, SODIUM CHLORIDE, SODIUM LACTATE, POTASSIUM CHLORIDE, AND CALCIUM CHLORIDE 5; .6; .31; .03; .02 G/100ML; G/100ML; G/100ML; G/100ML; G/100ML
50 INJECTION, SOLUTION INTRAVENOUS CONTINUOUS
Status: DISCONTINUED | OUTPATIENT
Start: 2025-07-25 | End: 2025-07-25

## 2025-07-25 RX ORDER — SODIUM, POTASSIUM,MAG SULFATES 17.5-3.13G
1 SOLUTION, RECONSTITUTED, ORAL ORAL EVERY 12 HOURS
Status: DISPENSED | OUTPATIENT
Start: 2025-07-25 | End: 2025-07-26

## 2025-07-25 RX ORDER — AMLODIPINE BESYLATE 10 MG/1
10 TABLET ORAL
Status: DISCONTINUED | OUTPATIENT
Start: 2025-07-25 | End: 2025-07-29 | Stop reason: HOSPADM

## 2025-07-25 RX ADMIN — POTASSIUM CHLORIDE 10 MEQ: 7.46 INJECTION, SOLUTION INTRAVENOUS at 14:35

## 2025-07-25 RX ADMIN — LOSARTAN POTASSIUM 100 MG: 50 TABLET, FILM COATED ORAL at 08:42

## 2025-07-25 RX ADMIN — INSULIN LISPRO 2 UNITS: 100 INJECTION, SOLUTION INTRAVENOUS; SUBCUTANEOUS at 18:15

## 2025-07-25 RX ADMIN — ISOSORBIDE MONONITRATE 30 MG: 30 TABLET, EXTENDED RELEASE ORAL at 08:42

## 2025-07-25 RX ADMIN — PANTOPRAZOLE SODIUM 40 MG: 40 INJECTION, POWDER, FOR SOLUTION INTRAVENOUS at 08:43

## 2025-07-25 RX ADMIN — VALACYCLOVIR HYDROCHLORIDE 500 MG: 500 TABLET, FILM COATED ORAL at 08:43

## 2025-07-25 RX ADMIN — POTASSIUM CHLORIDE 40 MEQ: 750 CAPSULE, EXTENDED RELEASE ORAL at 08:42

## 2025-07-25 RX ADMIN — PANTOPRAZOLE SODIUM 40 MG: 40 INJECTION, POWDER, FOR SOLUTION INTRAVENOUS at 18:15

## 2025-07-25 RX ADMIN — BUMETANIDE 1 MG: 1 TABLET ORAL at 08:42

## 2025-07-25 RX ADMIN — POTASSIUM CHLORIDE 10 MEQ: 7.46 INJECTION, SOLUTION INTRAVENOUS at 09:47

## 2025-07-25 RX ADMIN — LEVOTHYROXINE SODIUM 50 MCG: 50 TABLET ORAL at 05:08

## 2025-07-25 RX ADMIN — Medication 10 ML: at 21:45

## 2025-07-25 RX ADMIN — SERTRALINE HYDROCHLORIDE 200 MG: 100 TABLET, FILM COATED ORAL at 08:42

## 2025-07-25 RX ADMIN — AMIODARONE HYDROCHLORIDE 200 MG: 200 TABLET ORAL at 04:39

## 2025-07-25 RX ADMIN — INSULIN GLARGINE 5 UNITS: 100 INJECTION, SOLUTION SUBCUTANEOUS at 22:42

## 2025-07-25 RX ADMIN — POTASSIUM CHLORIDE 10 MEQ: 7.46 INJECTION, SOLUTION INTRAVENOUS at 12:27

## 2025-07-25 RX ADMIN — EMPAGLIFLOZIN 10 MG: 10 TABLET, FILM COATED ORAL at 08:42

## 2025-07-25 RX ADMIN — TERAZOSIN HYDROCHLORIDE 2 MG: 2 CAPSULE ORAL at 21:44

## 2025-07-25 RX ADMIN — POTASSIUM CHLORIDE 40 MEQ: 750 CAPSULE, EXTENDED RELEASE ORAL at 18:15

## 2025-07-25 RX ADMIN — ATORVASTATIN CALCIUM 40 MG: 40 TABLET, FILM COATED ORAL at 21:45

## 2025-07-25 RX ADMIN — NEBIBOLOL 20 MG: 10 TABLET ORAL at 08:42

## 2025-07-25 RX ADMIN — TIZANIDINE 4 MG: 4 TABLET ORAL at 21:45

## 2025-07-25 RX ADMIN — SODIUM CHLORIDE, SODIUM LACTATE, POTASSIUM CHLORIDE, CALCIUM CHLORIDE AND DEXTROSE MONOHYDRATE 50 ML/HR: 5; 600; 310; 30; 20 INJECTION, SOLUTION INTRAVENOUS at 15:57

## 2025-07-25 RX ADMIN — Medication 10 MG: at 21:44

## 2025-07-25 RX ADMIN — AMLODIPINE BESYLATE 10 MG: 10 TABLET ORAL at 08:42

## 2025-07-25 RX ADMIN — SODIUM CHLORIDE, SODIUM LACTATE, POTASSIUM CHLORIDE, CALCIUM CHLORIDE 50 ML/HR: 20; 30; 600; 310 INJECTION, SOLUTION INTRAVENOUS at 21:39

## 2025-07-25 RX ADMIN — PEG-3350, SODIUM SULFATE, SODIUM CHLORIDE, POTASSIUM CHLORIDE, SODIUM ASCORBATE AND ASCORBIC ACID 1000 ML: KIT at 04:33

## 2025-07-25 NOTE — PROGRESS NOTES
[facility]  IP Progress Note      Chief Complaint/Reason for service: #1 A-fib RVR status post SALVADOR guided cardioversion #2 heart failure preserved EF #3 exertional angina #4 hypertension    Subjective   Subjective: Patient is awake and alert.  Denies any anginal type pain.  States he did not ambulate yesterday.  Tells me he does a lot of wheezing at home.    Past medical, surgical, social and family history reviewed in the patient's electronic medical record.    Objective     Vital Sign Min/Max for last 24 hours  Temp  Min: 97.7 °F (36.5 °C)  Max: 98.4 °F (36.9 °C)   BP  Min: 119/59  Max: 169/70   Pulse  Min: 69  Max: 92   Resp  Min: 17  Max: 18   SpO2  Min: 93 %  Max: 96 %   Flow (L/min) (Oxygen Therapy)  Min: 2  Max: 2      Intake/Output Summary (Last 24 hours) at 2025 0654  Last data filed at 2025 0200  Gross per 24 hour   Intake --   Output 1050 ml   Net -1050 ml             Current Facility-Administered Medications:     acetaminophen (TYLENOL) tablet 650 mg, 650 mg, Oral, Q6H PRN, Letz, Claudia, APRN, 650 mg at 25 1648    [COMPLETED] amiodarone 150 mg in 100 mL D5W (loading dose), 150 mg, Intravenous, Once, 150 mg at 25 1227 **FOLLOWED BY** [] amiodarone 360 mg in 200 mL D5W infusion, 1 mg/min, Intravenous, Continuous, Stopped at 25 1840 **FOLLOWED BY** [] amiodarone 360 mg in 200 mL D5W infusion, 0.5 mg/min, Intravenous, Continuous, Stopped at 25 1240 **FOLLOWED BY** [COMPLETED] amiodarone (PACERONE) tablet 200 mg, 200 mg, Oral, Once, 200 mg at 25 1240 **FOLLOWED BY** amiodarone (PACERONE) tablet 200 mg, 200 mg, Oral, Q8H, 200 mg at 25 0439 **FOLLOWED BY** [START ON 2025] amiodarone (PACERONE) tablet 200 mg, 200 mg, Oral, Q12H **FOLLOWED BY** [START ON 8/10/2025] amiodarone (PACERONE) tablet 200 mg, 200 mg, Oral, Daily, Jani Cohen III, MD    amLODIPine (NORVASC) tablet 10 mg, 10 mg, Oral, Q24H, Vanessa  Carlos Bull MD    [Held by provider] apixaban (ELIQUIS) tablet 5 mg, 5 mg, Oral, Q12H, Igor Nath MD, 5 mg at 07/23/25 0947    atorvastatin (LIPITOR) tablet 40 mg, 40 mg, Oral, Nightly, Carlos Mendez MD, 40 mg at 07/24/25 2120    sennosides-docusate (PERICOLACE) 8.6-50 MG per tablet 2 tablet, 2 tablet, Oral, BID PRN **AND** polyethylene glycol (MIRALAX) packet 17 g, 17 g, Oral, Daily PRN **AND** bisacodyl (DULCOLAX) EC tablet 5 mg, 5 mg, Oral, Daily PRN **AND** bisacodyl (DULCOLAX) suppository 10 mg, 10 mg, Rectal, Daily PRN, Ruth Michaud MD    bumetanide (BUMEX) tablet 1 mg, 1 mg, Oral, BID Diuretics, Debbie Davenport PA-C, 1 mg at 07/24/25 1838    cloNIDine (CATAPRES) tablet 0.1 mg, 0.1 mg, Oral, Q1H PRN, Carlos Mendez MD, 0.1 mg at 07/17/25 1241    dextrose (D50W) (25 g/50 mL) IV injection 25 g, 25 g, Intravenous, Q15 Min PRN, Peggy Walsh DO    dextrose (GLUTOSE) oral gel 15 g, 15 g, Oral, Q15 Min PRN, Peggy Walsh,     empagliflozin (JARDIANCE) tablet 10 mg, 10 mg, Oral, Daily, Carlos Mendez MD, 10 mg at 07/24/25 0930    [Held by provider] ferrous sulfate tablet 325 mg, 325 mg, Oral, Daily With Breakfast, Carlos Mendez MD, 325 mg at 07/21/25 0915    glucagon (GLUCAGEN) injection 1 mg, 1 mg, Intramuscular, Q15 Min PRN, Peggy Walsh DO    hydrALAZINE (APRESOLINE) injection 10 mg, 10 mg, Intravenous, Q6H PRN, Peggy Walsh DO, 10 mg at 07/18/25 1733    insulin glargine (LANTUS, SEMGLEE) injection 5 Units, 5 Units, Subcutaneous, Nightly, Peggy Walsh DO, 5 Units at 07/24/25 2123    Insulin Lispro (humaLOG) injection 2-9 Units, 2-9 Units, Subcutaneous, 4x Daily AC & at Bedtime, Peggy Walsh DO, 2 Units at 07/24/25 2122    isosorbide mononitrate (IMDUR) 24 hr tablet 30 mg, 30 mg, Oral, Q24H, Carlos Mendez MD, 30 mg at 07/24/25 0930    levothyroxine (SYNTHROID, LEVOTHROID) tablet 50 mcg, 50 mcg, Oral, Q AM,  Peggy Walsh DO, 50 mcg at 07/25/25 0508    LORazepam (ATIVAN) tablet 0.5 mg, 0.5 mg, Oral, Daily PRN, Peggy Walsh DO, 0.5 mg at 07/21/25 2213    losartan (COZAAR) tablet 100 mg, 100 mg, Oral, Q24H, Carlos Mendez MD, 100 mg at 07/24/25 0930    Magnesium Cardiology Dose Replacement - Follow Nurse / BPA Driven Protocol, , Not Applicable, PRN, Juan Manuel Sandoval MD    melatonin tablet 10 mg, 10 mg, Oral, Nightly, Maya Garcia MD, 10 mg at 07/24/25 2119    metoprolol tartrate (LOPRESSOR) injection 5 mg, 5 mg, Intravenous, Q6H PRN, Peggy Walsh DO, 5 mg at 07/21/25 1925    metoprolol tartrate (LOPRESSOR) tablet 100 mg, 100 mg, Oral, Q12H, Carlos Mendez MD, 100 mg at 07/24/25 2121    nitroglycerin (NITROSTAT) SL tablet 0.4 mg, 0.4 mg, Sublingual, Q5 Min PRN, Ruth Michaud MD    pantoprazole (PROTONIX) injection 40 mg, 40 mg, Intravenous, BID AC, Caterina Philip PA, 40 mg at 07/24/25 1838    potassium chloride (MICRO-K/KLOR-CON) CR capsule, 30 mEq, Oral, Daily, Carlos Mendez MD, 30 mEq at 07/24/25 0930    Potassium Replacement - Follow Nurse / BPA Driven Protocol, , Not Applicable, PRN, Maya Garcia MD    [Held by provider] primidone (MYSOLINE) tablet 100 mg, 100 mg, Oral, Daily, Peggy Walsh DO, 100 mg at 07/21/25 0915    sertraline (ZOLOFT) tablet 200 mg, 200 mg, Oral, Daily, Peggy Walsh DO, 200 mg at 07/24/25 0929    sodium chloride 0.9 % flush 10 mL, 10 mL, Intravenous, Q12H, Ruth Michaud MD, 10 mL at 07/24/25 2122    sodium chloride 0.9 % flush 10 mL, 10 mL, Intravenous, PRN, Ruth Michaud MD    sodium chloride 0.9 % infusion 40 mL, 40 mL, Intravenous, PRN, Ruth Michaud MD    spironolactone (ALDACTONE) tablet 50 mg, 50 mg, Oral, Daily, Carlos Mendez MD, 50 mg at 07/24/25 0930    terazosin (HYTRIN) capsule 2 mg, 2 mg, Oral, Nightly, Jani Cohen III, MD, 2 mg at 07/24/25 2120    tiZANidine (ZANAFLEX) tablet 4 mg, 4 mg, Oral,  Nightly, Peggy Walsh DO, 4 mg at 07/24/25 2121    valACYclovir (VALTREX) tablet 500 mg, 500 mg, Oral, Daily, Debbie Davenport PA-C, 500 mg at 07/24/25 0930    Physical Exam: General Pleasant obese male in bed not dyspneic tachypneic at rest        HEENT: No JVP.  Nasal O2 present.       Respiratory: Equal bilateral symmetrical expansion auscultation bilaterally       Cardiovascular: Regular rate and rhythm with ectopics and pitting edema to palpation which is unchanged       GI: Obese and soft       Lower Extremities: Positive edema       Neuro: Facial expression symmetrical moves all 4 extremities       Skin: Warm and dry with pitting edema palpation       Psych: Pleasant affect    Results Review: Output exceeds intake by 3.8 L.  Heart rate 78-92.  Blood pressures 150-160.  Hemoglobin 9.0.  Patient was started on IV iron yesterday    Radiology Results:  Imaging Results (Last 72 Hours)       ** No results found for the last 72 hours. **            EKG: Sinus rhythm with PAC acceptable QTc    ECHO: Normal wall motion EF    Tele: Sinus rhythm    Assessment   Assessment/Plan: A-fib-the patient underwent SALVADOR cardioversion.  He is on amiodarone, metoprolol, and Eliquis..  Since the patient does a lot of wheezing at home and has dyspnea on exertion I will DC his metoprolol and switch him to Bystolic 20 mg daily  2 exertional angina-Imdur was started yesterday.  The patient was also being transfused couple units.  Patient did not ambulate yesterday  3 hypertension-we will change to Bystolic.  Consider the addition of hydralazine as needed for blood pressure    Carlos Mendez MD  07/25/25  06:54 EDT  This is an addendum to the earlier note.  Colonoscopy were canceled because of hypernatremia and hypokalemia.  For now I will discontinue the patient's Bumex and start him on D5 lactated Ringer's 50 an hour for 12 hours which should lower his sodium.  Hopefully the patient can have his procedure  tomorrow

## 2025-07-25 NOTE — CASE MANAGEMENT/SOCIAL WORK
Continued Stay Note  [facility]     Patient Name: Ibrahima Nava  MRN: [id number]  Today's Date: 7/25/2025    Admit Date: 7/15/2025    Plan: Inpatient Rehab   Discharge Plan       Row Name 07/25/25 1115       Plan    Plan Inpatient Rehab    Plan Comments Discussed patient in MDR.  Patient scheduled for EGD/colonoscopy today pending lab results.  Will remain here through the weekend.  Spoke with patient and family in room.  Plan is still inpatient rehab at discharge.  They are agreeable to bed at [facility].  Left voicemail for Hansa, admissions liason at [facility].  CM will follow up on Monday, 7/28 to confirm bed availability.    Final Discharge Disposition Code 61 - hospital-based swing bed                   Discharge Codes    No documentation.                 Expected Discharge Date and Time       Expected Discharge Date Expected Discharge Time    Jul 28, 2025               Codie Parekh RN

## 2025-07-25 NOTE — ANESTHESIA PREPROCEDURE EVALUATION
Anesthesia Evaluation     Patient summary reviewed and Nursing notes reviewed   NPO Solid Status: > 8 hours  NPO Liquid Status: > 2 hours           Airway   Mallampati: I  TM distance: >3 FB  Neck ROM: full  No difficulty expected  Dental      Pulmonary     breath sounds clear to auscultation  (+) a smoker Former, cigarettes,sleep apnea    ROS comment: JANNA lesion mediastinal mass   Cardiovascular     ECG reviewed  Rhythm: regular  Rate: normal    (+) hypertension, dysrhythmias Atrial Fib  (-) past MI, cardiac stents    ROS comment: ECG SR PACs     ECHO pre  CV  EF 56 - 60%.mild concentric LVH .No evidence of a CALEB thrombus  Mild to moderate MR      Neuro/Psych  (+) CVA, tremors    ROS Comment: 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    This appears contiguous with an elongated pleural-based mass extending along the left lateral mediastinal margin, which measures up to approximately 5.5 x 3 cm. Precise delineation from adjacent pulmonary vessels is difficult without IV contrast and only  the upper portion of the mediastinal mass was included on the previous neck CT scan.    GI/Hepatic/Renal/Endo    (+) liver disease history of elevated LFT  (-) diabetes, no thyroid disorder    Musculoskeletal     Abdominal    Substance History      OB/GYN          Other   arthritis,     ROS/Med Hx Other: Mediastinal Mass   CVA   Follow up anemia HCT 30       Phys Exam Other: McG 3 G1 V               Anesthesia Plan    ASA 3     general     (Cancelled due to low K 2.5 despite Rx )  intravenous induction     Anesthetic plan, risks, benefits, and alternatives have been provided, discussed and informed consent has been obtained with: patient.    Plan discussed with CRNA.      CODE STATUS:    Code Status (Patient has no pulse and is not breathing): CPR (Attempt to Resuscitate)  Medical Interventions (Patient has pulse or is breathing): Full Support  Level Of Support Discussed With: Patient

## 2025-07-25 NOTE — PROGRESS NOTES
"GI Daily Progress Note  Subjective:    Chief Complaint:  follow up anemia    Patient completed bowel prep.  However, Na 150 and K 2.5 and anesthesiology has deferred his case resultantly.  He reports dry mouth.    Objective:    /69 (BP Location: Left arm, Patient Position: Sitting)   Pulse 91   Temp 98.4 °F (36.9 °C) (Oral)   Resp 18   Ht 172.7 cm (67.99\")   Wt 133 kg (293 lb 3.4 oz)   SpO2 94%   BMI 44.59 kg/m²     Physical Exam   General: Patient awake, alert and cooperative   Cardiovascular: Regular rate, well-perfused extremities   Pulm: Equal expansion bilaterally, no increased WOB   Abdomen:  nondistended;               Neuro: A&O, No obvious sign of focal deficit    Lab  Lab Results   Component Value Date    WBC 8.02 07/25/2025    HGB 9.0 (L) 07/25/2025    HGB 8.7 (L) 07/24/2025    HGB 7.1 (L) 07/23/2025    MCV 88.7 07/25/2025     (L) 07/25/2025    INR 1.09 07/15/2025       Lab Results   Component Value Date    GLUCOSE 91 07/25/2025    BUN 14.8 07/25/2025    CREATININE 0.82 07/25/2025    EGFRIFNONA 83 06/13/2017    BCR 18.0 07/25/2025     (H) 07/25/2025    K 2.5 (C) 07/25/2025    CO2 35.0 (H) 07/25/2025    CALCIUM 8.5 (L) 07/25/2025    ALBUMIN 3.3 (L) 07/22/2025    ALKPHOS 83 07/22/2025    BILITOT 0.6 07/22/2025    BILIDIR 0.3 07/22/2025    ALT 40 07/22/2025    AST 77 (H) 07/22/2025       Assessment:    Gastrointestinal bleed with melena, subacute  Acute on chronic anemia, iron deficiency   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  However, Na 150 and K 2.5 and anesthesiology has deferred his case resultantly.  - Clear liquid diet today  - Electrolyte correction/replacement  - Suprep this evening  - EGD and colonoscopy tomorrow    Jani Berrios MD  07/25/25  14:08 EDT      "

## 2025-07-25 NOTE — PLAN OF CARE
Problem: Adult Inpatient Plan of Care  Goal: Plan of Care Review  Outcome: Progressing  Goal: Patient-Specific Goal (Individualized)  Outcome: Progressing  Goal: Absence of Hospital-Acquired Illness or Injury  Outcome: Progressing  Intervention: Identify and Manage Fall Risk  Recent Flowsheet Documentation  Taken 7/25/2025 1200 by Weathers, Rubi, RN  Safety Promotion/Fall Prevention:   activity supervised   assistive device/personal items within reach   clutter free environment maintained   lighting adjusted   room organization consistent  Taken 7/25/2025 1000 by Weathers, Rubi, RN  Safety Promotion/Fall Prevention:   activity supervised   assistive device/personal items within reach   clutter free environment maintained   lighting adjusted   room organization consistent  Taken 7/25/2025 0800 by Weathers, Rubi, RN  Safety Promotion/Fall Prevention:   activity supervised   assistive device/personal items within reach   clutter free environment maintained   lighting adjusted   room organization consistent  Intervention: Prevent Skin Injury  Recent Flowsheet Documentation  Taken 7/25/2025 1200 by Rubi Oneill RN  Body Position: position changed independently  Skin Protection:   incontinence pads utilized   silicone foam dressing in place   transparent dressing maintained  Taken 7/25/2025 1000 by Rubi Oneill RN  Body Position: position changed independently  Skin Protection:   incontinence pads utilized   silicone foam dressing in place   transparent dressing maintained  Taken 7/25/2025 0800 by Rubi Oneill RN  Body Position: position changed independently  Skin Protection:   incontinence pads utilized   silicone foam dressing in place   transparent dressing maintained  Intervention: Prevent and Manage VTE (Venous Thromboembolism) Risk  Recent Flowsheet Documentation  Taken 7/25/2025 0800 by Rubi Oneill RN  VTE Prevention/Management:   bilateral   SCDs (sequential  compression devices) off  Intervention: Prevent Infection  Recent Flowsheet Documentation  Taken 7/25/2025 1200 by Rubi Oneill RN  Infection Prevention: environmental surveillance performed  Taken 7/25/2025 1000 by Rubi Oneill RN  Infection Prevention: environmental surveillance performed  Taken 7/25/2025 0800 by Rubi Oneill RN  Infection Prevention: environmental surveillance performed  Goal: Optimal Comfort and Wellbeing  Outcome: Progressing  Intervention: Provide Person-Centered Care  Recent Flowsheet Documentation  Taken 7/25/2025 0800 by Rubi Oneill RN  Trust Relationship/Rapport:   care explained   choices provided   thoughts/feelings acknowledged  Goal: Readiness for Transition of Care  Outcome: Progressing     Problem: Stroke, Ischemic (Includes Transient Ischemic Attack)  Goal: Optimal Coping  Outcome: Progressing  Intervention: Support Psychosocial Response to Stroke  Recent Flowsheet Documentation  Taken 7/25/2025 0800 by Rubi Oneill RN  Family/Support System Care:   caregiver stress acknowledged   self-care encouraged  Goal: Optimal Cerebral Tissue Perfusion  Outcome: Progressing  Intervention: Protect and Optimize Cerebral Perfusion  Recent Flowsheet Documentation  Taken 7/25/2025 1200 by Rubi Oneill RN  Sensory Stimulation Regulation: care clustered  Taken 7/25/2025 0800 by Rubi Oneill RN  Sensory Stimulation Regulation: care clustered  Cerebral Perfusion Promotion: blood pressure monitored  Goal: Optimal Cognitive Function  Outcome: Progressing  Intervention: Optimize Cognitive Function  Recent Flowsheet Documentation  Taken 7/25/2025 1200 by Rubi Oneill RN  Sensory Stimulation Regulation: care clustered  Taken 7/25/2025 0800 by Rubi Oneill RN  Sensory Stimulation Regulation: care clustered  Goal: Optimal Functional Ability  Outcome: Progressing  Intervention: Optimize Functional Ability  Recent Flowsheet  Documentation  Taken 7/25/2025 0800 by Rubi Oneill RN  Activity Management: activity encouraged  Goal: Effective Oxygenation and Ventilation  Outcome: Progressing  Intervention: Optimize Oxygenation and Ventilation  Recent Flowsheet Documentation  Taken 7/25/2025 1200 by Rubi Oneill RN  Head of Bed (HOB) Positioning: HOB elevated  Taken 7/25/2025 1000 by Rubi Oneill RN  Head of Bed (HOB) Positioning: HOB elevated  Taken 7/25/2025 0800 by Rubi Oneill RN  Head of Bed (HOB) Positioning: HOB elevated  Goal: Safe and Effective Swallow  Outcome: Progressing     Problem: Comorbidity Management  Goal: Blood Pressure in Desired Range  Outcome: Progressing  Intervention: Maintain Blood Pressure Management  Recent Flowsheet Documentation  Taken 7/25/2025 1200 by Rubi Oneill RN  Medication Review/Management: medications reviewed  Taken 7/25/2025 1000 by Rubi Oneill RN  Medication Review/Management: medications reviewed  Taken 7/25/2025 0800 by Rubi Oneill RN  Medication Review/Management: medications reviewed     Problem: Fall Injury Risk  Goal: Absence of Fall and Fall-Related Injury  Outcome: Progressing  Intervention: Identify and Manage Contributors  Recent Flowsheet Documentation  Taken 7/25/2025 1200 by Rubi Oneill RN  Medication Review/Management: medications reviewed  Taken 7/25/2025 1000 by Rubi Oneill RN  Medication Review/Management: medications reviewed  Taken 7/25/2025 0800 by Rubi Oneill RN  Medication Review/Management: medications reviewed  Intervention: Promote Injury-Free Environment  Recent Flowsheet Documentation  Taken 7/25/2025 1200 by Weathers, Rubi, RN  Safety Promotion/Fall Prevention:   activity supervised   assistive device/personal items within reach   clutter free environment maintained   lighting adjusted   room organization consistent  Taken 7/25/2025 1000 by Rubi Oneill RN  Safety  Promotion/Fall Prevention:   activity supervised   assistive device/personal items within reach   clutter free environment maintained   lighting adjusted   room organization consistent  Taken 7/25/2025 0800 by Weathers, Rubi, RN  Safety Promotion/Fall Prevention:   activity supervised   assistive device/personal items within reach   clutter free environment maintained   lighting adjusted   room organization consistent     Problem: Skin Injury Risk Increased  Goal: Skin Health and Integrity  Outcome: Progressing  Intervention: Optimize Skin Protection  Recent Flowsheet Documentation  Taken 7/25/2025 1200 by Rubi Oneill RN  Pressure Reduction Techniques:   frequent weight shift encouraged   heels elevated off bed   pressure points protected  Head of Bed (HOB) Positioning: HOB elevated  Pressure Reduction Devices:   pressure-redistributing mattress utilized   heel offloading device utilized   positioning supports utilized  Skin Protection:   incontinence pads utilized   silicone foam dressing in place   transparent dressing maintained  Taken 7/25/2025 1000 by Rubi Oneill RN  Pressure Reduction Techniques: frequent weight shift encouraged  Head of Bed (HOB) Positioning: HOB elevated  Pressure Reduction Devices:   pressure-redistributing mattress utilized   positioning supports utilized   heel offloading device utilized  Skin Protection:   incontinence pads utilized   silicone foam dressing in place   transparent dressing maintained  Taken 7/25/2025 0800 by Rubi Oneill RN  Activity Management: activity encouraged  Pressure Reduction Techniques: frequent weight shift encouraged  Head of Bed (HOB) Positioning: HOB elevated  Pressure Reduction Devices:   pressure-redistributing mattress utilized   positioning supports utilized   heel offloading device utilized  Skin Protection:   incontinence pads utilized   silicone foam dressing in place   transparent dressing maintained   Goal Outcome  Evaluation:

## 2025-07-25 NOTE — PROGRESS NOTES
[facility] Medicine Services  PROGRESS NOTE    Patient Name: Ibrahima Nava  : 1945  MRN: [id number]    Date of Admission: 7/15/2025  Primary Care Physician: Sebastien Lowe MD    Subjective   Subjective     CC:  CVA workup    HPI:  Patient seen resting comfortably in bed in no acute distress.  Continues to have lower extremity edema, though improved per family at bedside.  Reports significant bowel movements overnight secondary to bowel preparation.  Denies any abdominal pain.      Objective   Objective     Vital Signs:   Temp:  [97.7 °F (36.5 °C)-98.4 °F (36.9 °C)] 98.1 °F (36.7 °C)  Heart Rate:  [75-92] 91  Resp:  [17-18] 18  BP: (148-175)/() 152/110  Flow (L/min) (Oxygen Therapy):  [2] 2     Physical Exam  Constitutional:       General: He is not in acute distress.  Cardiovascular:      Rate and Rhythm: Normal rate.      Pulses: Normal pulses.   Pulmonary:      Effort: Pulmonary effort is normal. No respiratory distress.   Abdominal:      General: There is no distension.      Palpations: Abdomen is soft.      Tenderness: There is no abdominal tenderness. There is no guarding or rebound.   Musculoskeletal:      Right lower leg: Edema present.      Left lower leg: Edema present.   Skin:     General: Skin is warm.   Neurological:      Mental Status: He is alert.   Psychiatric:         Mood and Affect: Mood normal.         Behavior: Behavior normal.           Results Reviewed:  LAB RESULTS:      Lab 25  0608 25  0820 25  0625 25  1458 25  0846   WBC 8.02 8.23 7.19 7.25 7.98   HEMOGLOBIN 9.0* 8.7* 7.1* 7.4* 7.5*   HEMATOCRIT 29.9* 29.6* 24.6* 25.5* 25.3*   PLATELETS 126* 128* 132* 141 153   MCV 88.7 87.8 87.9 87.9 86.9         Lab 25  1235 25  0608 25  1416 25  0820 25  1229 25  0625 25  2334 25  1458 25  0030 25  0846 25  1317 25  1153   SODIUM  --  150*  --  146*  --  144   --  142  --  141   < > 148*   POTASSIUM 2.5* 2.9* 3.5 3.5 2.8* 3.2*   < > 2.4*   < > 3.0*   < > 3.7   CHLORIDE  --  104  --  104  --  100  --  98  --  98   < > 105   CO2  --  35.0*  --  35.0*  --  35.0*  --  30.1*  --  32.0*   < > 29.3*   ANION GAP  --  11.0  --  7.0  --  9.0  --  13.9  --  11.0   < > 13.7   BUN  --  14.8  --  18.5  --  20.1  --  21.5  --  17.5   < > 24.0*   CREATININE  --  0.82  --  0.87  --  0.83  --  1.14  --  0.85   < > 0.88   EGFR  --  88.8  --  87.2  --  88.5  --  65.0  --  87.8   < > 86.9   GLUCOSE  --  91  --  110*  --  108*  --  201*  --  138*   < > 101*   CALCIUM  --  8.5*  --  8.6  --  8.6  --  8.4*  --  8.4*   < > 8.7   MAGNESIUM  --  2.3  --   --   --   --   --  2.1  --   --   --  2.3    < > = values in this interval not displayed.         Lab 07/22/25  1458   TOTAL PROTEIN 5.0*   ALBUMIN 3.3*   ALT (SGPT) 40   AST (SGOT) 77*   BILIRUBIN 0.6   INDIRECT BILIRUBIN 0.3   BILIRUBIN DIRECT 0.3   ALK PHOS 83         Lab 07/23/25  0625   PROBNP 1,175.0             Lab 07/23/25  1359 07/23/25  0933   FOLATE 14.20  --    VITAMIN B 12 1,195*  --    ABO TYPING  --  O   RH TYPING  --  Positive   ANTIBODY SCREEN  --  Negative         Brief Urine Lab Results  (Last result in the past 365 days)        Color   Clarity   Blood   Leuk Est   Nitrite   Protein   CREAT   Urine HCG        07/15/25 1319 Yellow   Clear   Moderate (2+)   Negative   Negative   30 mg/dL (1+)                   Microbiology Results Abnormal       None            No radiology results from the last 24 hrs    Results for orders placed during the hospital encounter of 07/15/25    Adult Transesophageal Echo (SALVADOR) W/ Cont if Necessary Per Protocol 07/19/2025  8:29 AM    Interpretation Summary    Left ventricular systolic function is low normal. Left ventricular ejection fraction appears to be 56 - 60%.    Left ventricular wall thickness is consistent with mild concentric hypertrophy.    No evidence of a left atrial appendage thrombus     Mild to moderate mitral valve regurgitation      Current medications:  Scheduled Meds:amLODIPine, 10 mg, Oral, Q24H  [Held by provider] apixaban, 5 mg, Oral, Q12H  atorvastatin, 40 mg, Oral, Nightly  empagliflozin, 10 mg, Oral, Daily  [Held by provider] ferrous sulfate, 325 mg, Oral, Daily With Breakfast  insulin glargine, 5 Units, Subcutaneous, Nightly  insulin lispro, 2-9 Units, Subcutaneous, 4x Daily AC & at Bedtime  isosorbide mononitrate, 30 mg, Oral, Q24H  levothyroxine, 50 mcg, Oral, Q AM  losartan, 100 mg, Oral, Q24H  melatonin, 10 mg, Oral, Nightly  nebivolol, 20 mg, Oral, Q24H  pantoprazole, 40 mg, Intravenous, BID AC  potassium chloride, 40 mEq, Oral, BID With Meals  [Held by provider] primidone, 100 mg, Oral, Daily  sertraline, 200 mg, Oral, Daily  sodium chloride, 10 mL, Intravenous, Q12H  sodium-potassium-magnesium sulfates, 1 bottle, Oral, Q12H  terazosin, 2 mg, Oral, Nightly  tiZANidine, 4 mg, Oral, Nightly  valACYclovir, 500 mg, Oral, Daily      Continuous Infusions:dextrose 5 % and lactated Ringer's, 50 mL/hr, Last Rate: 50 mL/hr (07/25/25 1557)      PRN Meds:.  acetaminophen    senna-docusate sodium **AND** polyethylene glycol **AND** bisacodyl **AND** bisacodyl    cloNIDine    dextrose    dextrose    glucagon (human recombinant)    hydrALAZINE    LORazepam    Magnesium Cardiology Dose Replacement - Follow Nurse / BPA Driven Protocol    metoprolol tartrate    nitroglycerin    Potassium Replacement - Follow Nurse / BPA Driven Protocol    sodium chloride    sodium chloride    Assessment & Plan   Assessment & Plan     Active Hospital Problems    Diagnosis  POA    **CVA (cerebral vascular accident) [I63.9]  Yes    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        Brief Hospital Course to date:  Ibrahima Nava is a 80 y.o. male with a PMHx significant for HTN, anxiety (on  "chronic benzodiazepine therapy), prior CVA (2012 with no residual deficits), essential tremor, obesity (BMI 44) and DARCY (unable to tolerate CPAP). Admitted to [facility] 7/15/25 due to speech dysarthria / expressive aphasia and confusion.  Stroke neurology consulted.  Etiology thought to be secondary to stroke recrudescence.  Hospital course complicated by A-fib with RVR, hypokalemia, and anemia.  Cardiology and gastroenterology following.     Stroke recrudescence   History of CVA   - Presented with speech dysarthria / expressive aphasia and confusion   - Stroke neurology team followed  - CTA head/neck with mild cervical/intracranial atherosclerotic changes, no LVO or flow-limiting stenosis  - MRI brain negative for acute stroke  - EEG reassuring  - SALVADOR with EF 56-60%, mild concentric LV hypertrophy, mild to moderate MR, no atrial appendage thrombus  - \"Cannot exclude the presence of PFO\" on TTE  - High-intensity statin   - On Eliquis due to new AF  - Follow up with stroke clinic in 1 month    Normocytic anemia  - Started on Eliquis this admission   - Iron studies show iron deficiency - started on PO iron however Hgb slowly trending down so started IV iron on 7/22  - FOBT positive  - s/p 2 unit PRBCs on 7/23 for Hgb 7.1 - improved to 8.7 on 7/24  -Holding Eliquis now gastroenterology consulted, holding Eliquis.  Originally planned for bidirectional endoscopy today, however procedure was canceled secondary to hypokalemia.  Currently repleting and holding Bumex.  Clear liquid diet today, n.p.o. at midnight.  Plan for scopes tomorrow.     Dysphagia  - Patient reported dysphagia to solids/liquids with R neck fullness  - CTA neck reassuring  - SLP evaluated. MBS with limited upper GI series with no evidence of obstruction, noted mild reflux  - Started PPI  - Soft texture / chopped meats with thin liquids     New onset atrial fibrillation   Acute HFpEF  Essential hypertension  - Appreciate cardiology assistance  - TSH OK "   -Cardiology consulted, initially started on amiodarone but since discontinued.  Current medications include Eliquis, amlodipine, Jardiance, Imdur, losartan, Nebivolol, terazosin, and Bumex  - Diuretics on hold today in setting of hypokalemia  - Eliquis on hold secondary to anemia workup     Hypokalemia  Hypernatremia  - Related to significant diuretic use (Bumex, HCTZ - also on Spironolactone though this is potassium sparing)  - Difficulty repleting K+ despite replacement protocol - appreciate cardiology assistance with diuretic adjustment   - Currently diuretics today and starting IV fluids to adjust sodium  - Repeat BMP in a.m.     New mediastinal mass / JANNA pulmonary mass  - CT chest proximately 4.5 cm mediastinal mass and a contiguous 5.5 x 3 cm pleural-based JANNA pulmonary mass concerning for primary neoplasm also noted a few shotty mediastinal lymph nodes with rounded morphology, reactive versus metastatic adenopathy  - Partner discussed CT chest findings with patient and family  - Patient reports history of smoking, quit 30 years ago.  Chronic cough x 1 year.  No weight loss or hemoptysis  - Patient/family interested in biopsy however was started on Eliquis for A-fib this admission.  - Pulmonology evaluated this admission. He is currently scheduled to see Dr. King on 7/28/25 to discuss procedure and arrange Jasper Bronch. Discussed with pulmonology - not feasible to do inpatient. If still here on 7/28, would notify pulmonology and perhaps they can see him and procedure can be scheduled at a later date. Family concerned about getting him back to the hospital for appointments & testing      Pancreatic head mass  - CT abdomen/pelvis showed incidental [id number] cystic finding in pancreatic head  - Will need continued monitoring on an outpatient basis   - GI evaluating now due to anemia - can continue to follow an outpatient basis      PreDM  - A1c 6.3%. Not on home meds. Now on Jardiance.   - Continue Lantus 5  units QHS + SSI      Essential tremor, Primidone on hold due to interaction with Eliquis  Anxiety / chronic benzo use, continue routine Lorazepam        Expected Discharge Location and Transportation: [facility]  Expected Discharge   Expected Discharge Date: 7/28/2025; Expected Discharge Time:      VTE Prophylaxis:  Pharmacologic & mechanical VTE prophylaxis orders are present.         AM-PAC 6 Clicks Score (PT): 17 (07/25/25 0800)    CODE STATUS:   Code Status and Medical Interventions: CPR (Attempt to Resuscitate); Full Support   Ordered at: 07/15/25 1515     Code Status (Patient has no pulse and is not breathing):    CPR (Attempt to Resuscitate)     Medical Interventions (Patient has pulse or is breathing):    Full Support     Level Of Support Discussed With:    Patient       Irineo Woody DO  07/25/25

## 2025-07-26 LAB
ANION GAP SERPL CALCULATED.3IONS-SCNC: 10 MMOL/L (ref 5–15)
ANION GAP SERPL CALCULATED.3IONS-SCNC: 12 MMOL/L (ref 5–15)
BUN SERPL-MCNC: 12.4 MG/DL (ref 8–23)
BUN SERPL-MCNC: 12.8 MG/DL (ref 8–23)
BUN/CREAT SERPL: 16.5 (ref 7–25)
BUN/CREAT SERPL: 16.8 (ref 7–25)
CALCIUM SPEC-SCNC: 8.5 MG/DL (ref 8.6–10.5)
CALCIUM SPEC-SCNC: 8.6 MG/DL (ref 8.6–10.5)
CHLORIDE SERPL-SCNC: 102 MMOL/L (ref 98–107)
CHLORIDE SERPL-SCNC: 102 MMOL/L (ref 98–107)
CO2 SERPL-SCNC: 32 MMOL/L (ref 22–29)
CO2 SERPL-SCNC: 33 MMOL/L (ref 22–29)
CREAT SERPL-MCNC: 0.75 MG/DL (ref 0.76–1.27)
CREAT SERPL-MCNC: 0.76 MG/DL (ref 0.76–1.27)
DEPRECATED RDW RBC AUTO: 53.3 FL (ref 37–54)
EGFRCR SERPLBLD CKD-EPI 2021: 90.9 ML/MIN/1.73
EGFRCR SERPLBLD CKD-EPI 2021: 91.2 ML/MIN/1.73
ERYTHROCYTE [DISTWIDTH] IN BLOOD BY AUTOMATED COUNT: 17.5 % (ref 12.3–15.4)
GLUCOSE BLDC GLUCOMTR-MCNC: 132 MG/DL (ref 70–130)
GLUCOSE BLDC GLUCOMTR-MCNC: 136 MG/DL (ref 70–130)
GLUCOSE BLDC GLUCOMTR-MCNC: 220 MG/DL (ref 70–130)
GLUCOSE BLDC GLUCOMTR-MCNC: 234 MG/DL (ref 70–130)
GLUCOSE SERPL-MCNC: 113 MG/DL (ref 65–99)
GLUCOSE SERPL-MCNC: 131 MG/DL (ref 65–99)
HCT VFR BLD AUTO: 28.7 % (ref 37.5–51)
HGB BLD-MCNC: 8.6 G/DL (ref 13–17.7)
MCH RBC QN AUTO: 26.7 PG (ref 26.6–33)
MCHC RBC AUTO-ENTMCNC: 30 G/DL (ref 31.5–35.7)
MCV RBC AUTO: 89.1 FL (ref 79–97)
NT-PROBNP SERPL-MCNC: 2355 PG/ML (ref 0–1800)
PLATELET # BLD AUTO: 101 10*3/MM3 (ref 140–450)
PMV BLD AUTO: 9.7 FL (ref 6–12)
POTASSIUM SERPL-SCNC: 2.5 MMOL/L (ref 3.5–5.2)
POTASSIUM SERPL-SCNC: 2.6 MMOL/L (ref 3.5–5.2)
POTASSIUM SERPL-SCNC: 3.1 MMOL/L (ref 3.5–5.2)
RBC # BLD AUTO: 3.22 10*6/MM3 (ref 4.14–5.8)
SODIUM SERPL-SCNC: 145 MMOL/L (ref 136–145)
SODIUM SERPL-SCNC: 146 MMOL/L (ref 136–145)
WBC NRBC COR # BLD AUTO: 6.83 10*3/MM3 (ref 3.4–10.8)

## 2025-07-26 PROCEDURE — 0DBK8ZZ EXCISION OF ASCENDING COLON, VIA NATURAL OR ARTIFICIAL OPENING ENDOSCOPIC: ICD-10-PCS | Performed by: INTERNAL MEDICINE

## 2025-07-26 PROCEDURE — 85027 COMPLETE CBC AUTOMATED: CPT | Performed by: STUDENT IN AN ORGANIZED HEALTH CARE EDUCATION/TRAINING PROGRAM

## 2025-07-26 PROCEDURE — 25010000002 FUROSEMIDE PER 20 MG: Performed by: INTERNAL MEDICINE

## 2025-07-26 PROCEDURE — 99232 SBSQ HOSP IP/OBS MODERATE 35: CPT | Performed by: INTERNAL MEDICINE

## 2025-07-26 PROCEDURE — 25810000003 LACTATED RINGERS PER 1000 ML: Performed by: NURSE ANESTHETIST, CERTIFIED REGISTERED

## 2025-07-26 PROCEDURE — 63710000001 INSULIN LISPRO (HUMAN) PER 5 UNITS: Performed by: FAMILY MEDICINE

## 2025-07-26 PROCEDURE — 63710000001 INSULIN GLARGINE PER 5 UNITS: Performed by: INTERNAL MEDICINE

## 2025-07-26 PROCEDURE — 83880 ASSAY OF NATRIURETIC PEPTIDE: CPT | Performed by: INTERNAL MEDICINE

## 2025-07-26 PROCEDURE — 25010000002 PROPOFOL 10 MG/ML EMULSION: Performed by: NURSE ANESTHETIST, CERTIFIED REGISTERED

## 2025-07-26 PROCEDURE — 25010000002 POTASSIUM CHLORIDE 10 MEQ/100ML SOLUTION

## 2025-07-26 PROCEDURE — 80048 BASIC METABOLIC PNL TOTAL CA: CPT | Performed by: INTERNAL MEDICINE

## 2025-07-26 PROCEDURE — 82948 REAGENT STRIP/BLOOD GLUCOSE: CPT

## 2025-07-26 PROCEDURE — 43255 EGD CONTROL BLEEDING ANY: CPT | Performed by: INTERNAL MEDICINE

## 2025-07-26 PROCEDURE — 25010000002 LIDOCAINE PF 1% 1 % SOLUTION: Performed by: NURSE ANESTHETIST, CERTIFIED REGISTERED

## 2025-07-26 PROCEDURE — 45385 COLONOSCOPY W/LESION REMOVAL: CPT | Performed by: INTERNAL MEDICINE

## 2025-07-26 PROCEDURE — 88305 TISSUE EXAM BY PATHOLOGIST: CPT | Performed by: INTERNAL MEDICINE

## 2025-07-26 PROCEDURE — 84132 ASSAY OF SERUM POTASSIUM: CPT | Performed by: NURSE PRACTITIONER

## 2025-07-26 PROCEDURE — 63710000001 INSULIN LISPRO (HUMAN) PER 5 UNITS: Performed by: INTERNAL MEDICINE

## 2025-07-26 PROCEDURE — 0W3P8ZZ CONTROL BLEEDING IN GASTROINTESTINAL TRACT, VIA NATURAL OR ARTIFICIAL OPENING ENDOSCOPIC: ICD-10-PCS | Performed by: INTERNAL MEDICINE

## 2025-07-26 DEVICE — DEV CLIP ENDO RESOLUTION360 CONTRL ROT 235CM: Type: IMPLANTABLE DEVICE | Site: STOMACH | Status: FUNCTIONAL

## 2025-07-26 DEVICE — DEV CLIP GI INSTINCT/PLUS MRI/CONDTN 7F 16MM 230CM: Type: IMPLANTABLE DEVICE | Site: COLON | Status: FUNCTIONAL

## 2025-07-26 RX ORDER — SPIRONOLACTONE 25 MG/1
50 TABLET ORAL DAILY
Status: DISCONTINUED | OUTPATIENT
Start: 2025-07-26 | End: 2025-07-26

## 2025-07-26 RX ORDER — POTASSIUM CHLORIDE 750 MG/1
20 CAPSULE, EXTENDED RELEASE ORAL ONCE
Status: COMPLETED | OUTPATIENT
Start: 2025-07-26 | End: 2025-07-26

## 2025-07-26 RX ORDER — ONDANSETRON 2 MG/ML
4 INJECTION INTRAMUSCULAR; INTRAVENOUS ONCE AS NEEDED
Status: DISCONTINUED | OUTPATIENT
Start: 2025-07-26 | End: 2025-07-26 | Stop reason: HOSPADM

## 2025-07-26 RX ORDER — PROPOFOL 10 MG/ML
VIAL (ML) INTRAVENOUS AS NEEDED
Status: DISCONTINUED | OUTPATIENT
Start: 2025-07-26 | End: 2025-07-26 | Stop reason: SURG

## 2025-07-26 RX ORDER — LORAZEPAM 0.5 MG/1
0.5 TABLET ORAL ONCE
Status: COMPLETED | OUTPATIENT
Start: 2025-07-26 | End: 2025-07-26

## 2025-07-26 RX ORDER — POTASSIUM CHLORIDE 750 MG/1
40 CAPSULE, EXTENDED RELEASE ORAL DAILY
Status: DISCONTINUED | OUTPATIENT
Start: 2025-07-27 | End: 2025-07-27

## 2025-07-26 RX ORDER — SODIUM CHLORIDE, SODIUM LACTATE, POTASSIUM CHLORIDE, CALCIUM CHLORIDE 600; 310; 30; 20 MG/100ML; MG/100ML; MG/100ML; MG/100ML
INJECTION, SOLUTION INTRAVENOUS CONTINUOUS PRN
Status: DISCONTINUED | OUTPATIENT
Start: 2025-07-26 | End: 2025-07-26 | Stop reason: SURG

## 2025-07-26 RX ORDER — LIDOCAINE HYDROCHLORIDE 10 MG/ML
INJECTION, SOLUTION EPIDURAL; INFILTRATION; INTRACAUDAL; PERINEURAL AS NEEDED
Status: DISCONTINUED | OUTPATIENT
Start: 2025-07-26 | End: 2025-07-26 | Stop reason: SURG

## 2025-07-26 RX ORDER — IPRATROPIUM BROMIDE AND ALBUTEROL SULFATE 2.5; .5 MG/3ML; MG/3ML
3 SOLUTION RESPIRATORY (INHALATION) ONCE AS NEEDED
Status: DISCONTINUED | OUTPATIENT
Start: 2025-07-26 | End: 2025-07-26 | Stop reason: HOSPADM

## 2025-07-26 RX ORDER — FAMOTIDINE 10 MG/ML
20 INJECTION, SOLUTION INTRAVENOUS ONCE
Status: DISCONTINUED | OUTPATIENT
Start: 2025-07-26 | End: 2025-07-26 | Stop reason: HOSPADM

## 2025-07-26 RX ORDER — SODIUM CHLORIDE 0.9 % (FLUSH) 0.9 %
10 SYRINGE (ML) INJECTION AS NEEDED
Status: DISCONTINUED | OUTPATIENT
Start: 2025-07-26 | End: 2025-07-26 | Stop reason: HOSPADM

## 2025-07-26 RX ORDER — SPIRONOLACTONE 25 MG/1
100 TABLET ORAL DAILY
Status: DISCONTINUED | OUTPATIENT
Start: 2025-07-27 | End: 2025-07-29 | Stop reason: HOSPADM

## 2025-07-26 RX ORDER — POTASSIUM CHLORIDE 7.45 MG/ML
10 INJECTION INTRAVENOUS
Status: DISCONTINUED | OUTPATIENT
Start: 2025-07-26 | End: 2025-07-26

## 2025-07-26 RX ORDER — FUROSEMIDE 10 MG/ML
20 INJECTION INTRAMUSCULAR; INTRAVENOUS ONCE
Status: COMPLETED | OUTPATIENT
Start: 2025-07-26 | End: 2025-07-26

## 2025-07-26 RX ORDER — FAMOTIDINE 20 MG/1
20 TABLET, FILM COATED ORAL ONCE
Status: DISCONTINUED | OUTPATIENT
Start: 2025-07-26 | End: 2025-07-26 | Stop reason: HOSPADM

## 2025-07-26 RX ORDER — SODIUM CHLORIDE 0.9 % (FLUSH) 0.9 %
10 SYRINGE (ML) INJECTION EVERY 12 HOURS SCHEDULED
Status: DISCONTINUED | OUTPATIENT
Start: 2025-07-26 | End: 2025-07-26 | Stop reason: HOSPADM

## 2025-07-26 RX ORDER — ONDANSETRON 2 MG/ML
2 INJECTION INTRAMUSCULAR; INTRAVENOUS EVERY 4 HOURS PRN
Status: DISCONTINUED | OUTPATIENT
Start: 2025-07-26 | End: 2025-07-29 | Stop reason: HOSPADM

## 2025-07-26 RX ORDER — POLYETHYLENE GLYCOL 3350 17 G/17G
17 POWDER, FOR SOLUTION ORAL DAILY
Status: DISCONTINUED | OUTPATIENT
Start: 2025-07-26 | End: 2025-07-29 | Stop reason: HOSPADM

## 2025-07-26 RX ORDER — POTASSIUM CHLORIDE 7.45 MG/ML
10 INJECTION INTRAVENOUS
Status: COMPLETED | OUTPATIENT
Start: 2025-07-26 | End: 2025-07-26

## 2025-07-26 RX ORDER — LIDOCAINE HYDROCHLORIDE 10 MG/ML
0.5 INJECTION, SOLUTION EPIDURAL; INFILTRATION; INTRACAUDAL; PERINEURAL ONCE AS NEEDED
Status: DISCONTINUED | OUTPATIENT
Start: 2025-07-26 | End: 2025-07-26 | Stop reason: HOSPADM

## 2025-07-26 RX ORDER — POTASSIUM CHLORIDE 750 MG/1
40 CAPSULE, EXTENDED RELEASE ORAL
Status: COMPLETED | OUTPATIENT
Start: 2025-07-26 | End: 2025-07-26

## 2025-07-26 RX ORDER — POTASSIUM CHLORIDE 750 MG/1
40 CAPSULE, EXTENDED RELEASE ORAL ONCE
Status: COMPLETED | OUTPATIENT
Start: 2025-07-26 | End: 2025-07-26

## 2025-07-26 RX ADMIN — POTASSIUM CHLORIDE 40 MEQ: 750 CAPSULE, EXTENDED RELEASE ORAL at 20:57

## 2025-07-26 RX ADMIN — PROPOFOL 100 MG: 10 INJECTION, EMULSION INTRAVENOUS at 13:54

## 2025-07-26 RX ADMIN — AMLODIPINE BESYLATE 10 MG: 10 TABLET ORAL at 08:18

## 2025-07-26 RX ADMIN — VALACYCLOVIR HYDROCHLORIDE 500 MG: 500 TABLET, FILM COATED ORAL at 08:18

## 2025-07-26 RX ADMIN — LORAZEPAM 0.5 MG: 0.5 TABLET ORAL at 07:56

## 2025-07-26 RX ADMIN — SODIUM CHLORIDE, POTASSIUM CHLORIDE, SODIUM LACTATE AND CALCIUM CHLORIDE: 600; 310; 30; 20 INJECTION, SOLUTION INTRAVENOUS at 13:49

## 2025-07-26 RX ADMIN — TERAZOSIN HYDROCHLORIDE 2 MG: 2 CAPSULE ORAL at 20:57

## 2025-07-26 RX ADMIN — PROPOFOL 50 MG: 10 INJECTION, EMULSION INTRAVENOUS at 14:10

## 2025-07-26 RX ADMIN — LORAZEPAM 0.5 MG: 0.5 TABLET ORAL at 21:49

## 2025-07-26 RX ADMIN — Medication 1 BOTTLE: at 05:04

## 2025-07-26 RX ADMIN — PROPOFOL 50 MG: 10 INJECTION, EMULSION INTRAVENOUS at 14:25

## 2025-07-26 RX ADMIN — PROPOFOL 50 MG: 10 INJECTION, EMULSION INTRAVENOUS at 14:06

## 2025-07-26 RX ADMIN — FUROSEMIDE 20 MG: 10 INJECTION, SOLUTION INTRAMUSCULAR; INTRAVENOUS at 16:17

## 2025-07-26 RX ADMIN — PANTOPRAZOLE SODIUM 40 MG: 40 INJECTION, POWDER, FOR SOLUTION INTRAVENOUS at 17:23

## 2025-07-26 RX ADMIN — POTASSIUM CHLORIDE 40 MEQ: 750 CAPSULE, EXTENDED RELEASE ORAL at 09:48

## 2025-07-26 RX ADMIN — NEBIBOLOL 20 MG: 10 TABLET ORAL at 08:18

## 2025-07-26 RX ADMIN — EMPAGLIFLOZIN 10 MG: 10 TABLET, FILM COATED ORAL at 08:18

## 2025-07-26 RX ADMIN — INSULIN LISPRO 4 UNITS: 100 INJECTION, SOLUTION INTRAVENOUS; SUBCUTANEOUS at 20:58

## 2025-07-26 RX ADMIN — PANTOPRAZOLE SODIUM 40 MG: 40 INJECTION, POWDER, FOR SOLUTION INTRAVENOUS at 05:05

## 2025-07-26 RX ADMIN — Medication 10 ML: at 21:03

## 2025-07-26 RX ADMIN — TIZANIDINE 4 MG: 4 TABLET ORAL at 20:58

## 2025-07-26 RX ADMIN — Medication 10 MG: at 20:58

## 2025-07-26 RX ADMIN — POTASSIUM CHLORIDE 10 MEQ: 7.46 INJECTION, SOLUTION INTRAVENOUS at 04:50

## 2025-07-26 RX ADMIN — PROPOFOL 50 MG: 10 INJECTION, EMULSION INTRAVENOUS at 14:35

## 2025-07-26 RX ADMIN — PROPOFOL 50 MG: 10 INJECTION, EMULSION INTRAVENOUS at 14:46

## 2025-07-26 RX ADMIN — INSULIN GLARGINE 5 UNITS: 100 INJECTION, SOLUTION SUBCUTANEOUS at 20:58

## 2025-07-26 RX ADMIN — PROPOFOL 50 MG: 10 INJECTION, EMULSION INTRAVENOUS at 14:17

## 2025-07-26 RX ADMIN — POTASSIUM CHLORIDE 40 MEQ: 750 CAPSULE, EXTENDED RELEASE ORAL at 12:31

## 2025-07-26 RX ADMIN — SERTRALINE HYDROCHLORIDE 200 MG: 100 TABLET, FILM COATED ORAL at 08:18

## 2025-07-26 RX ADMIN — INSULIN LISPRO 4 UNITS: 100 INJECTION, SOLUTION INTRAVENOUS; SUBCUTANEOUS at 12:31

## 2025-07-26 RX ADMIN — SPIRONOLACTONE 50 MG: 25 TABLET ORAL at 08:18

## 2025-07-26 RX ADMIN — POTASSIUM CHLORIDE 40 MEQ: 750 CAPSULE, EXTENDED RELEASE ORAL at 16:17

## 2025-07-26 RX ADMIN — LIDOCAINE HYDROCHLORIDE 100 MG: 10 INJECTION, SOLUTION EPIDURAL; INFILTRATION; INTRACAUDAL; PERINEURAL at 13:54

## 2025-07-26 RX ADMIN — PROPOFOL 100 MG: 10 INJECTION, EMULSION INTRAVENOUS at 13:59

## 2025-07-26 RX ADMIN — POTASSIUM CHLORIDE 10 MEQ: 7.46 INJECTION, SOLUTION INTRAVENOUS at 05:58

## 2025-07-26 RX ADMIN — ATORVASTATIN CALCIUM 40 MG: 40 TABLET, FILM COATED ORAL at 20:57

## 2025-07-26 RX ADMIN — PROPOFOL 50 MG: 10 INJECTION, EMULSION INTRAVENOUS at 14:41

## 2025-07-26 RX ADMIN — POTASSIUM CHLORIDE 40 MEQ: 750 CAPSULE, EXTENDED RELEASE ORAL at 08:18

## 2025-07-26 RX ADMIN — LOSARTAN POTASSIUM 100 MG: 50 TABLET, FILM COATED ORAL at 08:18

## 2025-07-26 RX ADMIN — POTASSIUM CHLORIDE 20 MEQ: 750 CAPSULE, EXTENDED RELEASE ORAL at 03:15

## 2025-07-26 RX ADMIN — POTASSIUM CHLORIDE 10 MEQ: 7.46 INJECTION, SOLUTION INTRAVENOUS at 03:20

## 2025-07-26 RX ADMIN — ISOSORBIDE MONONITRATE 30 MG: 30 TABLET, EXTENDED RELEASE ORAL at 08:18

## 2025-07-26 RX ADMIN — LEVOTHYROXINE SODIUM 50 MCG: 50 TABLET ORAL at 05:05

## 2025-07-26 NOTE — PLAN OF CARE
Problem: Adult Inpatient Plan of Care  Goal: Plan of Care Review  Outcome: Progressing  Goal: Patient-Specific Goal (Individualized)  Outcome: Progressing  Goal: Absence of Hospital-Acquired Illness or Injury  Outcome: Progressing  Intervention: Identify and Manage Fall Risk  Recent Flowsheet Documentation  Taken 7/26/2025 1400 by Weathers, Rubi, RN  Safety Promotion/Fall Prevention: patient off unit  Taken 7/26/2025 1200 by Weathers, Rubi, RN  Safety Promotion/Fall Prevention:   activity supervised   assistive device/personal items within reach   clutter free environment maintained   lighting adjusted   room organization consistent  Taken 7/26/2025 1000 by Weathers, Rubi, RN  Safety Promotion/Fall Prevention:   activity supervised   assistive device/personal items within reach   clutter free environment maintained   lighting adjusted   room organization consistent  Taken 7/26/2025 0800 by Weathers, Rubi, RN  Safety Promotion/Fall Prevention:   activity supervised   assistive device/personal items within reach   clutter free environment maintained   lighting adjusted   room organization consistent  Intervention: Prevent Skin Injury  Recent Flowsheet Documentation  Taken 7/26/2025 1200 by Rubi Oneill RN  Body Position: position changed independently  Skin Protection:   incontinence pads utilized   silicone foam dressing in place   transparent dressing maintained  Taken 7/26/2025 1000 by Rubi Oneill RN  Body Position: position changed independently  Skin Protection:   incontinence pads utilized   silicone foam dressing in place   transparent dressing maintained  Taken 7/26/2025 0800 by Rubi Oneill RN  Body Position: position changed independently  Skin Protection:   incontinence pads utilized   silicone foam dressing in place   transparent dressing maintained  Intervention: Prevent and Manage VTE (Venous Thromboembolism) Risk  Recent Flowsheet Documentation  Taken  7/26/2025 0800 by Rubi Oneill RN  VTE Prevention/Management: (see MAR) other (see comments)  Intervention: Prevent Infection  Recent Flowsheet Documentation  Taken 7/26/2025 1200 by Rubi Oneill RN  Infection Prevention: environmental surveillance performed  Taken 7/26/2025 1000 by Rubi Oneill RN  Infection Prevention: environmental surveillance performed  Taken 7/26/2025 0800 by Rubi Oneill RN  Infection Prevention: environmental surveillance performed  Goal: Optimal Comfort and Wellbeing  Outcome: Progressing  Intervention: Provide Person-Centered Care  Recent Flowsheet Documentation  Taken 7/26/2025 0800 by Rubi Oneill RN  Trust Relationship/Rapport:   care explained   choices provided   thoughts/feelings acknowledged  Goal: Readiness for Transition of Care  Outcome: Progressing     Problem: Stroke, Ischemic (Includes Transient Ischemic Attack)  Goal: Optimal Coping  Outcome: Progressing  Intervention: Support Psychosocial Response to Stroke  Recent Flowsheet Documentation  Taken 7/26/2025 0800 by Rubi Oneill RN  Supportive Measures: active listening utilized  Family/Support System Care: self-care encouraged  Goal: Optimal Cerebral Tissue Perfusion  Outcome: Progressing  Intervention: Protect and Optimize Cerebral Perfusion  Recent Flowsheet Documentation  Taken 7/26/2025 1200 by Rubi Oneill RN  Sensory Stimulation Regulation: care clustered  Taken 7/26/2025 0800 by Rubi Oneill RN  Sensory Stimulation Regulation: care clustered  Goal: Optimal Cognitive Function  Outcome: Progressing  Intervention: Optimize Cognitive Function  Recent Flowsheet Documentation  Taken 7/26/2025 1200 by Rubi Oneill RN  Sensory Stimulation Regulation: care clustered  Taken 7/26/2025 0800 by Rubi Oneill RN  Sensory Stimulation Regulation: care clustered  Goal: Optimal Functional Ability  Outcome: Progressing  Intervention: Optimize Functional  Ability  Recent Flowsheet Documentation  Taken 7/26/2025 0800 by Rubi Oneill RN  Activity Management: activity encouraged  Goal: Effective Oxygenation and Ventilation  Outcome: Progressing  Intervention: Optimize Oxygenation and Ventilation  Recent Flowsheet Documentation  Taken 7/26/2025 1200 by Rubi Oneill RN  Head of Bed (HOB) Positioning: HOB elevated  Taken 7/26/2025 1000 by Rubi Oneill RN  Head of Bed (HOB) Positioning: HOB elevated  Taken 7/26/2025 0800 by Rubi Oneill RN  Head of Bed (HOB) Positioning: HOB elevated  Goal: Safe and Effective Swallow  Outcome: Progressing     Problem: Comorbidity Management  Goal: Blood Pressure in Desired Range  Outcome: Progressing  Intervention: Maintain Blood Pressure Management  Recent Flowsheet Documentation  Taken 7/26/2025 1200 by Rubi Oneill RN  Medication Review/Management: medications reviewed  Taken 7/26/2025 1000 by Rubi Oneill RN  Medication Review/Management: medications reviewed  Taken 7/26/2025 0800 by Rubi Oneill RN  Medication Review/Management: medications reviewed     Problem: Fall Injury Risk  Goal: Absence of Fall and Fall-Related Injury  Outcome: Progressing  Intervention: Identify and Manage Contributors  Recent Flowsheet Documentation  Taken 7/26/2025 1200 by Rubi Oneill RN  Medication Review/Management: medications reviewed  Taken 7/26/2025 1000 by Rubi Oneill RN  Medication Review/Management: medications reviewed  Taken 7/26/2025 0800 by Rubi Oneill RN  Medication Review/Management: medications reviewed  Intervention: Promote Injury-Free Environment  Recent Flowsheet Documentation  Taken 7/26/2025 1400 by Weathers, Rubi, RN  Safety Promotion/Fall Prevention: patient off unit  Taken 7/26/2025 1200 by Weathers, Rubi, RN  Safety Promotion/Fall Prevention:   activity supervised   assistive device/personal items within reach   clutter free environment  maintained   lighting adjusted   room organization consistent  Taken 7/26/2025 1000 by Weathers, Rubi, RN  Safety Promotion/Fall Prevention:   activity supervised   assistive device/personal items within reach   clutter free environment maintained   lighting adjusted   room organization consistent  Taken 7/26/2025 0800 by Weathers, Rubi, RN  Safety Promotion/Fall Prevention:   activity supervised   assistive device/personal items within reach   clutter free environment maintained   lighting adjusted   room organization consistent     Problem: Skin Injury Risk Increased  Goal: Skin Health and Integrity  Outcome: Progressing  Intervention: Optimize Skin Protection  Recent Flowsheet Documentation  Taken 7/26/2025 1200 by Rubi Oneill RN  Pressure Reduction Techniques: frequent weight shift encouraged  Head of Bed (HOB) Positioning: [facility] elevated  Pressure Reduction Devices:   pressure-redistributing mattress utilized   positioning supports utilized   heel offloading device utilized  Skin Protection:   incontinence pads utilized   silicone foam dressing in place   transparent dressing maintained  Taken 7/26/2025 1000 by Rubi Oneill RN  Pressure Reduction Techniques: frequent weight shift encouraged  Head of Bed (HOB) Positioning: HOB elevated  Pressure Reduction Devices:   pressure-redistributing mattress utilized   positioning supports utilized   heel offloading device utilized  Skin Protection:   incontinence pads utilized   silicone foam dressing in place   transparent dressing maintained  Taken 7/26/2025 0800 by Rubi Oneill RN  Activity Management: activity encouraged  Pressure Reduction Techniques: frequent weight shift encouraged  Head of Bed (HOB) Positioning: HOB elevated  Pressure Reduction Devices:   pressure-redistributing mattress utilized   positioning supports utilized   heel offloading device utilized  Skin Protection:   incontinence pads utilized   silicone foam  dressing in place   transparent dressing maintained   Goal Outcome Evaluation:

## 2025-07-26 NOTE — PROGRESS NOTES
[facility] at [facility]  IP Progress Note      Chief Complaint/Reason for service: #1 atrial fibrillation #2 heart failure with preserved EF probably secondary to #1 #3 exertional angina #4 hypertension #5 hypokalemia    Subjective   Subjective: The patient not feeling well.  He is complaining of nausea and vomiting with the bowel prep.  Also claims to be a little short of breath    Past medical, surgical, social and family history reviewed in the patient's electronic medical record.    Objective     Vital Sign Min/Max for last 24 hours  Temp  Min: 97 °F (36.1 °C)  Max: 98.4 °F (36.9 °C)   BP  Min: 148/69  Max: 177/79   Pulse  Min: 76  Max: 94   Resp  Min: 17  Max: 18   SpO2  Min: 94 %  Max: 97 %   Flow (L/min) (Oxygen Therapy)  Min: 2  Max: 2      Intake/Output Summary (Last 24 hours) at 7/26/2025 0622  Last data filed at 7/26/2025 0329  Gross per 24 hour   Intake --   Output 400 ml   Net -400 ml             Current Facility-Administered Medications:     acetaminophen (TYLENOL) tablet 650 mg, 650 mg, Oral, Q6H PRN, Claudia Olivera APRN, 650 mg at 07/23/25 1648    amLODIPine (NORVASC) tablet 10 mg, 10 mg, Oral, Q24H, Carlos Mendez MD, 10 mg at 07/25/25 0842    [Held by provider] apixaban (ELIQUIS) tablet 5 mg, 5 mg, Oral, Q12H, Igor Nath MD, 5 mg at 07/23/25 0947    atorvastatin (LIPITOR) tablet 40 mg, 40 mg, Oral, Nightly, Carlos Mendez MD, 40 mg at 07/25/25 2145    sennosides-docusate (PERICOLACE) 8.6-50 MG per tablet 2 tablet, 2 tablet, Oral, BID PRN **AND** polyethylene glycol (MIRALAX) packet 17 g, 17 g, Oral, Daily PRN **AND** bisacodyl (DULCOLAX) EC tablet 5 mg, 5 mg, Oral, Daily PRN **AND** bisacodyl (DULCOLAX) suppository 10 mg, 10 mg, Rectal, Daily PRN, Ruth Michaud MD    cloNIDine (CATAPRES) tablet 0.1 mg, 0.1 mg, Oral, Q1H PRN, Carlos Mendez MD, 0.1 mg at 07/17/25 1241    dextrose (D50W) (25 g/50 mL) IV injection 25 g, 25 g, Intravenous,  Q15 Min PRN, Peggy Walsh DO    dextrose (GLUTOSE) oral gel 15 g, 15 g, Oral, Q15 Min PRN, Peggy Walsh DO    empagliflozin (JARDIANCE) tablet 10 mg, 10 mg, Oral, Daily, Carlos Mendez MD, 10 mg at 07/25/25 0842    [Held by provider] ferrous sulfate tablet 325 mg, 325 mg, Oral, Daily With Breakfast, Carlos Mendez MD, 325 mg at 07/21/25 0915    glucagon (GLUCAGEN) injection 1 mg, 1 mg, Intramuscular, Q15 Min PRN, Peggy Walsh DO    hydrALAZINE (APRESOLINE) injection 10 mg, 10 mg, Intravenous, Q6H PRN, Peggy Walsh DO, 10 mg at 07/18/25 1733    insulin glargine (LANTUS, SEMGLEE) injection 5 Units, 5 Units, Subcutaneous, Nightly, Peggy Walsh DO, 5 Units at 07/25/25 2242    Insulin Lispro (humaLOG) injection 2-9 Units, 2-9 Units, Subcutaneous, 4x Daily AC & at Bedtime, Peggy Walsh DO, 2 Units at 07/25/25 1815    isosorbide mononitrate (IMDUR) 24 hr tablet 30 mg, 30 mg, Oral, Q24H, Carlos Mendez MD, 30 mg at 07/25/25 0842    levothyroxine (SYNTHROID, LEVOTHROID) tablet 50 mcg, 50 mcg, Oral, Q AM, Peggy Walsh DO, 50 mcg at 07/26/25 0505    LORazepam (ATIVAN) tablet 0.5 mg, 0.5 mg, Oral, Daily PRN, Peggy Walsh DO, 0.5 mg at 07/21/25 2213    losartan (COZAAR) tablet 100 mg, 100 mg, Oral, Q24H, Carlos Mendez MD, 100 mg at 07/25/25 0842    Magnesium Cardiology Dose Replacement - Follow Nurse / BPA Driven Protocol, , Not Applicable, PRN, Juan Manuel Sandoval MD    melatonin tablet 10 mg, 10 mg, Oral, Nightly, Maya Garcia MD, 10 mg at 07/25/25 2144    metoprolol tartrate (LOPRESSOR) injection 5 mg, 5 mg, Intravenous, Q6H PRN, Peggy Walsh DO, 5 mg at 07/21/25 1925    nebivolol (BYSTOLIC) tablet 20 mg, 20 mg, Oral, Q24H, Carlos Mendez MD, 20 mg at 07/25/25 0842    nitroglycerin (NITROSTAT) SL tablet 0.4 mg, 0.4 mg, Sublingual, Q5 Min PRN, Ruth Michaud MD    pantoprazole (PROTONIX) injection 40 mg, 40 mg, Intravenous,  BID AC, Caterina Philip PA, 40 mg at 07/26/25 0505    potassium chloride (MICRO-K/KLOR-CON) CR capsule, 40 mEq, Oral, BID With Meals, Carlos Mendez MD, 40 mEq at 07/25/25 1815    potassium chloride (MICRO-K/KLOR-CON) CR capsule, 40 mEq, Oral, Daily, Carlos Mendez MD    potassium chloride 10 mEq in 100 mL IVPB, 10 mEq, Intravenous, Q1H, Claudia Olivera APRN, Last Rate: 100 mL/hr at 07/26/25 0558, 10 mEq at 07/26/25 0558    Potassium Replacement - Follow Nurse / BPA Driven Protocol, , Not Applicable, PRN, Maya Garcia MD    [Held by provider] primidone (MYSOLINE) tablet 100 mg, 100 mg, Oral, Daily, Peggy Walsh DO, 100 mg at 07/21/25 0915    sertraline (ZOLOFT) tablet 200 mg, 200 mg, Oral, Daily, Peggy Walsh DO, 200 mg at 07/25/25 0842    sodium chloride 0.9 % flush 10 mL, 10 mL, Intravenous, Q12H, Ruth Michaud MD, 10 mL at 07/25/25 2145    sodium chloride 0.9 % flush 10 mL, 10 mL, Intravenous, PRN, Ruth Michaud MD    sodium chloride 0.9 % infusion 40 mL, 40 mL, Intravenous, PRN, Ruth Michaud MD    sodium-potassium-magnesium sulfates (SUPREP) oral solution 1 bottle, 1 bottle, Oral, Q12H, Caterina Philip PA, 1 bottle at 07/26/25 0504    spironolactone (ALDACTONE) tablet 50 mg, 50 mg, Oral, Daily, Carlos Mendez MD    terazosin (HYTRIN) capsule 2 mg, 2 mg, Oral, Nightly, Jani Cohen III, MD, 2 mg at 07/25/25 2144    tiZANidine (ZANAFLEX) tablet 4 mg, 4 mg, Oral, Nightly, Peggy Walsh DO, 4 mg at 07/25/25 2145    valACYclovir (VALTREX) tablet 500 mg, 500 mg, Oral, Daily, Debbie Davenport PA-C, 500 mg at 07/25/25 0843    Physical Exam: General Pleasant elderly male sitting on the bedside commode with no resting dyspnea or tachypnea        HEENT: No JVP       Respiratory: Equal bilateral symmetrical expansion with an isolated crackle       Cardiovascular: Regular rate and rhythm       GI: Obese and soft       Lower Extremities: No lesion       Neuro: Facial  expressions are symmetric moves all 4 EXTR       Skin: Warm and dry       Psych: Flat affect    Results Review: Please see intake by 4.2 L.  Potassium was again low this morning 2.5.  Blood pressures 140 8177.  Heart rate 79-94.    Radiology Results:  Imaging Results (Last 72 Hours)       ** No results found for the last 72 hours. **            EKG: Sinus rhythm PACs    ECHO: Normal wall motion EF    Tele: Sinus    Assessment   Assessment/Plan: Patient presented with shortness of breath and angina was found to be in A-fib.  He underwent a SALVADOR guided cardioversion into sinus rhythm and is anticoagulated with Eliquis amiodarone and beta-blockers.  However he had significant drop in his hemoglobin with heme positive stools and was scheduled for endoscopy therefore the Eliquis was placed on hold.  QTc has been stable  2 exertional angina-patient is on Imdur.  Probably has significant underlying ischemic heart disease  3 hypertension-will add hydralazine 25 mg every 12  4 hypokalemia-has been difficult to be placed the patient's potassium.  Currently getting IV and will resume spironolactone.  Will order Zofran for his severe nausea and vomiting    Carlos Mendez MD  07/26/25  06:22 EDT

## 2025-07-26 NOTE — BRIEF OP NOTE
ESOPHAGOGASTRODUODENOSCOPY, COLONOSCOPY  Progress Note    Ibrahima Nava  7/26/2025    Colonoscopy reveals left-sided diverticulosis.  There is a pedunculated 15 mm ulcerated polyp in the mid ascending colon.  An Endoloop was applied to the polyp base resulting in appropriate purple/white mucosal change.  3 prophylactic hemostatic endoclips were applied to the polyp base.  The polyp was removed with hot snare.  3 additional endoclips were applied successfully for defect closure.    EGD shows normal esophagus.  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  An additional nonbleeding AVM was thermally ablated in the second portion duodenum, and 1 Endo Clip applied for hemostasis.    >> Await polyp pathology    Mark I. Brunner, MD     Date: 7/26/2025  Time: 15:25 EDT

## 2025-07-26 NOTE — ANESTHESIA POSTPROCEDURE EVALUATION
Patient: Ibrahima Nava    Procedure Summary       Date: 07/26/25 Room / Location:  [facility]    Anesthesia Start: 1349 Anesthesia Stop: 1517    Procedures:       ESOPHAGOGASTRODUODENOSCOPY      COLONOSCOPY Diagnosis:     Surgeons: Brunner, Mark I, MD Provider: Foster Davies MD    Anesthesia Type: general ASA Status: 3            Anesthesia Type: general    Vitals  T 98  HR84  /65  SPO2 92  RR 14      Post Anesthesia Care and Evaluation    Patient location during evaluation: PACU  Patient participation: complete - patient participated  Level of consciousness: awake and alert  Pain score: 0  Pain management: adequate    Airway patency: patent  Anesthetic complications: No anesthetic complications  PONV Status: none  Cardiovascular status: hemodynamically stable and acceptable  Respiratory status: nonlabored ventilation, acceptable and nasal cannula  Hydration status: acceptable

## 2025-07-26 NOTE — PROGRESS NOTES
[facility] Medicine Services  PROGRESS NOTE    Patient Name: Ibrahima Nava  : 1945  MRN: [id number]    Date of Admission: 7/15/2025  Primary Care Physician: Sebastien Lowe MD    Subjective   Subjective     CC:  Strokelike symptoms    HPI:  Waiting on repeat potassium in regards to scopes today, family at bedside      Objective   Objective     Vital Signs:   Temp:  [97 °F (36.1 °C)-98.1 °F (36.7 °C)] 98.1 °F (36.7 °C)  Heart Rate:  [76-94] 80  Resp:  [17-18] 18  BP: (152-183)/() 183/85  Flow (L/min) (Oxygen Therapy):  [2] 2     Physical Exam:  Constitutional: No acute distress, awake, alert  HENT: NCAT, mucous membranes moist  Respiratory: Respiratory effort normal   Gastrointestinal: Soft, nontender, nondistended  Musculoskeletal: Positive LE edema  Psychiatric: Appropriate affect, cooperative  Neurologic: Oriented x 3, speech clear  Skin: No rashes      Results Reviewed:  LAB RESULTS:      Lab 25  0753 25  0608 25  0820 25  0625 25  1458   WBC 6.83 8.02 8.23 7.19 7.25   HEMOGLOBIN 8.6* 9.0* 8.7* 7.1* 7.4*   HEMATOCRIT 28.7* 29.9* 29.6* 24.6* 25.5*   PLATELETS 101* 126* 128* 132* 141   MCV 89.1 88.7 87.8 87.9 87.9         Lab 25  0753 25  0054 25  1948 25  1732 25  1235 25  0608 25  1416 25  0820 25  1229 25  0625 25  2334 25  1458   SODIUM 145  --   --  144  --  150*  --  146*  --  144  --  142   POTASSIUM 2.6* 2.5* 2.4* 2.5* 2.5* 2.9*   < > 3.5   < > 3.2*   < > 2.4*   CHLORIDE 102  --   --  102  --  104  --  104  --  100  --  98   CO2 33.0*  --   --  30.6*  --  35.0*  --  35.0*  --  35.0*  --  30.1*   ANION GAP 10.0  --   --  11.4  --  11.0  --  7.0  --  9.0  --  13.9   BUN 12.4  --   --  12.3  --  14.8  --  18.5  --  20.1  --  21.5   CREATININE 0.75*  --   --  0.87  --  0.82  --  0.87  --  0.83  --  1.14   EGFR 91.2  --   --  87.2  --  88.8  --  87.2  --  88.5  --  65.0    GLUCOSE 131*  --   --  145*  --  91  --  110*  --  108*  --  201*   CALCIUM 8.5*  --   --  8.3*  --  8.5*  --  8.6  --  8.6  --  8.4*   MAGNESIUM  --   --   --   --   --  2.3  --   --   --   --   --  2.1    < > = values in this interval not displayed.         Lab 07/22/25  1458   TOTAL PROTEIN 5.0*   ALBUMIN 3.3*   ALT (SGPT) 40   AST (SGOT) 77*   BILIRUBIN 0.6   INDIRECT BILIRUBIN 0.3   BILIRUBIN DIRECT 0.3   ALK PHOS 83         Lab 07/26/25  0753 07/23/25  0625   PROBNP 2,355.0* 1,175.0             Lab 07/23/25  1359 07/23/25  0933   FOLATE 14.20  --    VITAMIN B 12 1,195*  --    ABO TYPING  --  O   RH TYPING  --  Positive   ANTIBODY SCREEN  --  Negative         Brief Urine Lab Results  (Last result in the past 365 days)        Color   Clarity   Blood   Leuk Est   Nitrite   Protein   CREAT   Urine HCG        07/15/25 1319 Yellow   Clear   Moderate (2+)   Negative   Negative   30 mg/dL (1+)                   Microbiology Results Abnormal       None            No radiology results from the last 24 hrs    Results for orders placed during the hospital encounter of 07/15/25    Adult Transesophageal Echo (SALVADOR) W/ Cont if Necessary Per Protocol 07/19/2025  8:29 AM    Interpretation Summary    Left ventricular systolic function is low normal. Left ventricular ejection fraction appears to be 56 - 60%.    Left ventricular wall thickness is consistent with mild concentric hypertrophy.    No evidence of a left atrial appendage thrombus    Mild to moderate mitral valve regurgitation      Current medications:  Scheduled Meds:amLODIPine, 10 mg, Oral, Q24H  [Held by provider] apixaban, 5 mg, Oral, Q12H  atorvastatin, 40 mg, Oral, Nightly  empagliflozin, 10 mg, Oral, Daily  [Held by provider] ferrous sulfate, 325 mg, Oral, Daily With Breakfast  insulin glargine, 5 Units, Subcutaneous, Nightly  insulin lispro, 2-9 Units, Subcutaneous, 4x Daily AC & at Bedtime  isosorbide mononitrate, 30 mg, Oral, Q24H  levothyroxine, 50 mcg,  Oral, Q AM  losartan, 100 mg, Oral, Q24H  melatonin, 10 mg, Oral, Nightly  nebivolol, 20 mg, Oral, Q24H  pantoprazole, 40 mg, Intravenous, BID AC  [START ON 7/27/2025] potassium chloride, 40 mEq, Oral, Daily  potassium chloride, 40 mEq, Oral, Q3H  [Held by provider] primidone, 100 mg, Oral, Daily  sertraline, 200 mg, Oral, Daily  sodium chloride, 10 mL, Intravenous, Q12H  sodium-potassium-magnesium sulfates, 1 bottle, Oral, Q12H  spironolactone, 50 mg, Oral, Daily  terazosin, 2 mg, Oral, Nightly  tiZANidine, 4 mg, Oral, Nightly  valACYclovir, 500 mg, Oral, Daily      Continuous Infusions:   PRN Meds:.  acetaminophen    senna-docusate sodium **AND** polyethylene glycol **AND** bisacodyl **AND** bisacodyl    cloNIDine    dextrose    dextrose    glucagon (human recombinant)    hydrALAZINE    LORazepam    Magnesium Cardiology Dose Replacement - Follow Nurse / BPA Driven Protocol    metoprolol tartrate    nitroglycerin    ondansetron    Potassium Replacement - Follow Nurse / BPA Driven Protocol    sodium chloride    sodium chloride    Assessment & Plan   Assessment & Plan     Active Hospital Problems    Diagnosis  POA    **CVA (cerebral vascular accident) [I63.9]  Yes    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        Brief Hospital Course to date:  Ibrahima Nava is a 80 y.o. male with a PMHx significant for HTN, anxiety (on chronic benzodiazepine therapy), prior CVA (2012 with no residual deficits), essential tremor, obesity (BMI 44) and DARCY (unable to tolerate CPAP).   Admitted to [facility] 7/15/25 due to speech dysarthria / expressive aphasia and confusion.    Stroke neurology consulted.  Etiology thought to be secondary to stroke recrudescence.  Hospital course complicated by A-fib with RVR, hypokalemia, and anemia.  Cardiology and gastroenterology following.     Stroke recrudescence   History  "of CVA   - Presented with speech dysarthria / expressive aphasia and confusion   - Stroke neurology team followed  - CTA head/neck with mild cervical/intracranial atherosclerotic changes, no LVO or flow-limiting stenosis  - MRI brain negative for acute stroke  - EEG reassuring  - SALVADOR with EF 56-60%, mild concentric LV hypertrophy, mild to moderate MR, no atrial appendage thrombus  - \"Cannot exclude the presence of PFO\" on TTE  - Continue statin  - On Eliquis due to new AF  - Follow up with stroke clinic in 1 month     Normocytic anemia  - Started on Eliquis this admission   - Iron studies show iron deficiency - started on PO iron however Hgb slowly trending down so received IV iron, first dose on 7/22  - FOBT positive  - s/p 2 unit PRBCs on 7/23 for Hgb 7.1 - improved to 8.7 on 7/24  -Holding Eliquis now gastroenterology consulted  -  Originally planned for bidirectional endoscopy 7/25, however procedure was canceled secondary to hypokalemia.    -Awaiting repeat BMP 7/26 in regards to colonoscopy       Dysphagia  - Patient reported dysphagia to solids/liquids with R neck fullness  - CTA neck reassuring  - SLP evaluated. MBS with limited upper GI series with no evidence of obstruction, noted mild reflux  - Continue PPI  - Soft texture / chopped meats with thin liquids     New onset atrial fibrillation   Acute HFpEF  Essential hypertension  - Appreciate cardiology assistance  - TSH OK   -Cardiology consulted, initially started on amiodarone but since discontinued.    - Eliquis on hold secondary to anemia workup     Hypokalemia  Hypernatremia, improved  -?  related to significant diuretic use (Bumex, HCTZ - also on Spironolactone though this is potassium sparing)       New mediastinal mass / JANNA pulmonary mass  - CT chest proximately 4.5 cm mediastinal mass and a contiguous 5.5 x 3 cm pleural-based JANNA pulmonary mass concerning for primary neoplasm also noted a few shotty mediastinal lymph nodes with rounded " morphology, reactive versus metastatic adenopathy  - Partner discussed CT chest findings with patient and family  - Patient reports history of smoking, quit 30 years ago.  Chronic cough x 1 year.  No weight loss or hemoptysis  - Patient/family interested in biopsy however was started on Eliquis for A-fib this admission.  - Pulmonology evaluated this admission. He is currently scheduled to see Dr. King on 7/28/25 to discuss procedure and arrange Jasper Bronch. Discussed with pulmonology - not feasible to do inpatient. If still here on 7/28, would notify pulmonology and perhaps they can see him and procedure can be scheduled at a later date. Family concerned about getting him back to the hospital for appointments & testing      Pancreatic head mass  - CT abdomen/pelvis showed incidental [id number] cystic finding in pancreatic head  - GI evaluated     PreDM  - A1c 6.3%. Not on home meds. Now on Jardiance.   - Continue Lantus, SSI      Essential tremor  - Primidone on hold due to interaction with Eliquis  Anxiety / chronic benzo use  - continue routine Lorazepam       Expected Discharge Location and Transportation: SNF  Expected Discharge   Expected Discharge Date: 7/28/2025; Expected Discharge Time:      VTE Prophylaxis:  Pharmacologic & mechanical VTE prophylaxis orders are present.         AM-PAC 6 Clicks Score (PT): 17 (07/26/25 0800)    CODE STATUS:   Code Status and Medical Interventions: CPR (Attempt to Resuscitate); Full Support   Ordered at: 07/15/25 1515     Code Status (Patient has no pulse and is not breathing):    CPR (Attempt to Resuscitate)     Medical Interventions (Patient has pulse or is breathing):    Full Support     Level Of Support Discussed With:    Patient       Dipika Swanson David,   07/26/25

## 2025-07-26 NOTE — PROGRESS NOTES
[facility] Medicine Services  PROGRESS NOTE    Patient Name: Ibrahima Nava  : 1945  MRN: [id number]    Date of Admission: 7/15/2025  Primary Care Physician: Sebastien Lowe MD    Subjective   Subjective     CC:  Anemia    HPI:  Seems a little more confused this morning, no family at bedside.  Complaining of nausea and then when I asked a few minutes later, he said it was gone.  Not eating much      Objective   Objective     Vital Signs:   Temp:  [97.3 °F (36.3 °C)-98.6 °F (37 °C)] 97.7 °F (36.5 °C)  Heart Rate:  [] 76  Resp:  [16-18] 16  BP: (139-159)/() 140/69  Flow (L/min) (Oxygen Therapy):  [2-3] 2     Physical Exam:  Constitutional: No acute distress, seems a little more confused this morning  HENT: NCAT, mucous membranes moist  Respiratory: Respiratory effort normal   Cardiovascular: RRR, no murmurs, rubs, or gallops  Gastrointestinal: Soft, nontender, nondistended  Musculoskeletal: Improved LE edema  Psychiatric: Appropriate affect, cooperative  Neurologic: Oriented x 2-3, speech mostly clear  Skin: No rashes      Results Reviewed:  LAB RESULTS:      Lab 25  0753 25  0608 25  0820 25  0625 25  1458   WBC 6.83 8.02 8.23 7.19 7.25   HEMOGLOBIN 8.6* 9.0* 8.7* 7.1* 7.4*   HEMATOCRIT 28.7* 29.9* 29.6* 24.6* 25.5*   PLATELETS 101* 126* 128* 132* 141   MCV 89.1 88.7 87.8 87.9 87.9         Lab 25  1227 25  0753 25  0054 25  1948 25  1732 25  1235 25  0608 25  1416 25  0820 25  2334 25  1458   SODIUM 146* 145  --   --  144  --  150*  --  146*   < > 142   POTASSIUM 3.1* 2.6* 2.5* 2.4* 2.5*   < > 2.9*   < > 3.5   < > 2.4*   CHLORIDE 102 102  --   --  102  --  104  --  104   < > 98   CO2 32.0* 33.0*  --   --  30.6*  --  35.0*  --  35.0*   < > 30.1*   ANION GAP 12.0 10.0  --   --  11.4  --  11.0  --  7.0   < > 13.9   BUN 12.8 12.4  --   --  12.3  --  14.8  --  18.5   < > 21.5    CREATININE 0.76 0.75*  --   --  0.87  --  0.82  --  0.87   < > 1.14   EGFR 90.9 91.2  --   --  87.2  --  88.8  --  87.2   < > 65.0   GLUCOSE 113* 131*  --   --  145*  --  91  --  110*   < > 201*   CALCIUM 8.6 8.5*  --   --  8.3*  --  8.5*  --  8.6   < > 8.4*   MAGNESIUM  --   --   --   --   --   --  2.3  --   --   --  2.1    < > = values in this interval not displayed.         Lab 07/22/25  1458   TOTAL PROTEIN 5.0*   ALBUMIN 3.3*   ALT (SGPT) 40   AST (SGOT) 77*   BILIRUBIN 0.6   INDIRECT BILIRUBIN 0.3   BILIRUBIN DIRECT 0.3   ALK PHOS 83         Lab 07/26/25  0753 07/23/25  0625   PROBNP 2,355.0* 1,175.0             Lab 07/23/25  1359 07/23/25  0933   FOLATE 14.20  --    VITAMIN B 12 1,195*  --    ABO TYPING  --  O   RH TYPING  --  Positive   ANTIBODY SCREEN  --  Negative         Brief Urine Lab Results  (Last result in the past 365 days)        Color   Clarity   Blood   Leuk Est   Nitrite   Protein   CREAT   Urine HCG        07/15/25 1319 Yellow   Clear   Moderate (2+)   Negative   Negative   30 mg/dL (1+)                   Microbiology Results Abnormal       None            No radiology results from the last 24 hrs    Results for orders placed during the hospital encounter of 07/15/25    Adult Transesophageal Echo (SALVADOR) W/ Cont if Necessary Per Protocol 07/19/2025  8:29 AM    Interpretation Summary    Left ventricular systolic function is low normal. Left ventricular ejection fraction appears to be 56 - 60%.    Left ventricular wall thickness is consistent with mild concentric hypertrophy.    No evidence of a left atrial appendage thrombus    Mild to moderate mitral valve regurgitation      Current medications:  Scheduled Meds:amiodarone, 200 mg, Oral, Q12H  amLODIPine, 10 mg, Oral, Q24H  [Held by provider] apixaban, 5 mg, Oral, Q12H  atorvastatin, 40 mg, Oral, Nightly  empagliflozin, 10 mg, Oral, Daily  [Held by provider] ferrous sulfate, 325 mg, Oral, Daily With Breakfast  insulin glargine, 5 Units,  Subcutaneous, Nightly  insulin lispro, 2-9 Units, Subcutaneous, 4x Daily AC & at Bedtime  isosorbide mononitrate, 30 mg, Oral, Q24H  levothyroxine, 50 mcg, Oral, Q AM  losartan, 100 mg, Oral, Q24H  melatonin, 10 mg, Oral, Nightly  nebivolol, 20 mg, Oral, Q24H  pantoprazole, 40 mg, Intravenous, BID AC  polyethylene glycol, 17 g, Oral, Daily  potassium chloride, 40 mEq, Oral, Daily  [Held by provider] primidone, 100 mg, Oral, Daily  sertraline, 200 mg, Oral, Daily  sodium chloride, 10 mL, Intravenous, Q12H  spironolactone, 100 mg, Oral, Daily  terazosin, 2 mg, Oral, Nightly  tiZANidine, 4 mg, Oral, Nightly  valACYclovir, 500 mg, Oral, Daily      Continuous Infusions:   PRN Meds:.  acetaminophen    senna-docusate sodium **AND** polyethylene glycol **AND** bisacodyl **AND** bisacodyl    cloNIDine    dextrose    dextrose    glucagon (human recombinant)    hydrALAZINE    LORazepam    Magnesium Cardiology Dose Replacement - Follow Nurse / BPA Driven Protocol    metoprolol tartrate    nitroglycerin    ondansetron    Potassium Replacement - Follow Nurse / BPA Driven Protocol    sodium chloride    sodium chloride    Assessment & Plan   Assessment & Plan     Active Hospital Problems    Diagnosis  POA    **CVA (cerebral vascular accident) [I63.9]  Yes    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        Brief Hospital Course to date:  Ibrahima Nava is a 80 y.o. male with a PMHx significant for HTN, anxiety (on chronic benzodiazepine therapy), prior CVA (2012 with no residual deficits), essential tremor, obesity (BMI 44) and DARCY (unable to tolerate CPAP).   Admitted to [facility] 7/15/25 due to speech dysarthria / expressive aphasia and confusion.    Stroke neurology consulted.  Etiology thought to be secondary to stroke recrudescence.  Hospital course complicated by A-fib with RVR, hypokalemia, and anemia.   "Cardiology and gastroenterology following.    A.m. labs are still pending...     Stroke recrudescence   History of CVA   - Presented with speech dysarthria / expressive aphasia and confusion   - Stroke neurology team followed  - MRI brain negative for acute stroke  - SALVADOR with EF 56-60%, mild concentric LV hypertrophy, mild to moderate MR, no atrial appendage thrombus  - \"Cannot exclude the presence of PFO\" on TTE  - Continue statin  - On Eliquis due to new AF     Normocytic anemia  - Started on Eliquis this admission, held due to worsening anemia  - s/p 2 unit PRBCs on 7/23 for Hgb 7.1  -GI evaluated, s/p EGD and colon on 7/26 with placement of endoclips in both colon and duodenum    - eliquis on hold        Dysphagia  - Patient reported dysphagia to solids/liquids with R neck fullness  - CTA neck reassuring  - SLP evaluated. MBS with limited upper GI series with no evidence of obstruction, noted mild reflux  - Continue PPI  - Soft texture / chopped meats with thin liquids     New onset atrial fibrillation   Acute HFpEF  Essential hypertension  - TSH OK   -Cardiology consulted, amiodarone restarted amiodarone restarted       Hypokalemia  Hypernatremia, improved  -better continue to replace above recommended amounts of protocol, monitor BMP        New mediastinal mass / JANNA pulmonary mass  - CT chest proximately 4.5 cm mediastinal mass and a contiguous 5.5 x 3 cm pleural-based JANNA pulmonary mass concerning for primary neoplasm also noted a few shotty mediastinal lymph nodes with rounded morphology, reactive versus metastatic adenopathy  - Partner discussed CT chest findings with patient and family  - Patient reports history of smoking, quit 30 years ago.  Chronic cough x 1 year.  No weight loss or hemoptysis  - Patient/family interested in biopsy however was started on Eliquis for A-fib this admission.  - Pulmonology evaluated this admission. He is currently scheduled to see Dr. King on 7/28/25 to discuss " procedure and arrange Jasper Bronch. Discussed with pulmonology - not feasible to do inpatient. If still here on 7/28, would notify pulmonology and perhaps they can see him and procedure can be scheduled at a later date. Family concerned about getting him back to the hospital for appointments & testing      Pancreatic head mass  - CT abdomen/pelvis showed incidental [id number] cystic finding in pancreatic head  - GI evaluated     PreDM  - A1c 6.3%. Not on home meds. Now on Jardiance.   - Continue Lantus, SSI      Essential tremor  - Primidone on hold due to interaction with Eliquis    Anxiety / chronic benzo use  - continue routine Lorazepam       Expected Discharge Location and Transportation: snf  Expected Discharge   Expected Discharge Date: 7/29/2025; Expected Discharge Time:      VTE Prophylaxis:  Pharmacologic & mechanical VTE prophylaxis orders are present.         AM-PAC 6 Clicks Score (PT): 17 (07/26/25 2000)    CODE STATUS:   Code Status and Medical Interventions: CPR (Attempt to Resuscitate); Full Support   Ordered at: 07/15/25 1515     Code Status (Patient has no pulse and is not breathing):    CPR (Attempt to Resuscitate)     Medical Interventions (Patient has pulse or is breathing):    Full Support     Level Of Support Discussed With:    Patient       Dipika Swanson David, DO  07/27/25

## 2025-07-27 LAB
ALBUMIN SERPL-MCNC: 3.3 G/DL (ref 3.5–5.2)
ANION GAP SERPL CALCULATED.3IONS-SCNC: 9 MMOL/L (ref 5–15)
BASOPHILS # BLD AUTO: 0.01 10*3/MM3 (ref 0–0.2)
BASOPHILS NFR BLD AUTO: 0.1 % (ref 0–1.5)
BUN SERPL-MCNC: 15.2 MG/DL (ref 8–23)
BUN/CREAT SERPL: 18.8 (ref 7–25)
CALCIUM SPEC-SCNC: 8.6 MG/DL (ref 8.6–10.5)
CHLORIDE SERPL-SCNC: 107 MMOL/L (ref 98–107)
CO2 SERPL-SCNC: 32 MMOL/L (ref 22–29)
CREAT SERPL-MCNC: 0.81 MG/DL (ref 0.76–1.27)
DEPRECATED RDW RBC AUTO: 55.3 FL (ref 37–54)
EGFRCR SERPLBLD CKD-EPI 2021: 89.1 ML/MIN/1.73
EOSINOPHIL # BLD AUTO: 0 10*3/MM3 (ref 0–0.4)
EOSINOPHIL NFR BLD AUTO: 0 % (ref 0.3–6.2)
ERYTHROCYTE [DISTWIDTH] IN BLOOD BY AUTOMATED COUNT: 18.6 % (ref 12.3–15.4)
GLUCOSE BLDC GLUCOMTR-MCNC: 179 MG/DL (ref 70–130)
GLUCOSE BLDC GLUCOMTR-MCNC: 186 MG/DL (ref 70–130)
GLUCOSE BLDC GLUCOMTR-MCNC: 193 MG/DL (ref 70–130)
GLUCOSE BLDC GLUCOMTR-MCNC: 239 MG/DL (ref 70–130)
GLUCOSE BLDC GLUCOMTR-MCNC: 268 MG/DL (ref 70–130)
GLUCOSE BLDC GLUCOMTR-MCNC: 389 MG/DL (ref 70–130)
GLUCOSE SERPL-MCNC: 140 MG/DL (ref 65–99)
HCT VFR BLD AUTO: 29.5 % (ref 37.5–51)
HGB BLD-MCNC: 8.7 G/DL (ref 13–17.7)
IMM GRANULOCYTES # BLD AUTO: 0.42 10*3/MM3 (ref 0–0.05)
IMM GRANULOCYTES NFR BLD AUTO: 5.6 % (ref 0–0.5)
LYMPHOCYTES # BLD AUTO: 0.95 10*3/MM3 (ref 0.7–3.1)
LYMPHOCYTES NFR BLD AUTO: 12.6 % (ref 19.6–45.3)
MCH RBC QN AUTO: 26.7 PG (ref 26.6–33)
MCHC RBC AUTO-ENTMCNC: 29.5 G/DL (ref 31.5–35.7)
MCV RBC AUTO: 90.5 FL (ref 79–97)
MONOCYTES # BLD AUTO: 0.34 10*3/MM3 (ref 0.1–0.9)
MONOCYTES NFR BLD AUTO: 4.5 % (ref 5–12)
NEUTROPHILS NFR BLD AUTO: 5.83 10*3/MM3 (ref 1.7–7)
NEUTROPHILS NFR BLD AUTO: 77.2 % (ref 42.7–76)
NRBC BLD AUTO-RTO: 1.2 /100 WBC (ref 0–0.2)
PHOSPHATE SERPL-MCNC: 2.5 MG/DL (ref 2.5–4.5)
PLATELET # BLD AUTO: 108 10*3/MM3 (ref 140–450)
PMV BLD AUTO: 10.8 FL (ref 6–12)
POTASSIUM SERPL-SCNC: 3.2 MMOL/L (ref 3.5–5.2)
POTASSIUM SERPL-SCNC: 4 MMOL/L (ref 3.5–5.2)
RBC # BLD AUTO: 3.26 10*6/MM3 (ref 4.14–5.8)
SODIUM SERPL-SCNC: 148 MMOL/L (ref 136–145)
WBC NRBC COR # BLD AUTO: 7.55 10*3/MM3 (ref 3.4–10.8)

## 2025-07-27 PROCEDURE — 82948 REAGENT STRIP/BLOOD GLUCOSE: CPT

## 2025-07-27 PROCEDURE — 85025 COMPLETE CBC W/AUTO DIFF WBC: CPT | Performed by: INTERNAL MEDICINE

## 2025-07-27 PROCEDURE — 97116 GAIT TRAINING THERAPY: CPT | Performed by: PHYSICAL THERAPIST

## 2025-07-27 PROCEDURE — 99232 SBSQ HOSP IP/OBS MODERATE 35: CPT | Performed by: INTERNAL MEDICINE

## 2025-07-27 PROCEDURE — 80069 RENAL FUNCTION PANEL: CPT | Performed by: INTERNAL MEDICINE

## 2025-07-27 PROCEDURE — 63710000001 INSULIN LISPRO (HUMAN) PER 5 UNITS: Performed by: INTERNAL MEDICINE

## 2025-07-27 PROCEDURE — 63710000001 INSULIN GLARGINE PER 5 UNITS: Performed by: INTERNAL MEDICINE

## 2025-07-27 PROCEDURE — 84132 ASSAY OF SERUM POTASSIUM: CPT | Performed by: INTERNAL MEDICINE

## 2025-07-27 RX ORDER — AMIODARONE HYDROCHLORIDE 200 MG/1
200 TABLET ORAL EVERY 12 HOURS SCHEDULED
Status: DISCONTINUED | OUTPATIENT
Start: 2025-07-27 | End: 2025-07-29 | Stop reason: HOSPADM

## 2025-07-27 RX ORDER — POTASSIUM CHLORIDE 750 MG/1
40 CAPSULE, EXTENDED RELEASE ORAL DAILY
Status: DISCONTINUED | OUTPATIENT
Start: 2025-07-27 | End: 2025-07-29

## 2025-07-27 RX ORDER — POTASSIUM CHLORIDE 1500 MG/1
40 TABLET, EXTENDED RELEASE ORAL EVERY 4 HOURS
Status: COMPLETED | OUTPATIENT
Start: 2025-07-27 | End: 2025-07-27

## 2025-07-27 RX ADMIN — PANTOPRAZOLE SODIUM 40 MG: 40 INJECTION, POWDER, FOR SOLUTION INTRAVENOUS at 17:12

## 2025-07-27 RX ADMIN — POTASSIUM CHLORIDE 40 MEQ: 750 CAPSULE, EXTENDED RELEASE ORAL at 11:15

## 2025-07-27 RX ADMIN — INSULIN GLARGINE 5 UNITS: 100 INJECTION, SOLUTION SUBCUTANEOUS at 22:04

## 2025-07-27 RX ADMIN — EMPAGLIFLOZIN 10 MG: 10 TABLET, FILM COATED ORAL at 08:55

## 2025-07-27 RX ADMIN — AMLODIPINE BESYLATE 10 MG: 10 TABLET ORAL at 08:55

## 2025-07-27 RX ADMIN — AMIODARONE HYDROCHLORIDE 200 MG: 200 TABLET ORAL at 22:04

## 2025-07-27 RX ADMIN — INSULIN LISPRO 2 UNITS: 100 INJECTION, SOLUTION INTRAVENOUS; SUBCUTANEOUS at 11:15

## 2025-07-27 RX ADMIN — ATORVASTATIN CALCIUM 40 MG: 40 TABLET, FILM COATED ORAL at 22:04

## 2025-07-27 RX ADMIN — AMIODARONE HYDROCHLORIDE 200 MG: 200 TABLET ORAL at 08:57

## 2025-07-27 RX ADMIN — LORAZEPAM 0.5 MG: 0.5 TABLET ORAL at 08:55

## 2025-07-27 RX ADMIN — POTASSIUM CHLORIDE 40 MEQ: 1500 TABLET, EXTENDED RELEASE ORAL at 17:13

## 2025-07-27 RX ADMIN — NEBIBOLOL 20 MG: 10 TABLET ORAL at 08:56

## 2025-07-27 RX ADMIN — TERAZOSIN HYDROCHLORIDE 2 MG: 2 CAPSULE ORAL at 22:04

## 2025-07-27 RX ADMIN — LOSARTAN POTASSIUM 100 MG: 50 TABLET, FILM COATED ORAL at 08:55

## 2025-07-27 RX ADMIN — Medication 10 MG: at 22:04

## 2025-07-27 RX ADMIN — INSULIN LISPRO 4 UNITS: 100 INJECTION, SOLUTION INTRAVENOUS; SUBCUTANEOUS at 17:12

## 2025-07-27 RX ADMIN — INSULIN LISPRO 2 UNITS: 100 INJECTION, SOLUTION INTRAVENOUS; SUBCUTANEOUS at 22:04

## 2025-07-27 RX ADMIN — TIZANIDINE 4 MG: 4 TABLET ORAL at 22:04

## 2025-07-27 RX ADMIN — SERTRALINE HYDROCHLORIDE 200 MG: 100 TABLET, FILM COATED ORAL at 08:56

## 2025-07-27 RX ADMIN — SPIRONOLACTONE 100 MG: 25 TABLET ORAL at 08:55

## 2025-07-27 RX ADMIN — POTASSIUM CHLORIDE 40 MEQ: 1500 TABLET, EXTENDED RELEASE ORAL at 13:25

## 2025-07-27 RX ADMIN — Medication 10 ML: at 08:57

## 2025-07-27 RX ADMIN — LEVOTHYROXINE SODIUM 50 MCG: 50 TABLET ORAL at 05:14

## 2025-07-27 RX ADMIN — VALACYCLOVIR HYDROCHLORIDE 500 MG: 500 TABLET, FILM COATED ORAL at 08:55

## 2025-07-27 RX ADMIN — Medication 10 ML: at 22:08

## 2025-07-27 RX ADMIN — ISOSORBIDE MONONITRATE 30 MG: 30 TABLET, EXTENDED RELEASE ORAL at 08:56

## 2025-07-27 RX ADMIN — PANTOPRAZOLE SODIUM 40 MG: 40 INJECTION, POWDER, FOR SOLUTION INTRAVENOUS at 08:57

## 2025-07-27 RX ADMIN — INSULIN LISPRO 2 UNITS: 100 INJECTION, SOLUTION INTRAVENOUS; SUBCUTANEOUS at 08:56

## 2025-07-27 NOTE — PROGRESS NOTES
[facility] Medicine Services  PROGRESS NOTE    Patient Name: Ibrahima Nava  : 1945  MRN: [id number]    Date of Admission: 7/15/2025  Primary Care Physician: Sebastien Lowe MD    Subjective   Subjective     CC:  Anemia, lung mass    HPI:  Spoke with family on the phone.  Patient overall doing ok, still not eating much      Objective   Objective     Vital Signs:   Temp:  [97.4 °F (36.3 °C)-99 °F (37.2 °C)] 97.4 °F (36.3 °C)  Heart Rate:  [71-79] 71  Resp:  [16] 16  BP: (118-177)/(57-91) 143/69  Flow (L/min) (Oxygen Therapy):  [2] 2     Physical Exam:  Constitutional: No acute distress, awake, alert  HENT: NCAT, mucous membranes moist  Respiratory: Respiratory effort normal   Musculoskeletal: pitting LE edema  Psychiatric: Appropriate affect, cooperative  Neurologic: Oriented x 2-3, speech clear  Skin: No rashes      Results Reviewed:  LAB RESULTS:      Lab 25  0532 25  1116 25  0753 25  0608 25  0820   WBC 6.11 7.55 6.83 8.02 8.23   HEMOGLOBIN 8.4* 8.7* 8.6* 9.0* 8.7*   HEMATOCRIT 28.9* 29.5* 28.7* 29.9* 29.6*   PLATELETS 89* 108* 101* 126* 128*   NEUTROS ABS 4.51 5.83  --   --   --    IMMATURE GRANS (ABS) 0.23* 0.42*  --   --   --    LYMPHS ABS 1.10 0.95  --   --   --    MONOS ABS 0.26 0.34  --   --   --    EOS ABS 0.00 0.00  --   --   --    MCV 93.2 90.5 89.1 88.7 87.8         Lab 25  0532 25  2147 25  1116 25  1227 25  0753 25  1948 25  1732 25  1235 25  0608 25  2334 25  1458   SODIUM 145  --  148* 146* 145  --  144  --  150*   < > 142   POTASSIUM 4.0 4.0 3.2* 3.1* 2.6*   < > 2.5*   < > 2.9*   < > 2.4*   CHLORIDE 108*  --  107 102 102  --  102  --  104   < > 98   CO2 27.7  --  32.0* 32.0* 33.0*  --  30.6*  --  35.0*   < > 30.1*   ANION GAP 9.3  --  9.0 12.0 10.0  --  11.4  --  11.0   < > 13.9   BUN 14.0  --  15.2 12.8 12.4  --  12.3  --  14.8   < > 21.5   CREATININE 0.76  --  0.81  0.76 0.75*  --  0.87  --  0.82   < > 1.14   EGFR 90.9  --  89.1 90.9 91.2  --  87.2  --  88.8   < > 65.0   GLUCOSE 115*  --  140* 113* 131*  --  145*  --  91   < > 201*   CALCIUM 8.9  --  8.6 8.6 8.5*  --  8.3*  --  8.5*   < > 8.4*   MAGNESIUM  --   --   --   --   --   --   --   --  2.3  --  2.1   PHOSPHORUS 2.9  --  2.5  --   --   --   --   --   --   --   --     < > = values in this interval not displayed.         Lab 07/28/25  0532 07/27/25  1116 07/22/25  1458   TOTAL PROTEIN  --   --  5.0*   ALBUMIN 3.3* 3.3* 3.3*   ALT (SGPT)  --   --  40   AST (SGOT)  --   --  77*   BILIRUBIN  --   --  0.6   INDIRECT BILIRUBIN  --   --  0.3   BILIRUBIN DIRECT  --   --  0.3   ALK PHOS  --   --  83         Lab 07/26/25  0753 07/23/25  0625   PROBNP 2,355.0* 1,175.0             Lab 07/23/25  1359 07/23/25  0933   FOLATE 14.20  --    VITAMIN B 12 1,195*  --    ABO TYPING  --  O   RH TYPING  --  Positive   ANTIBODY SCREEN  --  Negative         Brief Urine Lab Results  (Last result in the past 365 days)        Color   Clarity   Blood   Leuk Est   Nitrite   Protein   CREAT   Urine HCG        07/15/25 1319 Yellow   Clear   Moderate (2+)   Negative   Negative   30 mg/dL (1+)                   Microbiology Results Abnormal       None            No radiology results from the last 24 hrs    Results for orders placed during the hospital encounter of 07/15/25    Adult Transesophageal Echo (SALVADOR) W/ Cont if Necessary Per Protocol 07/19/2025  8:29 AM    Interpretation Summary    Left ventricular systolic function is low normal. Left ventricular ejection fraction appears to be 56 - 60%.    Left ventricular wall thickness is consistent with mild concentric hypertrophy.    No evidence of a left atrial appendage thrombus    Mild to moderate mitral valve regurgitation      Current medications:  Scheduled Meds:amiodarone, 200 mg, Oral, Q12H  amLODIPine, 10 mg, Oral, Q24H  apixaban, 5 mg, Oral, Q12H  atorvastatin, 40 mg, Oral, Nightly  empagliflozin,  10 mg, Oral, Daily  [Held by provider] ferrous sulfate, 325 mg, Oral, Daily With Breakfast  insulin glargine, 5 Units, Subcutaneous, Nightly  insulin lispro, 2-9 Units, Subcutaneous, 4x Daily AC & at Bedtime  isosorbide mononitrate, 30 mg, Oral, Q24H  levothyroxine, 50 mcg, Oral, Q AM  losartan, 100 mg, Oral, Q24H  melatonin, 10 mg, Oral, Nightly  nebivolol, 20 mg, Oral, Q24H  pantoprazole, 40 mg, Intravenous, BID AC  polyethylene glycol, 17 g, Oral, Daily  potassium chloride, 40 mEq, Oral, Daily  [Held by provider] primidone, 100 mg, Oral, Daily  sertraline, 200 mg, Oral, Daily  sodium chloride, 10 mL, Intravenous, Q12H  spironolactone, 100 mg, Oral, Daily  terazosin, 2 mg, Oral, Nightly  tiZANidine, 4 mg, Oral, Nightly  valACYclovir, 500 mg, Oral, Daily      Continuous Infusions:   PRN Meds:.  acetaminophen    senna-docusate sodium **AND** polyethylene glycol **AND** bisacodyl **AND** bisacodyl    cloNIDine    dextrose    dextrose    glucagon (human recombinant)    hydrALAZINE    LORazepam    Magnesium Cardiology Dose Replacement - Follow Nurse / BPA Driven Protocol    metoprolol tartrate    nitroglycerin    ondansetron    Potassium Replacement - Follow Nurse / BPA Driven Protocol    sodium chloride    sodium chloride    Assessment & Plan   Assessment & Plan     Active Hospital Problems    Diagnosis  POA    **CVA (cerebral vascular accident) [I63.9]  Yes    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        Brief Hospital Course to date:  Ibrahima Nvaa is a 80 y.o. male with a PMHx significant for HTN, anxiety (on chronic benzodiazepine therapy), prior CVA (2012 with no residual deficits), essential tremor, obesity (BMI 44) and DARCY (unable to tolerate CPAP).   Admitted to [facility] 7/15/25 due to speech dysarthria / expressive aphasia and confusion.    Stroke neurology consulted.  Etiology  thought to be secondary to stroke recrudescence.  Hospital course complicated by A-fib with RVR, hypokalemia, and anemia.  Cardiology and gastroenterology following.        Stroke recrudescence   History of CVA   - Presented with speech dysarthria / expressive aphasia and confusion   - Stroke neurology team followed  - MRI brain negative for acute stroke  - SALVADOR with EF 56-60%, mild concentric LV hypertrophy, mild to moderate MR, no atrial appendage thrombus     Normocytic anemia  - Started on Eliquis this admission, held due to worsening anemia  - s/p 2 unit PRBCs on 7/23 for Hgb 7.1  -GI evaluated, s/p EGD and colon on 7/26 with placement of endoclips in both colon and duodenum     -restart eliquis 7/28, monitor cbc in AM     Dysphagia  - Patient reported dysphagia to solids/liquids with R neck fullness  - CTA neck reassuring  - SLP evaluated. MBS with limited upper GI series with no evidence of obstruction, noted mild reflux  - Continue PPI  - Soft texture / chopped meats with thin liquids     New onset atrial fibrillation   Acute HFpEF  Essential hypertension  - TSH OK   -Cardiology consulted, amiodarone restarted  - lasix IV times one today        Hypokalemia  Hypernatremia, improved  -better continue to replace above recommended amounts of protocol, monitor BMP        New mediastinal mass / JANNA pulmonary mass  - CT chest proximately 4.5 cm mediastinal mass and a contiguous 5.5 x 3 cm pleural-based JANNA pulmonary mass concerning for primary neoplasm also noted a few shotty mediastinal lymph nodes with rounded morphology, reactive versus metastatic adenopathy  - Partner discussed CT chest findings with patient and family  - Patient reports history of smoking, quit 30 years ago.  Chronic cough x 1 year.  No weight loss or hemoptysis  - Patient/family interested in biopsy however was started on Eliquis for A-fib this admission.  - Pulmonology evaluated this admission. He was scheduled to see Dr. King on 7/28/25  to discuss procedure and arrange [facility].  - will need to be rescheduled for later date after rehab placement.      Pancreatic head mass  - CT abdomen/pelvis showed incidental [id number] cystic finding in pancreatic head  - GI evaluated     PreDM  - A1c 6.3%. Not on home meds. Now on Jardiance.   - Continue Lantus, SSI      Essential tremor  - Primidone on hold due to interaction with Eliquis     Anxiety / chronic benzo use  - continue routine Lorazepam     Expected Discharge Location and Transportation: SNF tomorrow if repeat cbc in AM ok  Expected Discharge   Expected Discharge Date: 7/29/2025; Expected Discharge Time:      VTE Prophylaxis:  Pharmacologic & mechanical VTE prophylaxis orders are present.         AM-PAC 6 Clicks Score (PT): 18 (07/27/25 2000)    CODE STATUS:   Code Status and Medical Interventions: CPR (Attempt to Resuscitate); Full Support   Ordered at: 07/15/25 1515     Code Status (Patient has no pulse and is not breathing):    CPR (Attempt to Resuscitate)     Medical Interventions (Patient has pulse or is breathing):    Full Support     Level Of Support Discussed With:    Patient       Dipika Sky Hernandez,   07/28/25

## 2025-07-27 NOTE — PROGRESS NOTES
[facility]  IP Progress Note      Chief Complaint/Reason for service: #1 A-fib RVR-status post SALVADOR cardioversion #2 exertional angina #3 heart failure with preserved EF    Subjective   Subjective: Patient states he feels better today.  Breathing is normal and he feels strong    Past medical, surgical, social and family history reviewed in the patient's electronic medical record.    Objective     Vital Sign Min/Max for last 24 hours  Temp  Min: 97.3 °F (36.3 °C)  Max: 98.6 °F (37 °C)   BP  Min: 139/67  Max: 183/85   Pulse  Min: 73  Max: 100   Resp  Min: 16  Max: 18   SpO2  Min: 93 %  Max: 97 %   Flow (L/min) (Oxygen Therapy)  Min: 2  Max: 3      Intake/Output Summary (Last 24 hours) at 7/27/2025 0634  Last data filed at 7/26/2025 2000  Gross per 24 hour   Intake 800 ml   Output --   Net 800 ml             Current Facility-Administered Medications:     acetaminophen (TYLENOL) tablet 650 mg, 650 mg, Oral, Q6H PRN, Brunner, Mark I, MD, 650 mg at 07/23/25 1648    amiodarone (PACERONE) tablet 200 mg, 200 mg, Oral, Q12H, Carlos Mendez MD    amLODIPine (NORVASC) tablet 10 mg, 10 mg, Oral, Q24H, Brunner, Mark I, MD, 10 mg at 07/26/25 0818    [Held by provider] apixaban (ELIQUIS) tablet 5 mg, 5 mg, Oral, Q12H, Brunner, Mark I, MD, 5 mg at 07/23/25 0947    atorvastatin (LIPITOR) tablet 40 mg, 40 mg, Oral, Nightly, Brunner, Mark I, MD, 40 mg at 07/26/25 2057    sennosides-docusate (PERICOLACE) 8.6-50 MG per tablet 2 tablet, 2 tablet, Oral, BID PRN **AND** polyethylene glycol (MIRALAX) packet 17 g, 17 g, Oral, Daily PRN **AND** bisacodyl (DULCOLAX) EC tablet 5 mg, 5 mg, Oral, Daily PRN **AND** bisacodyl (DULCOLAX) suppository 10 mg, 10 mg, Rectal, Daily PRN, Brunner, Mark I, MD    cloNIDine (CATAPRES) tablet 0.1 mg, 0.1 mg, Oral, Q1H PRN, Brunner, Mark I, MD, 0.1 mg at 07/17/25 1241    dextrose (D50W) (25 g/50 mL) IV injection 25 g, 25 g, Intravenous, Q15 Min PRN, Brunner, Mark I,  MD    dextrose (GLUTOSE) oral gel 15 g, 15 g, Oral, Q15 Min PRN, Brunner, Mark I, MD    empagliflozin (JARDIANCE) tablet 10 mg, 10 mg, Oral, Daily, Brunner, Mark I, MD, 10 mg at 07/26/25 0818    [Held by provider] ferrous sulfate tablet 325 mg, 325 mg, Oral, Daily With Breakfast, Brunner, Mark I, MD, 325 mg at 07/21/25 0915    glucagon (GLUCAGEN) injection 1 mg, 1 mg, Intramuscular, Q15 Min PRN, Brunner, Mark I, MD    hydrALAZINE (APRESOLINE) injection 10 mg, 10 mg, Intravenous, Q6H PRN, Brunner, Mark I, MD, 10 mg at 07/18/25 1733    insulin glargine (LANTUS, SEMGLEE) injection 5 Units, 5 Units, Subcutaneous, Nightly, Brunner, Mark I, MD, 5 Units at 07/26/25 2058    Insulin Lispro (humaLOG) injection 2-9 Units, 2-9 Units, Subcutaneous, 4x Daily AC & at Bedtime, Brunner, Mark I, MD, 4 Units at 07/26/25 2058    isosorbide mononitrate (IMDUR) 24 hr tablet 30 mg, 30 mg, Oral, Q24H, Brunner, Mark I, MD, 30 mg at 07/26/25 0818    levothyroxine (SYNTHROID, LEVOTHROID) tablet 50 mcg, 50 mcg, Oral, Q AM, Brunner, Mark I, MD, 50 mcg at 07/27/25 0514    LORazepam (ATIVAN) tablet 0.5 mg, 0.5 mg, Oral, Daily PRN, Brunner, Mark I, MD, 0.5 mg at 07/26/25 0756    losartan (COZAAR) tablet 100 mg, 100 mg, Oral, Q24H, Brunner, Mark I, MD, 100 mg at 07/26/25 0818    Magnesium Cardiology Dose Replacement - Follow Nurse / BPA Driven Protocol, , Not Applicable, PRN, Brunner, Mark I, MD    melatonin tablet 10 mg, 10 mg, Oral, Nightly, Brunner, Mark I, MD, 10 mg at 07/26/25 2058    metoprolol tartrate (LOPRESSOR) injection 5 mg, 5 mg, Intravenous, Q6H PRN, Brunner, Mark I, MD, 5 mg at 07/21/25 1925    nebivolol (BYSTOLIC) tablet 20 mg, 20 mg, Oral, Q24H, Brunner, Mark I, MD, 20 mg at 07/26/25 0818    nitroglycerin (NITROSTAT) SL tablet 0.4 mg, 0.4 mg, Sublingual, Q5 Min PRN, Brunner, Mark I, MD    ondansetron (ZOFRAN) injection 2 mg, 2 mg, Intravenous, Q4H PRN, Brunner, Mark I, MD    pantoprazole (PROTONIX) injection 40 mg, 40 mg,  "Intravenous, BID AC, Brunner, Mark I, MD, 40 mg at 07/26/25 1723    polyethylene glycol (MIRALAX) packet 17 g, 17 g, Oral, Daily, Brunner, Mark I, MD    potassium chloride (MICRO-K/KLOR-CON) CR capsule, 40 mEq, Oral, Daily, Brunner, Mark I, MD    Potassium Replacement - Follow Nurse / BPA Driven Protocol, , Not Applicable, PRN, Brunner, Mark I, MD    [Held by provider] primidone (MYSOLINE) tablet 100 mg, 100 mg, Oral, Daily, Brunner, Mark I, MD, 100 mg at 07/21/25 0915    sertraline (ZOLOFT) tablet 200 mg, 200 mg, Oral, Daily, Brunner, Mark I, MD, 200 mg at 07/26/25 0818    sodium chloride 0.9 % flush 10 mL, 10 mL, Intravenous, Q12H, Brunner, Mark I, MD, 10 mL at 07/26/25 2103    sodium chloride 0.9 % flush 10 mL, 10 mL, Intravenous, PRN, Brunner, Mark I, MD    sodium chloride 0.9 % infusion 40 mL, 40 mL, Intravenous, PRN, Brunner, Mark I, MD    spironolactone (ALDACTONE) tablet 100 mg, 100 mg, Oral, Daily, Brunner, Mark I, MD    terazosin (HYTRIN) capsule 2 mg, 2 mg, Oral, Nightly, Brunner, Mark I, MD, 2 mg at 07/26/25 2057    tiZANidine (ZANAFLEX) tablet 4 mg, 4 mg, Oral, Nightly, Brunner, Mark I, MD, 4 mg at 07/26/25 2058    valACYclovir (VALTREX) tablet 500 mg, 500 mg, Oral, Daily, Brunner, Mark I, MD, 500 mg at 07/26/25 0818    Physical Exam: General Pleasant overweight elderly male in bed not dyspneic tachypnea        HEENT: No JVP.  No       Respiratory: Equal bilateral symmetrical expansion\" bilaterally       Cardiovascular: Regular rate and rhythm with occasional ectopic with persistent 2+ pitting edema       GI: Soft       Lower Extremities: Stasis abnormalities with persistent edema       Neuro: Facial expressions symmetrical moves all 4 extremity       Skin: Warm and dry with pitting edema to palpation       Psych: Pleasant    Results Review: Heart rates in the 70s.  Blood pressures 139-152.  Potassium is up to 3.1.  GFR 90.9.  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  Also a " colon polyp    Radiology Results:  Imaging Results (Last 72 Hours)       ** No results found for the last 72 hours. **            EKG: Sinus rhythm QTc 470    ECHO: Preserved EF    Tele: Patient having some runs of A-fib    Assessment   Assessment/Plan: [id number] post SALVADOR cardioversion.  Anticoagulation had to be stopped because of significant bleeding.  Patient underwent endoscopy and will need to discuss with GI whether or not he can resume Eliquis.  For some reason he is off amiodarone so I will start amiodarone p.o. 200 mg every 12 hours and monitor QTc  2 elevated BNP secondary to #1-the patient is euvolemic  3 exertional angina-patient is on beta-blockers and nitrates.  He has been transfused 2 units of blood.  Hopefully the patient can be treated medically    Carlos Mendez MD  07/27/25  06:34 EDT

## 2025-07-27 NOTE — PLAN OF CARE
Problem: Adult Inpatient Plan of Care  Goal: Plan of Care Review  Outcome: Progressing  Goal: Patient-Specific Goal (Individualized)  Outcome: Progressing  Goal: Absence of Hospital-Acquired Illness or Injury  Outcome: Progressing  Intervention: Identify and Manage Fall Risk  Recent Flowsheet Documentation  Taken 7/27/2025 0600 by Viridiana Cage RN  Safety Promotion/Fall Prevention:   activity supervised   assistive device/personal items within reach   clutter free environment maintained   fall prevention program maintained   lighting adjusted   nonskid shoes/slippers when out of bed   room organization consistent   safety round/check completed  Taken 7/27/2025 0400 by Viridiana Cage RN  Safety Promotion/Fall Prevention:   activity supervised   assistive device/personal items within reach   clutter free environment maintained   fall prevention program maintained   lighting adjusted   nonskid shoes/slippers when out of bed   room organization consistent   safety round/check completed  Taken 7/27/2025 0200 by Viridiana Cage RN  Safety Promotion/Fall Prevention:   activity supervised   assistive device/personal items within reach   clutter free environment maintained   fall prevention program maintained   lighting adjusted   nonskid shoes/slippers when out of bed   room organization consistent   safety round/check completed  Taken 7/27/2025 0000 by Viridiana Cage RN  Safety Promotion/Fall Prevention:   activity supervised   assistive device/personal items within reach   clutter free environment maintained   fall prevention program maintained   lighting adjusted   nonskid shoes/slippers when out of bed   room organization consistent   safety round/check completed  Taken 7/26/2025 2200 by Viridiana Cage RN  Safety Promotion/Fall Prevention:   activity supervised   assistive device/personal items within reach   clutter free environment maintained   fall prevention program maintained    lighting adjusted   nonskid shoes/slippers when out of bed   room organization consistent   safety round/check completed  Taken 7/26/2025 2000 by Viridiana Cage RN  Safety Promotion/Fall Prevention:   activity supervised   assistive device/personal items within reach   clutter free environment maintained   fall prevention program maintained   lighting adjusted   nonskid shoes/slippers when out of bed   room organization consistent   safety round/check completed  Intervention: Prevent Skin Injury  Recent Flowsheet Documentation  Taken 7/27/2025 0600 by Viridiana Cage RN  Body Position: position changed independently  Skin Protection:   incontinence pads utilized   silicone foam dressing in place   transparent dressing maintained  Taken 7/27/2025 0400 by Viridiana Cage RN  Body Position: position changed independently  Skin Protection:   incontinence pads utilized   silicone foam dressing in place   transparent dressing maintained  Taken 7/27/2025 0200 by Viridiana Cage RN  Body Position: position changed independently  Skin Protection:   incontinence pads utilized   silicone foam dressing in place   transparent dressing maintained  Taken 7/27/2025 0000 by Viridiana Cage RN  Body Position: position changed independently  Skin Protection:   incontinence pads utilized   silicone foam dressing in place   transparent dressing maintained  Taken 7/26/2025 2200 by Viridiana Cage RN  Body Position: position changed independently  Skin Protection:   incontinence pads utilized   silicone foam dressing in place   transparent dressing maintained  Taken 7/26/2025 2000 by Viridiana Cage RN  Body Position: position changed independently  Skin Protection:   incontinence pads utilized   silicone foam dressing in place   skin sealant/moisture barrier applied  Intervention: Prevent and Manage VTE (Venous Thromboembolism) Risk  Recent Flowsheet Documentation  Taken 7/26/2025 2000 by  Viridiana Cage RN  VTE Prevention/Management:   bilateral   SCDs (sequential compression devices) off  Intervention: Prevent Infection  Recent Flowsheet Documentation  Taken 7/27/2025 0600 by Viridiana Cage RN  Infection Prevention:   equipment surfaces disinfected   hand hygiene promoted   personal protective equipment utilized  Taken 7/27/2025 0400 by Viridiana Cage RN  Infection Prevention:   equipment surfaces disinfected   hand hygiene promoted   personal protective equipment utilized  Taken 7/27/2025 0200 by Viridiana Cage RN  Infection Prevention:   equipment surfaces disinfected   hand hygiene promoted   personal protective equipment utilized  Taken 7/27/2025 0000 by Viridiana Cage RN  Infection Prevention:   equipment surfaces disinfected   hand hygiene promoted   personal protective equipment utilized  Taken 7/26/2025 2200 by Viridiana Cgae RN  Infection Prevention:   equipment surfaces disinfected   hand hygiene promoted   personal protective equipment utilized  Taken 7/26/2025 2000 by Viridiana Cage RN  Infection Prevention:   environmental surveillance performed   equipment surfaces disinfected   hand hygiene promoted   personal protective equipment utilized  Goal: Optimal Comfort and Wellbeing  Outcome: Progressing  Intervention: Provide Person-Centered Care  Recent Flowsheet Documentation  Taken 7/26/2025 2000 by Viridiana Cage RN  Trust Relationship/Rapport: care explained  Goal: Readiness for Transition of Care  Outcome: Progressing     Problem: Stroke, Ischemic (Includes Transient Ischemic Attack)  Goal: Optimal Coping  Outcome: Progressing  Intervention: Support Psychosocial Response to Stroke  Recent Flowsheet Documentation  Taken 7/26/2025 2000 by Viridiana Cage RN  Family/Support System Care:   self-care encouraged   support provided  Goal: Optimal Cerebral Tissue Perfusion  Outcome: Progressing  Goal: Optimal Cognitive  Function  Outcome: Progressing  Goal: Optimal Functional Ability  Outcome: Progressing  Intervention: Optimize Functional Ability  Recent Flowsheet Documentation  Taken 7/27/2025 0600 by Viridiana Cage RN  Activity Management: activity encouraged  Taken 7/27/2025 0400 by Viridiana Cage RN  Activity Management: activity encouraged  Taken 7/27/2025 0200 by Viridiana Cage RN  Activity Management: activity encouraged  Taken 7/27/2025 0000 by Viridiana Cage RN  Activity Management: activity encouraged  Taken 7/26/2025 2200 by Viridiana Cage RN  Activity Management: activity encouraged  Taken 7/26/2025 2000 by Viridiana Cage RN  Activity Management: activity encouraged  Self-Care Promotion:   independence encouraged   BADL personal objects within reach  Goal: Effective Oxygenation and Ventilation  Outcome: Progressing  Intervention: Optimize Oxygenation and Ventilation  Recent Flowsheet Documentation  Taken 7/27/2025 0600 by Viridiana Cage RN  Head of Bed (HOB) Positioning: HOB elevated  Taken 7/27/2025 0400 by Viridiana Cage RN  Head of Bed (HOB) Positioning: HOB elevated  Taken 7/27/2025 0200 by Viridiana Cage RN  Head of Bed (HOB) Positioning: HOB elevated  Taken 7/27/2025 0000 by Viridiana Cage RN  Head of Bed (HOB) Positioning: HOB elevated  Taken 7/26/2025 2200 by Viridiana Cage RN  Head of Bed (HOB) Positioning: HOB elevated  Taken 7/26/2025 2000 by Viridiana Cage RN  Head of Bed (HOB) Positioning: HOB elevated  Goal: Safe and Effective Swallow  Outcome: Progressing     Problem: Comorbidity Management  Goal: Blood Pressure in Desired Range  Outcome: Progressing  Intervention: Maintain Blood Pressure Management  Recent Flowsheet Documentation  Taken 7/27/2025 0600 by Viridiana Cage RN  Medication Review/Management: medications reviewed  Taken 7/27/2025 0400 by Viridiana Cage RN  Medication Review/Management: medications  reviewed  Taken 7/27/2025 0200 by Viridiana Cage RN  Medication Review/Management: medications reviewed  Taken 7/27/2025 0000 by Viridiana Cage RN  Medication Review/Management: medications reviewed  Taken 7/26/2025 2200 by Viridiana Cage RN  Medication Review/Management: medications reviewed  Taken 7/26/2025 2000 by Viridiana Cage RN  Medication Review/Management: medications reviewed     Problem: Fall Injury Risk  Goal: Absence of Fall and Fall-Related Injury  Outcome: Progressing  Intervention: Identify and Manage Contributors  Recent Flowsheet Documentation  Taken 7/27/2025 0600 by Viridiana Cage RN  Medication Review/Management: medications reviewed  Taken 7/27/2025 0400 by Viridiana Cage RN  Medication Review/Management: medications reviewed  Taken 7/27/2025 0200 by Viridiana Cage RN  Medication Review/Management: medications reviewed  Taken 7/27/2025 0000 by Viridiana Cage RN  Medication Review/Management: medications reviewed  Taken 7/26/2025 2200 by Viridiana Cage RN  Medication Review/Management: medications reviewed  Taken 7/26/2025 2000 by Viridiana Cage RN  Medication Review/Management: medications reviewed  Self-Care Promotion:   independence encouraged   BADL personal objects within reach  Intervention: Promote Injury-Free Environment  Recent Flowsheet Documentation  Taken 7/27/2025 0600 by Viridiana Cage RN  Safety Promotion/Fall Prevention:   activity supervised   assistive device/personal items within reach   clutter free environment maintained   fall prevention program maintained   lighting adjusted   nonskid shoes/slippers when out of bed   room organization consistent   safety round/check completed  Taken 7/27/2025 0400 by Viridiana Cage RN  Safety Promotion/Fall Prevention:   activity supervised   assistive device/personal items within reach   clutter free environment maintained   fall prevention program  maintained   lighting adjusted   nonskid shoes/slippers when out of bed   room organization consistent   safety round/check completed  Taken 7/27/2025 0200 by Viridiana Cage RN  Safety Promotion/Fall Prevention:   activity supervised   assistive device/personal items within reach   clutter free environment maintained   fall prevention program maintained   lighting adjusted   nonskid shoes/slippers when out of bed   room organization consistent   safety round/check completed  Taken 7/27/2025 0000 by Viridiana Cage RN  Safety Promotion/Fall Prevention:   activity supervised   assistive device/personal items within reach   clutter free environment maintained   fall prevention program maintained   lighting adjusted   nonskid shoes/slippers when out of bed   room organization consistent   safety round/check completed  Taken 7/26/2025 2200 by Viridiana Cage RN  Safety Promotion/Fall Prevention:   activity supervised   assistive device/personal items within reach   clutter free environment maintained   fall prevention program maintained   lighting adjusted   nonskid shoes/slippers when out of bed   room organization consistent   safety round/check completed  Taken 7/26/2025 2000 by Viridiana Cage RN  Safety Promotion/Fall Prevention:   activity supervised   assistive device/personal items within reach   clutter free environment maintained   fall prevention program maintained   lighting adjusted   nonskid shoes/slippers when out of bed   room organization consistent   safety round/check completed     Problem: Skin Injury Risk Increased  Goal: Skin Health and Integrity  Outcome: Progressing  Intervention: Optimize Skin Protection  Recent Flowsheet Documentation  Taken 7/27/2025 0600 by Viridiana Cage, RN  Activity Management: activity encouraged  Pressure Reduction Techniques:   frequent weight shift encouraged   pressure points protected   weight shift assistance provided  Head of Bed (HOB)  Positioning: [facility] elevated  Pressure Reduction Devices:   positioning supports utilized   pressure-redistributing mattress utilized  Skin Protection:   incontinence pads utilized   silicone foam dressing in place   transparent dressing maintained  Taken 7/27/2025 0400 by Viridiana Cage RN  Activity Management: activity encouraged  Pressure Reduction Techniques:   frequent weight shift encouraged   pressure points protected   weight shift assistance provided  Head of Bed ([facility]) Positioning: [facility] elevated  Pressure Reduction Devices:   positioning supports utilized   pressure-redistributing mattress utilized  Skin Protection:   incontinence pads utilized   silicone foam dressing in place   transparent dressing maintained  Taken 7/27/2025 0200 by Viridiana Cage RN  Activity Management: activity encouraged  Pressure Reduction Techniques:   frequent weight shift encouraged   pressure points protected   weight shift assistance provided  Head of Bed ([facility]) Positioning: [facility] elevated  Pressure Reduction Devices:   positioning supports utilized   pressure-redistributing mattress utilized  Skin Protection:   incontinence pads utilized   silicone foam dressing in place   transparent dressing maintained  Taken 7/27/2025 0000 by Viridiana Cage RN  Activity Management: activity encouraged  Pressure Reduction Techniques:   frequent weight shift encouraged   pressure points protected   weight shift assistance provided  Head of Bed ([facility]) Positioning: [facility] elevated  Pressure Reduction Devices:   positioning supports utilized   pressure-redistributing mattress utilized  Skin Protection:   incontinence pads utilized   silicone foam dressing in place   transparent dressing maintained  Taken 7/26/2025 2200 by Viridiana Cage RN  Activity Management: activity encouraged  Pressure Reduction Techniques:   frequent weight shift encouraged   pressure points protected   weight shift assistance provided  Head of Bed ([facility])  Positioning: [facility] elevated  Pressure Reduction Devices:   positioning supports utilized   pressure-redistributing mattress utilized  Skin Protection:   incontinence pads utilized   silicone foam dressing in place   transparent dressing maintained  Taken 7/26/2025 2000 by Viridiana Cage RN  Activity Management: activity encouraged  Pressure Reduction Techniques:   frequent weight shift encouraged   pressure points protected   weight shift assistance provided  Head of Bed ([facility]) Positioning: [facility] elevated  Pressure Reduction Devices:   heel offloading device utilized   positioning supports utilized   pressure-redistributing mattress utilized  Skin Protection:   incontinence pads utilized   silicone foam dressing in place   skin sealant/moisture barrier applied   Goal Outcome Evaluation:

## 2025-07-27 NOTE — THERAPY PROGRESS REPORT/RE-CERT
Patient Name: Ibrahima Nava  : 1945    MRN: [id number]                              Today's Date: 2025       Admit Date: 7/15/2025    Visit Dx:     ICD-10-CM ICD-9-CM   1. Cognitive communication deficit  R41.841 799.52   2. Aphasia  R47.01 784.3   3. Esophageal dysphagia  R13.19 787.29   4. Altered mental status, unspecified altered mental status type  R41.82 780.97   5. Hypokalemia  E87.6 276.8   6. History of stroke  Z86.73 V12.54   7. Primary sleep apnea of , unspecified type  P28.30 770.81   8. Sleep apnea, unspecified type  G47.30 780.57   9. 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 obesity)  E66.813 278.01   10. Iron deficiency anemia due to chronic blood loss  D50.0 280.0   11. GI (gastrointestinal bleed)  K92.2 578.9     Patient Active Problem List   Diagnosis    Primary localized osteoarthrosis of shoulder region    Hypertension    Impaired physical mobility    Acute pain of left shoulder    Status post reverse arthroplasty of shoulder, left    CVA (cerebral vascular accident)    Mediastinal mass    Iron deficiency anemia due to chronic blood loss     Past Medical History:   Diagnosis Date    Anxiety     Arthritis     Hypertension     CONTROLLED WITH MEDS PER PT     Shoulder pain, left     Sleep apnea     did not tolerate CPAP    Stroke     -- NO RESIDUAL PROBLEMS    Tremor of right hand     Wears dentures     UPPER AND LOWER PLATE      Past Surgical History:   Procedure Laterality Date    CARDIAC CATHETERIZATION      NO CORONARY STENTS     COLONOSCOPY      IL ARTHROPLASTY GLENOHUMERAL JOINT TOTAL SHOULDER Left 2017    Procedure: LEFT TOTAL SHOULDER ARTHROPLASTY ;  Surgeon: Michael Xavier MD;  Location: [facility];  Service: Orthopedics    TOTAL KNEE ARTHROPLASTY Bilateral     TOTAL SHOULDER REPLACEMENT Right     TOTAL SHOULDER REVISION Left 2024    Procedure: REVISION TOTAL SHOULDER ARTHROPLASTY TO REVERSE TOTAL SHOULDER ATHROPLASTY - LEFT;  Surgeon: Morenita  Michael Gonzalez MD;  Location: [facility];  Service: Orthopedics;  Laterality: Left;      General Information       Row Name 07/27/25 1540          Physical Therapy Time and Intention    Document Type therapy note (daily note);progress note/recertification  -     Mode of Treatment physical therapy  -       Row Name 07/27/25 1540          General Information    Patient Profile Reviewed yes  -LS     Prior Level of Function --  brief with mobility, [place]; monitor HR, essential tremors per chart  -LS     Existing Precautions/Restrictions fall;other (see comments)  -       Row Name 07/27/25 1548          Cognition    Orientation Status (Cognition) oriented x 4  -               User Key  (r) = Recorded By, (t) = Taken By, (c) = Cosigned By      Initials Name Provider Type    LS Akanksha Abreu, PT Physical Therapist                   Mobility       Row Name 07/27/25 1540          Bed Mobility    Comment, (Bed Mobility) deferred-UIC when therapist entered room and returned to chair  -       Row Name 07/27/25 1540          Sit-Stand Transfer    Sit-Stand [place] (Transfers) verbal cues;standby assist  -LS     Assistive Device (Sit-Stand Transfers) walker, front-wheeled  -LS     Comment, (Sit-Stand Transfer) vcs for hand placement sit to and from standing.  -       Row Name 07/27/25 1543          Gait/Stairs (Locomotion)    [place] Level (Gait) standby assist  -LS     Assistive Device (Gait) walker, front-wheeled  -LS     Patient was able to Ambulate yes  -LS     Distance in Feet (Gait) 180  -LS     Deviations/Abnormal Patterns (Gait) gait speed decreased;bilateral deviations;stride length decreased  -LS     Bilateral Gait Deviations forward flexed posture  -LS     Comment, (Gait/Stairs) walked on room air. vcs to avoid pushing the walker too far ahead. 2 standing rest breaks during which pt rested forearms on walker. cues for upright posture and PLB.  -LS               User Key  (r) = Recorded By, (t)  = Taken By, (c) = Cosigned By      Initials Name Provider Type    Akanksha Meyers, PT Physical Therapist                   Obj/Interventions       Row Name 07/27/25 1540          Balance    Static Standing Balance standby assist  -LS     Dynamic Standing Balance contact guard  -LS     Position/Device Used, Standing Balance supported;walker, rolling  -LS               User Key  (r) = Recorded By, (t) = Taken By, (c) = Cosigned By      Initials Name Provider Type    Akanksha Meyers, PT Physical Therapist                   Goals/Plan       Row Name 07/27/25 1540          Bed Mobility Goal 1 (PT)    Activity/Assistive Device (Bed Mobility Goal 1, PT) sit to supine/supine to sit  -LS     [place] Level/Cues Needed (Bed Mobility Goal 1, PT) independent  -LS     Time Frame (Bed Mobility Goal 1, PT) short term goal (STG);5 days  -LS     Progress/Outcomes (Bed Mobility Goal 1, PT) goal revised this date;goal ongoing  -LS       Row Name 07/27/25 1540          Transfer Goal 1 (PT)    Activity/Assistive Device (Transfer Goal 1, PT) sit-to-stand/stand-to-sit;walker, rolling  -LS     [place] Level/Cues Needed (Transfer Goal 1, PT) modified independence  -LS     Time Frame (Transfer Goal 1, PT) long term goal (LTG);10 days  -LS     Progress/Outcome (Transfer Goal 1, PT) goal revised this date;goal ongoing  -LS       Row Name 07/27/25 1540          Gait Training Goal 1 (PT)    Activity/Assistive Device (Gait Training Goal 1, PT) gait (walking locomotion);walker, rolling  -LS     [place] Level (Gait Training Goal 1, PT) modified independence  -LS     Distance (Gait Training Goal 1, PT) 250  -LS     Time Frame (Gait Training Goal 1, PT) long term goal (LTG);10 days  -LS     Progress/Outcome (Gait Training Goal 1, PT) goal revised this date;goal ongoing  -LS       Row Name 07/27/25 1540          Stairs Goal 1 (PT)    Activity/Assistive Device (Stairs Goal 1, PT) ascending stairs;descending stairs;other  (see comments)  with RW or  HR  -LS     [place] Level/Cues Needed (Stairs Goal 1, PT) contact guard required  -LS     Number of Stairs (Stairs Goal 1, PT) 1  -LS     Time Frame (Stairs Goal 1, PT) long term goal (LTG);10 days  -LS     Progress/Outcome (Stairs Goal 1, PT) goal ongoing  -LS       Row Name 07/27/25 1540          Therapy Assessment/Plan (PT)    Planned Therapy Interventions (PT) balance training;bed mobility training;gait training;home exercise program;patient/family education;stair training;strengthening;transfer training;postural re-education  -LS               User Key  (r) = Recorded By, (t) = Taken By, (c) = Cosigned By      Initials Name Provider Type    LS Akanksha Abreu, PT Physical Therapist                   Clinical Impression       Row Name 07/27/25 1543          Pain    Pretreatment Pain Rating 0/10 - no pain  -LS     Posttreatment Pain Rating 0/10 - no pain  -LS       Row Name 07/27/25 1540          Plan of Care Review    Plan of Care Reviewed With patient  -LS     Progress improving  -LS     Outcome Evaluation Improving stability with ambulation. Cont to be below baseline level of function with mobility and would benefit from cont.skilled PT services. He has generalized weakness, impaired balance, and decreased tolerance to activity requiring rest breaks during ambulation. Goals reviewed and revised as indicated today. Continued PT is indicated. Recommend IPR at discharge.  -       Row Name 07/27/25 1540          Vital Signs    Pre SpO2 (%) 97  -LS     O2 Delivery Pre Treatment nasal cannula  2L O2/NC  -LS     Post SpO2 (%) 92  -LS     O2 Delivery Post Treatment room air  -LS     Pre Patient Position Sitting  -LS     Post Patient Position Standing  -LS       Row Name 07/27/25 5238          Positioning and Restraints    Pre-Treatment Position sitting in chair/recliner  -LS     Post Treatment Position chair  -LS     In Chair notified nsg;sitting;call light within  reach;encouraged to call for assist;exit alarm on;with family/caregiver;waffle cushion;legs elevated;heels elevated  -               User Key  (r) = Recorded By, (t) = Taken By, (c) = Cosigned By      Initials Name Provider Type    Akanksha Meyers, PT Physical Therapist                   Outcome Measures       Row Name 07/27/25 1540          How much help from another person do you currently need...    Turning from your back to your side while in flat bed without using bedrails? 3  -LS     Moving from lying on back to sitting on the side of a flat bed without bedrails? 3  -LS     Moving to and from a bed to a chair (including a wheelchair)? 3  -LS     Standing up from a chair using your arms (e.g., wheelchair, bedside chair)? 3  -LS     Climbing 3-5 steps with a railing? 3  -LS     To walk in hospital room? 3  -LS     AM-PAC 6 Clicks Score (PT) 18  -LS     Highest Level of Mobility Goal Walk 10 Steps or More-6  -LS       Row Name 07/27/25 1540          Functional Assessment    Outcome Measure Options AM-PAC 6 Clicks Basic Mobility (PT)  -LS               User Key  (r) = Recorded By, (t) = Taken By, (c) = Cosigned By      Initials Name Provider Type    Akanksha Meyers, PT Physical Therapist                                 Physical Therapy Education       Title: PT OT SLP Therapies (In Progress)       Topic: Physical Therapy (Done)       Point: Mobility training (Done)       Learning Progress Summary            Patient Acceptance, E, VU,NR by LS at 7/27/2025 1540    Acceptance, E, VU by CK at 7/22/2025 1518    Acceptance, E, VU,NR by CD at 7/21/2025 1539    Comment: SEE FLOWSHEET    Eager, E, VU by SC at 7/17/2025 1155    Comment: reviewed benefits of activity    Eager, E, VU,DU by SC at 7/17/2025 1152    Comment: reviewed HEP    Acceptance, E, VU by KR at 7/16/2025 1410   Family Acceptance, E, VU by KR at 7/16/2025 1410                      Point: Home exercise program (Done)       Learning Progress  Summary            Patient Acceptance, E, VU,NR by CD at 7/21/2025 1539    Comment: SEE FLOWSHEET    Eager, E, VU by SC at 7/17/2025 1155    Comment: reviewed benefits of activity    Eager, E, VU,DU by SC at 7/17/2025 1152    Comment: reviewed HEP                      Point: Body mechanics (Done)       Learning Progress Summary            Patient Acceptance, E, VU by CK at 7/22/2025 1518    Acceptance, E, VU,NR by CD at 7/21/2025 1539    Comment: SEE FLOWSHEET    Eager, E, VU by SC at 7/17/2025 1155    Comment: reviewed benefits of activity    Eager, E, VU,DU by SC at 7/17/2025 1152    Comment: reviewed HEP    Acceptance, E, VU by KR at 7/16/2025 1410   Family Acceptance, E, VU by KR at 7/16/2025 1410                      Point: Precautions (Done)       Learning Progress Summary            Patient Acceptance, E, VU by CK at 7/22/2025 1518    Acceptance, E, VU,NR by CD at 7/21/2025 1539    Comment: SEE FLOWSHEET    Eager, E, VU by SC at 7/17/2025 1155    Comment: reviewed benefits of activity    Eager, E, VU,DU by SC at 7/17/2025 1152    Comment: reviewed HEP    Acceptance, E, VU by KR at 7/16/2025 1410   Family Acceptance, E, VU by KR at 7/16/2025 1410                                      User Key       Initials Effective Dates Name Provider Type Discipline    SC 02/03/23 -  Marian Howard, PT Physical Therapist PT    CD 02/03/23 -  Marla Hernández, PT Physical Therapist PT    LS 07/11/23 -  Akankhsa Abreu, PT Physical Therapist PT    CK 02/06/24 -  Zehra Allan, PT Physical Therapist PT    KR 12/30/22 -  Coco Casarez, PT Physical Therapist PT                  PT Recommendation and Plan  Planned Therapy Interventions (PT): balance training, bed mobility training, gait training, home exercise program, patient/family education, stair training, strengthening, transfer training, postural re-education  Progress: improving  Outcome Evaluation: Improving stability with ambulation. Cont to be below baseline  level of function with mobility and would benefit from cont.skilled PT services. He has generalized weakness, impaired balance, and decreased tolerance to activity requiring rest breaks during ambulation. Goals reviewed and revised as indicated today. Continued PT is indicated. Recommend IPR at discharge.     Time Calculation:         PT Charges       Row Name 07/27/25 1540             Time Calculation    Start Time 1540  -LS      PT Received On 07/27/25  -LS      PT Goal Re-Cert Due Date 08/06/25  -LS         Time Calculation- PT    Total Timed Code Minutes- PT 15 minute(s)  -LS         Timed Charges    56367 - Gait Training Minutes  10  -LS      61896 - PT Therapeutic Activity Minutes 5  -LS         Total Minutes    Timed Charges Total Minutes 15  -LS       Total Minutes 15  -LS                User Key  (r) = Recorded By, (t) = Taken By, (c) = Cosigned By      Initials Name Provider Type    Akanksha Meyers, PT Physical Therapist                  Therapy Charges for Today       Code Description Service Date Service Provider Modifiers Qty    40629623202 HC GAIT TRAINING EA 15 MIN 7/27/2025 Akanksha Abreu, PT GP 1            PT G-Codes  Outcome Measure Options: AM-PAC 6 Clicks Basic Mobility (PT)  AM-PAC 6 Clicks Score (PT): 18  AM-PAC 6 Clicks Score (OT): 16  Modified Melodie Scale: 2 - Slight disability.  Unable to carry out all previous activities but able to look after own affairs without assistance.  PT Discharge Summary  Anticipated Discharge Disposition (PT): inpatient rehabilitation facility    Akanksha Abreu, ALLYSSA  7/27/2025

## 2025-07-27 NOTE — PLAN OF CARE
Goal Outcome Evaluation:  Plan of Care Reviewed With: patient        Progress: improving  Outcome Evaluation: Improving stability with ambulation. Cont to be below baseline level of function with mobility and would benefit from cont.skilled PT services. He has generalized weakness, impaired balance, and decreased tolerance to activity requiring rest breaks during ambulation. Goals reviewed and revised as indicated today. Continued PT is indicated. Recommend IPR at discharge.    Anticipated Discharge Disposition (PT): inpatient rehabilitation facility

## 2025-07-28 LAB
ALBUMIN SERPL-MCNC: 3.3 G/DL (ref 3.5–5.2)
ANION GAP SERPL CALCULATED.3IONS-SCNC: 9.3 MMOL/L (ref 5–15)
BASOPHILS # BLD AUTO: 0.01 10*3/MM3 (ref 0–0.2)
BASOPHILS NFR BLD AUTO: 0.2 % (ref 0–1.5)
BUN SERPL-MCNC: 14 MG/DL (ref 8–23)
BUN/CREAT SERPL: 18.4 (ref 7–25)
CALCIUM SPEC-SCNC: 8.9 MG/DL (ref 8.6–10.5)
CHLORIDE SERPL-SCNC: 108 MMOL/L (ref 98–107)
CO2 SERPL-SCNC: 27.7 MMOL/L (ref 22–29)
CREAT SERPL-MCNC: 0.76 MG/DL (ref 0.76–1.27)
DEPRECATED RDW RBC AUTO: 57.9 FL (ref 37–54)
EGFRCR SERPLBLD CKD-EPI 2021: 90.9 ML/MIN/1.73
EOSINOPHIL # BLD AUTO: 0 10*3/MM3 (ref 0–0.4)
EOSINOPHIL NFR BLD AUTO: 0 % (ref 0.3–6.2)
ERYTHROCYTE [DISTWIDTH] IN BLOOD BY AUTOMATED COUNT: 18.7 % (ref 12.3–15.4)
GLUCOSE BLDC GLUCOMTR-MCNC: 148 MG/DL (ref 70–130)
GLUCOSE BLDC GLUCOMTR-MCNC: 163 MG/DL (ref 70–130)
GLUCOSE BLDC GLUCOMTR-MCNC: 170 MG/DL (ref 70–130)
GLUCOSE BLDC GLUCOMTR-MCNC: 195 MG/DL (ref 70–130)
GLUCOSE SERPL-MCNC: 115 MG/DL (ref 65–99)
HCT VFR BLD AUTO: 28.9 % (ref 37.5–51)
HGB BLD-MCNC: 8.4 G/DL (ref 13–17.7)
IMM GRANULOCYTES # BLD AUTO: 0.23 10*3/MM3 (ref 0–0.05)
IMM GRANULOCYTES NFR BLD AUTO: 3.8 % (ref 0–0.5)
LYMPHOCYTES # BLD AUTO: 1.1 10*3/MM3 (ref 0.7–3.1)
LYMPHOCYTES NFR BLD AUTO: 18 % (ref 19.6–45.3)
MCH RBC QN AUTO: 27.1 PG (ref 26.6–33)
MCHC RBC AUTO-ENTMCNC: 29.1 G/DL (ref 31.5–35.7)
MCV RBC AUTO: 93.2 FL (ref 79–97)
MONOCYTES # BLD AUTO: 0.26 10*3/MM3 (ref 0.1–0.9)
MONOCYTES NFR BLD AUTO: 4.3 % (ref 5–12)
NEUTROPHILS NFR BLD AUTO: 4.51 10*3/MM3 (ref 1.7–7)
NEUTROPHILS NFR BLD AUTO: 73.7 % (ref 42.7–76)
NRBC BLD AUTO-RTO: 1.8 /100 WBC (ref 0–0.2)
PHOSPHATE SERPL-MCNC: 2.9 MG/DL (ref 2.5–4.5)
PLATELET # BLD AUTO: 89 10*3/MM3 (ref 140–450)
PMV BLD AUTO: 10.8 FL (ref 6–12)
POTASSIUM SERPL-SCNC: 4 MMOL/L (ref 3.5–5.2)
QT INTERVAL: 378 MS
QT INTERVAL: 388 MS
QTC INTERVAL: 433 MS
QTC INTERVAL: 442 MS
RBC # BLD AUTO: 3.1 10*6/MM3 (ref 4.14–5.8)
SODIUM SERPL-SCNC: 145 MMOL/L (ref 136–145)
WBC NRBC COR # BLD AUTO: 6.11 10*3/MM3 (ref 3.4–10.8)

## 2025-07-28 PROCEDURE — 82948 REAGENT STRIP/BLOOD GLUCOSE: CPT

## 2025-07-28 PROCEDURE — 93005 ELECTROCARDIOGRAM TRACING: CPT | Performed by: INTERNAL MEDICINE

## 2025-07-28 PROCEDURE — 93010 ELECTROCARDIOGRAM REPORT: CPT | Performed by: INTERNAL MEDICINE

## 2025-07-28 PROCEDURE — 99232 SBSQ HOSP IP/OBS MODERATE 35: CPT | Performed by: INTERNAL MEDICINE

## 2025-07-28 PROCEDURE — 63710000001 INSULIN LISPRO (HUMAN) PER 5 UNITS: Performed by: INTERNAL MEDICINE

## 2025-07-28 PROCEDURE — 80069 RENAL FUNCTION PANEL: CPT | Performed by: INTERNAL MEDICINE

## 2025-07-28 PROCEDURE — 85025 COMPLETE CBC W/AUTO DIFF WBC: CPT | Performed by: INTERNAL MEDICINE

## 2025-07-28 PROCEDURE — 63710000001 INSULIN GLARGINE PER 5 UNITS: Performed by: INTERNAL MEDICINE

## 2025-07-28 PROCEDURE — 25010000002 FUROSEMIDE PER 20 MG: Performed by: INTERNAL MEDICINE

## 2025-07-28 PROCEDURE — 97110 THERAPEUTIC EXERCISES: CPT

## 2025-07-28 RX ORDER — HYDROXYZINE HYDROCHLORIDE 25 MG/1
25 TABLET, FILM COATED ORAL 3 TIMES DAILY PRN
Status: DISCONTINUED | OUTPATIENT
Start: 2025-07-28 | End: 2025-07-29 | Stop reason: HOSPADM

## 2025-07-28 RX ORDER — FUROSEMIDE 10 MG/ML
40 INJECTION INTRAMUSCULAR; INTRAVENOUS ONCE
Status: COMPLETED | OUTPATIENT
Start: 2025-07-28 | End: 2025-07-28

## 2025-07-28 RX ADMIN — FUROSEMIDE 40 MG: 10 INJECTION, SOLUTION INTRAMUSCULAR; INTRAVENOUS at 12:09

## 2025-07-28 RX ADMIN — VALACYCLOVIR HYDROCHLORIDE 500 MG: 500 TABLET, FILM COATED ORAL at 08:33

## 2025-07-28 RX ADMIN — INSULIN LISPRO 2 UNITS: 100 INJECTION, SOLUTION INTRAVENOUS; SUBCUTANEOUS at 11:58

## 2025-07-28 RX ADMIN — AMLODIPINE BESYLATE 10 MG: 10 TABLET ORAL at 08:33

## 2025-07-28 RX ADMIN — LEVOTHYROXINE SODIUM 50 MCG: 50 TABLET ORAL at 05:26

## 2025-07-28 RX ADMIN — ISOSORBIDE MONONITRATE 30 MG: 30 TABLET, EXTENDED RELEASE ORAL at 08:33

## 2025-07-28 RX ADMIN — SENNOSIDES AND DOCUSATE SODIUM 2 TABLET: 50; 8.6 TABLET ORAL at 08:34

## 2025-07-28 RX ADMIN — LORAZEPAM 0.5 MG: 0.5 TABLET ORAL at 12:14

## 2025-07-28 RX ADMIN — HYDROXYZINE HYDROCHLORIDE 25 MG: 25 TABLET, FILM COATED ORAL at 18:35

## 2025-07-28 RX ADMIN — PANTOPRAZOLE SODIUM 40 MG: 40 INJECTION, POWDER, FOR SOLUTION INTRAVENOUS at 08:32

## 2025-07-28 RX ADMIN — SERTRALINE HYDROCHLORIDE 200 MG: 100 TABLET, FILM COATED ORAL at 08:33

## 2025-07-28 RX ADMIN — Medication 10 ML: at 08:34

## 2025-07-28 RX ADMIN — POTASSIUM CHLORIDE 40 MEQ: 750 CAPSULE, EXTENDED RELEASE ORAL at 08:34

## 2025-07-28 RX ADMIN — LOSARTAN POTASSIUM 100 MG: 50 TABLET, FILM COATED ORAL at 08:32

## 2025-07-28 RX ADMIN — APIXABAN 5 MG: 5 TABLET, FILM COATED ORAL at 11:59

## 2025-07-28 RX ADMIN — SPIRONOLACTONE 100 MG: 25 TABLET ORAL at 08:33

## 2025-07-28 RX ADMIN — APIXABAN 5 MG: 5 TABLET, FILM COATED ORAL at 20:46

## 2025-07-28 RX ADMIN — Medication 10 ML: at 20:48

## 2025-07-28 RX ADMIN — Medication 10 MG: at 20:46

## 2025-07-28 RX ADMIN — INSULIN GLARGINE 5 UNITS: 100 INJECTION, SOLUTION SUBCUTANEOUS at 20:47

## 2025-07-28 RX ADMIN — ATORVASTATIN CALCIUM 40 MG: 40 TABLET, FILM COATED ORAL at 20:46

## 2025-07-28 RX ADMIN — AMIODARONE HYDROCHLORIDE 200 MG: 200 TABLET ORAL at 08:33

## 2025-07-28 RX ADMIN — TIZANIDINE 4 MG: 4 TABLET ORAL at 20:46

## 2025-07-28 RX ADMIN — INSULIN LISPRO 2 UNITS: 100 INJECTION, SOLUTION INTRAVENOUS; SUBCUTANEOUS at 17:36

## 2025-07-28 RX ADMIN — POLYETHYLENE GLYCOL 3350 17 G: 17 POWDER, FOR SOLUTION ORAL at 08:34

## 2025-07-28 RX ADMIN — EMPAGLIFLOZIN 10 MG: 10 TABLET, FILM COATED ORAL at 08:56

## 2025-07-28 RX ADMIN — PANTOPRAZOLE SODIUM 40 MG: 40 INJECTION, POWDER, FOR SOLUTION INTRAVENOUS at 17:36

## 2025-07-28 RX ADMIN — INSULIN LISPRO 2 UNITS: 100 INJECTION, SOLUTION INTRAVENOUS; SUBCUTANEOUS at 20:47

## 2025-07-28 RX ADMIN — NEBIBOLOL 20 MG: 10 TABLET ORAL at 08:33

## 2025-07-28 RX ADMIN — TERAZOSIN HYDROCHLORIDE 2 MG: 2 CAPSULE ORAL at 20:46

## 2025-07-28 RX ADMIN — AMIODARONE HYDROCHLORIDE 200 MG: 200 TABLET ORAL at 20:46

## 2025-07-28 NOTE — CASE MANAGEMENT/SOCIAL WORK
Continued Stay Note  [facility]     Patient Name: Ibrahima Nava  MRN: [id number]  Today's Date: 7/28/2025    Admit Date: 7/15/2025    Plan: [facility]   Discharge Plan       Row Name 07/28/25 1438       Plan    Plan [facility]    Patient/Family in Agreement with Plan yes    Plan Comments Discussed patient in MDR; anticipate discharge tomorrow.  Spoke with Hansa at [facility] bed unit; they do have a bed for him tomorrow.  Updated patient and spouse at bedside and they are in agreement with discharge plan.  Spouse to transport at discharge.  Case Management will follow up with [facility] again tomorrow morning.    Final Discharge Disposition Code 61 - hospital-based swing bed                   Discharge Codes    No documentation.                 Expected Discharge Date and Time       Expected Discharge Date Expected Discharge Time    Jul 29, 2025               Theresa Oconnor RN

## 2025-07-28 NOTE — PROGRESS NOTES
CPAP at bedtime   Ibrahima Nava  [id number]  1945   LOS: 13 days   Patient Care Team:  Sebastien Lowe MD as PCP - General  Sebastien Lowe MD as PCP - Family Medicine  Bacilio Winn MD as Consulting Physician (Cardiology)    Chief Complaint:  PAF & SALVADOR/ECV / HFpEF / ANGINA / SEVERE IRON DEFICIENCY / MORBID OBESITY / HTN    Subjective     Semisupine in bed and awakened from light sleep.  No current cardiopulmonary complaints at rest.  Oximetry 93% on 2 L/min nasal cannula.  No anginal type chest discomfort, tachycardia palpitation, headache, or focal motor-sensory changes.  No current GI or  difficulty.  Good appetite.    Objective     Vital Sign Min/Max for last 24 hours  Temp  Min: 97.7 °F (36.5 °C)  Max: 99 °F (37.2 °C)   BP  Min: 134/68  Max: 166/91   Pulse  Min: 73  Max: 82   Resp  Min: 16  Max: 16   SpO2  Min: 90 %  Max: 95 %               07/17/25  0935 07/18/25  1608   Weight: 133 kg (293 lb 3.4 oz) 133 kg (293 lb 3.4 oz)         Intake/Output Summary (Last 24 hours) at 7/28/2025 0808  Last data filed at 7/28/2025 0456  Gross per 24 hour   Intake --   Output 500 ml   Net -500 ml       Physical Exam:     General Appearance:    Alert, cooperative, in no acute distress   Lungs:   Overall diminished breath sounds with left basilar crackles,respirations regular, even and unlabored    Heart:    Irregular and normal rate, normal S1 and S2, grade 1/6 systolic murmur, no gallop, no rub, no click   Abdomen:  Extremities:   Soft, nontender, bowel sounds audible x4    No edema, normal range of motion   Pulses:   Pulses palpable and equal bilaterally      Results Review:   Results from last 7 days   Lab Units 07/28/25  0532 07/27/25  2147 07/27/25  1116 07/26/25  1227   SODIUM mmol/L 145  --  148* 146*   POTASSIUM mmol/L 4.0 4.0 3.2* 3.1*   CHLORIDE mmol/L 108*  --  107 102   CO2 mmol/L 27.7  --  32.0* 32.0*   BUN mg/dL 14.0  --  15.2 12.8   CREATININE mg/dL 0.76  --  0.81 0.76   GLUCOSE mg/dL 115*  --  140* 113*   CALCIUM  mg/dL 8.9  --  8.6 8.6     Results from last 7 days   Lab Units 07/28/25  0532 07/27/25  1116 07/26/25  0753   WBC 10*3/mm3 6.11 7.55 6.83   HEMOGLOBIN g/dL 8.4* 8.7* 8.6*   HEMATOCRIT % 28.9* 29.5* 28.7*   PLATELETS 10*3/mm3 89* 108* 101*     PHOSPHORUS: 2.9    ALBUMIN: 3.3    NO NEW CXR.    EKG:    Underlying sinus rhythm with atrial bigeminy  Otherwise normal ECG  When compared with ECG of 24-Jul-2025 08:12,  Nonspecific T wave abnormality no longer evident in Lateral leads    Medication Review: REVIEWED; NO DRIPS.    Assessment & Plan     Stable clinical course currently.  Would continue current treatment and initiate apixaban 5 mg twice daily if all concur.      CVA (cerebral vascular accident)    Mediastinal mass    Iron deficiency anemia due to chronic blood loss    07/28/25  08:08 EDT

## 2025-07-28 NOTE — PLAN OF CARE
Goal Outcome Evaluation:  Plan of Care Reviewed With: patient, spouse        Progress: no change  Outcome Evaluation: Pt pleasant and with good effort during BUE/BLE AROM warm-up, 2 sets of 5 chair push-ups, pulmonary ther-ex, and seated grooming tasks. Will cont IPOT per POC as tolerated, plan is SNF tomorrow.

## 2025-07-28 NOTE — THERAPY TREATMENT NOTE
Patient Name: Ibrahima Nava  : 1945    MRN: [id number]                              Today's Date: 2025       Admit Date: 7/15/2025    Visit Dx:     ICD-10-CM ICD-9-CM   1. Cognitive communication deficit  R41.841 799.52   2. Aphasia  R47.01 784.3   3. Esophageal dysphagia  R13.19 787.29   4. Altered mental status, unspecified altered mental status type  R41.82 780.97   5. Hypokalemia  E87.6 276.8   6. History of stroke  Z86.73 V12.54   7. Primary sleep apnea of , unspecified type  P28.30 770.81   8. Sleep apnea, unspecified type  G47.30 780.57   9. 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 obesity)  E66.813 278.01   10. Iron deficiency anemia due to chronic blood loss  D50.0 280.0   11. GI (gastrointestinal bleed)  K92.2 578.9     Patient Active Problem List   Diagnosis    Primary localized osteoarthrosis of shoulder region    Hypertension    Impaired physical mobility    Acute pain of left shoulder    Status post reverse arthroplasty of shoulder, left    CVA (cerebral vascular accident)    Mediastinal mass    Iron deficiency anemia due to chronic blood loss     Past Medical History:   Diagnosis Date    Anxiety     Arthritis     Hypertension     CONTROLLED WITH MEDS PER PT     Shoulder pain, left     Sleep apnea     did not tolerate CPAP    Stroke     -- NO RESIDUAL PROBLEMS    Tremor of right hand     Wears dentures     UPPER AND LOWER PLATE      Past Surgical History:   Procedure Laterality Date    CARDIAC CATHETERIZATION      NO CORONARY STENTS     COLONOSCOPY      COLONOSCOPY N/A 2025    Procedure: COLONOSCOPY;  Surgeon: Brunner, Mark I, MD;  Location:  [facility];  Service: Gastroenterology;  Laterality: N/A;    ENDOSCOPY N/A 2025    Procedure: ESOPHAGOGASTRODUODENOSCOPY;  Surgeon: Brunner, Mark I, MD;  Location:  [facility];  Service: Gastroenterology;  Laterality: N/A;    ND ARTHROPLASTY GLENOHUMERAL JOINT TOTAL SHOULDER Left 2017    Procedure: LEFT  TOTAL SHOULDER ARTHROPLASTY ;  Surgeon: Michael Xavier MD;  Location:  [facility];  Service: Orthopedics    TOTAL KNEE ARTHROPLASTY Bilateral     TOTAL SHOULDER REPLACEMENT Right     TOTAL SHOULDER REVISION Left 5/20/2024    Procedure: REVISION TOTAL SHOULDER ARTHROPLASTY TO REVERSE TOTAL SHOULDER ATHROPLASTY - LEFT;  Surgeon: Michael Xavier MD;  Location:  [facility];  Service: Orthopedics;  Laterality: Left;      General Information       Row Name 07/28/25 1506          OT Time and Intention    Document Type progress note/recertification  -CS     Mode of Treatment occupational therapy  -CS       Row Name 07/28/25 1506          General Information    Existing Precautions/Restrictions fall;other (see comments);oxygen therapy device and L/min  -CS     Barriers to Rehab medically complex;hearing deficit  -CS       Row Name 07/28/25 1506          Living Environment    Current Living Arrangements home  -CS     People in Home spouse  -CS       Row Name 07/28/25 1506          Home Main Entrance    Number of Stairs, Main Entrance one  -CS       Row Name 07/28/25 1506          Stairs Within Home, Primary    Number of Stairs, Within Home, Primary none  -CS       Row Name 07/28/25 1506          Cognition    Orientation Status (Cognition) oriented x 4  -CS       Row Name 07/28/25 1506          Safety Issues/Impairments Affecting Functional Mobility    Safety Issues Affecting Function (Mobility) insight into deficits/self-awareness;safety precaution awareness;safety precautions follow-through/compliance  -CS     Impairments Affecting Function (Mobility) balance;endurance/activity tolerance;postural/trunk control;strength;shortness of breath  -CS               User Key  (r) = Recorded By, (t) = Taken By, (c) = Cosigned By      Initials Name Provider Type    CS Rene Clark OT Occupational Therapist                     Mobility/ADL's       Row Name 07/28/25 1507          Bed Mobility    Bed Mobility  supine-sit;scooting/bridging  -CS     Comment, (Bed Mobility) UIC upon entry, returned to chair  -CS       Row Name 07/28/25 1507          Transfers    Transfers sit-stand transfer  -CS     Comment, (Transfers) preparatory weight shifting, cues for improved sequencing/safety  -CS       Row Name 07/28/25 1507          Sit-Stand Transfer    Sit-Stand [place] (Transfers) contact guard  -     Assistive Device (Sit-Stand Transfers) walker, front-wheeled  -CS       Row Name 07/28/25 1507          Functional Mobility    Functional Mobility- Comment defer to PT for  -CS     Patient was able to Ambulate yes  -CS       Row Name 07/28/25 1507          Activities of Daily Living    BADL Assessment/Intervention feeding;toileting  -CS       Row Name 07/28/25 1507          Toileting Assessment/Training    [place] Level (Toileting) minimum assist (75% patient effort)  -     Assistive Devices (Toileting) urinal  -CS     Position (Toileting) unsupported sitting  -CS     Comment, (Toileting) urinal use w/ spouse assist  -CS       Row Name 07/28/25 1507          Grooming Assessment/Training    [place] Level (Grooming) wash face, hands;set up;supervision  -CS     Position (Grooming) supported sitting  -CS       Row Name 07/28/25 1507          Self-Feeding Assessment/Training    [place] Level (Feeding) feeding skills;independent  -CS     Position (Feeding) supported sitting  -CS               User Key  (r) = Recorded By, (t) = Taken By, (c) = Cosigned By      Initials Name Provider Type    CS Rene Clark OT Occupational Therapist                   Obj/Interventions       Row Name 07/28/25 1510          Shoulder (Therapeutic Exercise)    Shoulder (Therapeutic Exercise) strengthening exercise  -CS     Shoulder Strengthening (Therapeutic Exercise) bilateral;flexion;extension;scapular stabilization;2 sets;5 repetitions  -CS       Row Name 07/28/25 1510          Elbow/Forearm (Therapeutic Exercise)     Elbow/Forearm (Therapeutic Exercise) strengthening exercise  -CS     Elbow/Forearm Strengthening (Therapeutic Exercise) bilateral;flexion;extension;2 sets;5 repetitions  -CS       Row Name 07/28/25 1510          Motor Skills    Motor Skills functional endurance;motor control/coordination interventions  -CS     Functional Endurance continues to require increased supp O2  -CS     Motor Control/Coordination Interventions gross motor coordination activities;therapeutic exercise/ROM  -CS     Therapeutic Exercise shoulder;elbow/forearm;other (see comments)  2 sets chair push-ups  -       Row Name 07/28/25 1510          Balance    Balance Assessment sitting dynamic balance;sitting static balance;standing static balance;standing dynamic balance  -CS     Static Sitting Balance supervision  -CS     Dynamic Sitting Balance supervision  -CS     Position, Sitting Balance unsupported;sitting edge of bed  -CS     Static Standing Balance contact guard  -CS     Dynamic Standing Balance contact guard  -CS     Balance Interventions sitting;sit to stand;standing;occupation based/functional task;UE activity with balance activity  -CS     Comment, Balance BUE reaching paired with anterior weight shifting  -CS               User Key  (r) = Recorded By, (t) = Taken By, (c) = Cosigned By      Initials Name Provider Type    CS Rene Clark, OT Occupational Therapist                   Goals/Plan    No documentation.                  Clinical Impression       Row Name 07/28/25 1512          Pain Assessment    Pretreatment Pain Rating 0/10 - no pain  -     Posttreatment Pain Rating 0/10 - no pain  -       Row Name 07/28/25 1512          Plan of Care Review    Plan of Care Reviewed With patient;spouse  -CS     Progress no change  -CS     Outcome Evaluation Pt pleasant and with good effort during BUE/BLE AROM warm-up, 2 sets of 5 chair push-ups, pulmonary ther-ex, and seated grooming tasks. Will cont IPOT per POC as tolerated, plan is  SNF tomorrow.  -CS       Row Name 07/28/25 1512          Vital Signs    O2 Delivery Pre Treatment room air  -CS     O2 Delivery Intra Treatment room air  -CS     O2 Delivery Post Treatment room air  -CS     Pre Patient Position Sitting  -CS     Intra Patient Position Standing  -CS     Post Patient Position Sitting  -CS       Row Name 07/28/25 1512          Positioning and Restraints    Pre-Treatment Position sitting in chair/recliner  -CS     Post Treatment Position chair  -CS     In Chair notified nsg;reclined;sitting;call light within reach;encouraged to call for assist;exit alarm on;waffle cushion;legs elevated;heels elevated;with family/caregiver  -CS               User Key  (r) = Recorded By, (t) = Taken By, (c) = Cosigned By      Initials Name Provider Type    CS Rene Clark, OT Occupational Therapist                   Outcome Measures       Row Name 07/28/25 1513          How much help from another is currently needed...    Putting on and taking off regular lower body clothing? 2  -CS     Bathing (including washing, rinsing, and drying) 2  -CS     Toileting (which includes using toilet bed pan or urinal) 3  -CS     Putting on and taking off regular upper body clothing 3  -CS     Taking care of personal grooming (such as brushing teeth) 3  -CS     Eating meals 4  -CS     AM-PAC 6 Clicks Score (OT) 17  -CS       Row Name 07/28/25 0800          How much help from another person do you currently need...    Turning from your back to your side while in flat bed without using bedrails? 3  -HK     Moving from lying on back to sitting on the side of a flat bed without bedrails? 3  -HK     Moving to and from a bed to a chair (including a wheelchair)? 3  -HK     Standing up from a chair using your arms (e.g., wheelchair, bedside chair)? 3  -HK     Climbing 3-5 steps with a railing? 2  -HK     To walk in hospital room? 3  -HK     AM-PAC 6 Clicks Score (PT) 17  -HK     Highest Level of Mobility Goal Stand (1 or More  Minutes)-5  -       Row Name 07/28/25 1513          Functional Assessment    Outcome Measure Options AM-PAC 6 Clicks Daily Activity (OT)  -CS               User Key  (r) = Recorded By, (t) = Taken By, (c) = Cosigned By      Initials Name Provider Type    CS Rene Clark OT Occupational Therapist    Jayleen Toledo, RN Registered Nurse                      OT Recommendation and Plan     Plan of Care Review  Plan of Care Reviewed With: patient, spouse  Progress: no change  Outcome Evaluation: Pt pleasant and with good effort during BUE/BLE AROM warm-up, 2 sets of 5 chair push-ups, pulmonary ther-ex, and seated grooming tasks. Will cont IPOT per POC as tolerated, plan is SNF tomorrow.     Time Calculation:         Time Calculation- OT       Row Name 07/28/25 1515             Time Calculation- OT    OT Start Time 1345  -CS      OT Received On 07/28/25  -CS      OT Goal Re-Cert Due Date 08/07/25  -CS         Timed Charges    77282 - OT Therapeutic Exercise Minutes 8  -CS      54913 - OT Therapeutic Activity Minutes 4  -CS      94052 - OT Self Care/Mgmt Minutes 2  -CS         Total Minutes    Timed Charges Total Minutes 14  -CS       Total Minutes 14  -CS                User Key  (r) = Recorded By, (t) = Taken By, (c) = Cosigned By      Initials Name Provider Type    CS Rene Clark OT Occupational Therapist                  Therapy Charges for Today       Code Description Service Date Service Provider Modifiers Qty    52994394739 HC OT THER PROC EA 15 MIN 7/28/2025 Rene Clark OT GO 1                 Rene Clark OT  7/28/2025

## 2025-07-28 NOTE — PLAN OF CARE
Problem: Adult Inpatient Plan of Care  Goal: Plan of Care Review  Outcome: Progressing  Flowsheets  Taken 7/28/2025 0521 by Roula Pruitt RN  Progress: no change  Taken 7/27/2025 1540 by Akanksha Abreu PT  Plan of Care Reviewed With: patient  Goal: Patient-Specific Goal (Individualized)  Outcome: Progressing  Goal: Absence of Hospital-Acquired Illness or Injury  Outcome: Progressing  Intervention: Identify and Manage Fall Risk  Recent Flowsheet Documentation  Taken 7/28/2025 0400 by Roula Pruitt RN  Safety Promotion/Fall Prevention:   activity supervised   assistive device/personal items within reach   clutter free environment maintained   fall prevention program maintained   room organization consistent   safety round/check completed  Taken 7/28/2025 0200 by Roula Pruitt RN  Safety Promotion/Fall Prevention:   activity supervised   assistive device/personal items within reach   clutter free environment maintained   fall prevention program maintained   room organization consistent   safety round/check completed  Taken 7/28/2025 0000 by Pruitt, Roula S, RN  Safety Promotion/Fall Prevention:   activity supervised   assistive device/personal items within reach   clutter free environment maintained   fall prevention program maintained   room organization consistent   safety round/check completed  Taken 7/27/2025 2200 by Roula Pruitt RN  Safety Promotion/Fall Prevention:   activity supervised   assistive device/personal items within reach   clutter free environment maintained   fall prevention program maintained   room organization consistent   safety round/check completed   nonskid shoes/slippers when out of bed  Taken 7/27/2025 2000 by Roula Pruitt RN  Safety Promotion/Fall Prevention:   activity supervised   assistive device/personal items within reach   clutter free environment maintained   fall prevention program maintained   room organization consistent   safety  round/check completed   nonskid shoes/slippers when out of bed  Intervention: Prevent Skin Injury  Recent Flowsheet Documentation  Taken 7/28/2025 0400 by Roula Pruitt RN  Body Position: position changed independently  Skin Protection: incontinence pads utilized  Taken 7/28/2025 0200 by Roula Pruitt RN  Skin Protection:   incontinence pads utilized   silicone foam dressing in place   transparent dressing maintained  Taken 7/28/2025 0000 by Roula Pruitt RN  Body Position: position changed independently  Skin Protection:   incontinence pads utilized   silicone foam dressing in place   transparent dressing maintained  Taken 7/27/2025 2200 by Roula Pruitt RN  Body Position: position changed independently  Skin Protection:   incontinence pads utilized   silicone foam dressing in place   transparent dressing maintained  Taken 7/27/2025 2000 by Roula Pruitt RN  Body Position: position changed independently  Skin Protection:   incontinence pads utilized   silicone foam dressing in place   transparent dressing maintained  Intervention: Prevent and Manage VTE (Venous Thromboembolism) Risk  Recent Flowsheet Documentation  Taken 7/27/2025 2000 by Roula Pruitt RN  VTE Prevention/Management: SCDs (sequential compression devices) off  Intervention: Prevent Infection  Recent Flowsheet Documentation  Taken 7/28/2025 0400 by Roula Pruitt RN  Infection Prevention: environmental surveillance performed  Taken 7/28/2025 0200 by Roula Pruitt RN  Infection Prevention: environmental surveillance performed  Taken 7/28/2025 0000 by Roula Pruitt RN  Infection Prevention: environmental surveillance performed  Taken 7/27/2025 2200 by Roula Pruitt RN  Infection Prevention: environmental surveillance performed  Taken 7/27/2025 2000 by Roula Pruitt RN  Infection Prevention: environmental surveillance performed  Goal: Optimal Comfort and Wellbeing  Outcome:  Progressing  Intervention: Provide Person-Centered Care  Recent Flowsheet Documentation  Taken 7/27/2025 2200 by Roula Pruitt RN  Trust Relationship/Rapport:   care explained   choices provided   thoughts/feelings acknowledged   reassurance provided  Taken 7/27/2025 2000 by Roula Pruitt RN  Trust Relationship/Rapport:   care explained   choices provided   reassurance provided   thoughts/feelings acknowledged  Goal: Readiness for Transition of Care  Outcome: Progressing     Problem: Stroke, Ischemic (Includes Transient Ischemic Attack)  Goal: Optimal Coping  Outcome: Progressing  Goal: Optimal Cerebral Tissue Perfusion  Outcome: Progressing  Goal: Optimal Cognitive Function  Outcome: Progressing  Goal: Optimal Functional Ability  Outcome: Progressing  Intervention: Optimize Functional Ability  Recent Flowsheet Documentation  Taken 7/28/2025 0400 by Roula Pruitt RN  Activity Management: activity minimized  Taken 7/28/2025 0200 by Roula Pruitt RN  Activity Management: activity minimized  Taken 7/28/2025 0000 by Roula Pruitt RN  Activity Management: activity minimized  Taken 7/27/2025 2200 by Roula Pruitt RN  Activity Management: activity minimized  Taken 7/27/2025 2000 by Roula Pruitt RN  Activity Management: activity minimized  Goal: Effective Oxygenation and Ventilation  Outcome: Progressing  Intervention: Optimize Oxygenation and Ventilation  Recent Flowsheet Documentation  Taken 7/28/2025 0400 by Roula Pruitt RN  Head of Bed (HOB) Positioning: HOB elevated  Taken 7/28/2025 0200 by Roula Pruitt RN  Head of Bed (HOB) Positioning: HOB elevated  Taken 7/28/2025 0000 by Roula Pruitt RN  Head of Bed (HOB) Positioning: HOB elevated  Taken 7/27/2025 2200 by Roula Pruitt RN  Head of Bed (HOB) Positioning: HOB elevated  Taken 7/27/2025 2000 by Roula Pruitt RN  Head of Bed (HOB) Positioning: HOB elevated  Goal: Safe and Effective  Swallow  Outcome: Progressing     Problem: Comorbidity Management  Goal: Blood Pressure in Desired Range  Outcome: Progressing  Intervention: Maintain Blood Pressure Management  Recent Flowsheet Documentation  Taken 7/28/2025 0400 by Roula Pruitt RN  Medication Review/Management: medications reviewed  Taken 7/28/2025 0200 by Roula Pruitt RN  Medication Review/Management: medications reviewed  Taken 7/28/2025 0000 by Roula Pruitt RN  Medication Review/Management: medications reviewed  Taken 7/27/2025 2200 by Roula Pruitt RN  Medication Review/Management: medications reviewed  Taken 7/27/2025 2000 by Roula Pruitt RN  Medication Review/Management: medications reviewed     Problem: Fall Injury Risk  Goal: Absence of Fall and Fall-Related Injury  Outcome: Progressing  Intervention: Identify and Manage Contributors  Recent Flowsheet Documentation  Taken 7/28/2025 0400 by Roula Pruitt RN  Medication Review/Management: medications reviewed  Taken 7/28/2025 0200 by Roula Pruitt RN  Medication Review/Management: medications reviewed  Taken 7/28/2025 0000 by Roula Pruitt RN  Medication Review/Management: medications reviewed  Taken 7/27/2025 2200 by Roula Pruitt RN  Medication Review/Management: medications reviewed  Taken 7/27/2025 2000 by Roula Pruitt RN  Medication Review/Management: medications reviewed  Intervention: Promote Injury-Free Environment  Recent Flowsheet Documentation  Taken 7/28/2025 0400 by Roula Pruitt RN  Safety Promotion/Fall Prevention:   activity supervised   assistive device/personal items within reach   clutter free environment maintained   fall prevention program maintained   room organization consistent   safety round/check completed  Taken 7/28/2025 0200 by Roula Pruitt RN  Safety Promotion/Fall Prevention:   activity supervised   assistive device/personal items within reach   clutter free environment  maintained   fall prevention program maintained   room organization consistent   safety round/check completed  Taken 7/28/2025 0000 by Pruitt, Roula S, RN  Safety Promotion/Fall Prevention:   activity supervised   assistive device/personal items within reach   clutter free environment maintained   fall prevention program maintained   room organization consistent   safety round/check completed  Taken 7/27/2025 2200 by Roula Pruitt RN  Safety Promotion/Fall Prevention:   activity supervised   assistive device/personal items within reach   clutter free environment maintained   fall prevention program maintained   room organization consistent   safety round/check completed   nonskid shoes/slippers when out of bed  Taken 7/27/2025 2000 by Roula Pruitt RN  Safety Promotion/Fall Prevention:   activity supervised   assistive device/personal items within reach   clutter free environment maintained   fall prevention program maintained   room organization consistent   safety round/check completed   nonskid shoes/slippers when out of bed     Problem: Skin Injury Risk Increased  Goal: Skin Health and Integrity  Outcome: Progressing  Intervention: Optimize Skin Protection  Recent Flowsheet Documentation  Taken 7/28/2025 0400 by Roula Pruitt RN  Activity Management: activity minimized  Pressure Reduction Techniques: frequent weight shift encouraged  Head of Bed (HOB) Positioning: HOB elevated  Pressure Reduction Devices:   pressure-redistributing mattress utilized   positioning supports utilized   heel offloading device utilized  Skin Protection: incontinence pads utilized  Taken 7/28/2025 0200 by Roula Pruitt RN  Activity Management: activity minimized  Pressure Reduction Techniques:   frequent weight shift encouraged   pressure points protected   heels elevated off bed  Head of Bed (HOB) Positioning: HOB elevated  Pressure Reduction Devices:   pressure-redistributing mattress utilized    positioning supports utilized   heel offloading device utilized  Skin Protection:   incontinence pads utilized   silicone foam dressing in place   transparent dressing maintained  Taken 7/28/2025 0000 by Roula Pruitt RN  Activity Management: activity minimized  Pressure Reduction Techniques:   frequent weight shift encouraged   pressure points protected   heels elevated off bed  Head of Bed (HOB) Positioning: HOB elevated  Pressure Reduction Devices:   pressure-redistributing mattress utilized   positioning supports utilized   heel offloading device utilized  Skin Protection:   incontinence pads utilized   silicone foam dressing in place   transparent dressing maintained  Taken 7/27/2025 2200 by Roula Pruitt RN  Activity Management: activity minimized  Pressure Reduction Techniques:   frequent weight shift encouraged   pressure points protected   heels elevated off bed  Head of Bed (HOB) Positioning: HOB elevated  Pressure Reduction Devices:   pressure-redistributing mattress utilized   positioning supports utilized   heel offloading device utilized  Skin Protection:   incontinence pads utilized   silicone foam dressing in place   transparent dressing maintained  Taken 7/27/2025 2000 by Roula Pruitt RN  Activity Management: activity minimized  Pressure Reduction Techniques:   frequent weight shift encouraged   pressure points protected  Head of Bed (HOB) Positioning: HOB elevated  Pressure Reduction Devices:   pressure-redistributing mattress utilized   positioning supports utilized   heel offloading device utilized  Skin Protection:   incontinence pads utilized   silicone foam dressing in place   transparent dressing maintained   Goal Outcome Evaluation:           Progress: no change

## 2025-07-29 VITALS
SYSTOLIC BLOOD PRESSURE: 146 MMHG | TEMPERATURE: 97.8 F | HEIGHT: 68 IN | WEIGHT: 293.21 LBS | OXYGEN SATURATION: 95 % | BODY MASS INDEX: 44.44 KG/M2 | DIASTOLIC BLOOD PRESSURE: 77 MMHG | RESPIRATION RATE: 14 BRPM | HEART RATE: 73 BPM

## 2025-07-29 LAB
ALBUMIN SERPL-MCNC: 3.1 G/DL (ref 3.5–5.2)
ANION GAP SERPL CALCULATED.3IONS-SCNC: 9.6 MMOL/L (ref 5–15)
BASOPHILS # BLD MANUAL: 0 10*3/MM3 (ref 0–0.2)
BASOPHILS NFR BLD MANUAL: 0 % (ref 0–1.5)
BUN SERPL-MCNC: 16.7 MG/DL (ref 8–23)
BUN/CREAT SERPL: 20.1 (ref 7–25)
CALCIUM SPEC-SCNC: 8.7 MG/DL (ref 8.6–10.5)
CHLORIDE SERPL-SCNC: 105 MMOL/L (ref 98–107)
CO2 SERPL-SCNC: 31.4 MMOL/L (ref 22–29)
CREAT SERPL-MCNC: 0.83 MG/DL (ref 0.76–1.27)
DEPRECATED RDW RBC AUTO: 54.6 FL (ref 37–54)
EGFRCR SERPLBLD CKD-EPI 2021: 88.5 ML/MIN/1.73
EOSINOPHIL # BLD MANUAL: 0 10*3/MM3 (ref 0–0.4)
EOSINOPHIL NFR BLD MANUAL: 0 % (ref 0.3–6.2)
ERYTHROCYTE [DISTWIDTH] IN BLOOD BY AUTOMATED COUNT: 18.6 % (ref 12.3–15.4)
GLUCOSE BLDC GLUCOMTR-MCNC: 122 MG/DL (ref 70–130)
GLUCOSE BLDC GLUCOMTR-MCNC: 130 MG/DL (ref 70–130)
GLUCOSE SERPL-MCNC: 125 MG/DL (ref 65–99)
HCT VFR BLD AUTO: 27.5 % (ref 37.5–51)
HGB BLD-MCNC: 8.2 G/DL (ref 13–17.7)
LYMPHOCYTES # BLD MANUAL: 0.98 10*3/MM3 (ref 0.7–3.1)
LYMPHOCYTES NFR BLD MANUAL: 4 % (ref 5–12)
MAGNESIUM SERPL-MCNC: 2.1 MG/DL (ref 1.6–2.4)
MCH RBC QN AUTO: 26.9 PG (ref 26.6–33)
MCHC RBC AUTO-ENTMCNC: 29.8 G/DL (ref 31.5–35.7)
MCV RBC AUTO: 90.2 FL (ref 79–97)
METAMYELOCYTES NFR BLD MANUAL: 2 % (ref 0–0)
MONOCYTES # BLD: 0.21 10*3/MM3 (ref 0.1–0.9)
NEUTROPHILS # BLD AUTO: 3.86 10*3/MM3 (ref 1.7–7)
NEUTROPHILS NFR BLD MANUAL: 72 % (ref 42.7–76)
NEUTS BAND NFR BLD MANUAL: 3 % (ref 0–5)
PHOSPHATE SERPL-MCNC: 3.5 MG/DL (ref 2.5–4.5)
PLAT MORPH BLD: NORMAL
PLATELET # BLD AUTO: 84 10*3/MM3 (ref 140–450)
PMV BLD AUTO: 10.5 FL (ref 6–12)
POTASSIUM SERPL-SCNC: 2.6 MMOL/L (ref 3.5–5.2)
QT INTERVAL: 388 MS
QTC INTERVAL: 461 MS
RBC # BLD AUTO: 3.05 10*6/MM3 (ref 4.14–5.8)
RBC MORPH BLD: NORMAL
SODIUM SERPL-SCNC: 146 MMOL/L (ref 136–145)
VARIANT LYMPHS NFR BLD MANUAL: 19 % (ref 19.6–45.3)
WBC MORPH BLD: NORMAL
WBC NRBC COR # BLD AUTO: 5.14 10*3/MM3 (ref 3.4–10.8)

## 2025-07-29 PROCEDURE — 25010000002 ONDANSETRON PER 1 MG: Performed by: INTERNAL MEDICINE

## 2025-07-29 PROCEDURE — 93010 ELECTROCARDIOGRAM REPORT: CPT | Performed by: INTERNAL MEDICINE

## 2025-07-29 PROCEDURE — 85025 COMPLETE CBC W/AUTO DIFF WBC: CPT | Performed by: INTERNAL MEDICINE

## 2025-07-29 PROCEDURE — 85007 BL SMEAR W/DIFF WBC COUNT: CPT | Performed by: INTERNAL MEDICINE

## 2025-07-29 PROCEDURE — 97530 THERAPEUTIC ACTIVITIES: CPT

## 2025-07-29 PROCEDURE — 80069 RENAL FUNCTION PANEL: CPT | Performed by: PHYSICIAN ASSISTANT

## 2025-07-29 PROCEDURE — 93005 ELECTROCARDIOGRAM TRACING: CPT | Performed by: INTERNAL MEDICINE

## 2025-07-29 PROCEDURE — 99232 SBSQ HOSP IP/OBS MODERATE 35: CPT | Performed by: INTERNAL MEDICINE

## 2025-07-29 PROCEDURE — 25010000002 POTASSIUM CHLORIDE 10 MEQ/100ML SOLUTION: Performed by: PHYSICIAN ASSISTANT

## 2025-07-29 PROCEDURE — 97110 THERAPEUTIC EXERCISES: CPT

## 2025-07-29 PROCEDURE — 83735 ASSAY OF MAGNESIUM: CPT | Performed by: PHYSICIAN ASSISTANT

## 2025-07-29 PROCEDURE — 99239 HOSP IP/OBS DSCHRG MGMT >30: CPT | Performed by: PHYSICIAN ASSISTANT

## 2025-07-29 PROCEDURE — 82948 REAGENT STRIP/BLOOD GLUCOSE: CPT

## 2025-07-29 RX ORDER — AMLODIPINE BESYLATE 10 MG/1
10 TABLET ORAL
Start: 2025-07-30

## 2025-07-29 RX ORDER — HYDROXYZINE HYDROCHLORIDE 25 MG/1
TABLET, FILM COATED ORAL
Start: 2025-07-29

## 2025-07-29 RX ORDER — ISOSORBIDE MONONITRATE 30 MG/1
30 TABLET, EXTENDED RELEASE ORAL
Start: 2025-07-30

## 2025-07-29 RX ORDER — POLYETHYLENE GLYCOL 3350 17 G/17G
17 POWDER, FOR SOLUTION ORAL DAILY
Start: 2025-07-30

## 2025-07-29 RX ORDER — FERROUS SULFATE 325(65) MG
325 TABLET ORAL
Start: 2025-07-30

## 2025-07-29 RX ORDER — INSULIN LISPRO 100 [IU]/ML
2-9 INJECTION, SOLUTION INTRAVENOUS; SUBCUTANEOUS
Start: 2025-07-29

## 2025-07-29 RX ORDER — POTASSIUM CHLORIDE 7.45 MG/ML
10 INJECTION INTRAVENOUS
Status: DISCONTINUED | OUTPATIENT
Start: 2025-07-29 | End: 2025-07-29 | Stop reason: HOSPADM

## 2025-07-29 RX ORDER — ACETAMINOPHEN 325 MG/1
650 TABLET ORAL EVERY 6 HOURS PRN
Start: 2025-07-29

## 2025-07-29 RX ORDER — NEBIVOLOL 20 MG/1
20 TABLET ORAL
Start: 2025-07-30

## 2025-07-29 RX ORDER — AMIODARONE HYDROCHLORIDE 200 MG/1
200 TABLET ORAL EVERY 12 HOURS SCHEDULED
Start: 2025-07-29

## 2025-07-29 RX ORDER — ONDANSETRON 4 MG/1
4 TABLET, ORALLY DISINTEGRATING ORAL EVERY 6 HOURS PRN
Start: 2025-07-29

## 2025-07-29 RX ORDER — POTASSIUM CHLORIDE 750 MG/1
40 CAPSULE, EXTENDED RELEASE ORAL 2 TIMES DAILY WITH MEALS
Start: 2025-07-29

## 2025-07-29 RX ORDER — LOSARTAN POTASSIUM 100 MG/1
100 TABLET ORAL
Start: 2025-07-30

## 2025-07-29 RX ORDER — SPIRONOLACTONE 100 MG/1
100 TABLET, FILM COATED ORAL DAILY
Start: 2025-07-30

## 2025-07-29 RX ORDER — TERAZOSIN 2 MG/1
2 CAPSULE ORAL NIGHTLY
Start: 2025-07-29

## 2025-07-29 RX ORDER — POTASSIUM CHLORIDE 750 MG/1
40 CAPSULE, EXTENDED RELEASE ORAL 2 TIMES DAILY WITH MEALS
Status: DISCONTINUED | OUTPATIENT
Start: 2025-07-29 | End: 2025-07-29 | Stop reason: HOSPADM

## 2025-07-29 RX ORDER — ATORVASTATIN CALCIUM 40 MG/1
40 TABLET, FILM COATED ORAL NIGHTLY
Start: 2025-07-29

## 2025-07-29 RX ADMIN — LOSARTAN POTASSIUM 100 MG: 50 TABLET, FILM COATED ORAL at 08:37

## 2025-07-29 RX ADMIN — ONDANSETRON 2 MG: 2 INJECTION, SOLUTION INTRAMUSCULAR; INTRAVENOUS at 13:14

## 2025-07-29 RX ADMIN — PANTOPRAZOLE SODIUM 40 MG: 40 INJECTION, POWDER, FOR SOLUTION INTRAVENOUS at 08:38

## 2025-07-29 RX ADMIN — AMIODARONE HYDROCHLORIDE 200 MG: 200 TABLET ORAL at 08:38

## 2025-07-29 RX ADMIN — FERROUS SULFATE TAB 325 MG (65 MG ELEMENTAL FE) 325 MG: 325 (65 FE) TAB at 08:39

## 2025-07-29 RX ADMIN — ISOSORBIDE MONONITRATE 30 MG: 30 TABLET, EXTENDED RELEASE ORAL at 08:37

## 2025-07-29 RX ADMIN — POTASSIUM CHLORIDE 40 MEQ: 750 CAPSULE, EXTENDED RELEASE ORAL at 08:37

## 2025-07-29 RX ADMIN — POTASSIUM CHLORIDE 10 MEQ: 7.46 INJECTION, SOLUTION INTRAVENOUS at 12:32

## 2025-07-29 RX ADMIN — AMLODIPINE BESYLATE 10 MG: 10 TABLET ORAL at 08:37

## 2025-07-29 RX ADMIN — APIXABAN 5 MG: 5 TABLET, FILM COATED ORAL at 08:38

## 2025-07-29 RX ADMIN — POTASSIUM CHLORIDE 10 MEQ: 7.46 INJECTION, SOLUTION INTRAVENOUS at 13:05

## 2025-07-29 RX ADMIN — LEVOTHYROXINE SODIUM 50 MCG: 50 TABLET ORAL at 05:22

## 2025-07-29 RX ADMIN — LORAZEPAM 0.5 MG: 0.5 TABLET ORAL at 11:50

## 2025-07-29 RX ADMIN — EMPAGLIFLOZIN 10 MG: 10 TABLET, FILM COATED ORAL at 08:38

## 2025-07-29 RX ADMIN — NEBIBOLOL 20 MG: 10 TABLET ORAL at 08:38

## 2025-07-29 RX ADMIN — SERTRALINE HYDROCHLORIDE 200 MG: 100 TABLET, FILM COATED ORAL at 08:37

## 2025-07-29 RX ADMIN — VALACYCLOVIR HYDROCHLORIDE 500 MG: 500 TABLET, FILM COATED ORAL at 08:37

## 2025-07-29 RX ADMIN — SPIRONOLACTONE 100 MG: 25 TABLET ORAL at 08:38

## 2025-07-29 NOTE — DISCHARGE PLACEMENT REQUEST
"Radha Nava (80 y.o. Male)       Date of Birth   1945    Social Security Number       Address   [address] [address]    Home Phone   [phone]    MRN   [id number]       Orthodoxy   Christianity    Marital Status                               Admission Date   7/15/2025    Admission Type   Emergency    Admitting Provider   Emilie Monteiro MD    Attending Provider   Emilie Monteiro MD    Department, Room/Bed   [facility] 3E, S335/1       Discharge Date       Discharge Disposition   Skilled Nursing Facility (DC - External)    Discharge Destination                                 Attending Provider: Emilie Monteiro MD    Allergies: No Known Allergies    Isolation: None   Infection: None   Code Status: CPR    Ht: 172.7 cm (67.99\")   Wt: 133 kg (293 lb 3.4 oz)    Admission Cmt: None   Principal Problem: CVA (cerebral vascular accident) [I63.9]                   Active Insurance as of 7/15/2025       Primary Coverage       Payor Plan Insurance Group Employer/Plan Group    MEDICARE MEDICARE A & B        Payor Plan Address Payor Plan Phone Number Payor Plan Fax Number Effective Dates    [address] [phone]  2010 - None Entered    [place] [address]         Subscriber Name Subscriber Birth Date Member ID       RADHA NAVA 1945 [id number]                     Emergency Contacts        (Rel.) Home Phone Work Phone Mobile Phone    Oralia Nava (Spouse) -- -- [phone]    Arron Alba (Daughter) -- -- [phone]    Freda Laughlin (Daughter) [phone] -- --                 Discharge Summary        Debbie Davenport PA-C at [facility]                  [facility] Medicine Services  DISCHARGE SUMMARY    Patient Name: Radha Nava  : 1945  MRN: [id number]    Date of Admission: 7/15/2025 12:37 PM  Date of Discharge:  2025  Primary Care Physician: Sebastien Lowe MD    Consults       Date and Time Order " Name Status Description    7/23/2025  8:05 AM Inpatient Gastroenterology Consult Completed     7/17/2025  1:25 PM Inpatient Pulmonology Consult Completed     7/16/2025 12:21 PM Inpatient Cardiology Consult Completed     7/15/2025  3:15 PM Inpatient Neurology Consult Stroke Completed     7/15/2025 12:29 PM Inpatient Neurology Consult Stroke Completed           Hospital Course     Active Hospital Problems    Diagnosis  POA    **CVA (cerebral vascular accident) [I63.9]  Yes    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      Hospital Course:  Ibrahima Nava is a 80 y.o. male with a PMHx significant for HTN, anxiety (on chronic benzodiazepine therapy), prior CVA (2012 with no residual deficits), essential tremor, obesity (BMI 44) and DARCY (unable to tolerate CPAP).   Admitted to [facility] 7/15/25 due to speech dysarthria / expressive aphasia and confusion.  Stroke neurology consulted.  Etiology thought to be secondary to stroke recrudescence.  Hospital course complicated by A-fib with RVR, hypokalemia, and anemia. Cardiology and gastroenterology have followed.     Stroke recrudescence   History of CVA   - Presented with speech dysarthria / expressive aphasia and confusion   - Stroke neurology team followed  - MRI brain negative for acute stroke  - SALVADOR with EF 56-60%, mild concentric LV hypertrophy, mild to moderate MR, no atrial appendage thrombus  - Eliquis and high-intensity statin  - Follow up with stroke clinic in 1 month      Normocytic anemia  - Started on Eliquis this admission, developed worsening anemia  - s/p 2 unit PRBCs on 7/23 for Hgb 7.1  - GI evaluated - s/p EGD and colonoscopy on 7/26   - EGD with normal esophagus. Sleeve gastrectomy appeared healthy. 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 Additional non-bleeding AVM in second portion of duodenum (ablated  and endoclip placed)   - Colonoscopy revealed left-sided diverticulosis. Pedunculated 15mm ulcerated polyp in mid-ascending colon (endoclip applied resulting in white/purple mucosal change. 3 prophylactic endoclips applied to polyp base and removed with hot snare. Additional 3 endoclips placed for defect closure)  - Eliquis resumed 7/28 - Hgb stable so far  - Please monitor Hgb periodally at SNF until determined stable     Dysphagia  - Patient reported dysphagia to solids/liquids with R neck fullness  - CTA neck reassuring  - SLP evaluated. MBS with limited upper GI series with no evidence of obstruction, noted mild reflux  - Continue PPI  - Soft texture / chopped meats with thin liquids     New onset atrial fibrillation   Acute HFpEF  Essential hypertension  - Appreciate cardiology assistance  - TSH OK   - Started on Amiodarone, Metoprolol and Eliquis for AF  - For HFpEF, started on Jardiance, Spironolactone, Losartan, PRN Bumex   - For HTN, started on Amlodipine 5mg, HCTZ 25mg and Terazosin   - Due to persistent hypoK,  HCTZ stopped, Spironolactone increased   - Appears to have significant hypoK with loop diuretics - cautious use and monitor closely post-administration     - Sodium up a little today at 146. Will monitor      Hypokalemia  Hypernatremia, improved  -better continue to replace above recommended amounts of protocol, monitor BMP     New mediastinal mass / JANNA pulmonary mass  - CT chest proximately 4.5 cm mediastinal mass and a contiguous 5.5 x 3 cm pleural-based JANNA pulmonary mass concerning for primary neoplasm also noted a few shotty mediastinal lymph nodes with rounded morphology, reactive versus metastatic adenopathy  - Partner discussed CT chest findings with patient and family  - Patient reports history of smoking, quit 30 years ago.  Chronic cough x 1 year.  No weight loss or hemoptysis  - Patient/family interested in biopsy however was started on Eliquis for A-fib this admission.  - Pulmonology  evaluated this admission. He was scheduled to see Dr. King on 7/28/25 to discuss procedure and arrange [facility] but missed OP appointment. Will reschedule in 3-4 weeks in hopes patient will be stronger post-rehab       Pancreatic head mass  - CT abdomen/pelvis showed incidental [id number] cystic finding in pancreatic head  - GI evaluated     PreDM  - A1c 6.3%. Not on home meds. Now on Jardiance.   - Continue Lantus, SSI      Essential tremor  - Primidone stopped due to interaction with Eliquis     Anxiety / chronic benzo use  - Continue routine Lorazepam     Day of Discharge     HPI:   In chair. Hasn't been sleeping well. Continues to have intermittent nausea. No chest pain. Short of breath with exertion. Remains on 2L NC   Patient/family anxious to go to SNF today - requesting to transfer despite low potassium. Case management has reached out to facility and they are willing to assume immediate care of this issue    Review of Systems  Gen- No fevers, chills  CV- No chest pain, palpitations  Resp- No cough, dyspnea  GI- No N/V/D, abd pain    Vital Signs:   Temp:  [97.5 °F (36.4 °C)-98.3 °F (36.8 °C)] 97.8 °F (36.6 °C)  Heart Rate:  [71-78] 73  Resp:  [14-18] 14  BP: (141-165)/(69-99) 146/77  Flow (L/min) (Oxygen Therapy):  [2] 2    Physical Exam:  Constitutional: No acute distress, awake, alert and conversant. Sitting in bed. Obese and chronically ill appearing   HENT: NCAT, mucous membranes moist  Respiratory: Clear to auscultation bilaterally, normal respiratory effort on 2L NC  Cardiovascular: RRR  Gastrointestinal: Positive bowel sounds, soft, nontender, nondistended.   Musculoskeletal: 1-2+ pitting bilateral ankle edema  Psychiatric: Appropriate affect, cooperative with exam  Neurologic: Oriented x 4, moves all extremities spontaneously without focal deficits, speech clear    Pertinent  and/or Most Recent Results     LAB RESULTS:      Lab 07/29/25  0545 07/28/25  0532 07/27/25  1116 07/26/25  075  07/25/25  0608   WBC 5.14 6.11 7.55 6.83 8.02   HEMOGLOBIN 8.2* 8.4* 8.7* 8.6* 9.0*   HEMATOCRIT 27.5* 28.9* 29.5* 28.7* 29.9*   PLATELETS 84* 89* 108* 101* 126*   NEUTROS ABS 3.86 4.51 5.83  --   --    IMMATURE GRANS (ABS)  --  0.23* 0.42*  --   --    LYMPHS ABS  --  1.10 0.95  --   --    MONOS ABS  --  0.26 0.34  --   --    EOS ABS 0.00 0.00 0.00  --   --    MCV 90.2 93.2 90.5 89.1 88.7         Lab 07/29/25  1041 07/28/25  0532 07/27/25  2147 07/27/25  1116 07/26/25  1227 07/26/25  0753 07/25/25  1235 07/25/25  0608 07/22/25  2334 07/22/25  1458   SODIUM 146* 145  --  148* 146* 145   < > 150*   < > 142   POTASSIUM 2.6* 4.0 4.0 3.2* 3.1* 2.6*   < > 2.9*   < > 2.4*   CHLORIDE 105 108*  --  107 102 102   < > 104   < > 98   CO2 31.4* 27.7  --  32.0* 32.0* 33.0*   < > 35.0*   < > 30.1*   ANION GAP 9.6 9.3  --  9.0 12.0 10.0   < > 11.0   < > 13.9   BUN 16.7 14.0  --  15.2 12.8 12.4   < > 14.8   < > 21.5   CREATININE 0.83 0.76  --  0.81 0.76 0.75*   < > 0.82   < > 1.14   EGFR 88.5 90.9  --  89.1 90.9 91.2   < > 88.8   < > 65.0   GLUCOSE 125* 115*  --  140* 113* 131*   < > 91   < > 201*   CALCIUM 8.7 8.9  --  8.6 8.6 8.5*   < > 8.5*   < > 8.4*   MAGNESIUM 2.1  --   --   --   --   --   --  2.3  --  2.1   PHOSPHORUS 3.5 2.9  --  2.5  --   --   --   --   --   --     < > = values in this interval not displayed.         Lab 07/29/25  1041 07/28/25  0532 07/27/25  1116 07/22/25  1458   TOTAL PROTEIN  --   --   --  5.0*   ALBUMIN 3.1* 3.3* 3.3* 3.3*   ALT (SGPT)  --   --   --  40   AST (SGOT)  --   --   --  77*   BILIRUBIN  --   --   --  0.6   INDIRECT BILIRUBIN  --   --   --  0.3   BILIRUBIN DIRECT  --   --   --  0.3   ALK PHOS  --   --   --  83         Lab 07/26/25  0753 07/23/25  0625   PROBNP 2,355.0* 1,175.0         Lab 07/23/25  1359 07/23/25  0933   FOLATE 14.20  --    VITAMIN B 12 1,195*  --    ABO TYPING  --  O   RH TYPING  --  Positive   ANTIBODY SCREEN  --  Negative     Brief Urine Lab Results  (Last result in the  past 365 days)        Color   Clarity   Blood   Leuk Est   Nitrite   Protein   CREAT   Urine HCG        07/15/25 1319 Yellow   Clear   Moderate (2+)   Negative   Negative   30 mg/dL (1+)                 Microbiology Results (last 10 days)       ** No results found for the last 240 hours. **          Cardioversion External in Cardiology Department  Result Date: 7/18/2025    Post cardioversion the patient displayed a sinus rhythm.   The cardioversion was successful.     Results for orders placed during the hospital encounter of 07/15/25    Adult Transesophageal Echo (SALVADOR) W/ Cont if Necessary Per Protocol 07/19/2025  8:29 AM    Interpretation Summary    Left ventricular systolic function is low normal. Left ventricular ejection fraction appears to be 56 - 60%.    Left ventricular wall thickness is consistent with mild concentric hypertrophy.    No evidence of a left atrial appendage thrombus    Mild to moderate mitral valve regurgitation    Discharge Details        Discharge Medications        New Medications        Instructions Start Date   acetaminophen 325 MG tablet  Commonly known as: TYLENOL   650 mg, Oral, Every 6 Hours PRN      amiodarone 200 MG tablet  Commonly known as: PACERONE   200 mg, Oral, Every 12 Hours Scheduled      amLODIPine 10 MG tablet  Commonly known as: NORVASC   10 mg, Oral, Every 24 Hours Scheduled   Start Date: July 30, 2025     apixaban 5 MG tablet tablet  Commonly known as: ELIQUIS   5 mg, Oral, Every 12 Hours Scheduled      atorvastatin 40 MG tablet  Commonly known as: LIPITOR   40 mg, Oral, Nightly      empagliflozin 10 MG tablet tablet  Commonly known as: JARDIANCE   10 mg, Oral, Daily   Start Date: July 30, 2025     ferrous sulfate 325 (65 FE) MG tablet   325 mg, Oral, Daily With Breakfast   Start Date: July 30, 2025     hydrOXYzine 25 MG tablet  Commonly known as: ATARAX   Take 2 tablets by mouth Every Night. May also take 1 tablet 2 (Two) Times a Day As Needed for Anxiety.       insulin glargine 100 UNIT/ML injection  Commonly known as: LANTUS, SEMGLEE   5 Units, Subcutaneous, Nightly      Insulin Lispro 100 UNIT/ML injection  Commonly known as: humaLOG   2-9 Units, Subcutaneous, 4 Times Daily Before Meals & Nightly      isosorbide mononitrate 30 MG 24 hr tablet  Commonly known as: IMDUR   30 mg, Oral, Every 24 Hours Scheduled   Start Date: July 30, 2025     losartan 100 MG tablet  Commonly known as: COZAAR   100 mg, Oral, Every 24 Hours Scheduled   Start Date: July 30, 2025     nebivolol 20 MG tablet  Commonly known as: BYSTOLIC   20 mg, Oral, Every 24 Hours Scheduled   Start Date: July 30, 2025     polyethylene glycol 17 g packet  Commonly known as: MIRALAX   17 g, Oral, Daily   Start Date: July 30, 2025     potassium chloride 10 MEQ CR capsule  Commonly known as: MICRO-K   40 mEq, Oral, 2 Times Daily With Meals      spironolactone 100 MG tablet  Commonly known as: ALDACTONE   100 mg, Oral, Daily   Start Date: July 30, 2025     terazosin 2 MG capsule  Commonly known as: HYTRIN  Replaces: doxazosin 1 MG tablet   2 mg, Oral, Nightly             Continue These Medications        Instructions Start Date   esomeprazole 20 MG capsule  Commonly known as: nexIUM   40 mg, Every Morning Before Breakfast      levothyroxine 50 MCG tablet  Commonly known as: SYNTHROID, LEVOTHROID   50 mcg, Every Early Morning      LORazepam 0.5 MG tablet  Commonly known as: ATIVAN   0.5 mg, Oral, Daily PRN      NON FORMULARY   1 tablet, Daily      sertraline 100 MG tablet  Commonly known as: ZOLOFT   200 mg, Daily      tiZANidine 4 MG tablet  Commonly known as: ZANAFLEX   4 mg, Nightly      valACYclovir 500 MG tablet  Commonly known as: VALTREX   500 mg, Daily             Stop These Medications      aspirin 325 MG tablet     celecoxib 100 MG capsule  Commonly known as: CeleBREX     doxazosin 1 MG tablet  Commonly known as: CARDURA  Replaced by: terazosin 2 MG capsule     pravastatin 80 MG tablet  Commonly known as:  PRAVACHOL     primidone 50 MG tablet  Commonly known as: MYSOLINE     telmisartan 80 MG tablet  Commonly known as: MICARDIS            No Known Allergies    Discharge Disposition:Skilled Nursing Facility (DC - External)    Diet:  Diet Instructions       Diet: Regular/House Diet, Cardiac Diets; Healthy Heart (2-3 Na+); Soft to Chew (NDD 3); Chopped Meat; Thin (IDDSI 0)      Discharge Diet:  Regular/House Diet  Cardiac Diets       Cardiac Diet: Healthy Heart (2-3 Na+)    Texture: Soft to Chew (NDD 3)    Soft to Chew: Chopped Meat    Fluid Consistency: Thin (IDDSI 0)           Activity:  Activity Instructions       Activity as Tolerated      Up WIth Assist            CODE STATUS:    Code Status and Medical Interventions: CPR (Attempt to Resuscitate); Full Support   Ordered at: 07/15/25 1515     Code Status (Patient has no pulse and is not breathing):    CPR (Attempt to Resuscitate)     Medical Interventions (Patient has pulse or is breathing):    Full Support     Level Of Support Discussed With:    Patient     No future appointments.    Additional Instructions for the Follow-ups that You Need to Schedule       Ambulatory Referral to Neurology   As directed      1 to 3 months    Order Comments: 1 to 3 months         Ambulatory Referral to Physical Therapy for Evaluation & Treatment   As directed      Specialty needed: Evaluate and treat   Follow-up needed: Yes        Ambulatory Referral to Sleep Medicine   As directed      Follow-up needed: Yes        Discharge Follow-up with Specified Provider: Cardiology in 4-6 weeks   As directed      To: Cardiology in 4-6 weeks        Discharge Follow-up with Specified Provider: Rescheduled appointment with Dr. King (missed 7/28 appt) for 3-4 weeks   As directed      To: Rescheduled appointment with Dr. King (missed 7/28 appt) for 3-4 weeks              Debbie Davenport PA-C  07/29/25    Time Spent on Discharge:  I spent 40 minutes on this discharge activity which  included: face-to-face encounter with the patient, reviewing the data in the system, coordination of the care with the nursing staff as well as consultants, documentation, and entering orders.            Electronically signed by Debbie Davenport PA-C at 07/29/25 [id number]

## 2025-07-29 NOTE — CASE MANAGEMENT/SOCIAL WORK
Case Management Discharge Note      Final Note:     Patient has been accepted for short term rehab at the [facility] swing bed unit and will discharge today.  Family to transport and in agreement with discharge plan; IMM given.      Nurse to call report to [phone].      CM will fax DC summary to [phone].  Facility has requested DCS to be faxed prior to 1500.         Selected Continued Care - Admitted Since 7/15/2025       Destination Coordination complete.      Service Provider Services Address Phone Fax Patient Preferred    [facility] [address] [address] [phone] --              Durable Medical Equipment    No services have been selected for the patient.                Dialysis/Infusion    No services have been selected for the patient.                Home Medical Care    No services have been selected for the patient.                Therapy       Service Provider Services Address Phone Fax Patient Preferred    [facility] [address] [id number] [phone] --              Community Resources    No services have been selected for the patient.                Community & DME    No services have been selected for the patient.                    Transportation Services  Transportation: Private Transportation  Private: Car    Final Discharge Disposition Code: 61 - hospital-based swing bed

## 2025-07-29 NOTE — DISCHARGE SUMMARY
[facility]  DISCHARGE SUMMARY    Patient Name: Ibrahima Nava  : 1945  MRN: [id number]    Date of Admission: 7/15/2025 12:37 PM  Date of Discharge:  2025  Primary Care Physician: Sebastien Lowe MD    Consults       Date and Time Order Name Status Description    2025  8:05 AM Inpatient Gastroenterology Consult Completed     2025  1:25 PM Inpatient Pulmonology Consult Completed     2025 12:21 PM Inpatient Cardiology Consult Completed     7/15/2025  3:15 PM Inpatient Neurology Consult Stroke Completed     7/15/2025 12:29 PM Inpatient Neurology Consult Stroke Completed           Hospital Course     Active Hospital Problems    Diagnosis  POA    **CVA (cerebral vascular accident) [I63.9]  Yes    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      Hospital Course:  Ibrahima Nava is a 80 y.o. male with a PMHx significant for HTN, anxiety (on chronic benzodiazepine therapy), prior CVA ( with no residual deficits), essential tremor, obesity (BMI 44) and DARCY (unable to tolerate CPAP).   Admitted to [facility] 7/15/25 due to speech dysarthria / expressive aphasia and confusion.  Stroke neurology consulted.  Etiology thought to be secondary to stroke recrudescence.  Hospital course complicated by A-fib with RVR, hypokalemia, and anemia. Cardiology and gastroenterology have followed.     Stroke recrudescence   History of CVA   - Presented with speech dysarthria / expressive aphasia and confusion   - Stroke neurology team followed  - MRI brain negative for acute stroke  - SALVADOR with EF 56-60%, mild concentric LV hypertrophy, mild to moderate MR, no atrial appendage thrombus  - Eliquis and high-intensity statin  - Follow up with stroke clinic in 1 month      Normocytic anemia  - Started on Eliquis this admission, developed worsening  anemia  - s/p 2 unit PRBCs on 7/23 for Hgb 7.1  - GI evaluated - s/p EGD and colonoscopy on 7/26   - EGD with normal esophagus. Sleeve gastrectomy appeared healthy. 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 Additional non-bleeding AVM in second portion of duodenum (ablated and endoclip placed)   - Colonoscopy revealed left-sided diverticulosis. Pedunculated 15mm ulcerated polyp in mid-ascending colon (endoclip applied resulting in white/purple mucosal change. 3 prophylactic endoclips applied to polyp base and removed with hot snare. Additional 3 endoclips placed for defect closure)  - Eliquis resumed 7/28 - Hgb stable so far  - Please monitor Hgb periodally at SNF until determined stable     Dysphagia  - Patient reported dysphagia to solids/liquids with R neck fullness  - CTA neck reassuring  - SLP evaluated. MBS with limited upper GI series with no evidence of obstruction, noted mild reflux  - Continue PPI  - Soft texture / chopped meats with thin liquids     New onset atrial fibrillation   Acute HFpEF  Essential hypertension  - Appreciate cardiology assistance  - TSH OK   - Started on Amiodarone, Metoprolol and Eliquis for AF  - For HFpEF, started on Jardiance, Spironolactone, Losartan, PRN Bumex   - For HTN, started on Amlodipine 5mg, HCTZ 25mg and Terazosin   - Due to persistent hypoK,  HCTZ stopped, Spironolactone increased   - Appears to have significant hypoK with loop diuretics - cautious use and monitor closely post-administration     - Sodium up a little today at 146. Will monitor      Hypokalemia  Hypernatremia, improved  -better continue to replace above recommended amounts of protocol, monitor BMP     New mediastinal mass / JANNA pulmonary mass  - CT chest proximately 4.5 cm mediastinal mass and a contiguous 5.5 x 3 cm pleural-based JANNA pulmonary mass concerning for primary neoplasm also noted a few shotty mediastinal lymph nodes with rounded morphology, reactive versus  metastatic adenopathy  - Partner discussed CT chest findings with patient and family  - Patient reports history of smoking, quit 30 years ago.  Chronic cough x 1 year.  No weight loss or hemoptysis  - Patient/family interested in biopsy however was started on Eliquis for A-fib this admission.  - Pulmonology evaluated this admission. He was scheduled to see Dr. King on 7/28/25 to discuss procedure and arrange [facility] but missed OP appointment. Will reschedule in 3-4 weeks in hopes patient will be stronger post-rehab       Pancreatic head mass  - CT abdomen/pelvis showed incidental [id number] cystic finding in pancreatic head  - GI evaluated     PreDM  - A1c 6.3%. Not on home meds. Now on Jardiance.   - Continue Lantus, SSI      Essential tremor  - Primidone stopped due to interaction with Eliquis     Anxiety / chronic benzo use  - Continue routine Lorazepam     Day of Discharge     HPI:   In chair. Hasn't been sleeping well. Continues to have intermittent nausea. No chest pain. Short of breath with exertion. Remains on 2L NC   Patient/family anxious to go to SNF today - requesting to transfer despite low potassium. Case management has reached out to facility and they are willing to assume immediate care of this issue    Review of Systems  Gen- No fevers, chills  CV- No chest pain, palpitations  Resp- No cough, dyspnea  GI- No N/V/D, abd pain    Vital Signs:   Temp:  [97.5 °F (36.4 °C)-98.3 °F (36.8 °C)] 97.8 °F (36.6 °C)  Heart Rate:  [71-78] 73  Resp:  [14-18] 14  BP: (141-165)/(69-99) 146/77  Flow (L/min) (Oxygen Therapy):  [2] 2    Physical Exam:  Constitutional: No acute distress, awake, alert and conversant. Sitting in bed. Obese and chronically ill appearing   HENT: NCAT, mucous membranes moist  Respiratory: Clear to auscultation bilaterally, normal respiratory effort on 2L NC  Cardiovascular: RRR  Gastrointestinal: Positive bowel sounds, soft, nontender, nondistended.   Musculoskeletal: 1-2+ pitting  bilateral ankle edema  Psychiatric: Appropriate affect, cooperative with exam  Neurologic: Oriented x 4, moves all extremities spontaneously without focal deficits, speech clear    Pertinent  and/or Most Recent Results     LAB RESULTS:      Lab 07/29/25  0545 07/28/25  0532 07/27/25  1116 07/26/25  0753 07/25/25  0608   WBC 5.14 6.11 7.55 6.83 8.02   HEMOGLOBIN 8.2* 8.4* 8.7* 8.6* 9.0*   HEMATOCRIT 27.5* 28.9* 29.5* 28.7* 29.9*   PLATELETS 84* 89* 108* 101* 126*   NEUTROS ABS 3.86 4.51 5.83  --   --    IMMATURE GRANS (ABS)  --  0.23* 0.42*  --   --    LYMPHS ABS  --  1.10 0.95  --   --    MONOS ABS  --  0.26 0.34  --   --    EOS ABS 0.00 0.00 0.00  --   --    MCV 90.2 93.2 90.5 89.1 88.7         Lab 07/29/25  1041 07/28/25  0532 07/27/25  2147 07/27/25  1116 07/26/25  1227 07/26/25  0753 07/25/25  1235 07/25/25  0608 07/22/25  2334 07/22/25  1458   SODIUM 146* 145  --  148* 146* 145   < > 150*   < > 142   POTASSIUM 2.6* 4.0 4.0 3.2* 3.1* 2.6*   < > 2.9*   < > 2.4*   CHLORIDE 105 108*  --  107 102 102   < > 104   < > 98   CO2 31.4* 27.7  --  32.0* 32.0* 33.0*   < > 35.0*   < > 30.1*   ANION GAP 9.6 9.3  --  9.0 12.0 10.0   < > 11.0   < > 13.9   BUN 16.7 14.0  --  15.2 12.8 12.4   < > 14.8   < > 21.5   CREATININE 0.83 0.76  --  0.81 0.76 0.75*   < > 0.82   < > 1.14   EGFR 88.5 90.9  --  89.1 90.9 91.2   < > 88.8   < > 65.0   GLUCOSE 125* 115*  --  140* 113* 131*   < > 91   < > 201*   CALCIUM 8.7 8.9  --  8.6 8.6 8.5*   < > 8.5*   < > 8.4*   MAGNESIUM 2.1  --   --   --   --   --   --  2.3  --  2.1   PHOSPHORUS 3.5 2.9  --  2.5  --   --   --   --   --   --     < > = values in this interval not displayed.         Lab 07/29/25  1041 07/28/25  0532 07/27/25  1116 07/22/25  1458   TOTAL PROTEIN  --   --   --  5.0*   ALBUMIN 3.1* 3.3* 3.3* 3.3*   ALT (SGPT)  --   --   --  40   AST (SGOT)  --   --   --  77*   BILIRUBIN  --   --   --  0.6   INDIRECT BILIRUBIN  --   --   --  0.3   BILIRUBIN DIRECT  --   --   --  0.3   ALK PHOS   --   --   --  83         Lab 07/26/25  0753 07/23/25  0625   PROBNP 2,355.0* 1,175.0         Lab 07/23/25  1359 07/23/25  0933   FOLATE 14.20  --    VITAMIN B 12 1,195*  --    ABO TYPING  --  O   RH TYPING  --  Positive   ANTIBODY SCREEN  --  Negative     Brief Urine Lab Results  (Last result in the past 365 days)        Color   Clarity   Blood   Leuk Est   Nitrite   Protein   CREAT   Urine HCG        07/15/25 1319 Yellow   Clear   Moderate (2+)   Negative   Negative   30 mg/dL (1+)                 Microbiology Results (last 10 days)       ** No results found for the last 240 hours. **          Cardioversion External in Cardiology Department  Result Date: 7/18/2025    Post cardioversion the patient displayed a sinus rhythm.   The cardioversion was successful.     Results for orders placed during the hospital encounter of 07/15/25    Adult Transesophageal Echo (SALVADOR) W/ Cont if Necessary Per Protocol 07/19/2025  8:29 AM    Interpretation Summary    Left ventricular systolic function is low normal. Left ventricular ejection fraction appears to be 56 - 60%.    Left ventricular wall thickness is consistent with mild concentric hypertrophy.    No evidence of a left atrial appendage thrombus    Mild to moderate mitral valve regurgitation    Discharge Details        Discharge Medications        New Medications        Instructions Start Date   acetaminophen 325 MG tablet  Commonly known as: TYLENOL   650 mg, Oral, Every 6 Hours PRN      amiodarone 200 MG tablet  Commonly known as: PACERONE   200 mg, Oral, Every 12 Hours Scheduled      amLODIPine 10 MG tablet  Commonly known as: NORVASC   10 mg, Oral, Every 24 Hours Scheduled   Start Date: July 30, 2025     apixaban 5 MG tablet tablet  Commonly known as: ELIQUIS   5 mg, Oral, Every 12 Hours Scheduled      atorvastatin 40 MG tablet  Commonly known as: LIPITOR   40 mg, Oral, Nightly      empagliflozin 10 MG tablet tablet  Commonly known as: JARDIANCE   10 mg, Oral, Daily    Start Date: July 30, 2025     ferrous sulfate 325 (65 FE) MG tablet   325 mg, Oral, Daily With Breakfast   Start Date: July 30, 2025     hydrOXYzine 25 MG tablet  Commonly known as: ATARAX   Take 2 tablets by mouth Every Night. May also take 1 tablet 2 (Two) Times a Day As Needed for Anxiety.      insulin glargine 100 UNIT/ML injection  Commonly known as: LANTUS, SEMGLEE   5 Units, Subcutaneous, Nightly      Insulin Lispro 100 UNIT/ML injection  Commonly known as: humaLOG   2-9 Units, Subcutaneous, 4 Times Daily Before Meals & Nightly      isosorbide mononitrate 30 MG 24 hr tablet  Commonly known as: IMDUR   30 mg, Oral, Every 24 Hours Scheduled   Start Date: July 30, 2025     losartan 100 MG tablet  Commonly known as: COZAAR   100 mg, Oral, Every 24 Hours Scheduled   Start Date: July 30, 2025     nebivolol 20 MG tablet  Commonly known as: BYSTOLIC   20 mg, Oral, Every 24 Hours Scheduled   Start Date: July 30, 2025     ondansetron ODT 4 MG disintegrating tablet  Commonly known as: ZOFRAN-ODT   4 mg, Translingual, Every 6 Hours PRN      polyethylene glycol 17 g packet  Commonly known as: MIRALAX   17 g, Oral, Daily   Start Date: July 30, 2025     potassium chloride 10 MEQ CR capsule  Commonly known as: MICRO-K   40 mEq, Oral, 2 Times Daily With Meals      spironolactone 100 MG tablet  Commonly known as: ALDACTONE   100 mg, Oral, Daily   Start Date: July 30, 2025     terazosin 2 MG capsule  Commonly known as: HYTRIN  Replaces: doxazosin 1 MG tablet   2 mg, Oral, Nightly             Continue These Medications        Instructions Start Date   esomeprazole 20 MG capsule  Commonly known as: nexIUM   40 mg, Every Morning Before Breakfast      levothyroxine 50 MCG tablet  Commonly known as: SYNTHROID, LEVOTHROID   50 mcg, Every Early Morning      LORazepam 0.5 MG tablet  Commonly known as: ATIVAN   0.5 mg, Oral, Daily PRN      NON FORMULARY   1 tablet, Daily      sertraline 100 MG tablet  Commonly known as: ZOLOFT   200 mg,  Daily      tiZANidine 4 MG tablet  Commonly known as: ZANAFLEX   4 mg, Nightly      valACYclovir 500 MG tablet  Commonly known as: VALTREX   500 mg, Daily             Stop These Medications      aspirin 325 MG tablet     celecoxib 100 MG capsule  Commonly known as: CeleBREX     doxazosin 1 MG tablet  Commonly known as: CARDURA  Replaced by: terazosin 2 MG capsule     pravastatin 80 MG tablet  Commonly known as: PRAVACHOL     primidone 50 MG tablet  Commonly known as: MYSOLINE     telmisartan 80 MG tablet  Commonly known as: MICARDIS            No Known Allergies    Discharge Disposition:Skilled Nursing Facility (DC - External)    Diet:  Diet Instructions       Diet: Regular/House Diet, Cardiac Diets; Healthy Heart (2-3 Na+); Soft to Chew (NDD 3); Chopped Meat; Thin (IDDSI 0)      Discharge Diet:  Regular/House Diet  Cardiac Diets       Cardiac Diet: Healthy Heart (2-3 Na+)    Texture: Soft to Chew (NDD 3)    Soft to Chew: Chopped Meat    Fluid Consistency: Thin (IDDSI 0)           Activity:  Activity Instructions       Activity as Tolerated      Up WIth Assist            CODE STATUS:    Code Status and Medical Interventions: CPR (Attempt to Resuscitate); Full Support   Ordered at: 07/15/25 1515     Code Status (Patient has no pulse and is not breathing):    CPR (Attempt to Resuscitate)     Medical Interventions (Patient has pulse or is breathing):    Full Support     Level Of Support Discussed With:    Patient     No future appointments.    Additional Instructions for the Follow-ups that You Need to Schedule       Ambulatory Referral to Neurology   As directed      1 to 3 months    Order Comments: 1 to 3 months         Ambulatory Referral to Physical Therapy for Evaluation & Treatment   As directed      Specialty needed: Evaluate and treat   Follow-up needed: Yes        Ambulatory Referral to Sleep Medicine   As directed      Follow-up needed: Yes        Discharge Follow-up with Specified Provider: Cardiology in 4-6  weeks   As directed      To: Cardiology in 4-6 weeks        Discharge Follow-up with Specified Provider: Rescheduled appointment with Dr. King (missed 7/28 appt) for 3-4 weeks   As directed      To: Rescheduled appointment with Dr. King (missed 7/28 appt) for 3-4 weeks              Debbie Davenport PA-C  07/29/25    Time Spent on Discharge:  I spent 40 minutes on this discharge activity which included: face-to-face encounter with the patient, reviewing the data in the system, coordination of the care with the nursing staff as well as consultants, documentation, and entering orders.

## 2025-07-29 NOTE — PROGRESS NOTES
Ibrahima Nava  [id number]  1945   LOS: 14 days   Patient Care Team:  Sebastien Lowe MD as PCP - General  Sebastien Lowe MD as PCP - Family Medicine  Bacilio Winn MD as Consulting Physician (Cardiology)    Chief Complaint:  PAF & SALVADOR/ECV / HFpEF / ANGINA / SEVERE IRON DEFICIENCY / MORBID OBESITY / HTN    Subjective     No acute events.  In NSR with frequent pacs.  Last QTC acceptable.      Objective     Vital Sign Min/Max for last 24 hours  Temp  Min: 97.5 °F (36.4 °C)  Max: 98.3 °F (36.8 °C)   BP  Min: 141/86  Max: 165/73   Pulse  Min: 71  Max: 78   Resp  Min: 14  Max: 18   SpO2  Min: 93 %  Max: 95 %               07/17/25  0935 07/18/25  1608   Weight: 133 kg (293 lb 3.4 oz) 133 kg (293 lb 3.4 oz)         Intake/Output Summary (Last 24 hours) at 7/29/2025 1149  Last data filed at 7/29/2025 0523  Gross per 24 hour   Intake 118 ml   Output 900 ml   Net -782 ml       Physical Exam:     General Appearance:    Alert, cooperative, in no acute distress   Lungs:   Overall diminished breath sounds with left basilar crackles,respirations regular, even and unlabored    Heart:    Irregular and normal rate, normal S1 and S2, grade 1/6 systolic murmur, no gallop, no rub, no click.  Edema present.   Abdomen:  Extremities:   Soft, nontender, bowel sounds audible x4    No edema, normal range of motion          Results Review:   Results from last 7 days   Lab Units 07/29/25  1041 07/28/25  0532 07/27/25  2147 07/27/25  1116   SODIUM mmol/L 146* 145  --  148*   POTASSIUM mmol/L 2.6* 4.0 4.0 3.2*   CHLORIDE mmol/L 105 108*  --  107   CO2 mmol/L 31.4* 27.7  --  32.0*   BUN mg/dL 16.7 14.0  --  15.2   CREATININE mg/dL 0.83 0.76  --  0.81   GLUCOSE mg/dL 125* 115*  --  140*   CALCIUM mg/dL 8.7 8.9  --  8.6     Results from last 7 days   Lab Units 07/29/25  0545 07/28/25  0532 07/27/25  1116   WBC 10*3/mm3 5.14 6.11 7.55   HEMOGLOBIN g/dL 8.2* 8.4* 8.7*   HEMATOCRIT % 27.5* 28.9* 29.5*   PLATELETS 10*3/mm3 84* 89* 108*      PHOSPHORUS: 2.9    ALBUMIN: 3.3    NO NEW CXR.    EKG:    Underlying sinus rhythm with atrial bigeminy  Otherwise normal ECG  When compared with ECG of 24-Jul-2025 08:12,  Nonspecific T wave abnormality no longer evident in Lateral leads    Medication Review: REVIEWED; NO DRIPS.    Assessment  -Afib s/p cardioversion, maintaining NSR  -GI bleed, tolerating anticoagulation  -prior CVA  -EKG QTC acceptable, echo preserved LV function    Plan  -cont. Eliquis 5 mg bid.  tolerating  -cont. Amiodarone 200 mg and BB  -prn diuretics  -cont. Lipid lowering therapy    Ric Novoa MD

## 2025-07-29 NOTE — THERAPY TREATMENT NOTE
Patient Name: Ibrahima Nava  : 1945    MRN: [id number]                              Today's Date: 2025       Admit Date: 7/15/2025    Visit Dx:     ICD-10-CM ICD-9-CM   1. Cognitive communication deficit  R41.841 799.52   2. Aphasia  R47.01 784.3   3. Esophageal dysphagia  R13.19 787.29   4. Altered mental status, unspecified altered mental status type  R41.82 780.97   5. Hypokalemia  E87.6 276.8   6. History of stroke  Z86.73 V12.54   7. Primary sleep apnea of , unspecified type  P28.30 770.81   8. Sleep apnea, unspecified type  G47.30 780.57   9. 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 obesity)  E66.813 278.01   10. Iron deficiency anemia due to chronic blood loss  D50.0 280.0   11. GI (gastrointestinal bleed)  K92.2 578.9     Patient Active Problem List   Diagnosis    Primary localized osteoarthrosis of shoulder region    Hypertension    Impaired physical mobility    Acute pain of left shoulder    Status post reverse arthroplasty of shoulder, left    CVA (cerebral vascular accident)    Mediastinal mass    Iron deficiency anemia due to chronic blood loss     Past Medical History:   Diagnosis Date    Anxiety     Arthritis     Hypertension     CONTROLLED WITH MEDS PER PT     Shoulder pain, left     Sleep apnea     did not tolerate CPAP    Stroke     -- NO RESIDUAL PROBLEMS    Tremor of right hand     Wears dentures     UPPER AND LOWER PLATE      Past Surgical History:   Procedure Laterality Date    CARDIAC CATHETERIZATION      NO CORONARY STENTS     COLONOSCOPY      COLONOSCOPY N/A 2025    Procedure: COLONOSCOPY;  Surgeon: Brunner, Mark I, MD;  Location:  [facility];  Service: Gastroenterology;  Laterality: N/A;    ENDOSCOPY N/A 2025    Procedure: ESOPHAGOGASTRODUODENOSCOPY;  Surgeon: Brunner, Mark I, MD;  Location:  [facility];  Service: Gastroenterology;  Laterality: N/A;    LA ARTHROPLASTY GLENOHUMERAL JOINT TOTAL SHOULDER Left 2017    Procedure: LEFT  TOTAL SHOULDER ARTHROPLASTY ;  Surgeon: Michael Xavier MD;  Location:  [facility];  Service: Orthopedics    TOTAL KNEE ARTHROPLASTY Bilateral     TOTAL SHOULDER REPLACEMENT Right     TOTAL SHOULDER REVISION Left 5/20/2024    Procedure: REVISION TOTAL SHOULDER ARTHROPLASTY TO REVERSE TOTAL SHOULDER ATHROPLASTY - LEFT;  Surgeon: Michael Xavier MD;  Location:  [facility];  Service: Orthopedics;  Laterality: Left;      General Information       Row Name 07/29/25 0955          Physical Therapy Time and Intention    Document Type therapy note (daily note)  -CK     Mode of Treatment physical therapy;individual therapy  -CK       Row Name 07/29/25 0955          General Information    Patient Profile Reviewed yes  -CK     Existing Precautions/Restrictions fall;other (see comments);oxygen therapy device and L/min  brief with mobility, monitor HR, tremors  -CK     Barriers to Rehab medically complex;hearing deficit  -CK       Row Name 07/29/25 0955          Cognition    Orientation Status (Cognition) oriented x 4  -CK       Row Name 07/29/25 0955          Safety Issues/Impairments Affecting Functional Mobility    Safety Issues Affecting Function (Mobility) awareness of need for assistance;insight into deficits/self-awareness;safety precaution awareness;safety precautions follow-through/compliance;sequencing abilities  -CK     Impairments Affecting Function (Mobility) balance;endurance/activity tolerance;postural/trunk control;strength;shortness of breath  -CK               User Key  (r) = Recorded By, (t) = Taken By, (c) = Cosigned By      Initials Name Provider Type    CK Zehra Allan, ALLYSSA Physical Therapist                   Mobility       Row Name 07/29/25 0956          Bed Mobility    Bed Mobility supine-sit  -CK     Supine-Sit Gobles (Bed Mobility) contact guard  -CK     Assistive Device (Bed Mobility) head of bed elevated;bed rails  -CK     Comment, (Bed Mobility) increased time and effort to achieve  EOB sitting, patient heavily reliant on bedrail use  -CK       Row Name 07/29/25 0956          Bed-Chair Transfer    Bed-Chair Mower (Transfers) minimum assist (75% patient effort);1 person assist;verbal cues  -CK     Assistive Device (Bed-Chair Transfers) other (see comments)  armrest of chair  -CK     Comment, (Bed-Chair Transfer) patient able to sidestep and turn as needed while using armrests of chair for support, encouraged use of FWW for safety however patient declined  -CK       Row Name 07/29/25 0956          Sit-Stand Transfer    Sit-Stand Mower (Transfers) minimum assist (75% patient effort)  -CK     Assistive Device (Sit-Stand Transfers) other (see comments)  armrests of chair  -CK     Comment, (Sit-Stand Transfer) patient stood from EOB with armrest support of chair, he relied heavily on anterior weightshifting to clear his hips from bed, encouraged use of FWW for support but patient declined  -CK       Row Name 07/29/25 0956          Gait/Stairs (Locomotion)    Mower Level (Gait) unable to assess  -CK     Comment, (Gait/Stairs) patient declined due to fatigue and nausea today  -CK               User Key  (r) = Recorded By, (t) = Taken By, (c) = Cosigned By      Initials Name Provider Type    CK Zehra Allan PT Physical Therapist                   Obj/Interventions       Row Name 07/29/25 0958          Motor Skills    Therapeutic Exercise hip;ankle;other (see comments)  encouraged ankle pumps and heel slides at the very least 2-3x/day  -CK       Row Name 07/29/25 0958          Hip (Therapeutic Exercise)    Hip (Therapeutic Exercise) strengthening exercise  -CK     Hip Strengthening (Therapeutic Exercise) bilateral;heel slides;5 repetitions  -CK       Row Name 07/29/25 0958          Ankle (Therapeutic Exercise)    Ankle (Therapeutic Exercise) AROM (active range of motion)  -CK     Ankle AROM (Therapeutic Exercise) bilateral;dorsiflexion;plantarflexion;10 repetitions  -CK        Row Name 07/29/25 0958          Balance    Balance Assessment sitting static balance;standing static balance;standing dynamic balance  -CK     Static Sitting Balance supervision  -CK     Position, Sitting Balance unsupported;sitting edge of bed  -CK     Static Standing Balance contact guard  -CK     Dynamic Standing Balance minimal assist  -CK     Position/Device Used, Standing Balance supported;other (see comments)  armrest of chair  -CK     Comment, Balance unsteady with transitional movements, encouraged FWW use for transfer, but patient declined  -CK               User Key  (r) = Recorded By, (t) = Taken By, (c) = Cosigned By      Initials Name Provider Type    CK Zehra Allan PT Physical Therapist                   Goals/Plan    No documentation.                  Clinical Impression       Row Name 07/29/25 0959          Pain    Pretreatment Pain Rating 0/10 - no pain  -CK     Posttreatment Pain Rating 0/10 - no pain  -CK       Row Name 07/29/25 0959          Plan of Care Review    Plan of Care Reviewed With patient;spouse  -CK     Progress no change  -CK     Outcome Evaluation Patient limited today by increased fatigue and nausea. He gave good effort with transfers and brief BLE therex, but continues to be impaired by significant endurance deficits as well as limited safety awareness. IPPT will continue to follow to address current deficits. Continue to recommend D/C to SNF.  -CK       Row Name 07/29/25 0959          Vital Signs    Pre Systolic BP Rehab 153  -CK     Pre Treatment Diastolic BP 99  -CK     Pretreatment Heart Rate (beats/min) 77  -CK     Posttreatment Heart Rate (beats/min) 101  -CK     Pre SpO2 (%) 94  -CK     O2 Delivery Pre Treatment nasal cannula  -CK     O2 Delivery Intra Treatment nasal cannula  -CK     Post SpO2 (%) 92  -CK     O2 Delivery Post Treatment nasal cannula  -CK     Pre Patient Position Supine  -CK     Post Patient Position Sitting  -CK       Row Name 07/29/25 0959           Positioning and Restraints    Pre-Treatment Position in bed  -CK     Post Treatment Position chair  -CK     In Chair reclined;call light within reach;encouraged to call for assist;exit alarm on;with family/caregiver;waffle cushion;notified nsg  -CK               User Key  (r) = Recorded By, (t) = Taken By, (c) = Cosigned By      Initials Name Provider Type    Zehra Cuello PT Physical Therapist                   Outcome Measures       Row Name 07/29/25 1001          How much help from another person do you currently need...    Turning from your back to your side while in flat bed without using bedrails? 3  -CK     Moving from lying on back to sitting on the side of a flat bed without bedrails? 3  -CK     Moving to and from a bed to a chair (including a wheelchair)? 3  -CK     Standing up from a chair using your arms (e.g., wheelchair, bedside chair)? 3  -CK     Climbing 3-5 steps with a railing? 2  -CK     To walk in hospital room? 3  -CK     AM-PAC 6 Clicks Score (PT) 17  -CK     Highest Level of Mobility Goal Stand (1 or More Minutes)-5  -CK       Row Name 07/29/25 1001          Functional Assessment    Outcome Measure Options AM-PAC 6 Clicks Basic Mobility (PT)  -CK               User Key  (r) = Recorded By, (t) = Taken By, (c) = Cosigned By      Initials Name Provider Type    Zehra Cuello PT Physical Therapist                                 Physical Therapy Education       Title: PT OT SLP Therapies (In Progress)       Topic: Physical Therapy (Done)       Point: Mobility training (Done)       Learning Progress Summary            Patient Acceptance, E, VU by CK at 7/29/2025 1001    Acceptance, E, VU,NR by LS at 7/27/2025 1540    Acceptance, E, VU by CK at 7/22/2025 1518    Acceptance, E, VU,NR by CD at 7/21/2025 1539    Comment: SEE FLOWSHEET    CRISTIN Kamara, VU by SC at 7/17/2025 1155    Comment: reviewed benefits of activity    CRISTIN Kamara VU,DU by SC at 7/17/2025 1152    Comment: reviewed HEP     Acceptance, E, VU by KR at 7/16/2025 1410   Family Acceptance, E, VU by KR at 7/16/2025 1410   Significant Other Acceptance, E, VU by CK at 7/29/2025 1001                      Point: Home exercise program (Done)       Learning Progress Summary            Patient Acceptance, E, VU by CK at 7/29/2025 1001    Acceptance, E, VU,NR by CD at 7/21/2025 1539    Comment: SEE FLOWSHEET    Eager, E, VU by SC at 7/17/2025 1155    Comment: reviewed benefits of activity    Eager, E, VU,DU by SC at 7/17/2025 1152    Comment: reviewed HEP   Significant Other Acceptance, E, VU by CK at 7/29/2025 1001                      Point: Body mechanics (Done)       Learning Progress Summary            Patient Acceptance, E, VU by CK at 7/29/2025 1001    Acceptance, E, VU by CK at 7/22/2025 1518    Acceptance, E, VU,NR by CD at 7/21/2025 1539    Comment: SEE FLOWSHEET    Eager, E, VU by SC at 7/17/2025 1155    Comment: reviewed benefits of activity    Eager, E, VU,DU by SC at 7/17/2025 1152    Comment: reviewed HEP    Acceptance, E, VU by KR at 7/16/2025 1410   Family Acceptance, E, VU by KR at 7/16/2025 1410   Significant Other Acceptance, E, VU by CK at 7/29/2025 1001                      Point: Precautions (Done)       Learning Progress Summary            Patient Acceptance, E, VU by CK at 7/29/2025 1001    Acceptance, E, VU by CK at 7/22/2025 1518    Acceptance, E, VU,NR by CD at 7/21/2025 1539    Comment: SEE FLOWSHEET    Eager, E, VU by SC at 7/17/2025 1155    Comment: reviewed benefits of activity    Eager, E, VU,DU by SC at 7/17/2025 1152    Comment: reviewed HEP    Acceptance, E, VU by KR at 7/16/2025 1410   Family Acceptance, E, VU by KR at 7/16/2025 1410   Significant Other Acceptance, E, VU by CK at 7/29/2025 1001                                      User Key       Initials Effective Dates Name Provider Type Discipline    SC 02/03/23 -  Marian Howard, PT Physical Therapist PT    CD 02/03/23 -  Marla Hernández, PT Physical  Therapist PT    LS 07/11/23 -  Akanksha Abreu, PT Physical Therapist PT    CK 02/06/24 -  Zehra Allan, PT Physical Therapist PT    KR 12/30/22 -  Coco Casarez, PT Physical Therapist PT                  PT Recommendation and Plan     Progress: no change  Outcome Evaluation: Patient limited today by increased fatigue and nausea. He gave good effort with transfers and brief BLE therex, but continues to be impaired by significant endurance deficits as well as limited safety awareness. IPPT will continue to follow to address current deficits. Continue to recommend D/C to SNF.     Time Calculation:         PT Charges       Row Name 07/29/25 1001             Time Calculation    Start Time 0850  -CK      PT Received On 07/29/25  -CK         Timed Charges    23105 - PT Therapeutic Exercise Minutes 8  -CK      07942 - PT Therapeutic Activity Minutes 16  -CK         Total Minutes    Timed Charges Total Minutes 24  -CK       Total Minutes 24  -CK                User Key  (r) = Recorded By, (t) = Taken By, (c) = Cosigned By      Initials Name Provider Type    CK Zehra Allan, PT Physical Therapist                  Therapy Charges for Today       Code Description Service Date Service Provider Modifiers Qty    17778156058 HC PT THER PROC EA 15 MIN 7/29/2025 Zehra Allan, PT GP 1    73374108941 HC PT THERAPEUTIC ACT EA 15 MIN 7/29/2025 Zehra Allan, PT GP 1            PT G-Codes  Outcome Measure Options: AM-PAC 6 Clicks Basic Mobility (PT)  AM-PAC 6 Clicks Score (PT): 17  AM-PAC 6 Clicks Score (OT): 17  Modified Bonner Scale: 2 - Slight disability.  Unable to carry out all previous activities but able to look after own affairs without assistance.  PT Discharge Summary  Anticipated Discharge Disposition (PT): skilled nursing facility    Zehra Allan PT  7/29/2025

## 2025-07-29 NOTE — PLAN OF CARE
Goal Outcome Evaluation:  Plan of Care Reviewed With: patient, spouse        Progress: no change  Outcome Evaluation: Patient limited today by increased fatigue and nausea. He gave good effort with transfers and brief BLE therex, but continues to be impaired by significant endurance deficits as well as limited safety awareness. IPPT will continue to follow to address current deficits. Continue to recommend D/C to SNF.    Anticipated Discharge Disposition (PT): skilled nursing facility

## 2025-07-30 LAB
CYTO UR: NORMAL
LAB AP CASE REPORT: NORMAL
LAB AP CLINICAL INFORMATION: NORMAL
PATH REPORT.FINAL DX SPEC: NORMAL
PATH REPORT.GROSS SPEC: NORMAL

## 2025-08-02 LAB
QT INTERVAL: 388 MS
QTC INTERVAL: 469 MS

## (undated) DEVICE — SUT MONOCRYL PLS ANTIB UND 3/0  PS1 27IN

## (undated) DEVICE — PATIENT RETURN ELECTRODE, SINGLE-USE, CONTACT QUALITY MONITORING, ADULT, WITH 9FT CORD, FOR PATIENTS WEIGING OVER 33LBS. (15KG): Brand: MEGADYNE

## (undated) DEVICE — CONTN GRAD MEAS TRIANG 32OZ BLK

## (undated) DEVICE — ANTIBACTERIAL UNDYED BRAIDED (POLYGLACTIN 910), SYNTHETIC ABSORBABLE SUTURE: Brand: COATED VICRYL

## (undated) DEVICE — THE BITE BLOCK MAXI, LATEX FREE STRAP IS USED TO PROTECT THE ENDOSCOPE INSERTION TUBE FROM BEING BITTEN BY THE PATIENT.

## (undated) DEVICE — BIT DRL EQUINOXE 2 AND 3.2MM

## (undated) DEVICE — GLV SURG TRIUMPH CLASSIC PF LTX 7.5 STRL

## (undated) DEVICE — SLNG ULTRSLING3 13TO15IN LG

## (undated) DEVICE — GOWN,NON-REINFORCED,SIRUS,SET IN SLV,XL: Brand: MEDLINE

## (undated) DEVICE — SYR LUERLOK 50ML

## (undated) DEVICE — COVER,MAYO STAND,STERILE: Brand: MEDLINE

## (undated) DEVICE — AIR/WATER CLEANING VALVES: Brand: AIR/WATER CLEANING VALVES

## (undated) DEVICE — T-MAX DISPOSABLE FACE MASK 8 PER BOX

## (undated) DEVICE — SOLIDIFIER LIQ PREMISORB 1500CC

## (undated) DEVICE — TUBING, SUCTION, 1/4" X 10', STRAIGHT: Brand: MEDLINE

## (undated) DEVICE — BIPOLAR ELECTROHEMOSTASIS CATHETER: Brand: INJECTION GOLD PROBE

## (undated) DEVICE — CVR HNDL LT SURG ACCSSRY BLU STRL

## (undated) DEVICE — DRSNG SURG AQUACEL AG/ADVNTGE 9X25CM 3.5X10IN

## (undated) DEVICE — SKN PREP SPNG STKS PVP PNT STR: Brand: MEDLINE INDUSTRIES, INC.

## (undated) DEVICE — ADHS SKIN PREMIERPRO EXOFIN TOPICAL HI/VISC .5ML

## (undated) DEVICE — LUBE JELLY FOIL PACK 1.4 OZ: Brand: MEDLINE INDUSTRIES, INC.

## (undated) DEVICE — PK MAJ SHLDR SPLT 10

## (undated) DEVICE — SOL IRR H2O BO 1000ML STRL

## (undated) DEVICE — ST LINER SAFECAP GRN RED CP STRL

## (undated) DEVICE — SYR CATH/TIP 50ML 2OZ STRL 1P/U

## (undated) DEVICE — SST TWIST DRILL, STANDARD, 2.4MM DIA. X 127MM: Brand: MICROAIRE®

## (undated) DEVICE — SINGLE USE LIGATING DEVICE: Brand: SINGLE USE LIGATING DEVICE

## (undated) DEVICE — DRAPE,SHOULDER,BEACH CHAIR,STERILE: Brand: MEDLINE

## (undated) DEVICE — T4 PULLOVER TOGA, LARGE

## (undated) DEVICE — BLANKT WARM LOWR/BDY 100X120CM

## (undated) DEVICE — SUT VIC 0 TIES 18IN J912G

## (undated) DEVICE — SAFELINER SUCTION CANISTER 1000CC: Brand: DEROYAL

## (undated) DEVICE — HYBRID CO2 TUBING/CAP SET FOR OLYMPUS® SCOPES & CO2 SOURCE: Brand: ERBE

## (undated) DEVICE — PAD,ARMBOARD,CONV,FOAM,2X8X20",12PR/CS: Brand: MEDLINE

## (undated) DEVICE — STRYKER PERFORMANCE SERIES SAGITTAL BLADE: Brand: STRYKER PERFORMANCE SERIES

## (undated) DEVICE — SNAR POLYP CAPTIVATOR MICROHEX 13 240CM

## (undated) DEVICE — ELECTRD BLD EZ CLN STD 6.5IN

## (undated) DEVICE — GRADUATE CONTN 1000ML

## (undated) DEVICE — INTRO ACCSR BLNT TP

## (undated) DEVICE — SYR LUERLOK 30CC

## (undated) DEVICE — PENCL SMOKE/EVAC MEGADYNE TELESCP 10FT

## (undated) DEVICE — KT PUMP INFUBLOCK MDL 2100 PMKITSOLIS

## (undated) DEVICE — HANDPIECE SET WITH HIGH FLOW TIP AND SUCTION TUBE: Brand: INTERPULSE

## (undated) DEVICE — SKIN AFFIX SURG ADHESIVE 72/CS 0.55ML: Brand: MEDLINE

## (undated) DEVICE — FIRST STEP BEDSIDE ADD WATER KIT - RESEALABLE STAND-UP POUCH, ENDOSCOPIC CLEANING PAD - 1 POUCH: Brand: FIRST STEP BEDSIDE ADD WATER KIT - RESEALABLE STAND-UP POUCH, ENDOSCOPIC CLEANIN

## (undated) DEVICE — KT ORCA ORCAPOD DISP STRL

## (undated) DEVICE — GLV SURG SIGNATURE TOUCH PF LTX 8 STRL BX/50

## (undated) DEVICE — POSTN HD UNIV

## (undated) DEVICE — CONTAINER,SPECIMEN,OR STERILE,4OZ: Brand: MEDLINE

## (undated) DEVICE — KT PUMP PAIN ONQ CBLOC SELCTAFLO 400ML

## (undated) DEVICE — 3 BONE CEMENT MIXER: Brand: MIXEVAC